# Patient Record
Sex: MALE | Race: WHITE | Employment: FULL TIME | URBAN - METROPOLITAN AREA
[De-identification: names, ages, dates, MRNs, and addresses within clinical notes are randomized per-mention and may not be internally consistent; named-entity substitution may affect disease eponyms.]

---

## 2017-03-24 ENCOUNTER — TELEPHONE (OUTPATIENT)
Dept: FAMILY MEDICINE CLINIC | Facility: CLINIC | Age: 57
End: 2017-03-24

## 2017-03-24 DIAGNOSIS — R73.01 IMPAIRED FASTING GLUCOSE: ICD-10-CM

## 2017-03-24 DIAGNOSIS — E78.5 DYSLIPIDEMIA: Primary | ICD-10-CM

## 2017-03-24 DIAGNOSIS — E03.8 SUBCLINICAL HYPOTHYROIDISM: ICD-10-CM

## 2017-03-24 DIAGNOSIS — I10 ESSENTIAL HYPERTENSION: ICD-10-CM

## 2017-03-24 RX ORDER — LOSARTAN POTASSIUM AND HYDROCHLOROTHIAZIDE 25; 100 MG/1; MG/1
TABLET ORAL
Qty: 30 TABLET | Refills: 0 | Status: SHIPPED | OUTPATIENT
Start: 2017-03-24 | End: 2017-03-31

## 2017-03-24 NOTE — TELEPHONE ENCOUNTER
Please call pt as he needs a BP check with Dr Sumeet Shankar, Lorsartan/HCTZ refill only #30 pended for approval.

## 2017-03-24 NOTE — TELEPHONE ENCOUNTER
PT has appointment 3/31/17. He would like to do labs before appt on 3/27/17. Please advise pt when in system. Thank you.

## 2017-03-24 NOTE — TELEPHONE ENCOUNTER
Call to pt-advised edward fasting lab orders have been placed. Pt mentions pharamcy told him he could get his losartan refill if he just calls the office. Informed pt dr Graham Burrell already sent refill for losartan-HCTZ to Take the Interview today.   Patient voices unders

## 2017-03-24 NOTE — TELEPHONE ENCOUNTER
Failed protocol,as pt has not seen your for 8months. Phone call for front staff to call pt and make an appt.  BP CHECK

## 2017-03-24 NOTE — TELEPHONE ENCOUNTER
Pt is scheduled for med visit 3/31/17  Last ofc visit 7/16 with recommendation to follow up in 2 wks-never returned. Last lab orders placed in 2015 never done. Please confirm what labs you want for this visit-CMP, A1C,  lipids, tsh with reflex?  If so, s

## 2017-03-27 ENCOUNTER — APPOINTMENT (OUTPATIENT)
Dept: LAB | Age: 57
End: 2017-03-27
Attending: FAMILY MEDICINE
Payer: COMMERCIAL

## 2017-03-27 DIAGNOSIS — R73.01 IMPAIRED FASTING GLUCOSE: ICD-10-CM

## 2017-03-27 DIAGNOSIS — E78.5 DYSLIPIDEMIA: ICD-10-CM

## 2017-03-27 DIAGNOSIS — I10 ESSENTIAL HYPERTENSION: ICD-10-CM

## 2017-03-27 DIAGNOSIS — E03.8 SUBCLINICAL HYPOTHYROIDISM: ICD-10-CM

## 2017-03-27 LAB
ALBUMIN SERPL-MCNC: 3.4 G/DL (ref 3.5–4.8)
ALP LIVER SERPL-CCNC: 118 U/L (ref 45–117)
ALT SERPL-CCNC: 57 U/L (ref 17–63)
AST SERPL-CCNC: 48 U/L (ref 15–41)
BILIRUB SERPL-MCNC: 0.5 MG/DL (ref 0.1–2)
BUN BLD-MCNC: 22 MG/DL (ref 8–20)
CALCIUM BLD-MCNC: 9.9 MG/DL (ref 8.3–10.3)
CHLORIDE: 99 MMOL/L (ref 101–111)
CHOLEST SMN-MCNC: 209 MG/DL (ref ?–200)
CO2: 29 MMOL/L (ref 22–32)
CREAT BLD-MCNC: 1.08 MG/DL (ref 0.7–1.3)
EST. AVERAGE GLUCOSE BLD GHB EST-MCNC: 309 MG/DL (ref 68–126)
GLUCOSE BLD-MCNC: 334 MG/DL (ref 70–99)
HBA1C MFR BLD HPLC: 12.4 % (ref ?–5.7)
HDLC SERPL-MCNC: 30 MG/DL (ref 45–?)
HDLC SERPL: 6.97 {RATIO} (ref ?–4.97)
LDLC SERPL CALC-MCNC: 124 MG/DL (ref ?–130)
M PROTEIN MFR SERPL ELPH: 8.8 G/DL (ref 6.1–8.3)
NONHDLC SERPL-MCNC: 179 MG/DL (ref ?–130)
POTASSIUM SERPL-SCNC: 4.1 MMOL/L (ref 3.6–5.1)
SODIUM SERPL-SCNC: 136 MMOL/L (ref 136–144)
TRIGLYCERIDES: 277 MG/DL (ref ?–150)
TSI SER-ACNC: 5.1 MIU/ML (ref 0.35–5.5)
VLDL: 55 MG/DL (ref 5–40)

## 2017-03-27 PROCEDURE — 80053 COMPREHEN METABOLIC PANEL: CPT

## 2017-03-27 PROCEDURE — 84443 ASSAY THYROID STIM HORMONE: CPT

## 2017-03-27 PROCEDURE — 83036 HEMOGLOBIN GLYCOSYLATED A1C: CPT

## 2017-03-27 PROCEDURE — 36415 COLL VENOUS BLD VENIPUNCTURE: CPT

## 2017-03-27 PROCEDURE — 80061 LIPID PANEL: CPT

## 2017-03-31 ENCOUNTER — OFFICE VISIT (OUTPATIENT)
Dept: FAMILY MEDICINE CLINIC | Facility: CLINIC | Age: 57
End: 2017-03-31

## 2017-03-31 VITALS
BODY MASS INDEX: 42.2 KG/M2 | WEIGHT: 315 LBS | HEIGHT: 72.5 IN | SYSTOLIC BLOOD PRESSURE: 160 MMHG | DIASTOLIC BLOOD PRESSURE: 100 MMHG | RESPIRATION RATE: 18 BRPM | HEART RATE: 88 BPM | TEMPERATURE: 98 F

## 2017-03-31 DIAGNOSIS — E11.40 UNCONTROLLED TYPE 2 DIABETES MELLITUS WITH DIABETIC NEUROPATHY, WITHOUT LONG-TERM CURRENT USE OF INSULIN (HCC): Primary | ICD-10-CM

## 2017-03-31 DIAGNOSIS — E11.69 HYPERLIPIDEMIA DUE TO TYPE 2 DIABETES MELLITUS (HCC): ICD-10-CM

## 2017-03-31 DIAGNOSIS — E66.01 MORBID OBESITY WITH BMI OF 45.0-49.9, ADULT (HCC): ICD-10-CM

## 2017-03-31 DIAGNOSIS — E78.5 HYPERLIPIDEMIA DUE TO TYPE 2 DIABETES MELLITUS (HCC): ICD-10-CM

## 2017-03-31 DIAGNOSIS — I10 HYPERTENSION GOAL BP (BLOOD PRESSURE) < 130/80: ICD-10-CM

## 2017-03-31 DIAGNOSIS — E11.65 UNCONTROLLED TYPE 2 DIABETES MELLITUS WITH DIABETIC NEUROPATHY, WITHOUT LONG-TERM CURRENT USE OF INSULIN (HCC): Primary | ICD-10-CM

## 2017-03-31 PROCEDURE — 99215 OFFICE O/P EST HI 40 MIN: CPT | Performed by: FAMILY MEDICINE

## 2017-03-31 RX ORDER — LOSARTAN POTASSIUM AND HYDROCHLOROTHIAZIDE 25; 100 MG/1; MG/1
1 TABLET ORAL
Qty: 90 TABLET | Refills: 0 | Status: SHIPPED | OUTPATIENT
Start: 2017-03-31 | End: 2017-07-10

## 2017-03-31 NOTE — PATIENT INSTRUCTIONS
Controlling Your Cholesterol  Cholesterol is a waxy substance. It travels in your blood through the blood vessels. When you have high cholesterol, it builds up in the walls of the blood vessels. This makes the vessels narrower. Blood flow decreases.  You · Choose an activity you enjoy. Walking, swimming, and riding a bike are some good ways to be active. · Start at a level where you feel comfortable. Increase your time and pace a little each week.   · Work up to 40 minutes of moderate to high intensity phy How medicine helps  Different kinds of medicines help with cholesterol levels. Some help lower your LDL (bad cholesterol). Some help raise your HDL (good cholesterol). Other medicines lower your triglyceride levels. And some do all three.  It may take some Statins can help you stay healthy. They can also help prevent a heart attack or stroke. You may need to take a statin if you are in one of these groups:  · Adults who have had a heart attack or stroke.  Or adults who have had peripheral vascular disease, a Food is an important tool that you can use to control diabetes and stay healthy. Eating well-balanced meals in the correct amounts will help you control your blood glucose levels and prevent low blood sugar reactions.  It will also help you reduce the healt · Avoid added salt. It can contribute to high blood pressure, which can cause heart disease. People with diabetes already have a risk of high blood pressure and heart disease. · Stay at a healthy weight.  If you need to lose weight, cut down on your portio · Fiber is found in foods such as vegetables, fruits, beans, and whole grains. Unlike other carbs, fiber isn’t digested or absorbed. So it doesn’t raise blood sugar.  In fact, fiber can help keep blood sugar from rising too fast. It also helps keep blood ch Protein helps the body build and repair muscle and other tissue. Protein has little or no effect on blood sugar. However, many foods that contain protein also contain saturated fat.  By choosing low-fat protein sources, you can get the benefits of protein w Keep track of the amount of carbohydrates you eat. This can help you keep the right balance of physical activity and medicine. The amount of carbohydrates needed will vary for each person.  It depends on many things such as your health, the medicines you ta The amount of food you eat affects your blood sugar. It also affects your weight. Your healthcare team will tell you how much of each type of food you should eat. · Use measuring cups and spoons and a food scale to measure serving sizes.   · Learn what a c Portion sizes are important to controlling your blood sugar and staying at a healthy weight. Stock up on healthy snack items so you always have them on hand. When to eat  Your meal plan will likely include breakfast, lunch, dinner, and some snacks.   · Try · Nerve problems (neuropathy), causing pain or loss of feeling in your feet and other parts of your body, potentially leading to an amputation of a limb   · High blood pressure (hypertension), putting strain on your heart and blood vessels  · Serious infec · Listen to your feelings. This will help you work through fear or anger. · Eat the same meals you eat. Your meal plan will be healthy for family and friends, too. · Exercise with you. Exercise is good for everyone.  It strengthens the heart and helps rel © 0371-1212 [address], [address] [place]. All rights reserved. This information is not intended as a substitute for professional medical care. Always follow your healthcare professional's instructions.

## 2017-03-31 NOTE — PROGRESS NOTES
[facility] Family Medicine Office Note  Chief Complaint:   Patient presents with:  Medication Follow-Up: BP CHECK      HPI:   This is a 55year old male coming in for diabetes, HTN, hyperlipidemia and obesity.     1.  Diabetes - The patient presen History:  No family history on file.   Allergies:  No Known Allergies  Current Meds:    Current Outpatient Prescriptions:  MetFORMIN HCl 500 MG Oral Tab 2 tabs PO BID Disp: 360 tablet Rfl: 0   SITagliptin Phosphate (JANUVIA) 100 MG Oral Tab Take 1 tablet (1 hepatosplenomegaly  EXTREMITIES:  Strength intact with 5/5 bilaterally upper and lower extremities, no edema noted; no ulcerations noted in bilateral legs but does have purplish hue and +2 pitting edema consistent with venous stasis, pedal pulses intact bi OV  -  Encourage low fat/low carb diet  -  Recheck lipid panel in 3 months  - DIETITIAN EDUCATION INITIAL, DIET (INTERNAL)    4.   Morbid obesity with BMI 45.0-49.0  -  Encourage strict low fat/low carb diet and exercise  -  Follow up in 3 months and will r

## 2017-04-21 ENCOUNTER — OFFICE VISIT (OUTPATIENT)
Dept: FAMILY MEDICINE CLINIC | Facility: CLINIC | Age: 57
End: 2017-04-21

## 2017-04-21 VITALS
RESPIRATION RATE: 18 BRPM | HEART RATE: 80 BPM | BODY MASS INDEX: 42.2 KG/M2 | TEMPERATURE: 97 F | WEIGHT: 315 LBS | SYSTOLIC BLOOD PRESSURE: 160 MMHG | DIASTOLIC BLOOD PRESSURE: 100 MMHG | OXYGEN SATURATION: 95 % | HEIGHT: 72.5 IN

## 2017-04-21 DIAGNOSIS — E11.65 UNCONTROLLED TYPE 2 DIABETES MELLITUS WITH DIABETIC NEUROPATHY, WITHOUT LONG-TERM CURRENT USE OF INSULIN (HCC): ICD-10-CM

## 2017-04-21 DIAGNOSIS — R07.9 INTERMITTENT CHEST PAIN: Primary | ICD-10-CM

## 2017-04-21 DIAGNOSIS — M54.41 CHRONIC BILATERAL LOW BACK PAIN WITH BILATERAL SCIATICA: ICD-10-CM

## 2017-04-21 DIAGNOSIS — G89.29 CHRONIC BILATERAL LOW BACK PAIN WITH BILATERAL SCIATICA: ICD-10-CM

## 2017-04-21 DIAGNOSIS — E11.40 UNCONTROLLED TYPE 2 DIABETES MELLITUS WITH DIABETIC NEUROPATHY, WITHOUT LONG-TERM CURRENT USE OF INSULIN (HCC): ICD-10-CM

## 2017-04-21 DIAGNOSIS — I10 HYPERTENSION GOAL BP (BLOOD PRESSURE) < 130/80: ICD-10-CM

## 2017-04-21 DIAGNOSIS — M54.42 CHRONIC BILATERAL LOW BACK PAIN WITH BILATERAL SCIATICA: ICD-10-CM

## 2017-04-21 PROCEDURE — 93000 ELECTROCARDIOGRAM COMPLETE: CPT | Performed by: FAMILY MEDICINE

## 2017-04-21 PROCEDURE — 99214 OFFICE O/P EST MOD 30 MIN: CPT | Performed by: FAMILY MEDICINE

## 2017-04-21 RX ORDER — METOPROLOL SUCCINATE 100 MG/1
100 TABLET, EXTENDED RELEASE ORAL DAILY
Qty: 30 TABLET | Refills: 0 | Status: SHIPPED | OUTPATIENT
Start: 2017-04-21 | End: 2017-07-10 | Stop reason: DRUGHIGH

## 2017-04-21 NOTE — PROGRESS NOTES
[facility] Family Medicine Office Note  Chief Complaint:   Patient presents with:  Pain: right upper chest and upper rib to axilla       HPI:   This is a 55year old male coming in for intermittent right chest pain, diabetes and HTN.     1.  Inter ago.  Revised in 1995     Social History:    Smoking Status: Never Smoker                      Smokeless Status: Never Used                        Alcohol Use: No              Family History:  History reviewed. No pertinent family history.   Allergies:  No auscultation bilaterally, no rales/rhonchi/wheezing  HEART:  Regular rate and rhythm, no murmurs, rubs or gallops  CHEST:  + TTP of right upper pectoralis muscle  ABDOMEN:  Soft, nondistended, nontender, bowel sounds normal in all 4 quadrants, no hepatospl 07/09/2011  Influenza Vaccine(1) due on 09/01/2016  Annual Physical due on 09/17/2016    Patient/Caregiver Education: Patient/Caregiver Education: There are no barriers to learning. Medical education done. Outcome: Patient verbalizes understanding.  Yaquelin

## 2017-04-21 NOTE — PATIENT INSTRUCTIONS
Uncertain Causes of Chest Pain    Chest pain can happen for a number of reasons. Sometimes the cause can't be determined.  If your condition does not seem serious, and your pain does not appear to be coming from your heart, your healthcare provider may re © 2525-6039 [address], [address] [place]. All rights reserved. This information is not intended as a substitute for professional medical care. Always follow your healthcare professional's instructions.         Jovi Mckeon · Cough with dark colored sputum (phlegm) or blood  · Fever of 100.4ºF (38ºC) or higher, or as directed by your healthcare provider  · Swelling, pain or redness in one leg  · Shortness of breath  Date Last Reviewed: 12/30/2015  © 6297-7982 [employer] ¨ Don’t add salt to your food at the table. ¨ Use only small amounts of salt when cooking. · Begin an exercise program. Talk with your health care provider about what exercise program is best for you. It doesn’t have to be difficult.  Even brisk walking f · Difficulty speaking or seeing   Date Last Reviewed: 11/25/2014  © 2525-1166 The [address] [place] [address], [address] [place] [address]. All rights reserved. This information is not intended as a substitute for professional medical care.  Always

## 2017-05-02 ENCOUNTER — TELEPHONE (OUTPATIENT)
Dept: FAMILY MEDICINE CLINIC | Facility: CLINIC | Age: 57
End: 2017-05-02

## 2017-05-02 NOTE — TELEPHONE ENCOUNTER
Please call patient to schedule a nurse visit to recheck their Blood pressure. Pt BP was elevated at last OV with PCP.

## 2017-07-06 ENCOUNTER — TELEPHONE (OUTPATIENT)
Dept: FAMILY MEDICINE CLINIC | Facility: CLINIC | Age: 57
End: 2017-07-06

## 2017-07-06 NOTE — TELEPHONE ENCOUNTER
Please call patient. Due for visit with Dr. Per Burris and labs for DM. Orders are in for labs. Please schedule follow up visit.

## 2017-07-10 ENCOUNTER — OFFICE VISIT (OUTPATIENT)
Dept: FAMILY MEDICINE CLINIC | Facility: CLINIC | Age: 57
End: 2017-07-10

## 2017-07-10 ENCOUNTER — LAB ENCOUNTER (OUTPATIENT)
Dept: LAB | Age: 57
End: 2017-07-10
Attending: FAMILY MEDICINE
Payer: COMMERCIAL

## 2017-07-10 VITALS
DIASTOLIC BLOOD PRESSURE: 100 MMHG | HEIGHT: 72.5 IN | RESPIRATION RATE: 18 BRPM | HEART RATE: 80 BPM | BODY MASS INDEX: 42.2 KG/M2 | SYSTOLIC BLOOD PRESSURE: 180 MMHG | WEIGHT: 315 LBS | TEMPERATURE: 97 F

## 2017-07-10 DIAGNOSIS — E11.40 UNCONTROLLED TYPE 2 DIABETES MELLITUS WITH DIABETIC NEUROPATHY, WITHOUT LONG-TERM CURRENT USE OF INSULIN (HCC): ICD-10-CM

## 2017-07-10 DIAGNOSIS — I10 HYPERTENSION GOAL BP (BLOOD PRESSURE) < 130/80: ICD-10-CM

## 2017-07-10 DIAGNOSIS — I10 ESSENTIAL HYPERTENSION WITH GOAL BLOOD PRESSURE LESS THAN 140/90: ICD-10-CM

## 2017-07-10 DIAGNOSIS — Z12.11 SCREEN FOR COLON CANCER: ICD-10-CM

## 2017-07-10 DIAGNOSIS — E11.65 UNCONTROLLED TYPE 2 DIABETES MELLITUS WITH DIABETIC NEUROPATHY, WITHOUT LONG-TERM CURRENT USE OF INSULIN (HCC): Primary | ICD-10-CM

## 2017-07-10 DIAGNOSIS — E66.01 MORBID OBESITY WITH BMI OF 45.0-49.9, ADULT (HCC): ICD-10-CM

## 2017-07-10 DIAGNOSIS — G47.19 EXCESSIVE DAYTIME SLEEPINESS: ICD-10-CM

## 2017-07-10 DIAGNOSIS — E11.40 UNCONTROLLED TYPE 2 DIABETES MELLITUS WITH DIABETIC NEUROPATHY, WITHOUT LONG-TERM CURRENT USE OF INSULIN (HCC): Primary | ICD-10-CM

## 2017-07-10 DIAGNOSIS — R60.9 PERIPHERAL EDEMA: ICD-10-CM

## 2017-07-10 DIAGNOSIS — E11.65 UNCONTROLLED TYPE 2 DIABETES MELLITUS WITH DIABETIC NEUROPATHY, WITHOUT LONG-TERM CURRENT USE OF INSULIN (HCC): ICD-10-CM

## 2017-07-10 DIAGNOSIS — E11.69 HYPERLIPIDEMIA DUE TO TYPE 2 DIABETES MELLITUS (HCC): ICD-10-CM

## 2017-07-10 DIAGNOSIS — E78.5 HYPERLIPIDEMIA DUE TO TYPE 2 DIABETES MELLITUS (HCC): ICD-10-CM

## 2017-07-10 LAB
ALBUMIN SERPL-MCNC: 3.4 G/DL (ref 3.5–4.8)
ALP LIVER SERPL-CCNC: 95 U/L (ref 45–117)
ALT SERPL-CCNC: 44 U/L (ref 17–63)
AST SERPL-CCNC: 29 U/L (ref 15–41)
BILIRUB SERPL-MCNC: 0.4 MG/DL (ref 0.1–2)
BUN BLD-MCNC: 20 MG/DL (ref 8–20)
CALCIUM BLD-MCNC: 9.2 MG/DL (ref 8.3–10.3)
CHLORIDE: 102 MMOL/L (ref 101–111)
CHOLEST SMN-MCNC: 214 MG/DL (ref ?–200)
CO2: 31 MMOL/L (ref 22–32)
CREAT BLD-MCNC: 1.12 MG/DL (ref 0.7–1.3)
CREAT UR-SCNC: 171 MG/DL
EST. AVERAGE GLUCOSE BLD GHB EST-MCNC: 280 MG/DL (ref 68–126)
GLUCOSE BLD-MCNC: 310 MG/DL (ref 70–99)
HBA1C MFR BLD HPLC: 11.4 % (ref ?–5.7)
HDLC SERPL-MCNC: 31 MG/DL (ref 45–?)
HDLC SERPL: 6.9 {RATIO} (ref ?–4.97)
LDLC SERPL DIRECT ASSAY-MCNC: 133 MG/DL (ref ?–100)
M PROTEIN MFR SERPL ELPH: 8.6 G/DL (ref 6.1–8.3)
MICROALBUMIN UR-MCNC: 3.36 MG/DL
MICROALBUMIN/CREAT 24H UR-RTO: 19.6 UG/MG (ref ?–30)
NONHDLC SERPL-MCNC: 183 MG/DL (ref ?–130)
POTASSIUM SERPL-SCNC: 4.1 MMOL/L (ref 3.6–5.1)
SODIUM SERPL-SCNC: 139 MMOL/L (ref 136–144)
TRIGLYCERIDES: 404 MG/DL (ref ?–150)

## 2017-07-10 PROCEDURE — 82570 ASSAY OF URINE CREATININE: CPT | Performed by: FAMILY MEDICINE

## 2017-07-10 PROCEDURE — 83036 HEMOGLOBIN GLYCOSYLATED A1C: CPT | Performed by: FAMILY MEDICINE

## 2017-07-10 PROCEDURE — 99215 OFFICE O/P EST HI 40 MIN: CPT | Performed by: FAMILY MEDICINE

## 2017-07-10 PROCEDURE — 80053 COMPREHEN METABOLIC PANEL: CPT | Performed by: FAMILY MEDICINE

## 2017-07-10 PROCEDURE — 82043 UR ALBUMIN QUANTITATIVE: CPT | Performed by: FAMILY MEDICINE

## 2017-07-10 PROCEDURE — 36415 COLL VENOUS BLD VENIPUNCTURE: CPT | Performed by: FAMILY MEDICINE

## 2017-07-10 PROCEDURE — 80061 LIPID PANEL: CPT | Performed by: FAMILY MEDICINE

## 2017-07-10 PROCEDURE — 83721 ASSAY OF BLOOD LIPOPROTEIN: CPT | Performed by: FAMILY MEDICINE

## 2017-07-10 RX ORDER — POTASSIUM CHLORIDE 20 MEQ/1
TABLET, EXTENDED RELEASE ORAL
Qty: 30 TABLET | Refills: 0 | Status: SHIPPED | OUTPATIENT
Start: 2017-07-10 | End: 2017-10-10

## 2017-07-10 RX ORDER — LOSARTAN POTASSIUM AND HYDROCHLOROTHIAZIDE 25; 100 MG/1; MG/1
1 TABLET ORAL
Qty: 90 TABLET | Refills: 0 | Status: SHIPPED | OUTPATIENT
Start: 2017-07-10 | End: 2018-01-23

## 2017-07-10 RX ORDER — FUROSEMIDE 20 MG/1
TABLET ORAL
Qty: 60 TABLET | Refills: 0 | Status: SHIPPED | OUTPATIENT
Start: 2017-07-10 | End: 2017-09-23

## 2017-07-10 RX ORDER — METOPROLOL SUCCINATE 200 MG/1
200 TABLET, EXTENDED RELEASE ORAL DAILY
Qty: 90 TABLET | Refills: 0 | Status: SHIPPED | OUTPATIENT
Start: 2017-07-10 | End: 2017-10-26 | Stop reason: ALTCHOICE

## 2017-07-10 NOTE — PATIENT INSTRUCTIONS
Diet: Diabetes  Food is an important tool that you can use to control diabetes and stay healthy. Eating well-balanced meals in the correct amounts will help you control your blood glucose levels and prevent low blood sugar reactions.  It will also help yo · Avoid added salt. It can contribute to high blood pressure, which can cause heart disease. People with diabetes already have a risk of high blood pressure and heart disease. · Stay at a healthy weight.  If you need to lose weight, cut down on your portio · Kidney disease (nephropathy) can eventually lead to kidney failure, which may require dialysis or kidney transplant   · Nerve problems (neuropathy), causing pain or loss of feeling in your feet and other parts of your body, potentially leading to an ampu A blood pressure reading is made up of 2 numbers. There is a top number over a bottom number. The top number is the systolic pressure. The bottom number is the diastolic pressure. A normal blood pressure is less than 120 over less than 80.  High blood press · Learn how to handle stress better. This is an important part of any program to lower blood pressure. Learn ways to relax. These include meditation, yoga, and biofeedback. · If medicines were prescribed, take them exactly as directed.  Missing doses may c

## 2017-07-10 NOTE — PROGRESS NOTES
[facility] Family Medicine Office Note  Chief Complaint:   Patient presents with:  Diabetes: DM, HTN CHECK      HPI:   This is a 65year old male coming in for follow up of DM2, HTN, obesity and excessive daytime sleepiness.     1.  DM2 - The linnea Never Used                      Alcohol use: No              Family History:  History reviewed. No pertinent family history. Allergies:  No Known Allergies  Current Meds:    Current Outpatient Prescriptions:   SITagliptin Phosphate (JANUVIA) 100 MG Oral Ta apparent distress, morbidly obese  HEAD:  Normocephalic, atraumatic  NECK:  Supple,no LAD  LUNGS: clear to auscultation bilaterally, no rales/rhonchi/wheezing  HEART:  Regular rate and rhythm, no murmurs, rubs or gallops  ABDOMEN:  Soft, nondistended, nont adult [facility])  -Encourage low fat/low carb intake and exercise  -Consider concentrate and weight loss clinic referral if no improvement in weight loss    4.  Excessive daytime sleepiness  -Check sleep study to rule out obstructive sleep apnea  -FRANSISCO could be c Medical education done. Outcome: Patient verbalizes understanding. Patient is notified to call with any questions, complications, allergies, or worsening or changing symptoms.   Patient is to call with any side effects or complications from the treatments

## 2017-07-14 DIAGNOSIS — E78.5 DYSLIPIDEMIA: Primary | ICD-10-CM

## 2017-07-14 DIAGNOSIS — R89.9 ABNORMAL LABORATORY TEST RESULT: ICD-10-CM

## 2017-07-14 DIAGNOSIS — R53.83 FATIGUE, UNSPECIFIED TYPE: ICD-10-CM

## 2017-07-14 DIAGNOSIS — I10 ESSENTIAL HYPERTENSION: ICD-10-CM

## 2017-07-14 DIAGNOSIS — R73.01 IMPAIRED FASTING GLUCOSE: ICD-10-CM

## 2017-07-14 DIAGNOSIS — Z12.5 SCREENING FOR PROSTATE CANCER: ICD-10-CM

## 2017-07-24 ENCOUNTER — NURSE ONLY (OUTPATIENT)
Dept: FAMILY MEDICINE CLINIC | Facility: CLINIC | Age: 57
End: 2017-07-24

## 2017-07-24 VITALS — DIASTOLIC BLOOD PRESSURE: 86 MMHG | SYSTOLIC BLOOD PRESSURE: 150 MMHG

## 2017-07-24 DIAGNOSIS — I10 HYPERTENSION, UNSPECIFIED TYPE: Primary | ICD-10-CM

## 2017-07-24 PROCEDURE — 99211 OFF/OP EST MAY X REQ PHY/QHP: CPT | Performed by: FAMILY MEDICINE

## 2017-07-24 NOTE — PROGRESS NOTES
Pt states he hasn't started the Metroprolol 200 mg as directed yet due to pharmacy issues and also hasn't had the time to pick. No complaints. States he will pick it up today.     Pt advised to make sure to  the prescription today and to follow up

## 2017-08-08 ENCOUNTER — TELEPHONE (OUTPATIENT)
Dept: FAMILY MEDICINE CLINIC | Facility: CLINIC | Age: 57
End: 2017-08-08

## 2017-08-08 PROBLEM — IMO0001 UNCONTROLLED TYPE 2 DIABETES MELLITUS WITHOUT COMPLICATION, WITHOUT LONG-TERM CURRENT USE OF INSULIN: Status: ACTIVE | Noted: 2017-08-08

## 2017-08-08 NOTE — TELEPHONE ENCOUNTER
I need an official Dx of Diabetes Type 1 or 2 in the chart to be able to process the PA for the [place].

## 2017-09-23 DIAGNOSIS — R60.9 PERIPHERAL EDEMA: ICD-10-CM

## 2017-09-25 RX ORDER — FUROSEMIDE 20 MG/1
TABLET ORAL
Qty: 60 TABLET | Refills: 0 | Status: SHIPPED | OUTPATIENT
Start: 2017-09-25 | End: 2019-07-12 | Stop reason: ALTCHOICE

## 2017-10-10 ENCOUNTER — OFFICE VISIT (OUTPATIENT)
Dept: FAMILY MEDICINE CLINIC | Facility: CLINIC | Age: 57
End: 2017-10-10

## 2017-10-10 VITALS
HEART RATE: 84 BPM | RESPIRATION RATE: 16 BRPM | HEIGHT: 72.5 IN | WEIGHT: 315 LBS | SYSTOLIC BLOOD PRESSURE: 162 MMHG | TEMPERATURE: 98 F | BODY MASS INDEX: 42.2 KG/M2 | DIASTOLIC BLOOD PRESSURE: 92 MMHG

## 2017-10-10 DIAGNOSIS — R60.9 PERIPHERAL EDEMA: ICD-10-CM

## 2017-10-10 DIAGNOSIS — J22 ACUTE LOWER RESPIRATORY INFECTION: Primary | ICD-10-CM

## 2017-10-10 PROCEDURE — 99214 OFFICE O/P EST MOD 30 MIN: CPT | Performed by: FAMILY MEDICINE

## 2017-10-10 RX ORDER — POTASSIUM CHLORIDE 20 MEQ/1
TABLET, EXTENDED RELEASE ORAL
Qty: 90 TABLET | Refills: 0 | Status: SHIPPED | OUTPATIENT
Start: 2017-10-10

## 2017-10-10 RX ORDER — AZITHROMYCIN 250 MG/1
TABLET, FILM COATED ORAL
Qty: 6 TABLET | Refills: 0 | Status: SHIPPED | OUTPATIENT
Start: 2017-10-10 | End: 2017-11-10 | Stop reason: ALTCHOICE

## 2017-10-10 NOTE — PROGRESS NOTES
HPI:   Deborah Acuna is a 65year old male who presents for upper respiratory symptoms for  10  days. Patient reports congestion, cough with green colored sputum, OTC cold meds have not been helping, prior history of bronchitis, denies fever.       Mehreen Pearson shortness of breath with exertion; cough  CARDIOVASCULAR: denies chest pain on exertion  GI: no nausea or abdominal pain  NEURO: denies headaches    EXAM:   BP (!) 168/94 (BP Location: Right arm, Patient Position: Sitting, Cuff Size: large)   Pulse 84   Te

## 2017-10-26 ENCOUNTER — TELEPHONE (OUTPATIENT)
Dept: FAMILY MEDICINE CLINIC | Facility: CLINIC | Age: 57
End: 2017-10-26

## 2017-10-26 RX ORDER — ATENOLOL 50 MG/1
50 TABLET ORAL DAILY
Qty: 90 TABLET | Refills: 0 | Status: SHIPPED | OUTPATIENT
Start: 2017-10-26 | End: 2017-11-10

## 2017-10-26 NOTE — TELEPHONE ENCOUNTER
Pt was taking Antelol (sp?) (BP med) before but then [facility] had a shortage of med and pt could not get it. Dr. Ester Snider wanted pt to call [facility] to see if they had gotten the Antelol in. Pt called [facility] and they do have the med now.   Pt is asking for refill

## 2017-10-26 NOTE — TELEPHONE ENCOUNTER
S/w pt. Advised of below. He agrees. Rx pended. Med list updated. Appt made for nurse vs, needs early am d/t schedule.

## 2017-10-26 NOTE — TELEPHONE ENCOUNTER
Since patient is taking both losartan/hydrochlorothiazide and metoprolol and blood pressure is still not controlled, would continue losartan/hydrochlorothiazide 100/25 mg daily and stop metoprolol and start atenolol 50 mg daily.   His metoprolol dose is mid

## 2017-10-26 NOTE — TELEPHONE ENCOUNTER
Pt was called as I do not see that pt ever was on atenolol. Pt states, my mother is on this medication and [facility] carries it. This is the only BP med that controlled moms BP. Pt states you were going to switch him from losartan to atenolol as BP still high.

## 2017-11-10 ENCOUNTER — NURSE ONLY (OUTPATIENT)
Dept: FAMILY MEDICINE CLINIC | Facility: CLINIC | Age: 57
End: 2017-11-10

## 2017-11-10 VITALS — DIASTOLIC BLOOD PRESSURE: 96 MMHG | HEART RATE: 86 BPM | SYSTOLIC BLOOD PRESSURE: 156 MMHG

## 2017-11-10 DIAGNOSIS — I10 HYPERTENSION, UNSPECIFIED TYPE: Primary | ICD-10-CM

## 2017-11-10 PROCEDURE — 99211 OFF/OP EST MAY X REQ PHY/QHP: CPT | Performed by: FAMILY MEDICINE

## 2017-11-10 RX ORDER — ATENOLOL 50 MG/1
TABLET ORAL
Qty: 90 TABLET | Refills: 0 | COMMUNITY
Start: 2017-11-10 | End: 2019-05-03

## 2017-11-10 NOTE — PROGRESS NOTES
Patient here for BP check.  BP(right)-150/90, P-86  BP(left)-156/96, P-86  BP-146/90. Dr. Floridalma Mae notified.   Patient to continue current medications, Patient to increase to Atenolol 100 mg daily, (patient currently taking Atenolol 50 mg daily), take BP at [facility]

## 2018-01-09 ENCOUNTER — TELEPHONE (OUTPATIENT)
Dept: FAMILY MEDICINE CLINIC | Facility: CLINIC | Age: 58
End: 2018-01-09

## 2018-01-09 NOTE — TELEPHONE ENCOUNTER
Please call patient. Needs DM follow up with Dr. Lucy Ramirez. Have labs done prior to visit - orders are in.

## 2018-01-23 DIAGNOSIS — E11.40 UNCONTROLLED TYPE 2 DIABETES MELLITUS WITH DIABETIC NEUROPATHY, WITHOUT LONG-TERM CURRENT USE OF INSULIN (HCC): ICD-10-CM

## 2018-01-23 DIAGNOSIS — I10 ESSENTIAL HYPERTENSION WITH GOAL BLOOD PRESSURE LESS THAN 140/90: ICD-10-CM

## 2018-01-23 DIAGNOSIS — E11.65 UNCONTROLLED TYPE 2 DIABETES MELLITUS WITH DIABETIC NEUROPATHY, WITHOUT LONG-TERM CURRENT USE OF INSULIN (HCC): ICD-10-CM

## 2018-01-24 ENCOUNTER — TELEPHONE (OUTPATIENT)
Dept: FAMILY MEDICINE CLINIC | Facility: CLINIC | Age: 58
End: 2018-01-24

## 2018-01-26 DIAGNOSIS — E11.65 UNCONTROLLED TYPE 2 DIABETES MELLITUS WITH DIABETIC NEUROPATHY, WITHOUT LONG-TERM CURRENT USE OF INSULIN (HCC): ICD-10-CM

## 2018-01-26 DIAGNOSIS — E11.40 UNCONTROLLED TYPE 2 DIABETES MELLITUS WITH DIABETIC NEUROPATHY, WITHOUT LONG-TERM CURRENT USE OF INSULIN (HCC): ICD-10-CM

## 2018-01-26 RX ORDER — ATENOLOL 50 MG/1
50 TABLET ORAL DAILY
Qty: 90 TABLET | Refills: 0 | OUTPATIENT
Start: 2018-01-26

## 2018-01-26 RX ORDER — LOSARTAN POTASSIUM AND HYDROCHLOROTHIAZIDE 25; 100 MG/1; MG/1
1 TABLET ORAL
Qty: 90 TABLET | Refills: 0 | Status: SHIPPED | OUTPATIENT
Start: 2018-01-26 | End: 2019-05-03

## 2018-01-26 RX ORDER — SITAGLIPTIN 100 MG/1
100 TABLET, FILM COATED ORAL DAILY
Qty: 90 TABLET | Refills: 0 | Status: SHIPPED | OUTPATIENT
Start: 2018-01-26 | End: 2019-05-03

## 2018-01-26 RX ORDER — ATENOLOL 50 MG/1
50 TABLET ORAL DAILY
Qty: 90 TABLET | Refills: 0 | Status: SHIPPED | OUTPATIENT
Start: 2018-01-26 | End: 2019-05-03

## 2018-01-26 RX ORDER — SITAGLIPTIN 100 MG/1
100 TABLET, FILM COATED ORAL DAILY
Qty: 90 TABLET | Refills: 0 | Status: SHIPPED | OUTPATIENT
Start: 2018-01-26 | End: 2018-01-26

## 2018-02-06 ENCOUNTER — LAB ENCOUNTER (OUTPATIENT)
Dept: LAB | Age: 58
End: 2018-02-06
Attending: FAMILY MEDICINE
Payer: COMMERCIAL

## 2018-02-06 DIAGNOSIS — I10 ESSENTIAL HYPERTENSION: ICD-10-CM

## 2018-02-06 DIAGNOSIS — E78.5 DYSLIPIDEMIA: ICD-10-CM

## 2018-02-06 DIAGNOSIS — Z12.5 SCREENING FOR PROSTATE CANCER: ICD-10-CM

## 2018-02-06 DIAGNOSIS — R73.01 IMPAIRED FASTING GLUCOSE: ICD-10-CM

## 2018-02-06 DIAGNOSIS — R89.9 ABNORMAL LABORATORY TEST RESULT: ICD-10-CM

## 2018-02-06 DIAGNOSIS — R53.83 FATIGUE, UNSPECIFIED TYPE: ICD-10-CM

## 2018-02-06 LAB
ALBUMIN SERPL-MCNC: 3.5 G/DL (ref 3.5–4.8)
ALP LIVER SERPL-CCNC: 107 U/L (ref 45–117)
ALT SERPL-CCNC: 59 U/L (ref 17–63)
AST SERPL-CCNC: 42 U/L (ref 15–41)
BASOPHILS # BLD AUTO: 0.06 X10(3) UL (ref 0–0.1)
BASOPHILS NFR BLD AUTO: 0.7 %
BILIRUB SERPL-MCNC: 0.5 MG/DL (ref 0.1–2)
BUN BLD-MCNC: 15 MG/DL (ref 8–20)
CALCIUM BLD-MCNC: 9.3 MG/DL (ref 8.3–10.3)
CHLORIDE: 100 MMOL/L (ref 101–111)
CHOLEST SMN-MCNC: 222 MG/DL (ref ?–200)
CO2: 31 MMOL/L (ref 22–32)
COMPLEXED PSA SERPL-MCNC: 1.05 NG/ML (ref 0.01–4)
CREAT BLD-MCNC: 1.01 MG/DL (ref 0.7–1.3)
EOSINOPHIL # BLD AUTO: 0.12 X10(3) UL (ref 0–0.3)
EOSINOPHIL NFR BLD AUTO: 1.5 %
ERYTHROCYTE [DISTWIDTH] IN BLOOD BY AUTOMATED COUNT: 12.1 % (ref 11.5–16)
EST. AVERAGE GLUCOSE BLD GHB EST-MCNC: 295 MG/DL (ref 68–126)
GLUCOSE BLD-MCNC: 288 MG/DL (ref 70–99)
HBA1C MFR BLD HPLC: 11.9 % (ref ?–5.7)
HCT VFR BLD AUTO: 51 % (ref 37–53)
HDLC SERPL-MCNC: 30 MG/DL (ref 45–?)
HDLC SERPL: 7.4 {RATIO} (ref ?–4.97)
HGB BLD-MCNC: 17.3 G/DL (ref 13–17)
IMMATURE GRANULOCYTE COUNT: 0.02 X10(3) UL (ref 0–1)
IMMATURE GRANULOCYTE RATIO %: 0.2 %
LDLC SERPL CALC-MCNC: 141 MG/DL (ref ?–130)
LYMPHOCYTES # BLD AUTO: 2.51 X10(3) UL (ref 0.9–4)
LYMPHOCYTES NFR BLD AUTO: 30.8 %
M PROTEIN MFR SERPL ELPH: 8.6 G/DL (ref 6.1–8.3)
MCH RBC QN AUTO: 30.9 PG (ref 27–33.2)
MCHC RBC AUTO-ENTMCNC: 33.9 G/DL (ref 31–37)
MCV RBC AUTO: 91.2 FL (ref 80–99)
MONOCYTES # BLD AUTO: 0.65 X10(3) UL (ref 0.1–0.6)
MONOCYTES NFR BLD AUTO: 8 %
NEUTROPHIL ABS PRELIM: 4.8 X10 (3) UL (ref 1.3–6.7)
NEUTROPHILS # BLD AUTO: 4.8 X10(3) UL (ref 1.3–6.7)
NEUTROPHILS NFR BLD AUTO: 58.8 %
NONHDLC SERPL-MCNC: 192 MG/DL (ref ?–130)
PLATELET # BLD AUTO: 165 10(3)UL (ref 150–450)
POTASSIUM SERPL-SCNC: 4 MMOL/L (ref 3.6–5.1)
RBC # BLD AUTO: 5.59 X10(6)UL (ref 4.3–5.7)
RED CELL DISTRIBUTION WIDTH-SD: 40.1 FL (ref 35.1–46.3)
SODIUM SERPL-SCNC: 138 MMOL/L (ref 136–144)
TRIGL SERPL-MCNC: 256 MG/DL (ref ?–150)
VLDLC SERPL CALC-MCNC: 51 MG/DL (ref 5–40)
WBC # BLD AUTO: 8.2 X10(3) UL (ref 4–13)

## 2018-02-06 PROCEDURE — 80061 LIPID PANEL: CPT | Performed by: FAMILY MEDICINE

## 2018-02-06 PROCEDURE — 84153 ASSAY OF PSA TOTAL: CPT | Performed by: FAMILY MEDICINE

## 2018-02-06 PROCEDURE — 80053 COMPREHEN METABOLIC PANEL: CPT | Performed by: FAMILY MEDICINE

## 2018-02-06 PROCEDURE — 85025 COMPLETE CBC W/AUTO DIFF WBC: CPT | Performed by: FAMILY MEDICINE

## 2018-02-06 PROCEDURE — 83036 HEMOGLOBIN GLYCOSYLATED A1C: CPT | Performed by: FAMILY MEDICINE

## 2018-02-06 PROCEDURE — 36415 COLL VENOUS BLD VENIPUNCTURE: CPT | Performed by: FAMILY MEDICINE

## 2018-02-08 ENCOUNTER — OFFICE VISIT (OUTPATIENT)
Dept: FAMILY MEDICINE CLINIC | Facility: CLINIC | Age: 58
End: 2018-02-08

## 2018-02-08 VITALS
HEART RATE: 88 BPM | BODY MASS INDEX: 42.2 KG/M2 | RESPIRATION RATE: 20 BRPM | SYSTOLIC BLOOD PRESSURE: 178 MMHG | TEMPERATURE: 97 F | HEIGHT: 72.5 IN | DIASTOLIC BLOOD PRESSURE: 100 MMHG | WEIGHT: 315 LBS

## 2018-02-08 DIAGNOSIS — E66.01 MORBID OBESITY WITH BMI OF 45.0-49.9, ADULT (HCC): ICD-10-CM

## 2018-02-08 DIAGNOSIS — E11.69 HYPERLIPIDEMIA ASSOCIATED WITH TYPE 2 DIABETES MELLITUS (HCC): ICD-10-CM

## 2018-02-08 DIAGNOSIS — I10 ESSENTIAL HYPERTENSION WITH GOAL BLOOD PRESSURE LESS THAN 130/80: ICD-10-CM

## 2018-02-08 DIAGNOSIS — E78.5 HYPERLIPIDEMIA ASSOCIATED WITH TYPE 2 DIABETES MELLITUS (HCC): ICD-10-CM

## 2018-02-08 DIAGNOSIS — IMO0001 UNCONTROLLED TYPE 2 DIABETES MELLITUS WITHOUT COMPLICATION, WITHOUT LONG-TERM CURRENT USE OF INSULIN: Primary | ICD-10-CM

## 2018-02-08 PROCEDURE — 99214 OFFICE O/P EST MOD 30 MIN: CPT | Performed by: FAMILY MEDICINE

## 2018-02-08 RX ORDER — AMLODIPINE BESYLATE 10 MG/1
10 TABLET ORAL DAILY
Qty: 90 TABLET | Refills: 0 | Status: SHIPPED | OUTPATIENT
Start: 2018-02-08 | End: 2021-03-10

## 2018-02-08 RX ORDER — ROSUVASTATIN CALCIUM 10 MG/1
10 TABLET, COATED ORAL NIGHTLY
Qty: 90 TABLET | Refills: 0 | Status: SHIPPED | OUTPATIENT
Start: 2018-02-08 | End: 2019-05-03 | Stop reason: DRUGHIGH

## 2018-05-13 ENCOUNTER — PATIENT OUTREACH (OUTPATIENT)
Dept: FAMILY MEDICINE CLINIC | Facility: CLINIC | Age: 58
End: 2018-05-13

## 2018-05-13 NOTE — PROGRESS NOTES
Patient has dx of HTN and DM. Pt's last office visit 02/2018- per Dr Lindo Puls note pt to follow up in 3 months. Please have pt schedule med check/follow up HTN and DM with Dr Floridalma Mae.  Thanks

## 2018-05-15 NOTE — PROGRESS NOTES
Call to patient reaches a business and name of gentleman other than patient. Unable to reach letter sent via [address]

## 2018-06-03 ENCOUNTER — PATIENT OUTREACH (OUTPATIENT)
Dept: FAMILY MEDICINE CLINIC | Facility: CLINIC | Age: 58
End: 2018-06-03

## 2018-06-03 NOTE — PROGRESS NOTES
Please call pt to inquire if pt has had a colonoscopy in the last 10 years and if so who performed it (name and phone #). Please let [facility] know so I can obtain the report.     If pt did not have a colonoscopy please advise them we will leave the FIT stoo

## 2019-03-20 ENCOUNTER — TELEPHONE (OUTPATIENT)
Dept: FAMILY MEDICINE CLINIC | Facility: CLINIC | Age: 59
End: 2019-03-20

## 2019-03-20 NOTE — TELEPHONE ENCOUNTER
PLease call pt to schedule visit with Dr. Michael Sparrow for diabetes and BP follow up.   He has not been seen since Feb 2018

## 2019-03-26 NOTE — PROGRESS NOTES
Phone rang continuously and did not connect to a voicemail. Sent unable to reach letter via [employer].

## 2019-03-26 NOTE — TELEPHONE ENCOUNTER
Phone rang continuously and never connected to a voicemail. Unable to reach letter sent via [address]

## 2019-04-02 ENCOUNTER — TELEPHONE (OUTPATIENT)
Dept: FAMILY MEDICINE CLINIC | Facility: CLINIC | Age: 59
End: 2019-04-02

## 2019-04-29 NOTE — PROGRESS NOTES
Please call pt to inquire if pt has had a colonoscopy in the last 10 years and if so who performed it (name and phone #). Please let Nadyne Closs know so I can obtain the report.     If pt did not have a colonoscopy please advise them we will leave the FIT stoo

## 2019-05-03 ENCOUNTER — OFFICE VISIT (OUTPATIENT)
Dept: FAMILY MEDICINE CLINIC | Facility: CLINIC | Age: 59
End: 2019-05-03
Payer: COMMERCIAL

## 2019-05-03 ENCOUNTER — LAB ENCOUNTER (OUTPATIENT)
Dept: LAB | Age: 59
End: 2019-05-03
Attending: FAMILY MEDICINE
Payer: COMMERCIAL

## 2019-05-03 VITALS
BODY MASS INDEX: 42.2 KG/M2 | RESPIRATION RATE: 14 BRPM | DIASTOLIC BLOOD PRESSURE: 110 MMHG | WEIGHT: 315 LBS | HEIGHT: 72.5 IN | TEMPERATURE: 97 F | HEART RATE: 78 BPM | SYSTOLIC BLOOD PRESSURE: 170 MMHG

## 2019-05-03 DIAGNOSIS — E11.69 HYPERLIPIDEMIA ASSOCIATED WITH TYPE 2 DIABETES MELLITUS (HCC): ICD-10-CM

## 2019-05-03 DIAGNOSIS — Z12.11 COLON CANCER SCREENING: ICD-10-CM

## 2019-05-03 DIAGNOSIS — E11.65 UNCONTROLLED TYPE 2 DIABETES MELLITUS WITH HYPERGLYCEMIA, WITHOUT LONG-TERM CURRENT USE OF INSULIN (HCC): Primary | ICD-10-CM

## 2019-05-03 DIAGNOSIS — E78.5 HYPERLIPIDEMIA ASSOCIATED WITH TYPE 2 DIABETES MELLITUS (HCC): ICD-10-CM

## 2019-05-03 DIAGNOSIS — R60.9 PERIPHERAL EDEMA: ICD-10-CM

## 2019-05-03 DIAGNOSIS — E66.01 MORBID OBESITY WITH BMI OF 45.0-49.9, ADULT (HCC): ICD-10-CM

## 2019-05-03 DIAGNOSIS — I10 ESSENTIAL HYPERTENSION WITH GOAL BLOOD PRESSURE LESS THAN 130/80: ICD-10-CM

## 2019-05-03 DIAGNOSIS — E11.65 UNCONTROLLED TYPE 2 DIABETES MELLITUS WITH HYPERGLYCEMIA, WITHOUT LONG-TERM CURRENT USE OF INSULIN (HCC): ICD-10-CM

## 2019-05-03 PROBLEM — R60.0 PERIPHERAL EDEMA: Status: ACTIVE | Noted: 2019-05-03

## 2019-05-03 PROCEDURE — 83036 HEMOGLOBIN GLYCOSYLATED A1C: CPT

## 2019-05-03 PROCEDURE — 82043 UR ALBUMIN QUANTITATIVE: CPT

## 2019-05-03 PROCEDURE — 99215 OFFICE O/P EST HI 40 MIN: CPT | Performed by: FAMILY MEDICINE

## 2019-05-03 PROCEDURE — 80053 COMPREHEN METABOLIC PANEL: CPT

## 2019-05-03 PROCEDURE — 36415 COLL VENOUS BLD VENIPUNCTURE: CPT

## 2019-05-03 PROCEDURE — 82570 ASSAY OF URINE CREATININE: CPT

## 2019-05-03 PROCEDURE — 85025 COMPLETE CBC W/AUTO DIFF WBC: CPT

## 2019-05-03 PROCEDURE — 80061 LIPID PANEL: CPT

## 2019-05-03 RX ORDER — OLMESARTAN MEDOXOMIL AND HYDROCHLOROTHIAZIDE 40/25 40; 25 MG/1; MG/1
1 TABLET ORAL DAILY
Qty: 90 TABLET | Refills: 0 | Status: SHIPPED | OUTPATIENT
Start: 2019-05-03 | End: 2019-05-15

## 2019-05-03 RX ORDER — ROSUVASTATIN CALCIUM 5 MG/1
5 TABLET, COATED ORAL NIGHTLY
Qty: 90 TABLET | Refills: 0 | Status: SHIPPED | OUTPATIENT
Start: 2019-05-03 | End: 2019-12-13

## 2019-05-03 RX ORDER — ATENOLOL 50 MG/1
50 TABLET ORAL DAILY
Qty: 90 TABLET | Refills: 0 | Status: SHIPPED | OUTPATIENT
Start: 2019-05-03 | End: 2019-12-09

## 2019-05-05 NOTE — PROGRESS NOTES
[facility] Family Medicine Office Note  Chief Complaint:   Patient presents with:  Medication Follow-Up: DM CHECK      HPI:   This is a 63year old male coming in for DM2, HTN, hyperlipidemia, peripheral edema and obesity.     1. DM2 - The patient history. Allergies:  No Known Allergies  Current Meds:    Current Outpatient Medications:  Olmesartan Medoxomil-HCTZ 40-25 MG Oral Tab Take 1 tablet by mouth daily.  Disp: 90 tablet Rfl: 0   metFORMIN HCl 1000 MG Oral Tab Take 1 tablet (1,000 mg total) by this encounter: 72.5\". Weight as of this encounter: 368 lb. Vital signs reviewed. Appears stated age, well groomed.   Physical Exam:  GEN:  Patient is alert and oriented x3, no apparent distress  HEAD:  Normocephalic, atraumatic  NECK:  Supple, no LAD 1 tablet by mouth daily. Dispense: 90 tablet; Refill: 0  - atenolol 50 MG Oral Tab; Take 1 tablet (50 mg total) by mouth daily. Dispense: 90 tablet; Refill: 0  - COMP METABOLIC PANEL (14); Future  - CBC WITH DIFFERENTIAL WITH PLATELET; Future    4.  Hyper complications from the treatments as a result of today.      Problem List:  Patient Active Problem List:     Hyperlipidemia associated with type 2 diabetes mellitus ([facility] [address]     Subclinical hypothyroidism     Impaired fasting glucose     Proteinuria     Essent

## 2019-05-06 DIAGNOSIS — E78.2 MIXED HYPERLIPIDEMIA: ICD-10-CM

## 2019-05-06 DIAGNOSIS — E11.65 UNCONTROLLED TYPE 2 DIABETES MELLITUS WITH HYPERGLYCEMIA, WITHOUT LONG-TERM CURRENT USE OF INSULIN (HCC): Primary | ICD-10-CM

## 2019-05-09 ENCOUNTER — TELEPHONE (OUTPATIENT)
Dept: FAMILY MEDICINE CLINIC | Facility: CLINIC | Age: 59
End: 2019-05-09

## 2019-05-09 NOTE — TELEPHONE ENCOUNTER
Received a request for a PA to be done for pt's Mc[place]tyre. The request did list two alternatives as Invokana and Jardiance. Dr Dioni Garrison stated that pt could be switched to Jardiance 10mg every morning. Order pended to provider.     Called pt and discussed the

## 2019-05-15 ENCOUNTER — TELEPHONE (OUTPATIENT)
Dept: FAMILY MEDICINE CLINIC | Facility: CLINIC | Age: 59
End: 2019-05-15

## 2019-05-15 RX ORDER — HYDROCHLOROTHIAZIDE 25 MG/1
25 TABLET ORAL DAILY
Qty: 90 TABLET | Refills: 0 | OUTPATIENT
Start: 2019-05-15 | End: 2019-12-09

## 2019-05-15 RX ORDER — OLMESARTAN MEDOXOMIL 20 MG/1
40 TABLET ORAL DAILY
Qty: 180 TABLET | Refills: 0 | OUTPATIENT
Start: 2019-05-15 | End: 2019-12-09

## 2019-05-15 NOTE — TELEPHONE ENCOUNTER
Call from mercedes/pharmacist/denny patel received 5/3/19 olmesartan-HCTZ 40-25 order from dr huang.  Mary Shukla this combination med is currently unavailable but she can dispense these meds separately if dr huang is ok w that.  Mary Shukla currently can only get olm

## 2019-06-07 ENCOUNTER — NURSE ONLY (OUTPATIENT)
Dept: FAMILY MEDICINE CLINIC | Facility: CLINIC | Age: 59
End: 2019-06-07
Payer: COMMERCIAL

## 2019-06-07 DIAGNOSIS — I10 HYPERTENSION, UNSPECIFIED TYPE: Primary | ICD-10-CM

## 2019-06-07 PROCEDURE — 99211 OFF/OP EST MAY X REQ PHY/QHP: CPT | Performed by: FAMILY MEDICINE

## 2019-06-07 NOTE — PROGRESS NOTES
Patient here for BP Check. BP-169/69, P-72, recheck BP-152/96. Patient taking:  Olmesartan 20 mg twice daily  Hydrochlorothiazide 25 mg daily   atenolol 50 mg daily. Dr. Brenda Morales. Patient to take Olmesartan 20 mg (2 tablet daily together).   Add amlodi

## 2019-06-13 ENCOUNTER — TELEPHONE (OUTPATIENT)
Dept: FAMILY MEDICINE CLINIC | Facility: CLINIC | Age: 59
End: 2019-06-13

## 2019-06-13 NOTE — TELEPHONE ENCOUNTER
Letter of determination received from [facility], pt's Farxiga 5mg tab is denied.   Please advise, thank you

## 2019-06-21 NOTE — TELEPHONE ENCOUNTER
Called to pt's insurance spoke with \"Dmitry\" who stated that the covered alternatives are Invokana and Jardiance.   Please advise, thank you

## 2019-06-21 NOTE — TELEPHONE ENCOUNTER
Medication ordered and sent to pharmacy. Called to pt to advise, he stated that was the medication that he has been on.

## 2019-07-12 ENCOUNTER — HOSPITAL ENCOUNTER (OUTPATIENT)
Age: 59
Discharge: HOME | End: 2019-07-12
Attending: FAMILY MEDICINE
Payer: COMMERCIAL

## 2019-07-12 VITALS
WEIGHT: 315 LBS | SYSTOLIC BLOOD PRESSURE: 168 MMHG | HEIGHT: 73 IN | TEMPERATURE: 98 F | HEART RATE: 87 BPM | DIASTOLIC BLOOD PRESSURE: 91 MMHG | RESPIRATION RATE: 16 BRPM | OXYGEN SATURATION: 94 % | BODY MASS INDEX: 41.75 KG/M2

## 2019-07-12 DIAGNOSIS — S13.9XXA NECK SPRAIN, INITIAL ENCOUNTER: Primary | ICD-10-CM
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
## 2019-07-12 DIAGNOSIS — S43.409A SPRAIN OF SHOULDER, UNSPECIFIED LATERALITY, UNSPECIFIED SHOULDER SPRAIN TYPE, INITIAL ENCOUNTER: ICD-10-CM

## 2019-07-12 DIAGNOSIS — S33.5XXA LUMBAR SPRAIN, INITIAL ENCOUNTER: ICD-10-CM

## 2019-07-12 PROCEDURE — 99213 OFFICE O/P EST LOW 20 MIN: CPT

## 2019-07-12 PROCEDURE — 99204 OFFICE O/P NEW MOD 45 MIN: CPT

## 2019-07-12 RX ORDER — NAPROXEN 500 MG/1
500 TABLET ORAL 2 TIMES DAILY PRN
Qty: 20 TABLET | Refills: 0 | Status: SHIPPED | OUTPATIENT
Start: 2019-07-12 | End: 2019-07-19

## 2019-07-12 RX ORDER — CYCLOBENZAPRINE HCL 10 MG
10 TABLET ORAL 3 TIMES DAILY PRN
Qty: 20 TABLET | Refills: 0 | Status: SHIPPED | OUTPATIENT
Start: 2019-07-12 | End: 2019-07-19
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
## 2019-07-30 RX ORDER — FUROSEMIDE 20 MG/1
TABLET ORAL
Qty: 60 TABLET | Refills: 2 | Status: SHIPPED | OUTPATIENT
Start: 2019-07-30 | End: 2019-12-09

## 2019-12-09 ENCOUNTER — TELEPHONE (OUTPATIENT)
Dept: FAMILY MEDICINE CLINIC | Facility: CLINIC | Age: 59
End: 2019-12-09

## 2019-12-09 DIAGNOSIS — E11.65 UNCONTROLLED TYPE 2 DIABETES MELLITUS WITH HYPERGLYCEMIA, WITHOUT LONG-TERM CURRENT USE OF INSULIN (HCC): ICD-10-CM

## 2019-12-09 DIAGNOSIS — I10 ESSENTIAL HYPERTENSION WITH GOAL BLOOD PRESSURE LESS THAN 130/80: ICD-10-CM

## 2019-12-09 RX ORDER — OLMESARTAN MEDOXOMIL 20 MG/1
40 TABLET ORAL DAILY
Qty: 60 TABLET | Refills: 0 | Status: SHIPPED | OUTPATIENT
Start: 2019-12-09 | End: 2020-04-02

## 2019-12-09 RX ORDER — OLMESARTAN MEDOXOMIL 20 MG/1
40 TABLET ORAL DAILY
Qty: 180 TABLET | Refills: 0 | Status: CANCELLED | OUTPATIENT
Start: 2019-12-09

## 2019-12-09 RX ORDER — ATENOLOL 50 MG/1
50 TABLET ORAL DAILY
Qty: 90 TABLET | Refills: 0 | Status: CANCELLED | OUTPATIENT
Start: 2019-12-09

## 2019-12-09 RX ORDER — ATENOLOL 50 MG/1
50 TABLET ORAL DAILY
Qty: 30 TABLET | Refills: 0 | Status: SHIPPED | OUTPATIENT
Start: 2019-12-09 | End: 2019-12-13 | Stop reason: DRUGHIGH

## 2019-12-09 RX ORDER — HYDROCHLOROTHIAZIDE 25 MG/1
25 TABLET ORAL DAILY
Qty: 30 TABLET | Refills: 0 | Status: SHIPPED | OUTPATIENT
Start: 2019-12-09 | End: 2020-04-02

## 2019-12-09 RX ORDER — HYDROCHLOROTHIAZIDE 25 MG/1
25 TABLET ORAL DAILY
Qty: 90 TABLET | Refills: 0 | Status: CANCELLED | OUTPATIENT
Start: 2019-12-09

## 2019-12-09 RX ORDER — FUROSEMIDE 20 MG/1
TABLET ORAL
Qty: 60 TABLET | Refills: 0 | Status: SHIPPED | OUTPATIENT
Start: 2019-12-09 | End: 2020-07-30

## 2019-12-09 NOTE — TELEPHONE ENCOUNTER
Please call pt as he needs a 6 month med check ,remind pt that fasting labs from 8/2019 not done yet, refills pending

## 2019-12-09 NOTE — TELEPHONE ENCOUNTER
Pt requesting 90 day refills of:    -metFORMIN HCl 1000 MG Oral Tab    -atenolol 50 MG Oral Tab    -Olmesartan Medoxomil 20 MG Oral Tab    -FUROSEMIDE 20 MG Oral Tab    -hydrochlorothiazide 25 MG Oral Tab      All sent to [facility] in chart.     Zulema stated

## 2019-12-13 ENCOUNTER — OFFICE VISIT (OUTPATIENT)
Dept: FAMILY MEDICINE CLINIC | Facility: CLINIC | Age: 59
End: 2019-12-13
Payer: COMMERCIAL

## 2019-12-13 VITALS
HEART RATE: 80 BPM | DIASTOLIC BLOOD PRESSURE: 84 MMHG | OXYGEN SATURATION: 97 % | BODY MASS INDEX: 41.75 KG/M2 | SYSTOLIC BLOOD PRESSURE: 138 MMHG | WEIGHT: 315 LBS | HEIGHT: 73 IN | TEMPERATURE: 99 F

## 2019-12-13 DIAGNOSIS — B35.1 ONYCHOMYCOSIS: ICD-10-CM

## 2019-12-13 DIAGNOSIS — G89.29 CHRONIC RIGHT-SIDED LOW BACK PAIN WITHOUT SCIATICA: ICD-10-CM

## 2019-12-13 DIAGNOSIS — I10 ESSENTIAL HYPERTENSION WITH GOAL BLOOD PRESSURE LESS THAN 130/80: ICD-10-CM

## 2019-12-13 DIAGNOSIS — M54.50 CHRONIC RIGHT-SIDED LOW BACK PAIN WITHOUT SCIATICA: ICD-10-CM

## 2019-12-13 DIAGNOSIS — E11.69 HYPERLIPIDEMIA ASSOCIATED WITH TYPE 2 DIABETES MELLITUS (HCC): ICD-10-CM

## 2019-12-13 DIAGNOSIS — E66.01 MORBID OBESITY WITH BMI OF 45.0-49.9, ADULT (HCC): ICD-10-CM

## 2019-12-13 DIAGNOSIS — E78.5 HYPERLIPIDEMIA ASSOCIATED WITH TYPE 2 DIABETES MELLITUS (HCC): ICD-10-CM

## 2019-12-13 DIAGNOSIS — E11.65 UNCONTROLLED TYPE 2 DIABETES MELLITUS WITH HYPERGLYCEMIA, WITHOUT LONG-TERM CURRENT USE OF INSULIN (HCC): Primary | ICD-10-CM

## 2019-12-13 DIAGNOSIS — F32.0 MILD MAJOR DEPRESSION, SINGLE EPISODE (HCC): ICD-10-CM

## 2019-12-13 PROCEDURE — 99215 OFFICE O/P EST HI 40 MIN: CPT | Performed by: FAMILY MEDICINE

## 2019-12-13 RX ORDER — BUPROPION HYDROCHLORIDE 150 MG/1
150 TABLET ORAL DAILY
Qty: 30 TABLET | Refills: 0 | Status: SHIPPED | OUTPATIENT
Start: 2019-12-13

## 2019-12-13 RX ORDER — ATENOLOL 100 MG/1
100 TABLET ORAL DAILY
Qty: 90 TABLET | Refills: 0 | COMMUNITY
Start: 2019-12-13 | End: 2021-03-11 | Stop reason: DRUGHIGH

## 2019-12-13 RX ORDER — ETODOLAC 400 MG/1
400 TABLET, FILM COATED ORAL 2 TIMES DAILY
Qty: 28 TABLET | Refills: 0 | Status: SHIPPED | OUTPATIENT
Start: 2019-12-13 | End: 2019-12-27
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
## 2020-04-01 DIAGNOSIS — I10 ESSENTIAL HYPERTENSION WITH GOAL BLOOD PRESSURE LESS THAN 130/80: ICD-10-CM

## 2020-04-01 DIAGNOSIS — E11.65 UNCONTROLLED TYPE 2 DIABETES MELLITUS WITH HYPERGLYCEMIA, WITHOUT LONG-TERM CURRENT USE OF INSULIN (HCC): ICD-10-CM

## 2020-04-02 RX ORDER — ATENOLOL 50 MG/1
TABLET ORAL
Qty: 30 TABLET | Refills: 0 | Status: SHIPPED | OUTPATIENT
Start: 2020-04-02 | End: 2021-01-27

## 2020-04-02 RX ORDER — HYDROCHLOROTHIAZIDE 25 MG/1
TABLET ORAL
Qty: 30 TABLET | Refills: 0 | Status: SHIPPED | OUTPATIENT
Start: 2020-04-02 | End: 2020-07-30

## 2020-04-02 RX ORDER — OLMESARTAN MEDOXOMIL 20 MG/1
TABLET ORAL
Qty: 60 TABLET | Refills: 0 | Status: SHIPPED | OUTPATIENT
Start: 2020-04-02 | End: 2020-07-30

## 2020-07-27 DIAGNOSIS — E11.65 UNCONTROLLED TYPE 2 DIABETES MELLITUS WITH HYPERGLYCEMIA, WITHOUT LONG-TERM CURRENT USE OF INSULIN (HCC): ICD-10-CM

## 2020-07-30 RX ORDER — HYDROCHLOROTHIAZIDE 25 MG/1
TABLET ORAL
Qty: 30 TABLET | Refills: 0 | Status: SHIPPED | OUTPATIENT
Start: 2020-07-30 | End: 2020-11-09

## 2020-07-30 RX ORDER — OLMESARTAN MEDOXOMIL 20 MG/1
TABLET ORAL
Qty: 60 TABLET | Refills: 0 | Status: SHIPPED | OUTPATIENT
Start: 2020-07-30 | End: 2020-11-09

## 2020-07-30 RX ORDER — FUROSEMIDE 20 MG/1
TABLET ORAL
Qty: 60 TABLET | Refills: 0 | Status: SHIPPED | OUTPATIENT
Start: 2020-07-30 | End: 2020-11-09

## 2020-08-05 ENCOUNTER — TELEMEDICINE (OUTPATIENT)
Dept: FAMILY MEDICINE CLINIC | Facility: CLINIC | Age: 60
End: 2020-08-05
Payer: COMMERCIAL

## 2020-08-05 DIAGNOSIS — F32.0 MILD MAJOR DEPRESSION, SINGLE EPISODE (HCC): ICD-10-CM

## 2020-08-05 DIAGNOSIS — E11.69 HYPERLIPIDEMIA ASSOCIATED WITH TYPE 2 DIABETES MELLITUS (HCC): ICD-10-CM

## 2020-08-05 DIAGNOSIS — E78.5 HYPERLIPIDEMIA ASSOCIATED WITH TYPE 2 DIABETES MELLITUS (HCC): ICD-10-CM

## 2020-08-05 DIAGNOSIS — I10 ESSENTIAL HYPERTENSION WITH GOAL BLOOD PRESSURE LESS THAN 130/80: ICD-10-CM

## 2020-08-05 DIAGNOSIS — M62.81 MUSCLE WEAKNESS: ICD-10-CM

## 2020-08-05 DIAGNOSIS — M25.50 MULTIPLE JOINT PAIN: ICD-10-CM

## 2020-08-05 DIAGNOSIS — E11.65 UNCONTROLLED TYPE 2 DIABETES MELLITUS WITH HYPERGLYCEMIA, WITHOUT LONG-TERM CURRENT USE OF INSULIN (HCC): Primary | ICD-10-CM

## 2020-08-05 PROCEDURE — 99214 OFFICE O/P EST MOD 30 MIN: CPT | Performed by: FAMILY MEDICINE

## 2020-08-05 NOTE — PROGRESS NOTES
Virtual/Telephone Check-In    Noel Perez verbally consents to a Virtual/Telephone Check-In service on 08/05/20. Patient understands and accepts financial responsibility for any deductible, co-insurance and/or co-pays associated with this service.  This TWICE DAILY AS NEEDED  60 tablet 0   • HYDROCHLOROTHIAZIDE 25 MG Oral Tab TAKE ONE TABLET BY MOUTH ONE TIME DAILY  30 tablet 0   • OLMESARTAN MEDOXOMIL 20 MG Oral Tab TAKE TWO TABLETS BY MOUTH DAILY  60 tablet 0   • ATENOLOL 50 MG Oral Tab TAKE ONE TABLET blood pressure less than 130/80  -  stable  -  Continue benicar hct  -  Continue atenolol  -  Check renal function stable  -  Monitor BP at home  - COMP METABOLIC PANEL (14); Future  - CBC WITH DIFFERENTIAL WITH PLATELET; Future     3.  Hyperlipidemia assoc

## 2020-11-09 DIAGNOSIS — E11.65 UNCONTROLLED TYPE 2 DIABETES MELLITUS WITH HYPERGLYCEMIA, WITHOUT LONG-TERM CURRENT USE OF INSULIN (HCC): ICD-10-CM

## 2020-11-09 RX ORDER — HYDROCHLOROTHIAZIDE 25 MG/1
TABLET ORAL
Qty: 30 TABLET | Refills: 2 | Status: SHIPPED | OUTPATIENT
Start: 2020-11-09 | End: 2021-07-20

## 2020-11-09 RX ORDER — FUROSEMIDE 20 MG/1
TABLET ORAL
Qty: 60 TABLET | Refills: 2 | Status: SHIPPED | OUTPATIENT
Start: 2020-11-09 | End: 2021-07-20

## 2020-11-09 RX ORDER — OLMESARTAN MEDOXOMIL 20 MG/1
TABLET ORAL
Qty: 60 TABLET | Refills: 2 | Status: SHIPPED | OUTPATIENT
Start: 2020-11-09 | End: 2021-07-20

## 2020-12-17 ENCOUNTER — TELEPHONE (OUTPATIENT)
Dept: ENDOCRINOLOGY CLINIC | Facility: CLINIC | Age: 60
End: 2020-12-17

## 2020-12-17 NOTE — TELEPHONE ENCOUNTER
Spoke with patient regarding setting up an appointment with a diabetes provider for diabetes management at [facility] based on A1c from high A1c list. 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

## 2021-01-27 ENCOUNTER — OFFICE VISIT (OUTPATIENT)
Dept: ENDOCRINOLOGY CLINIC | Facility: CLINIC | Age: 61
End: 2021-01-27
Payer: COMMERCIAL

## 2021-01-27 VITALS
WEIGHT: 315 LBS | HEART RATE: 73 BPM | HEIGHT: 73 IN | BODY MASS INDEX: 41.75 KG/M2 | DIASTOLIC BLOOD PRESSURE: 92 MMHG | OXYGEN SATURATION: 98 % | SYSTOLIC BLOOD PRESSURE: 140 MMHG

## 2021-01-27 DIAGNOSIS — I10 ESSENTIAL HYPERTENSION: ICD-10-CM

## 2021-01-27 DIAGNOSIS — E66.01 MORBID OBESITY WITH BMI OF 45.0-49.9, ADULT (HCC): ICD-10-CM

## 2021-01-27 DIAGNOSIS — E11.65 TYPE 2 DIABETES MELLITUS WITH HYPERGLYCEMIA, WITHOUT LONG-TERM CURRENT USE OF INSULIN (HCC): Primary | ICD-10-CM

## 2021-01-27 DIAGNOSIS — E78.2 MIXED HYPERLIPIDEMIA: ICD-10-CM

## 2021-01-27 PROBLEM — IMO0001 UNCONTROLLED TYPE 2 DIABETES MELLITUS WITHOUT COMPLICATION, WITHOUT LONG-TERM CURRENT USE OF INSULIN: Status: RESOLVED | Noted: 2017-08-08 | Resolved: 2021-01-27

## 2021-01-27 LAB
CARTRIDGE LOT#: 704 NUMERIC
HEMOGLOBIN A1C: 12.2 % (ref 4.3–5.6)

## 2021-01-27 PROCEDURE — 3077F SYST BP >= 140 MM HG: CPT | Performed by: NURSE PRACTITIONER

## 2021-01-27 PROCEDURE — 3008F BODY MASS INDEX DOCD: CPT | Performed by: NURSE PRACTITIONER

## 2021-01-27 PROCEDURE — 3080F DIAST BP >= 90 MM HG: CPT | Performed by: NURSE PRACTITIONER

## 2021-01-27 PROCEDURE — 99214 OFFICE O/P EST MOD 30 MIN: CPT | Performed by: NURSE PRACTITIONER

## 2021-01-27 PROCEDURE — 83036 HEMOGLOBIN GLYCOSYLATED A1C: CPT | Performed by: NURSE PRACTITIONER

## 2021-01-27 PROCEDURE — 3046F HEMOGLOBIN A1C LEVEL >9.0%: CPT | Performed by: FAMILY MEDICINE

## 2021-01-27 RX ORDER — SEMAGLUTIDE 1.34 MG/ML
0.25 INJECTION, SOLUTION SUBCUTANEOUS
Qty: 1 PEN | Refills: 0 | Status: SHIPPED | COMMUNITY
Start: 2021-01-27 | End: 2021-01-27

## 2021-01-27 RX ORDER — SEMAGLUTIDE 1.34 MG/ML
0.25 INJECTION, SOLUTION SUBCUTANEOUS
Qty: 1 PEN | Refills: 0 | Status: SHIPPED | COMMUNITY
Start: 2021-01-27 | End: 2021-08-11

## 2021-01-27 NOTE — PATIENT INSTRUCTIONS
We are here to support you with Diabetes but please remember that you still need your primary care doctor for your routine health maintenance. Your current A1C: 12.2%  This test provides us with your average blood sugar for the past 3 months.    The main your kidney function (blood and urine protein levels) , liver function tests and cholesterol levels when you have diabetes. 2. FOOT EXAMS:  daily foot inspections for foot wounds or skin changes are important for foot care.  Any unusual changes should b

## 2021-02-11 ENCOUNTER — TELEPHONE (OUTPATIENT)
Dept: ENDOCRINOLOGY CLINIC | Facility: CLINIC | Age: 61
End: 2021-02-11

## 2021-02-26 DIAGNOSIS — I10 ESSENTIAL HYPERTENSION WITH GOAL BLOOD PRESSURE LESS THAN 130/80: ICD-10-CM

## 2021-03-02 RX ORDER — ATENOLOL 50 MG/1
TABLET ORAL
Qty: 30 TABLET | Refills: 0 | Status: SHIPPED | OUTPATIENT
Start: 2021-03-02 | End: 2021-03-11 | Stop reason: DRUGHIGH

## 2021-03-05 ENCOUNTER — TELEPHONE (OUTPATIENT)
Dept: FAMILY MEDICINE CLINIC | Facility: CLINIC | Age: 61
End: 2021-03-05

## 2021-03-05 RX ORDER — ATENOLOL 100 MG/1
100 TABLET ORAL DAILY
Qty: 30 TABLET | Refills: 0 | Status: CANCELLED | OUTPATIENT
Start: 2021-03-05

## 2021-03-05 NOTE — TELEPHONE ENCOUNTER
Pt states that his ATENOLOL 50 MG Oral Tab was supposed to be updated to 100 mg. The refill that was sent in today was for 50 mg. Please advise.

## 2021-03-05 NOTE — TELEPHONE ENCOUNTER
As of LOV 8/5/20 (teleV)   Pt on 100 mg daily of Atenolol and 50 mg daily  (per OV notes/review)   And pt reported been taking 100 mg since dose change 12/13/19     Do you want 100 mg or 50 mg or 150 mg?  Can be discussed on OV, coming Mon  3/5        Benny Armstrong

## 2021-03-08 ENCOUNTER — TELEMEDICINE (OUTPATIENT)
Dept: FAMILY MEDICINE CLINIC | Facility: CLINIC | Age: 61
End: 2021-03-08

## 2021-03-08 DIAGNOSIS — F32.0 MILD MAJOR DEPRESSION, SINGLE EPISODE (HCC): ICD-10-CM

## 2021-03-08 DIAGNOSIS — I10 ESSENTIAL HYPERTENSION WITH GOAL BLOOD PRESSURE LESS THAN 130/80: Primary | ICD-10-CM

## 2021-03-08 DIAGNOSIS — E11.65 UNCONTROLLED TYPE 2 DIABETES MELLITUS WITH HYPERGLYCEMIA, WITHOUT LONG-TERM CURRENT USE OF INSULIN (HCC): ICD-10-CM

## 2021-03-08 DIAGNOSIS — E11.69 HYPERLIPIDEMIA ASSOCIATED WITH TYPE 2 DIABETES MELLITUS (HCC): ICD-10-CM

## 2021-03-08 DIAGNOSIS — E78.5 HYPERLIPIDEMIA ASSOCIATED WITH TYPE 2 DIABETES MELLITUS (HCC): ICD-10-CM

## 2021-03-08 PROCEDURE — G2012 BRIEF CHECK IN BY MD/QHP: HCPCS | Performed by: FAMILY MEDICINE

## 2021-03-09 ENCOUNTER — PATIENT OUTREACH (OUTPATIENT)
Dept: FAMILY MEDICINE CLINIC | Facility: CLINIC | Age: 61
End: 2021-03-09

## 2021-03-09 NOTE — PROGRESS NOTES
Patient is due for annual physical and cancer screenings (which can be discussed at his office visit)- please schedule with Dr. Trish Moss

## 2021-03-10 RX ORDER — AMLODIPINE BESYLATE 10 MG/1
10 TABLET ORAL DAILY
Qty: 90 TABLET | Refills: 0 | Status: SHIPPED | OUTPATIENT
Start: 2021-03-10 | End: 2021-07-20

## 2021-03-10 NOTE — PROGRESS NOTES
Virtual/Telephone Check-In    Teja Méndez verbally consents to a Virtual/Telephone Check-In service on 3/8/21. Patient understands and accepts financial responsibility for any deductible, co-insurance and/or co-pays associated with this service.  This v FUROSEMIDE 20 MG Oral Tab TAKE ONE TABLET BY MOUTH TWICE DAILY AS NEEDED  60 tablet 2   • HYDROCHLOROTHIAZIDE 25 MG Oral Tab TAKE ONE TABLET BY MOUTH ONE TIME DAILY  30 tablet 2   • buPROPion HCl ER, XL, 150 MG Oral Tablet 24 Hr Take 1 tablet (150 mg total wellbutrin      Follow up: After lab results  Duration of service, in minutes: 8 min  Patient also advised to follow CDC guidelines for self isolation/social distancing and symptomatic treatment as outlined on CDC Patient Guidelines.   Meggan Blair,

## 2021-03-11 ENCOUNTER — TELEPHONE (OUTPATIENT)
Dept: FAMILY MEDICINE CLINIC | Facility: CLINIC | Age: 61
End: 2021-03-11

## 2021-03-11 RX ORDER — ATENOLOL 100 MG/1
100 TABLET ORAL DAILY
Qty: 90 TABLET | Refills: 0 | Status: SHIPPED | OUTPATIENT
Start: 2021-03-11 | End: 2021-07-20

## 2021-03-20 DIAGNOSIS — Z23 NEED FOR VACCINATION: ICD-10-CM

## 2021-07-16 DIAGNOSIS — I10 ESSENTIAL HYPERTENSION WITH GOAL BLOOD PRESSURE LESS THAN 130/80: ICD-10-CM

## 2021-07-20 RX ORDER — HYDROCHLOROTHIAZIDE 25 MG/1
TABLET ORAL
Qty: 90 TABLET | Refills: 0 | Status: SHIPPED | OUTPATIENT
Start: 2021-07-20

## 2021-07-20 RX ORDER — AMLODIPINE BESYLATE 10 MG/1
TABLET ORAL
Qty: 90 TABLET | Refills: 0 | Status: SHIPPED | OUTPATIENT
Start: 2021-07-20

## 2021-07-20 RX ORDER — OLMESARTAN MEDOXOMIL 20 MG/1
TABLET ORAL
Qty: 180 TABLET | Refills: 0 | Status: SHIPPED | OUTPATIENT
Start: 2021-07-20 | End: 2022-01-10

## 2021-07-20 RX ORDER — ATENOLOL 100 MG/1
TABLET ORAL
Qty: 90 TABLET | Refills: 0 | Status: SHIPPED | OUTPATIENT
Start: 2021-07-20

## 2021-07-20 RX ORDER — FUROSEMIDE 20 MG/1
TABLET ORAL
Qty: 180 TABLET | Refills: 0 | Status: SHIPPED | OUTPATIENT
Start: 2021-07-20

## 2021-08-11 ENCOUNTER — OFFICE VISIT (OUTPATIENT)
Dept: FAMILY MEDICINE CLINIC | Facility: CLINIC | Age: 61
End: 2021-08-11
Payer: COMMERCIAL

## 2021-08-11 VITALS
WEIGHT: 315 LBS | DIASTOLIC BLOOD PRESSURE: 92 MMHG | RESPIRATION RATE: 18 BRPM | BODY MASS INDEX: 41.75 KG/M2 | TEMPERATURE: 98 F | HEART RATE: 68 BPM | SYSTOLIC BLOOD PRESSURE: 136 MMHG | HEIGHT: 73 IN

## 2021-08-11 DIAGNOSIS — E66.01 MORBID OBESITY WITH BMI OF 45.0-49.9, ADULT (HCC): ICD-10-CM

## 2021-08-11 DIAGNOSIS — S39.012A STRAIN OF LUMBAR REGION, INITIAL ENCOUNTER: ICD-10-CM

## 2021-08-11 DIAGNOSIS — Z12.5 PROSTATE CANCER SCREENING: ICD-10-CM

## 2021-08-11 DIAGNOSIS — I10 ESSENTIAL HYPERTENSION WITH GOAL BLOOD PRESSURE LESS THAN 130/80: ICD-10-CM

## 2021-08-11 DIAGNOSIS — E11.65 UNCONTROLLED TYPE 2 DIABETES MELLITUS WITH HYPERGLYCEMIA, WITHOUT LONG-TERM CURRENT USE OF INSULIN (HCC): ICD-10-CM

## 2021-08-11 DIAGNOSIS — Z12.11 COLON CANCER SCREENING: ICD-10-CM

## 2021-08-11 DIAGNOSIS — E11.69 HYPERLIPIDEMIA ASSOCIATED WITH TYPE 2 DIABETES MELLITUS (HCC): ICD-10-CM

## 2021-08-11 DIAGNOSIS — E78.5 HYPERLIPIDEMIA ASSOCIATED WITH TYPE 2 DIABETES MELLITUS (HCC): ICD-10-CM

## 2021-08-11 DIAGNOSIS — Z00.00 ROUTINE GENERAL MEDICAL EXAMINATION AT A HEALTH CARE FACILITY: Primary | ICD-10-CM

## 2021-08-11 DIAGNOSIS — F32.0 MILD MAJOR DEPRESSION, SINGLE EPISODE (HCC): ICD-10-CM

## 2021-08-11 PROCEDURE — 3080F DIAST BP >= 90 MM HG: CPT | Performed by: FAMILY MEDICINE

## 2021-08-11 PROCEDURE — 99396 PREV VISIT EST AGE 40-64: CPT | Performed by: FAMILY MEDICINE

## 2021-08-11 PROCEDURE — 3075F SYST BP GE 130 - 139MM HG: CPT | Performed by: FAMILY MEDICINE

## 2021-08-11 PROCEDURE — 3008F BODY MASS INDEX DOCD: CPT | Performed by: FAMILY MEDICINE

## 2021-08-11 PROCEDURE — 99214 OFFICE O/P EST MOD 30 MIN: CPT | Performed by: FAMILY MEDICINE

## 2021-08-11 RX ORDER — METHYLPREDNISOLONE 4 MG/1
TABLET ORAL
Qty: 1 EACH | Refills: 0 | Status: SHIPPED | OUTPATIENT
Start: 2021-08-11

## 2021-08-11 RX ORDER — CYCLOBENZAPRINE HCL 10 MG
10 TABLET ORAL NIGHTLY PRN
Qty: 30 TABLET | Refills: 0 | Status: SHIPPED | OUTPATIENT
Start: 2021-08-11

## 2021-08-12 NOTE — PROGRESS NOTES
Taty Ontiveros is a 62year old male who presents for a complete physical exam.   HPI:   Pt complains of low back pain. Pain is located at right low back. Pain is described as dull, aching, cramping. Severity is mild.  The pain radiates to no radiation of by mouth daily. • methylPREDNISolone (MEDROL) 4 MG Oral Tablet Therapy Pack As directed. 1 each 0   • cyclobenzaprine 10 MG Oral Tab Take 1 tablet (10 mg total) by mouth nightly as needed for Muscle spasms.  30 tablet 0   • HYDROCHLOROTHIAZIDE 25 MG Ora standard drinks    Drug use: No     Occ: insurance claims   Exercise: none.   Diet: watches minimally     REVIEW OF SYSTEMS:   GENERAL: feels well otherwise  SKIN: denies any unusual skin lesions  EYES:denies blurred vision or double vision  HEENT: denies n mass index is 48.82 kg/m². , recommended low fat diet and aerobic exercise 30 minutes three times weekly.    Health maintenance, will check: Orders Placed This Encounter      Hemoglobin A1C [E]      PSA (Screening) [E]      TSH W Reflex To Free T4 [E]      O today    The patient indicates understanding of these issues and agrees to the plan. The patient is asked to return in 3 months.

## 2021-10-12 DIAGNOSIS — E11.65 TYPE 2 DIABETES MELLITUS WITH HYPERGLYCEMIA, WITHOUT LONG-TERM CURRENT USE OF INSULIN (HCC): ICD-10-CM

## 2021-10-28 ENCOUNTER — TELEPHONE (OUTPATIENT)
Dept: FAMILY MEDICINE CLINIC | Facility: CLINIC | Age: 61
End: 2021-10-28

## 2021-10-28 NOTE — TELEPHONE ENCOUNTER
Medical Records Request received from [address] [place] [address] 2050 for records from 8/10/2021 to present. Release original sent to [employer] on 10/28/2021. Copy sent to scanning.

## 2022-01-10 ENCOUNTER — PATIENT MESSAGE (OUTPATIENT)
Dept: FAMILY MEDICINE CLINIC | Facility: CLINIC | Age: 62
End: 2022-01-10

## 2022-01-10 ENCOUNTER — TELEPHONE (OUTPATIENT)
Dept: FAMILY MEDICINE CLINIC | Facility: CLINIC | Age: 62
End: 2022-01-10

## 2022-01-10 DIAGNOSIS — I10 ESSENTIAL HYPERTENSION WITH GOAL BLOOD PRESSURE LESS THAN 130/80: ICD-10-CM

## 2022-01-10 RX ORDER — OLMESARTAN MEDOXOMIL 20 MG/1
TABLET ORAL
Qty: 60 TABLET | Refills: 0 | Status: SHIPPED | OUTPATIENT
Start: 2022-01-10

## 2022-01-10 NOTE — TELEPHONE ENCOUNTER
LOV: 8/11/21  for: Physical  Patient advised to RTC on:  3 months  [facility] sent to make a follow up appt.     OLMESARTAN MEDOXOMIL 20 MG Oral Tab 180 tablet 0 7/20/2021    Sig:   TAKE TWO TABLETS BY MOUTH DAILY

## 2022-01-14 ENCOUNTER — TELEPHONE (OUTPATIENT)
Dept: FAMILY MEDICINE CLINIC | Facility: CLINIC | Age: 62
End: 2022-01-14

## 2022-01-14 NOTE — TELEPHONE ENCOUNTER
[facility] sent to make a follow up appt, do blood work, do or send diabetic eye exam.

## 2022-03-18 NOTE — TELEPHONE ENCOUNTER
Received call back from pt. Advised due for lab work and follow up, also requesting diabetic eye exam results/appointment. Pt states has completed diabetic eye exam and will get results to our office. Pt also states will schedule lab work and then call back to schedule and follow up.

## 2022-04-19 ENCOUNTER — TELEPHONE (OUTPATIENT)
Dept: FAMILY MEDICINE CLINIC | Facility: CLINIC | Age: 62
End: 2022-04-19

## 2022-04-19 NOTE — TELEPHONE ENCOUNTER
Last OV was cpx 8/11/21  Cyclobenzaprine and medrol dose rona  last ordered on same date. Call to pt's cell reaches identified voice mail. Per hipaa consent, left m req call back to triage nurse today to update symptom info before we forward referral request to dr huang.  Provided ofc phone hours/contact number

## 2022-04-19 NOTE — TELEPHONE ENCOUNTER
Pt requesting referral to see a doctor regarding his back pain. Pt states he has reached a point where he struggles to walk comfortably. Pt notes that he was prescribed a muscle relaxer by Dr Se Archuleta, however it has not offered much improvement. Advised appointment may be needed for evaluation and to discuss referral, pt declined appointment states wants to see if Dr Se Archuleta can write referral without appointment.     Please advise

## 2022-04-19 NOTE — TELEPHONE ENCOUNTER
Pt called back and states he did receive Cyclobenzaprine and Medrol Dose Lucas from [address] [place] [address], but no relief. Has been taking Advil and seeing a Chiropractor and feels symptoms have worsen. C/O low back pain and decrease muscle strength on B/L legs. Specialist? OV here? Please see original message below.

## 2022-04-19 NOTE — TELEPHONE ENCOUNTER
Ok to refer patient to Dr. Alana Anthony from neurosurgery. He should not need referral as he has PPO.

## 2022-04-19 NOTE — TELEPHONE ENCOUNTER
Medical Records Request received from [facility] for records from 8/10/2021 - Present. Release original sent to [employer] on 4/19/2022. Copy sent to scanning.

## 2022-05-26 ENCOUNTER — PATIENT OUTREACH (OUTPATIENT)
Dept: FAMILY MEDICINE CLINIC | Facility: CLINIC | Age: 62
End: 2022-05-26

## 2022-05-26 NOTE — PROGRESS NOTES
Patient outreached RE: following up for his Blood Pressures. The pt last OV was 08/11/2021. Pt advised to lalitha and schedule his appt.

## 2022-06-30 ENCOUNTER — TELEPHONE (OUTPATIENT)
Dept: FAMILY MEDICINE CLINIC | Facility: CLINIC | Age: 62
End: 2022-06-30

## 2022-06-30 NOTE — TELEPHONE ENCOUNTER
Multiple messages sent to remind pt to have eye exam and colonoscopy done. Also, to make an appt. [facility] sent again to remind pt of care gaps and to make an appt.

## 2022-07-05 ENCOUNTER — OFFICE VISIT (OUTPATIENT)
Dept: SURGERY | Facility: CLINIC | Age: 62
End: 2022-07-05
Payer: COMMERCIAL

## 2022-07-05 VITALS — SYSTOLIC BLOOD PRESSURE: 148 MMHG | HEART RATE: 104 BPM | DIASTOLIC BLOOD PRESSURE: 98 MMHG

## 2022-07-05 DIAGNOSIS — M47.816 LUMBAR SPONDYLOSIS: Primary | ICD-10-CM

## 2022-07-05 DIAGNOSIS — M48.062 SPINAL STENOSIS OF LUMBAR REGION WITH NEUROGENIC CLAUDICATION: ICD-10-CM

## 2022-07-05 PROCEDURE — 99203 OFFICE O/P NEW LOW 30 MIN: CPT | Performed by: PHYSICIAN ASSISTANT

## 2022-07-05 PROCEDURE — 3077F SYST BP >= 140 MM HG: CPT | Performed by: PHYSICIAN ASSISTANT

## 2022-07-05 PROCEDURE — 3080F DIAST BP >= 90 MM HG: CPT | Performed by: PHYSICIAN ASSISTANT

## 2022-07-05 NOTE — PATIENT INSTRUCTIONS
PLAN:  -X-rays and MRI of the lumbar spine  -Follow-up after imaging and further recommendations be forthcoming

## 2022-07-05 NOTE — PROGRESS NOTES
Patient here for evaluation of low back pain. Patient has no recent imaging and states he anticipates imaging to be ordered today. Patient states pain is located right lower back, and is constant. Patient has issues ambulating long distances. Patient states pain is 8/10 at it's worst, and is tolerable on a day when he works at home. Patient taking Aleve at home to manage pain.

## 2022-08-12 DIAGNOSIS — I10 ESSENTIAL HYPERTENSION WITH GOAL BLOOD PRESSURE LESS THAN 130/80: ICD-10-CM

## 2022-08-12 DIAGNOSIS — E11.65 TYPE 2 DIABETES MELLITUS WITH HYPERGLYCEMIA, WITHOUT LONG-TERM CURRENT USE OF INSULIN (HCC): ICD-10-CM

## 2022-08-16 DIAGNOSIS — I10 ESSENTIAL HYPERTENSION WITH GOAL BLOOD PRESSURE LESS THAN 130/80: ICD-10-CM

## 2022-08-16 DIAGNOSIS — E11.65 TYPE 2 DIABETES MELLITUS WITH HYPERGLYCEMIA, WITHOUT LONG-TERM CURRENT USE OF INSULIN (HCC): ICD-10-CM

## 2022-08-17 RX ORDER — ATENOLOL 100 MG/1
100 TABLET ORAL DAILY
Qty: 30 TABLET | Refills: 0 | Status: SHIPPED | OUTPATIENT
Start: 2022-08-17

## 2022-08-17 RX ORDER — OLMESARTAN MEDOXOMIL 20 MG/1
TABLET ORAL
Qty: 60 TABLET | Refills: 0 | Status: SHIPPED | OUTPATIENT
Start: 2022-08-17

## 2022-08-17 RX ORDER — HYDROCHLOROTHIAZIDE 25 MG/1
TABLET ORAL
Qty: 90 TABLET | Refills: 0 | OUTPATIENT
Start: 2022-08-17

## 2022-08-17 RX ORDER — ATENOLOL 100 MG/1
TABLET ORAL
Qty: 90 TABLET | Refills: 0 | OUTPATIENT
Start: 2022-08-17

## 2022-08-17 RX ORDER — AMLODIPINE BESYLATE 10 MG/1
TABLET ORAL
Qty: 90 TABLET | Refills: 0 | OUTPATIENT
Start: 2022-08-17

## 2022-08-17 RX ORDER — FUROSEMIDE 20 MG/1
TABLET ORAL
Qty: 180 TABLET | Refills: 0 | OUTPATIENT
Start: 2022-08-17

## 2022-08-17 RX ORDER — FUROSEMIDE 20 MG/1
20 TABLET ORAL 2 TIMES DAILY PRN
Qty: 60 TABLET | Refills: 0 | Status: SHIPPED | OUTPATIENT
Start: 2022-08-17

## 2022-08-17 RX ORDER — AMLODIPINE BESYLATE 10 MG/1
10 TABLET ORAL DAILY
Qty: 30 TABLET | Refills: 0 | Status: SHIPPED | OUTPATIENT
Start: 2022-08-17

## 2022-09-06 ENCOUNTER — TELEPHONE (OUTPATIENT)
Dept: SURGERY | Facility: CLINIC | Age: 62
End: 2022-09-06

## 2022-12-22 ENCOUNTER — TELEPHONE (OUTPATIENT)
Dept: FAMILY MEDICINE CLINIC | Facility: CLINIC | Age: 62
End: 2022-12-22

## 2022-12-22 NOTE — TELEPHONE ENCOUNTER
Called and spoke to Pt regarding care gaps. Pt states he has done is Diabetic Eye Exam through [facility]. Pt was told about other care gaps. Pt states he is in the middle of looking for a new provider/medical group. Called [facility] and they state Pt has not done an eye exam at any of their locations since Nov. 2021.

## 2023-02-03 NOTE — PROGRESS NOTES
[facility] Family Medicine Office Note  Chief Complaint:   Patient presents with:  Medication Follow-Up: diabetes       HPI:   This is a 65year old male coming in for follow up of DM2, HTN, and obesity.     1.  DM2 - The patient presents for rech tablet Rfl: 0   Rosuvastatin Calcium 10 MG Oral Tab Take 1 tablet (10 mg total) by mouth nightly.  Disp: 90 tablet Rfl: 0   METFORMIN  MG Oral Tab TAKE 2 TABLETS BY MOUTH TWICE DAILY Disp: 360 tablet Rfl: 0   LOSARTAN POTASSIUM-HCTZ 100-25 MG Oral Ta distress, morbidly obese  HEAD:  Normocephalic, atraumatic  NECK:  Supple,no LAD  LUNGS: clear to auscultation bilaterally, no rales/rhonchi/wheezing  HEART:  Regular rate and rhythm, no murmurs, rubs or gallops  ABDOMEN:  Soft, nondistended, nontender, alla Rosuvastatin Calcium 10 MG Oral Tab 90 tablet 0      Sig: Take 1 tablet (10 mg total) by mouth nightly.            Health Maintenance:  Annual Depression Screen due on 07/09/1960  Diabetes Care Foot Exam due on 07/09/1960  Diabetes Care Dilated Eye Exam due oriented to person, place and time

## 2023-02-27 ENCOUNTER — HOSPITAL ENCOUNTER (INPATIENT)
Facility: HOSPITAL | Age: 63
DRG: 641 | End: 2023-02-27
Attending: EMERGENCY MEDICINE | Admitting: INTERNAL MEDICINE
Payer: MEDICAID

## 2023-02-27 ENCOUNTER — APPOINTMENT (OUTPATIENT)
Dept: CT IMAGING | Facility: HOSPITAL | Age: 63
DRG: 641 | End: 2023-02-27
Attending: EMERGENCY MEDICINE
Payer: MEDICAID

## 2023-02-27 ENCOUNTER — HOSPITAL ENCOUNTER (INPATIENT)
Facility: HOSPITAL | Age: 63
LOS: 8 days | Discharge: NURSING, REHAB OR LONG-TERM CARE | DRG: 641 | End: 2023-03-07
Attending: EMERGENCY MEDICINE | Admitting: INTERNAL MEDICINE
Payer: MEDICAID

## 2023-02-27 ENCOUNTER — APPOINTMENT (OUTPATIENT)
Dept: GENERAL RADIOLOGY | Facility: HOSPITAL | Age: 63
DRG: 641 | End: 2023-02-27
Attending: EMERGENCY MEDICINE
Payer: MEDICAID

## 2023-02-27 DIAGNOSIS — R29.898 RIGHT LEG WEAKNESS: ICD-10-CM

## 2023-02-27 DIAGNOSIS — J96.01 ACUTE HYPOXEMIC RESPIRATORY FAILURE (HCC): ICD-10-CM

## 2023-02-27 DIAGNOSIS — M62.82 NON-TRAUMATIC RHABDOMYOLYSIS: Primary | ICD-10-CM

## 2023-02-27 LAB
ALBUMIN SERPL-MCNC: 3 G/DL (ref 3.4–5)
ALBUMIN/GLOB SERPL: 0.6 {RATIO} (ref 1–2)
ALP LIVER SERPL-CCNC: 97 U/L
ALT SERPL-CCNC: 34 U/L
AMPHET UR QL SCN: NEGATIVE
ANION GAP SERPL CALC-SCNC: 11 MMOL/L (ref 0–18)
APAP SERPL-MCNC: <2 UG/ML (ref 10–30)
ARTERIAL PATENCY WRIST A: POSITIVE
AST SERPL-CCNC: 63 U/L (ref 15–37)
BASE EXCESS BLDA CALC-SCNC: 3.1 MMOL/L (ref ?–2)
BASOPHILS # BLD AUTO: 0.02 X10(3) UL (ref 0–0.2)
BASOPHILS NFR BLD AUTO: 0.2 %
BENZODIAZ UR QL SCN: NEGATIVE
BILIRUB SERPL-MCNC: 1.1 MG/DL (ref 0.1–2)
BILIRUB UR QL STRIP.AUTO: NEGATIVE
BODY TEMPERATURE: 98.6 F
BUN BLD-MCNC: 26 MG/DL (ref 7–18)
CA-I BLD-SCNC: 1.15 MMOL/L (ref 0.95–1.32)
CALCIUM BLD-MCNC: 9.2 MG/DL (ref 8.5–10.1)
CANNABINOIDS UR QL SCN: NEGATIVE
CHLORIDE SERPL-SCNC: 100 MMOL/L (ref 98–112)
CHOLEST SERPL-MCNC: 194 MG/DL (ref ?–200)
CK SERPL-CCNC: 1937 U/L
CO2 SERPL-SCNC: 28 MMOL/L (ref 21–32)
COCAINE UR QL: NEGATIVE
COHGB MFR BLD: 1.6 % SAT (ref 0–3)
COLOR UR AUTO: YELLOW
CREAT BLD-MCNC: 1.33 MG/DL
CREAT UR-SCNC: 223 MG/DL
EOSINOPHIL # BLD AUTO: 0 X10(3) UL (ref 0–0.7)
EOSINOPHIL NFR BLD AUTO: 0 %
ERYTHROCYTE [DISTWIDTH] IN BLOOD BY AUTOMATED COUNT: 12.4 %
ETHANOL SERPL-MCNC: <3 MG/DL (ref ?–3)
GFR SERPLBLD BASED ON 1.73 SQ M-ARVRAT: 61 ML/MIN/1.73M2 (ref 60–?)
GLOBULIN PLAS-MCNC: 5.2 G/DL (ref 2.8–4.4)
GLUCOSE BLD-MCNC: 280 MG/DL (ref 70–99)
GLUCOSE BLD-MCNC: 298 MG/DL (ref 70–99)
GLUCOSE BLD-MCNC: 316 MG/DL (ref 70–99)
GLUCOSE UR STRIP.AUTO-MCNC: >=500 MG/DL
HCO3 BLDA-SCNC: 27.3 MEQ/L (ref 21–27)
HCT VFR BLD AUTO: 49.8 %
HDLC SERPL-MCNC: 33 MG/DL (ref 40–59)
HGB BLD-MCNC: 17 G/DL
HGB BLD-MCNC: 17.1 G/DL
HYALINE CASTS #/AREA URNS AUTO: PRESENT /LPF
IMM GRANULOCYTES # BLD AUTO: 0.06 X10(3) UL (ref 0–1)
IMM GRANULOCYTES NFR BLD: 0.5 %
L/M: 2 L/MIN
LACTATE BLD-SCNC: 1.6 MMOL/L (ref 0.5–2)
LDLC SERPL CALC-MCNC: 135 MG/DL (ref ?–100)
LEUKOCYTE ESTERASE UR QL STRIP.AUTO: NEGATIVE
LYMPHOCYTES # BLD AUTO: 0.74 X10(3) UL (ref 1–4)
LYMPHOCYTES NFR BLD AUTO: 6.2 %
MCH RBC QN AUTO: 30.3 PG (ref 26–34)
MCHC RBC AUTO-ENTMCNC: 34.3 G/DL (ref 31–37)
MCV RBC AUTO: 88.3 FL
MDMA UR QL SCN: NEGATIVE
METHGB MFR BLD: 0.3 % SAT (ref 0.4–1.5)
MONOCYTES # BLD AUTO: 1.14 X10(3) UL (ref 0.1–1)
MONOCYTES NFR BLD AUTO: 9.6 %
NEUTROPHILS # BLD AUTO: 9.89 X10 (3) UL (ref 1.5–7.7)
NEUTROPHILS # BLD AUTO: 9.89 X10(3) UL (ref 1.5–7.7)
NEUTROPHILS NFR BLD AUTO: 83.5 %
NITRITE UR QL STRIP.AUTO: NEGATIVE
NONHDLC SERPL-MCNC: 161 MG/DL (ref ?–130)
NT-PROBNP SERPL-MCNC: 2210 PG/ML (ref ?–125)
OPIATES UR QL SCN: NEGATIVE
OSMOLALITY SERPL CALC.SUM OF ELEC: 303 MOSM/KG (ref 275–295)
OXYCODONE UR QL SCN: NEGATIVE
OXYHGB MFR BLDA: 95.2 % (ref 92–100)
PCO2 BLDA: 38 MM HG (ref 35–45)
PH BLDA: 7.46 [PH] (ref 7.35–7.45)
PH UR STRIP.AUTO: 5 [PH] (ref 5–8)
PLATELET # BLD AUTO: 130 10(3)UL (ref 150–450)
PO2 BLDA: 79 MM HG (ref 80–100)
POTASSIUM BLD-SCNC: 4.1 MMOL/L (ref 3.6–5.1)
POTASSIUM SERPL-SCNC: 4.1 MMOL/L (ref 3.5–5.1)
PROT SERPL-MCNC: 8.2 G/DL (ref 6.4–8.2)
PROT UR STRIP.AUTO-MCNC: 100 MG/DL
RBC # BLD AUTO: 5.64 X10(6)UL
SALICYLATES SERPL-MCNC: <1.7 MG/DL (ref 2.8–20)
SARS-COV-2 RNA RESP QL NAA+PROBE: NOT DETECTED
SODIUM BLD-SCNC: 135 MMOL/L (ref 135–145)
SODIUM SERPL-SCNC: 139 MMOL/L (ref 136–145)
SP GR UR STRIP.AUTO: 1.03 (ref 1–1.03)
TRIGL SERPL-MCNC: 142 MG/DL (ref 30–149)
TROPONIN I HIGH SENSITIVITY: 82 NG/L
UROBILINOGEN UR STRIP.AUTO-MCNC: 2 MG/DL
VLDLC SERPL CALC-MCNC: 26 MG/DL (ref 0–30)
WBC # BLD AUTO: 11.9 X10(3) UL (ref 4–11)

## 2023-02-27 PROCEDURE — 70450 CT HEAD/BRAIN W/O DYE: CPT | Performed by: EMERGENCY MEDICINE

## 2023-02-27 PROCEDURE — 99291 CRITICAL CARE FIRST HOUR: CPT | Performed by: INTERNAL MEDICINE

## 2023-02-27 PROCEDURE — 72131 CT LUMBAR SPINE W/O DYE: CPT | Performed by: EMERGENCY MEDICINE

## 2023-02-27 PROCEDURE — 71045 X-RAY EXAM CHEST 1 VIEW: CPT | Performed by: EMERGENCY MEDICINE

## 2023-02-27 RX ORDER — ACETAMINOPHEN 500 MG
500 TABLET ORAL EVERY 4 HOURS PRN
Status: DISCONTINUED | OUTPATIENT
Start: 2023-02-27 | End: 2023-02-27

## 2023-02-27 RX ORDER — HYDRALAZINE HYDROCHLORIDE 20 MG/ML
10 INJECTION INTRAMUSCULAR; INTRAVENOUS EVERY 2 HOUR PRN
Status: DISCONTINUED | OUTPATIENT
Start: 2023-02-27 | End: 2023-03-07

## 2023-02-27 RX ORDER — LABETALOL HYDROCHLORIDE 5 MG/ML
10 INJECTION, SOLUTION INTRAVENOUS EVERY 10 MIN PRN
Status: COMPLETED | OUTPATIENT
Start: 2023-02-27 | End: 2023-03-06

## 2023-02-27 RX ORDER — ENOXAPARIN SODIUM 100 MG/ML
0.5 INJECTION SUBCUTANEOUS DAILY
Status: DISCONTINUED | OUTPATIENT
Start: 2023-02-28 | End: 2023-03-07

## 2023-02-27 RX ORDER — SODIUM CHLORIDE 9 MG/ML
125 INJECTION, SOLUTION INTRAVENOUS CONTINUOUS
Status: DISCONTINUED | OUTPATIENT
Start: 2023-02-27 | End: 2023-02-28

## 2023-02-27 RX ORDER — ACETAMINOPHEN 325 MG/1
650 TABLET ORAL EVERY 4 HOURS PRN
Status: DISCONTINUED | OUTPATIENT
Start: 2023-02-27 | End: 2023-03-07

## 2023-02-27 RX ORDER — SODIUM CHLORIDE 9 MG/ML
INJECTION, SOLUTION INTRAVENOUS CONTINUOUS
Status: DISCONTINUED | OUTPATIENT
Start: 2023-02-27 | End: 2023-02-27

## 2023-02-27 RX ORDER — PHENYLEPHRINE HCL IN 0.9% NACL 50MG/250ML
PLASTIC BAG, INJECTION (ML) INTRAVENOUS CONTINUOUS PRN
Status: DISCONTINUED | OUTPATIENT
Start: 2023-02-27 | End: 2023-03-02 | Stop reason: ALTCHOICE

## 2023-02-27 RX ORDER — ONDANSETRON 2 MG/ML
4 INJECTION INTRAMUSCULAR; INTRAVENOUS EVERY 6 HOURS PRN
Status: DISCONTINUED | OUTPATIENT
Start: 2023-02-27 | End: 2023-02-27

## 2023-02-27 RX ORDER — PROCHLORPERAZINE EDISYLATE 5 MG/ML
5 INJECTION INTRAMUSCULAR; INTRAVENOUS EVERY 8 HOURS PRN
Status: DISCONTINUED | OUTPATIENT
Start: 2023-02-27 | End: 2023-03-07

## 2023-02-27 RX ORDER — NICOTINE POLACRILEX 4 MG
15 LOZENGE BUCCAL
Status: DISCONTINUED | OUTPATIENT
Start: 2023-02-27 | End: 2023-03-07

## 2023-02-27 RX ORDER — ASPIRIN 325 MG
325 TABLET ORAL DAILY
Status: DISCONTINUED | OUTPATIENT
Start: 2023-02-28 | End: 2023-03-01

## 2023-02-27 RX ORDER — ASPIRIN 81 MG/1
324 TABLET, CHEWABLE ORAL ONCE
Status: COMPLETED | OUTPATIENT
Start: 2023-02-27 | End: 2023-02-27

## 2023-02-27 RX ORDER — ACETAMINOPHEN 650 MG/1
650 SUPPOSITORY RECTAL EVERY 4 HOURS PRN
Status: DISCONTINUED | OUTPATIENT
Start: 2023-02-27 | End: 2023-03-07

## 2023-02-27 RX ORDER — DEXTROSE MONOHYDRATE 25 G/50ML
50 INJECTION, SOLUTION INTRAVENOUS
Status: DISCONTINUED | OUTPATIENT
Start: 2023-02-27 | End: 2023-03-07

## 2023-02-27 RX ORDER — MELATONIN
3 NIGHTLY PRN
Status: DISCONTINUED | OUTPATIENT
Start: 2023-02-27 | End: 2023-03-07

## 2023-02-27 RX ORDER — ONDANSETRON 2 MG/ML
4 INJECTION INTRAMUSCULAR; INTRAVENOUS EVERY 6 HOURS PRN
Status: DISCONTINUED | OUTPATIENT
Start: 2023-02-27 | End: 2023-03-07

## 2023-02-27 RX ORDER — ASPIRIN 300 MG/1
300 SUPPOSITORY RECTAL DAILY
Status: DISCONTINUED | OUTPATIENT
Start: 2023-02-28 | End: 2023-03-01

## 2023-02-27 RX ORDER — LABETALOL HYDROCHLORIDE 5 MG/ML
5 INJECTION, SOLUTION INTRAVENOUS ONCE
Status: COMPLETED | OUTPATIENT
Start: 2023-02-27 | End: 2023-02-27

## 2023-02-27 RX ORDER — NICOTINE POLACRILEX 4 MG
30 LOZENGE BUCCAL
Status: DISCONTINUED | OUTPATIENT
Start: 2023-02-27 | End: 2023-03-07

## 2023-02-28 ENCOUNTER — NURSE ONLY (OUTPATIENT)
Dept: ELECTROPHYSIOLOGY | Facility: HOSPITAL | Age: 63
DRG: 641 | End: 2023-02-28
Attending: NURSE PRACTITIONER
Payer: MEDICAID

## 2023-02-28 ENCOUNTER — APPOINTMENT (OUTPATIENT)
Dept: CV DIAGNOSTICS | Facility: HOSPITAL | Age: 63
DRG: 641 | End: 2023-02-28
Payer: MEDICAID

## 2023-02-28 ENCOUNTER — APPOINTMENT (OUTPATIENT)
Dept: MRI IMAGING | Facility: HOSPITAL | Age: 63
DRG: 641 | End: 2023-02-28
Payer: MEDICAID

## 2023-02-28 LAB
ALBUMIN SERPL-MCNC: 2.7 G/DL (ref 3.4–5)
ALBUMIN/GLOB SERPL: 0.6 {RATIO} (ref 1–2)
ALP LIVER SERPL-CCNC: 85 U/L
ALT SERPL-CCNC: 31 U/L
ANION GAP SERPL CALC-SCNC: 8 MMOL/L (ref 0–18)
ARTERIAL PATENCY WRIST A: POSITIVE
AST SERPL-CCNC: 54 U/L (ref 15–37)
ATRIAL RATE: 92 BPM
BASE EXCESS BLDA CALC-SCNC: 2.4 MMOL/L (ref ?–2)
BASOPHILS # BLD AUTO: 0.02 X10(3) UL (ref 0–0.2)
BASOPHILS NFR BLD AUTO: 0.2 %
BILIRUB SERPL-MCNC: 1.1 MG/DL (ref 0.1–2)
BILIRUB UR QL STRIP.AUTO: NEGATIVE
BODY TEMPERATURE: 98.6 F
BUN BLD-MCNC: 33 MG/DL (ref 7–18)
CALCIUM BLD-MCNC: 8.8 MG/DL (ref 8.5–10.1)
CHLORIDE SERPL-SCNC: 103 MMOL/L (ref 98–112)
CHOLEST SERPL-MCNC: 184 MG/DL (ref ?–200)
CK SERPL-CCNC: 1896 U/L
CLARITY UR REFRACT.AUTO: CLEAR
CO2 SERPL-SCNC: 27 MMOL/L (ref 21–32)
COHGB MFR BLD: 1.8 % SAT (ref 0–3)
COLOR UR AUTO: YELLOW
CREAT BLD-MCNC: 1.18 MG/DL
CREAT UR-SCNC: 220 MG/DL
EOSINOPHIL # BLD AUTO: 0 X10(3) UL (ref 0–0.7)
EOSINOPHIL NFR BLD AUTO: 0 %
ERYTHROCYTE [DISTWIDTH] IN BLOOD BY AUTOMATED COUNT: 12.6 %
EST. AVERAGE GLUCOSE BLD GHB EST-MCNC: 258 MG/DL (ref 68–126)
GFR SERPLBLD BASED ON 1.73 SQ M-ARVRAT: 70 ML/MIN/1.73M2 (ref 60–?)
GLOBULIN PLAS-MCNC: 4.7 G/DL (ref 2.8–4.4)
GLUCOSE BLD-MCNC: 193 MG/DL (ref 70–99)
GLUCOSE BLD-MCNC: 214 MG/DL (ref 70–99)
GLUCOSE BLD-MCNC: 261 MG/DL (ref 70–99)
GLUCOSE BLD-MCNC: 274 MG/DL (ref 70–99)
GLUCOSE BLD-MCNC: 298 MG/DL (ref 70–99)
GLUCOSE UR STRIP.AUTO-MCNC: >=500 MG/DL
HBA1C MFR BLD: 10.6 % (ref ?–5.7)
HCO3 BLDA-SCNC: 26.7 MEQ/L (ref 21–27)
HCT VFR BLD AUTO: 47.3 %
HDLC SERPL-MCNC: 27 MG/DL (ref 40–59)
HGB BLD-MCNC: 16.1 G/DL
HGB BLD-MCNC: 16.2 G/DL
IMM GRANULOCYTES # BLD AUTO: 0.03 X10(3) UL (ref 0–1)
IMM GRANULOCYTES NFR BLD: 0.3 %
LDLC SERPL CALC-MCNC: 128 MG/DL (ref ?–100)
LEUKOCYTE ESTERASE UR QL STRIP.AUTO: NEGATIVE
LYMPHOCYTES # BLD AUTO: 0.86 X10(3) UL (ref 1–4)
LYMPHOCYTES NFR BLD AUTO: 8.3 %
MAGNESIUM SERPL-MCNC: 2 MG/DL (ref 1.6–2.6)
MCH RBC QN AUTO: 30.2 PG (ref 26–34)
MCHC RBC AUTO-ENTMCNC: 34 G/DL (ref 31–37)
MCV RBC AUTO: 88.7 FL
METHGB MFR BLD: 0.8 % SAT (ref 0.4–1.5)
MONOCYTES # BLD AUTO: 1.19 X10(3) UL (ref 0.1–1)
MONOCYTES NFR BLD AUTO: 11.5 %
NEUTROPHILS # BLD AUTO: 8.27 X10 (3) UL (ref 1.5–7.7)
NEUTROPHILS # BLD AUTO: 8.27 X10(3) UL (ref 1.5–7.7)
NEUTROPHILS NFR BLD AUTO: 79.7 %
NITRITE UR QL STRIP.AUTO: NEGATIVE
NONHDLC SERPL-MCNC: 157 MG/DL (ref ?–130)
OSMOLALITY SERPL CALC.SUM OF ELEC: 304 MOSM/KG (ref 275–295)
OXYHGB MFR BLDA: 94.7 % (ref 92–100)
P AXIS: 43 DEGREES
P-R INTERVAL: 190 MS
PCO2 BLDA: 38 MM HG (ref 35–45)
PH BLDA: 7.45 [PH] (ref 7.35–7.45)
PH UR STRIP.AUTO: 5 [PH] (ref 5–8)
PHOSPHATE SERPL-MCNC: 3.1 MG/DL (ref 2.5–4.9)
PLATELET # BLD AUTO: 132 10(3)UL (ref 150–450)
PO2 BLDA: 77 MM HG (ref 80–100)
POTASSIUM SERPL-SCNC: 3.8 MMOL/L (ref 3.5–5.1)
PROCALCITONIN SERPL-MCNC: 1.61 NG/ML (ref ?–0.16)
PROT SERPL-MCNC: 7.4 G/DL (ref 6.4–8.2)
PROT UR STRIP.AUTO-MCNC: 30 MG/DL
PROT UR-MCNC: 148.6 MG/DL
PROT/CREAT UR-RTO: 0.68
Q-T INTERVAL: 394 MS
QRS DURATION: 96 MS
QTC CALCULATION (BEZET): 487 MS
R AXIS: -31 DEGREES
RBC # BLD AUTO: 5.33 X10(6)UL
SODIUM SERPL-SCNC: 138 MMOL/L (ref 136–145)
SP GR UR STRIP.AUTO: 1.03 (ref 1–1.03)
T AXIS: 64 DEGREES
T4 FREE SERPL-MCNC: 1.1 NG/DL (ref 0.8–1.7)
TRIGL SERPL-MCNC: 162 MG/DL (ref 30–149)
TROPONIN I HIGH SENSITIVITY: 64 NG/L
TROPONIN I HIGH SENSITIVITY: 75 NG/L
TSI SER-ACNC: 1.55 MIU/ML (ref 0.36–3.74)
UROBILINOGEN UR STRIP.AUTO-MCNC: 4 MG/DL
VENTRICULAR RATE: 92 BPM
VIT B12 SERPL-MCNC: 573 PG/ML (ref 193–986)
VLDLC SERPL CALC-MCNC: 29 MG/DL (ref 0–30)
WBC # BLD AUTO: 10.4 X10(3) UL (ref 4–11)

## 2023-02-28 PROCEDURE — 99233 SBSQ HOSP IP/OBS HIGH 50: CPT | Performed by: HOSPITALIST

## 2023-02-28 PROCEDURE — 93306 TTE W/DOPPLER COMPLETE: CPT | Performed by: NURSE PRACTITIONER

## 2023-02-28 PROCEDURE — 95819 EEG AWAKE AND ASLEEP: CPT

## 2023-02-28 PROCEDURE — 99223 1ST HOSP IP/OBS HIGH 75: CPT | Performed by: OTHER

## 2023-02-28 PROCEDURE — 95816 EEG AWAKE AND DROWSY: CPT | Performed by: OTHER

## 2023-02-28 PROCEDURE — 70549 MR ANGIOGRAPH NECK W/O&W/DYE: CPT | Performed by: NURSE PRACTITIONER

## 2023-02-28 PROCEDURE — 70553 MRI BRAIN STEM W/O & W/DYE: CPT | Performed by: NURSE PRACTITIONER

## 2023-02-28 PROCEDURE — 70546 MR ANGIOGRAPH HEAD W/O&W/DYE: CPT | Performed by: NURSE PRACTITIONER

## 2023-02-28 PROCEDURE — 5A09357 ASSISTANCE WITH RESPIRATORY VENTILATION, LESS THAN 24 CONSECUTIVE HOURS, CONTINUOUS POSITIVE AIRWAY PRESSURE: ICD-10-PCS | Performed by: NURSE PRACTITIONER

## 2023-02-28 RX ORDER — LOSARTAN POTASSIUM 25 MG/1
25 TABLET ORAL DAILY
Status: DISCONTINUED | OUTPATIENT
Start: 2023-02-28 | End: 2023-03-07

## 2023-02-28 RX ORDER — CEFAZOLIN SODIUM/WATER 2 G/20 ML
2 SYRINGE (ML) INTRAVENOUS EVERY 8 HOURS
Status: DISCONTINUED | OUTPATIENT
Start: 2023-02-28 | End: 2023-03-07

## 2023-02-28 RX ORDER — GADOTERATE MEGLUMINE 376.9 MG/ML
20 INJECTION INTRAVENOUS
Status: COMPLETED | OUTPATIENT
Start: 2023-02-28 | End: 2023-02-28

## 2023-02-28 RX ORDER — POTASSIUM CHLORIDE 1.5 G/1.77G
40 POWDER, FOR SOLUTION ORAL ONCE
Status: COMPLETED | OUTPATIENT
Start: 2023-02-28 | End: 2023-02-28

## 2023-02-28 RX ORDER — AMLODIPINE BESYLATE 5 MG/1
5 TABLET ORAL DAILY
Status: DISCONTINUED | OUTPATIENT
Start: 2023-02-28 | End: 2023-03-07

## 2023-02-28 RX ORDER — SODIUM CHLORIDE 9 MG/ML
INJECTION, SOLUTION INTRAVENOUS CONTINUOUS
Status: DISCONTINUED | OUTPATIENT
Start: 2023-02-28 | End: 2023-03-02

## 2023-02-28 NOTE — PLAN OF CARE
Admitting hospitalist called rn to request completion of the med rec. Family at bedside, not aware of what medications the patient takes or when he may have taken them last. Pt poor historian/unable to participate in care at this time. Called Eddie S[facility]chez, last known place pt refilled medications. Last medications filled 8/17/22. Med list updated with dates of last filled medications.

## 2023-02-28 NOTE — ED QUICK NOTES
[facility] MARIO EMMANUEL.

## 2023-02-28 NOTE — CM/SW NOTE
Chart reviewed and pt listed as self pay. Message left for [facility] [place]. [id number]    / to remain available for support and/or discharge planning.      Nabila Perry MBA MSN, RN CTL/  [id number]

## 2023-02-28 NOTE — PLAN OF CARE
Received pt as an admission from the ED on 3L NC. Pt arrived awake and following commands, however slow to respond, requiring multiple prompts for neuro exam. APN at bedside to examine, NIH stroke scale completed, see flowsheets. Consulted neurology, see orders. Pt BP remained elevated however within ordered parameters. Pt became more drowsy, falling asleep in conversation, APN notified, at bedside to assess, no new orders.  Neuro checks completed Q1hrs, discussed with APN, instructed to change to Q2 hrs to promote pt rest.

## 2023-02-28 NOTE — PLAN OF CARE
Assumed care of patient at change of shift. Neuro status waxing and waning. See flowsheet for assessment details. NIH done by stroke navigator. MRI brain/neck done, no acute stroke. Neuro checks Q4. eeg done at bedside. Nasal cannula versus cpap this afternoon for FRANSISCO (pt desats to 70s). Afebrile, Sr on tele, sbp 140-180, no bp parameters per neuro just lower slowly. Oral BP meds restarted, no prns administered. Echo done at bedside. SLP eval done, cardiac diabetic diet ordered, pt okay to eat while alert. accuchecks with insulin coverage. No bm. External catheter in place, no void this afternoon, bladder scan >400 straight cath x1. Tylenol administered for back pain with relief. PT al, see note for details. 2 piv. Blood cx x3 and ura/cx done. SW consult placed for assistance determining surrogate decision maker.

## 2023-02-28 NOTE — ED INITIAL ASSESSMENT (HPI)
Pt here via EMS for being found on the living room unresponsive by sister. Pt does not recall about what happened. Pt was found covered in feces by ems. Denies blood thinners. Patient stated Leticia Browning has not taken BP meds in over a week d/t running out of them\".

## 2023-03-01 ENCOUNTER — APPOINTMENT (OUTPATIENT)
Dept: GENERAL RADIOLOGY | Facility: HOSPITAL | Age: 63
DRG: 641 | End: 2023-03-01
Attending: INTERNAL MEDICINE
Payer: MEDICAID

## 2023-03-01 ENCOUNTER — APPOINTMENT (OUTPATIENT)
Dept: GENERAL RADIOLOGY | Facility: HOSPITAL | Age: 63
DRG: 641 | End: 2023-03-01
Attending: HOSPITALIST
Payer: MEDICAID

## 2023-03-01 LAB
ANION GAP SERPL CALC-SCNC: 2 MMOL/L (ref 0–18)
ARTERIAL PATENCY WRIST A: POSITIVE
BASE EXCESS BLDA CALC-SCNC: -2.1 MMOL/L (ref ?–2)
BODY TEMPERATURE: 99.8 F
BUN BLD-MCNC: 35 MG/DL (ref 7–18)
C DIFF TOX B STL QL: NEGATIVE
CALCIUM BLD-MCNC: 8.9 MG/DL (ref 8.5–10.1)
CHLORIDE SERPL-SCNC: 108 MMOL/L (ref 98–112)
CK SERPL-CCNC: 671 U/L
CO2 SERPL-SCNC: 27 MMOL/L (ref 21–32)
COHGB MFR BLD: 1.5 % SAT (ref 0–3)
CPAP: 20 CM H2O
CREAT BLD-MCNC: 1.15 MG/DL
ERYTHROCYTE [DISTWIDTH] IN BLOOD BY AUTOMATED COUNT: 13 %
GFR SERPLBLD BASED ON 1.73 SQ M-ARVRAT: 72 ML/MIN/1.73M2 (ref 60–?)
GLUCOSE BLD-MCNC: 184 MG/DL (ref 70–99)
GLUCOSE BLD-MCNC: 184 MG/DL (ref 70–99)
GLUCOSE BLD-MCNC: 189 MG/DL (ref 70–99)
GLUCOSE BLD-MCNC: 201 MG/DL (ref 70–99)
GLUCOSE BLD-MCNC: 206 MG/DL (ref 70–99)
HCO3 BLDA-SCNC: 23.2 MEQ/L (ref 21–27)
HCT VFR BLD AUTO: 46 %
HGB BLD-MCNC: 15.2 G/DL
HGB BLD-MCNC: 15.5 G/DL
L/M: 5 L/MIN
MCH RBC QN AUTO: 30.9 PG (ref 26–34)
MCHC RBC AUTO-ENTMCNC: 33.7 G/DL (ref 31–37)
MCV RBC AUTO: 91.6 FL
METHGB MFR BLD: 0.5 % SAT (ref 0.4–1.5)
OSMOLALITY SERPL CALC.SUM OF ELEC: 298 MOSM/KG (ref 275–295)
OXYHGB MFR BLDA: 95.7 % (ref 92–100)
PCO2 BLDA: 38 MM HG (ref 35–45)
PH BLDA: 7.38 [PH] (ref 7.35–7.45)
PLATELET # BLD AUTO: 140 10(3)UL (ref 150–450)
PO2 BLDA: 92 MM HG (ref 80–100)
POTASSIUM SERPL-SCNC: 3.8 MMOL/L (ref 3.5–5.1)
POTASSIUM SERPL-SCNC: 3.8 MMOL/L (ref 3.5–5.1)
RBC # BLD AUTO: 5.02 X10(6)UL
SODIUM SERPL-SCNC: 137 MMOL/L (ref 136–145)
WBC # BLD AUTO: 9.3 X10(3) UL (ref 4–11)

## 2023-03-01 PROCEDURE — 71045 X-RAY EXAM CHEST 1 VIEW: CPT | Performed by: INTERNAL MEDICINE

## 2023-03-01 PROCEDURE — 99232 SBSQ HOSP IP/OBS MODERATE 35: CPT | Performed by: OTHER

## 2023-03-01 PROCEDURE — 73620 X-RAY EXAM OF FOOT: CPT | Performed by: HOSPITALIST

## 2023-03-01 PROCEDURE — 73630 X-RAY EXAM OF FOOT: CPT | Performed by: HOSPITALIST

## 2023-03-01 PROCEDURE — 99233 SBSQ HOSP IP/OBS HIGH 50: CPT | Performed by: HOSPITALIST

## 2023-03-01 RX ORDER — LOPERAMIDE HYDROCHLORIDE 2 MG/1
2 CAPSULE ORAL 4 TIMES DAILY PRN
Status: DISCONTINUED | OUTPATIENT
Start: 2023-03-01 | End: 2023-03-07

## 2023-03-01 RX ORDER — ATENOLOL 50 MG/1
100 TABLET ORAL DAILY
Status: DISCONTINUED | OUTPATIENT
Start: 2023-03-01 | End: 2023-03-07

## 2023-03-01 RX ORDER — POTASSIUM CHLORIDE 14.9 MG/ML
20 INJECTION INTRAVENOUS ONCE
Status: COMPLETED | OUTPATIENT
Start: 2023-03-01 | End: 2023-03-01

## 2023-03-01 RX ORDER — MELATONIN
100 DAILY
Status: DISCONTINUED | OUTPATIENT
Start: 2023-03-02 | End: 2023-03-07

## 2023-03-01 NOTE — PROCEDURES
ELECTROENCEPHALOGRAM REPORT      Patient Name: Sourav Newton   : 1960    Date of Test: 2023  History: 65year old male with HTN, found with decreased responsiveness    TECHNICAL ASPECTS OF EEG RECORDING:  This is a scalp EEG monitoring study. Scalp electrodes were positioned according to the 10-20 International system of electrode placement. EEG data were recorded continuously and digitally stored, and this is a routine study. No sedation was given. EEG data were reviewed using bipolar and referential montages. Video recording is also present. DESCRIPTION OF EEG FINDINGS:  BACKGROUND ACTIVITY: The background rhythm is not well appreciated and instead a diffuse slow wave abnormality is seen, in the 6Hz range. In addition, there is a superimposed slow wave abnormality in the bifrontal regions, especially toward the end of the record, that is in the 1-2 second range. INTERICTAL EPILEPTIFORM ACTIVITY: None  ICTAL ACTIVITY: None  ACTIVATION PROCEDURES: Not performed  SLEEP: Sleep pattern not clearly seen    IMPRESSION:  This is an abnormal EEG. This study is consistent with diffuse cerebral dysfunction of a non-specific type. Clinical correlation is recommended. No seizure or epileptiform activity was seen.        Monty Doherty DO  Neuromuscular and General Neurology  [facility]

## 2023-03-01 NOTE — CM/SW NOTE
Received order to determine surrogate decision maker. Pt discussed in rounds and he is now alert and able to make HCPOA decisions.  consulted to assist patient. / to remain available for support and/or discharge planning.      Ksenia Sweet MBA MSN, RN CTL/  [id number]

## 2023-03-01 NOTE — PLAN OF CARE
Pt resting in bed. Neurologically improving as day goes on. Discontinued restraints after pt verbally stated they will not pull at lines or interfere with their medical equipment. Denies pain laying in bed unless he is moving. O2 weaned as able. Feeds self at bedside. Multiple loose stools. Sent test to rule out cdiff. PRN imodium given. Pt created new password for account which is alannah. External male cath placed. Straight cath per protocol.

## 2023-03-01 NOTE — PROGRESS NOTES
Assumed care following RN report with patient resting in bed. Neuro status waxing and waning. Drowsy, oriented x1-2. Cpap placed at night for FRANSISCO. Bladder scan >400 mL, straight cath per protocol. Blood cultures positive for gram (+) cocci in cluster, PCR Wendy Fernandez, CCAPN MANOJ Trujillo notified as well ID (Dr. Carina Hoang) consulted. See orders. Call light within reach. Will continue to monitor.

## 2023-03-01 NOTE — PROGRESS NOTES
Critical Care Progress Note     Assessment / Plan:  1. Syncope   - suspect due to dehydration and uncontrolled DM/HTN  - CT head without acute changes   - MRI brain reviewed  - echo with normal LVEF. Grade 1 DD  2. Rhabdomyolysis   - due to syncope   - improving CK  - IVF  3. TARIK  - due to above  - Improving Cr  - IVF   - monitor UO  4. ID: GPC bacteremia   - on cefazolin based on non-MRSA screen  - ID On consult   - echo reviewed  5. Nocturnal hypoxemia   - suspect undiagnosed FRANSISCO  - OP sleep study  - O2 prn with sleep   - CPAP protocol while hospitalized   - reviewed ABG which does not show CO2 retention   6. Uncontrolled DM   - per IM   7. HTN   - home meds   8. FEN, prophyalxis   - ADAT  - SCDs  - LMWH  9. Dispo  - we will follow      Subjective:  Patient drowsy overnight and placed on CPAP. He is interactive and responding to questions this am.     Objective:   03/01/23  0900 03/01/23  0925 03/01/23  0938 03/01/23  1000   BP: 138/73   (!) 134/120   BP Location:       Pulse: 86 90 86 85   Resp: 22 22 20 (!) 27   Temp:       TempSrc:       SpO2: 100% 94% 96% 95%   Weight:       Height:         Physical Exam:  General: laying in bed, on CPAP  Skin: no rash, ulcers or subcutaneous nodules  Eyes: anicteric sclerae, moist conjunctivae  Head, ears, nose, throat: atraumatic, oropharynx clear with moist mucous membranes  Neck: trachea midline with no thyromegaly  Heart: regular rate and rhythm, no murmurs / rubs / gallops  Lungs: clear bilaterally, normal respiratory effort, no accessory muscle use  Abdomen: soft, nontender, nondistended   Extremities: no edema or cyanosis  Psych: interactive, answering questions appropriately, appropriate affect    Medications:  Reviewed in EMR    Lab Data:  Reviewed in EMR    Imaging:  I independently visualized all relevant chest imaging in PACS and agree with radiology interpretation except where noted.

## 2023-03-01 NOTE — CM/SW NOTE
03/01/23 1500   CM/SW Referral Data   Referral Source Physician   Reason for Referral Discharge planning   Informant Patient   Patient Info   Patient's Current Mental Status at Time of Assessment Alert;Oriented   Patient's Home Environment [facility]   Number of Levels in Home 2   Patient lives with Alone   Patient Status Prior to Admission   Independent with ADLs and Mobility Yes   Discharge Needs   Anticipated D/C needs To be determined   Choice of Post-Acute Provider   Informed patient of right to choose their preferred provider Yes     Received order for [facility]   Pt admitted 2/27/2023 after he was found unresponsive at home by his sister. Pt with MSSA bacteremia, rhabdomyolysis. Today, patient is more alert/oriented today so met with him for eval.    He reports he lives alone and was independent at baseline. He had to leave his job last fall due to back pain/unable to work any more so he no longer has health care coverage. He has sisters that live nearby, but they all work during the day. PT evaluated yesterday and recommended Acute rehab. Sent tentative referral and ordered PMR    Change  started Medicaid application    Addendum 3/1/2023 1600  Patient's sisters arrived at bedside and discussed possible plans with pt and sisters. Eleanor Iqbal reports she lives a block away and if pt needs to stay with her post discharge or post rehab, he is welcome to do so. Both sisters have been trying to assist him with getting Medicaid coverage and will assist with gathering documents to complete application      / to remain available for support and/or discharge planning.      Irene CLAYA MSN, RN CTL/  [id number]

## 2023-03-01 NOTE — PROGRESS NOTES
03/01/23 1504   Clinical Encounter Type   Visited With Patient; Family   Routine Visit Introduction   Taxonomy   Intended Effects Build relationship of care and support   Methods Collaborate with care team member;Offer support   Interventions Acknowledge current situation;Assist someone with Advance Directives; Explain  role      responded to request for POAH discussion.  contacted nurse Love Bernal who verified that patient is decisional.  explained POAH process. Patient would like to read through forms.  advised patient and sister to let nurse Love Bernal know if we can be of further assistance in completing the POAH forms. Spiritual Care support can be requested via an Epic consult. WILMA Sarah. Div  Extension:[phone]

## 2023-03-02 ENCOUNTER — APPOINTMENT (OUTPATIENT)
Dept: MRI IMAGING | Facility: HOSPITAL | Age: 63
DRG: 641 | End: 2023-03-02
Attending: PHYSICIAN ASSISTANT
Payer: MEDICAID

## 2023-03-02 LAB
ANION GAP SERPL CALC-SCNC: 1 MMOL/L (ref 0–18)
BASOPHILS # BLD AUTO: 0.05 X10(3) UL (ref 0–0.2)
BASOPHILS NFR BLD AUTO: 0.4 %
BUN BLD-MCNC: 30 MG/DL (ref 7–18)
CALCIUM BLD-MCNC: 8.5 MG/DL (ref 8.5–10.1)
CHLORIDE SERPL-SCNC: 111 MMOL/L (ref 98–112)
CO2 SERPL-SCNC: 23 MMOL/L (ref 21–32)
CREAT BLD-MCNC: 0.95 MG/DL
EOSINOPHIL # BLD AUTO: 0.03 X10(3) UL (ref 0–0.7)
EOSINOPHIL NFR BLD AUTO: 0.2 %
ERYTHROCYTE [DISTWIDTH] IN BLOOD BY AUTOMATED COUNT: 13.1 %
GFR SERPLBLD BASED ON 1.73 SQ M-ARVRAT: 91 ML/MIN/1.73M2 (ref 60–?)
GLUCOSE BLD-MCNC: 121 MG/DL (ref 70–99)
GLUCOSE BLD-MCNC: 122 MG/DL (ref 70–99)
GLUCOSE BLD-MCNC: 137 MG/DL (ref 70–99)
GLUCOSE BLD-MCNC: 140 MG/DL (ref 70–99)
GLUCOSE BLD-MCNC: 161 MG/DL (ref 70–99)
HCT VFR BLD AUTO: 47.5 %
HGB BLD-MCNC: 15.5 G/DL
IMM GRANULOCYTES # BLD AUTO: 0.05 X10(3) UL (ref 0–1)
IMM GRANULOCYTES NFR BLD: 0.4 %
LYMPHOCYTES # BLD AUTO: 1.68 X10(3) UL (ref 1–4)
LYMPHOCYTES NFR BLD AUTO: 12.7 %
MCH RBC QN AUTO: 30.4 PG (ref 26–34)
MCHC RBC AUTO-ENTMCNC: 32.6 G/DL (ref 31–37)
MCV RBC AUTO: 93.1 FL
MONOCYTES # BLD AUTO: 1.93 X10(3) UL (ref 0.1–1)
MONOCYTES NFR BLD AUTO: 14.6 %
NEUTROPHILS # BLD AUTO: 9.52 X10 (3) UL (ref 1.5–7.7)
NEUTROPHILS # BLD AUTO: 9.52 X10(3) UL (ref 1.5–7.7)
NEUTROPHILS NFR BLD AUTO: 71.7 %
OSMOLALITY SERPL CALC.SUM OF ELEC: 288 MOSM/KG (ref 275–295)
PLATELET # BLD AUTO: 131 10(3)UL (ref 150–450)
POTASSIUM SERPL-SCNC: 4.3 MMOL/L (ref 3.5–5.1)
POTASSIUM SERPL-SCNC: 4.3 MMOL/L (ref 3.5–5.1)
RBC # BLD AUTO: 5.1 X10(6)UL
SODIUM SERPL-SCNC: 135 MMOL/L (ref 136–145)
WBC # BLD AUTO: 13.3 X10(3) UL (ref 4–11)

## 2023-03-02 PROCEDURE — 99232 SBSQ HOSP IP/OBS MODERATE 35: CPT | Performed by: HOSPITALIST

## 2023-03-02 PROCEDURE — 72158 MRI LUMBAR SPINE W/O & W/DYE: CPT | Performed by: PHYSICIAN ASSISTANT

## 2023-03-02 PROCEDURE — 72157 MRI CHEST SPINE W/O & W/DYE: CPT | Performed by: PHYSICIAN ASSISTANT

## 2023-03-02 RX ORDER — BENZONATATE 100 MG/1
200 CAPSULE ORAL 3 TIMES DAILY PRN
Status: DISCONTINUED | OUTPATIENT
Start: 2023-03-02 | End: 2023-03-07

## 2023-03-02 RX ORDER — GADOTERATE MEGLUMINE 376.9 MG/ML
20 INJECTION INTRAVENOUS
Status: COMPLETED | OUTPATIENT
Start: 2023-03-02 | End: 2023-03-02

## 2023-03-02 NOTE — PROGRESS NOTES
Assumed care following RN report with patient resting in bed maintaining saturations on 2L NC. Placed on Cpap at night for FRANSISCO, patient refusing to wear mask, pt placed back on 2L NC with saturations wnl. VSS. Incontinent of urine, external cathether placed. Transfer order in place. Will continue to monitor.

## 2023-03-02 NOTE — PLAN OF CARE
Pt a/o x2-3. Intermittent confusion r/t date/situation. C/o moderate back pain. PRN Tylenol given w/adequate results per pt report. 4L NC PRN. Desaturation w/sleeping. CPAP for naps/sleeping. NSR observed to bedside monitor. Up to chair with max assist, well tolerated. Good appetite w/ADA diet. Urine retention managed w/intermittent catheterization x1. Approximately 800mL urine output. MRI lumbar spine, results pending. Active transfer orders in place. Awaiting bed placement. Plan of care discussed.

## 2023-03-02 NOTE — CM/SW NOTE
PMR did eval and advise acut rehab. Need to find out id [facility] will accept under Medicaid pending. Will need to get copy of Medicaid martine from [facility]. Rosy requesting copy of Medicaid martine too.     Sunitha Oconnor, JAMESW  /Discharge Planner

## 2023-03-03 LAB
ANION GAP SERPL CALC-SCNC: 4 MMOL/L (ref 0–18)
BASOPHILS # BLD AUTO: 0.05 X10(3) UL (ref 0–0.2)
BASOPHILS NFR BLD AUTO: 0.4 %
BUN BLD-MCNC: 37 MG/DL (ref 7–18)
CALCIUM BLD-MCNC: 8.7 MG/DL (ref 8.5–10.1)
CHLORIDE SERPL-SCNC: 108 MMOL/L (ref 98–112)
CO2 SERPL-SCNC: 25 MMOL/L (ref 21–32)
CREAT BLD-MCNC: 0.99 MG/DL
EOSINOPHIL # BLD AUTO: 0.07 X10(3) UL (ref 0–0.7)
EOSINOPHIL NFR BLD AUTO: 0.6 %
ERYTHROCYTE [DISTWIDTH] IN BLOOD BY AUTOMATED COUNT: 13.1 %
GFR SERPLBLD BASED ON 1.73 SQ M-ARVRAT: 87 ML/MIN/1.73M2 (ref 60–?)
GLUCOSE BLD-MCNC: 131 MG/DL (ref 70–99)
GLUCOSE BLD-MCNC: 136 MG/DL (ref 70–99)
GLUCOSE BLD-MCNC: 138 MG/DL (ref 70–99)
GLUCOSE BLD-MCNC: 145 MG/DL (ref 70–99)
GLUCOSE BLD-MCNC: 158 MG/DL (ref 70–99)
HCT VFR BLD AUTO: 43.5 %
HGB BLD-MCNC: 14.3 G/DL
IMM GRANULOCYTES # BLD AUTO: 0.06 X10(3) UL (ref 0–1)
IMM GRANULOCYTES NFR BLD: 0.5 %
LYMPHOCYTES # BLD AUTO: 1.61 X10(3) UL (ref 1–4)
LYMPHOCYTES NFR BLD AUTO: 14.3 %
MCH RBC QN AUTO: 30.1 PG (ref 26–34)
MCHC RBC AUTO-ENTMCNC: 32.9 G/DL (ref 31–37)
MCV RBC AUTO: 91.6 FL
MONOCYTES # BLD AUTO: 1.2 X10(3) UL (ref 0.1–1)
MONOCYTES NFR BLD AUTO: 10.7 %
NEUTROPHILS # BLD AUTO: 8.26 X10 (3) UL (ref 1.5–7.7)
NEUTROPHILS # BLD AUTO: 8.26 X10(3) UL (ref 1.5–7.7)
NEUTROPHILS NFR BLD AUTO: 73.5 %
OSMOLALITY SERPL CALC.SUM OF ELEC: 295 MOSM/KG (ref 275–295)
PLATELET # BLD AUTO: 155 10(3)UL (ref 150–450)
POTASSIUM SERPL-SCNC: 4 MMOL/L (ref 3.5–5.1)
RBC # BLD AUTO: 4.75 X10(6)UL
SODIUM SERPL-SCNC: 137 MMOL/L (ref 136–145)
STAPHYLOCOCCUS AUREUS, NOT MRSA BY PCR: DETECTED
WBC # BLD AUTO: 11.3 X10(3) UL (ref 4–11)

## 2023-03-03 PROCEDURE — B548ZZA ULTRASONOGRAPHY OF SUPERIOR VENA CAVA, GUIDANCE: ICD-10-PCS | Performed by: INTERNAL MEDICINE

## 2023-03-03 PROCEDURE — 99232 SBSQ HOSP IP/OBS MODERATE 35: CPT | Performed by: HOSPITALIST

## 2023-03-03 PROCEDURE — 02HV33Z INSERTION OF INFUSION DEVICE INTO SUPERIOR VENA CAVA, PERCUTANEOUS APPROACH: ICD-10-PCS | Performed by: INTERNAL MEDICINE

## 2023-03-03 RX ORDER — LIDOCAINE HYDROCHLORIDE 10 MG/ML
5 INJECTION, SOLUTION EPIDURAL; INFILTRATION; INTRACAUDAL; PERINEURAL
Status: COMPLETED | OUTPATIENT
Start: 2023-03-03 | End: 2023-03-03

## 2023-03-03 RX ORDER — SODIUM CHLORIDE 9 MG/ML
500 INJECTION, SOLUTION INTRAVENOUS ONCE
Status: DISCONTINUED | OUTPATIENT
Start: 2023-03-03 | End: 2023-03-03

## 2023-03-03 NOTE — PLAN OF CARE
Assumed care of pt for the night shift. Pt alert following commands. CPAP at night. Pt with transfer orders. See flowsheets for further assessment.

## 2023-03-03 NOTE — CM/SW NOTE
Gordy Ybarra from [facility]: in regards to eventual IV abs at home. \"We can but we have to set up like self pay on a payment plan in case. Then when Medicaid pays us we would return any funds we collected. \"    Will need to address above with pt /family.     Carlos Carter LCSW  /Discharge Planner

## 2023-03-03 NOTE — CM/SW NOTE
Matteo received call from Carlo from [facility]- they are reviewing for acceptance - MATTEO to follow up with [facility] on Monday  [phone] is Tab contact number.     Carlos Ervin LCSW  /Discharge Planner

## 2023-03-03 NOTE — PLAN OF CARE
Pt a/o x4. Forgetful. C/o mild back. PRN tylenol given. Maintaining spo2 saturations on 2L NC. PRN Tessalon given for congested cough. NSR. PT/OT this afternoon. Up with moderate/max assist. Urinary retention resolved. Approximately 600mL urine output. Good appetite. PICC placed to RUE r/t long term abx. Active transfer orders in place. Awaiting bed placement. Plan of care discussed.

## 2023-03-03 NOTE — CM/SW NOTE
Left message for [facility] [employer] requesting copy of Medicaid Application and supporting documents.     Miki Landau, JAMESW  /Discharge Planner

## 2023-03-03 NOTE — PROGRESS NOTES
03/02/23 1901   Clinical Encounter Type   Visited With Family   Taxonomy   Intended Effects Build relationship of care and support   Methods Offer support; Offer emotional support;Collaborate with care team member   Interventions Acknowledge current situation; Active listening; Ask guided questions; Share words of hope and inspiration;Provide hospitality        attempted to follow up with patient Cely Mccarty on [facility]. Patient out for a procedure.  visited with sister Josselyn Batista as she waited outside ICU.  provided info on family waiting area.  also sat with Josselyn Batista and helped pass time as she waited for nurses to get finished prepping Cely Mccarty.  advised how to contact spiritual care once Cely Mccarty is ready to complete POAH.  remains available for support. Spiritual Care support can be requested via an Epic consult. WILMA Maharaj. Div  Extension:[phone]

## 2023-03-03 NOTE — CM/SW NOTE
Sienna met with pt and later spoke to his sister re: need for supporting documents for his Medicaid application. Jose Armando Martinez are considering pt- clinically approved for [facility] but will need to be financially approved also. Sister will work on getting supporting docs this weekend. Pt will also be in need of 6 weeks of IV abs. Pt and sister aware of this. Sw sent referral to [facility] to see if they will accept pt Medicaid pending once he leaves rehab. Sw to follow up with pt's sister Josie Washington on Monday to get copy of supporting docs. Change  sent me copy of the Medicaid application which has been uploaded into [employer].     Venetta Litten, LCSW  /Discharge Planner

## 2023-03-04 LAB
ANION GAP SERPL CALC-SCNC: 3 MMOL/L (ref 0–18)
BASOPHILS # BLD AUTO: 0.07 X10(3) UL (ref 0–0.2)
BASOPHILS NFR BLD AUTO: 0.7 %
BUN BLD-MCNC: 29 MG/DL (ref 7–18)
CALCIUM BLD-MCNC: 8.8 MG/DL (ref 8.5–10.1)
CHLORIDE SERPL-SCNC: 106 MMOL/L (ref 98–112)
CO2 SERPL-SCNC: 28 MMOL/L (ref 21–32)
CREAT BLD-MCNC: 0.86 MG/DL
EOSINOPHIL # BLD AUTO: 0.2 X10(3) UL (ref 0–0.7)
EOSINOPHIL NFR BLD AUTO: 2 %
ERYTHROCYTE [DISTWIDTH] IN BLOOD BY AUTOMATED COUNT: 13 %
GFR SERPLBLD BASED ON 1.73 SQ M-ARVRAT: 99 ML/MIN/1.73M2 (ref 60–?)
GLUCOSE BLD-MCNC: 118 MG/DL (ref 70–99)
GLUCOSE BLD-MCNC: 125 MG/DL (ref 70–99)
GLUCOSE BLD-MCNC: 141 MG/DL (ref 70–99)
GLUCOSE BLD-MCNC: 142 MG/DL (ref 70–99)
GLUCOSE BLD-MCNC: 145 MG/DL (ref 70–99)
HCT VFR BLD AUTO: 44.6 %
HGB BLD-MCNC: 14.4 G/DL
IMM GRANULOCYTES # BLD AUTO: 0.06 X10(3) UL (ref 0–1)
IMM GRANULOCYTES NFR BLD: 0.6 %
LYMPHOCYTES # BLD AUTO: 1.92 X10(3) UL (ref 1–4)
LYMPHOCYTES NFR BLD AUTO: 18.8 %
MCH RBC QN AUTO: 30.7 PG (ref 26–34)
MCHC RBC AUTO-ENTMCNC: 32.3 G/DL (ref 31–37)
MCV RBC AUTO: 95.1 FL
MONOCYTES # BLD AUTO: 1.1 X10(3) UL (ref 0.1–1)
MONOCYTES NFR BLD AUTO: 10.8 %
NEUTROPHILS # BLD AUTO: 6.86 X10 (3) UL (ref 1.5–7.7)
NEUTROPHILS # BLD AUTO: 6.86 X10(3) UL (ref 1.5–7.7)
NEUTROPHILS NFR BLD AUTO: 67.1 %
OSMOLALITY SERPL CALC.SUM OF ELEC: 292 MOSM/KG (ref 275–295)
PLATELET # BLD AUTO: 174 10(3)UL (ref 150–450)
POTASSIUM SERPL-SCNC: 4.2 MMOL/L (ref 3.5–5.1)
RBC # BLD AUTO: 4.69 X10(6)UL
SODIUM SERPL-SCNC: 137 MMOL/L (ref 136–145)
WBC # BLD AUTO: 10.2 X10(3) UL (ref 4–11)

## 2023-03-04 PROCEDURE — 99232 SBSQ HOSP IP/OBS MODERATE 35: CPT | Performed by: INTERNAL MEDICINE

## 2023-03-04 NOTE — PLAN OF CARE
Pt resting in bed on 3L NC. Follows commands. Confused at times. Up to chair with PT 2 assist this morning. Complains of right sided leg/lower back pain after getting up and moving. PRN tylenol given, ice pack, and patch for pain relief.  Offered to order pt breakfast pt said he was not hungry and did not want to eat breakfast.

## 2023-03-04 NOTE — PLAN OF CARE
Assumed care of pt for the night shift. Pt forgetful at times, follows commands. Pt with cough, PRN cough medication given per orders. Pt with transfer orders. See flowsheets for further assessment.

## 2023-03-05 LAB
ANION GAP SERPL CALC-SCNC: 0 MMOL/L (ref 0–18)
BASOPHILS # BLD AUTO: 0.04 X10(3) UL (ref 0–0.2)
BASOPHILS NFR BLD AUTO: 0.4 %
BUN BLD-MCNC: 25 MG/DL (ref 7–18)
CALCIUM BLD-MCNC: 8.9 MG/DL (ref 8.5–10.1)
CHLORIDE SERPL-SCNC: 106 MMOL/L (ref 98–112)
CO2 SERPL-SCNC: 29 MMOL/L (ref 21–32)
CREAT BLD-MCNC: 0.76 MG/DL
EOSINOPHIL # BLD AUTO: 0.15 X10(3) UL (ref 0–0.7)
EOSINOPHIL NFR BLD AUTO: 1.4 %
ERYTHROCYTE [DISTWIDTH] IN BLOOD BY AUTOMATED COUNT: 12.6 %
GFR SERPLBLD BASED ON 1.73 SQ M-ARVRAT: 102 ML/MIN/1.73M2 (ref 60–?)
GLUCOSE BLD-MCNC: 128 MG/DL (ref 70–99)
GLUCOSE BLD-MCNC: 142 MG/DL (ref 70–99)
GLUCOSE BLD-MCNC: 153 MG/DL (ref 70–99)
GLUCOSE BLD-MCNC: 159 MG/DL (ref 70–99)
GLUCOSE BLD-MCNC: 162 MG/DL (ref 70–99)
HCT VFR BLD AUTO: 43.7 %
HGB BLD-MCNC: 14.3 G/DL
IMM GRANULOCYTES # BLD AUTO: 0.09 X10(3) UL (ref 0–1)
IMM GRANULOCYTES NFR BLD: 0.8 %
LYMPHOCYTES # BLD AUTO: 1.67 X10(3) UL (ref 1–4)
LYMPHOCYTES NFR BLD AUTO: 15.1 %
MCH RBC QN AUTO: 30.1 PG (ref 26–34)
MCHC RBC AUTO-ENTMCNC: 32.7 G/DL (ref 31–37)
MCV RBC AUTO: 92 FL
MONOCYTES # BLD AUTO: 1 X10(3) UL (ref 0.1–1)
MONOCYTES NFR BLD AUTO: 9 %
NEUTROPHILS # BLD AUTO: 8.11 X10 (3) UL (ref 1.5–7.7)
NEUTROPHILS # BLD AUTO: 8.11 X10(3) UL (ref 1.5–7.7)
NEUTROPHILS NFR BLD AUTO: 73.3 %
OSMOLALITY SERPL CALC.SUM OF ELEC: 288 MOSM/KG (ref 275–295)
PLATELET # BLD AUTO: 208 10(3)UL (ref 150–450)
POTASSIUM SERPL-SCNC: 4.2 MMOL/L (ref 3.5–5.1)
RBC # BLD AUTO: 4.75 X10(6)UL
SODIUM SERPL-SCNC: 135 MMOL/L (ref 136–145)
WBC # BLD AUTO: 11.1 X10(3) UL (ref 4–11)

## 2023-03-05 PROCEDURE — 99232 SBSQ HOSP IP/OBS MODERATE 35: CPT | Performed by: INTERNAL MEDICINE

## 2023-03-05 RX ORDER — HYDROCHLOROTHIAZIDE 25 MG/1
25 TABLET ORAL DAILY
Status: DISCONTINUED | OUTPATIENT
Start: 2023-03-05 | End: 2023-03-07

## 2023-03-05 RX ORDER — TRAMADOL HYDROCHLORIDE 50 MG/1
50 TABLET ORAL EVERY 6 HOURS PRN
Qty: 10 TABLET | Refills: 0 | Status: SHIPPED | OUTPATIENT
Start: 2023-03-05

## 2023-03-05 RX ORDER — TRAMADOL HYDROCHLORIDE 50 MG/1
50 TABLET ORAL EVERY 6 HOURS PRN
Status: DISCONTINUED | OUTPATIENT
Start: 2023-03-05 | End: 2023-03-07

## 2023-03-05 NOTE — PLAN OF CARE
Assumed care of pt for the night shift. Pt confused. Complaint of persistent back pain, DOTTIE MARIA notfiied. PRN medications given, see MAR. See flowsheets for further assessment.      Pt to transfer to room 384, report given to [facility]

## 2023-03-05 NOTE — PLAN OF CARE
ER admit 2/27 found unresponsive at home, acute rhabdomyolysis, Pt admitted to ICU, transfer to Ortho 6 am 3/5, MRI showing infectious myositis, PICC place to Rt upper arm, Pt removed IV, Pt remains slightly confused / forgetful, reminded to use call light for needs, voiding freely to urinal or restroom, up MOD assist W/ RW gait belt and 2 PPL, glucose monitored and treated per orders, plan for acute rehab, Pt doing well, all needs met, all safety measures in place, call light within reach, will CTM.

## 2023-03-06 LAB
ANION GAP SERPL CALC-SCNC: 1 MMOL/L (ref 0–18)
BASOPHILS # BLD AUTO: 0.04 X10(3) UL (ref 0–0.2)
BASOPHILS NFR BLD AUTO: 0.5 %
BUN BLD-MCNC: 22 MG/DL (ref 7–18)
CALCIUM BLD-MCNC: 9.1 MG/DL (ref 8.5–10.1)
CHLORIDE SERPL-SCNC: 104 MMOL/L (ref 98–112)
CO2 SERPL-SCNC: 31 MMOL/L (ref 21–32)
CREAT BLD-MCNC: 0.83 MG/DL
EOSINOPHIL # BLD AUTO: 0.16 X10(3) UL (ref 0–0.7)
EOSINOPHIL NFR BLD AUTO: 1.8 %
ERYTHROCYTE [DISTWIDTH] IN BLOOD BY AUTOMATED COUNT: 12.7 %
GFR SERPLBLD BASED ON 1.73 SQ M-ARVRAT: 100 ML/MIN/1.73M2 (ref 60–?)
GLUCOSE BLD-MCNC: 124 MG/DL (ref 70–99)
GLUCOSE BLD-MCNC: 135 MG/DL (ref 70–99)
GLUCOSE BLD-MCNC: 140 MG/DL (ref 70–99)
GLUCOSE BLD-MCNC: 182 MG/DL (ref 70–99)
GLUCOSE BLD-MCNC: 184 MG/DL (ref 70–99)
HCT VFR BLD AUTO: 40.6 %
HGB BLD-MCNC: 13.5 G/DL
IMM GRANULOCYTES # BLD AUTO: 0.07 X10(3) UL (ref 0–1)
IMM GRANULOCYTES NFR BLD: 0.8 %
LYMPHOCYTES # BLD AUTO: 1.84 X10(3) UL (ref 1–4)
LYMPHOCYTES NFR BLD AUTO: 20.8 %
MCH RBC QN AUTO: 30.4 PG (ref 26–34)
MCHC RBC AUTO-ENTMCNC: 33.3 G/DL (ref 31–37)
MCV RBC AUTO: 91.4 FL
MONOCYTES # BLD AUTO: 0.79 X10(3) UL (ref 0.1–1)
MONOCYTES NFR BLD AUTO: 8.9 %
NEUTROPHILS # BLD AUTO: 5.96 X10 (3) UL (ref 1.5–7.7)
NEUTROPHILS # BLD AUTO: 5.96 X10(3) UL (ref 1.5–7.7)
NEUTROPHILS NFR BLD AUTO: 67.2 %
OSMOLALITY SERPL CALC.SUM OF ELEC: 287 MOSM/KG (ref 275–295)
PLATELET # BLD AUTO: 208 10(3)UL (ref 150–450)
POTASSIUM SERPL-SCNC: 3.8 MMOL/L (ref 3.5–5.1)
RBC # BLD AUTO: 4.44 X10(6)UL
SODIUM SERPL-SCNC: 136 MMOL/L (ref 136–145)
WBC # BLD AUTO: 8.9 X10(3) UL (ref 4–11)

## 2023-03-06 PROCEDURE — 99232 SBSQ HOSP IP/OBS MODERATE 35: CPT | Performed by: INTERNAL MEDICINE

## 2023-03-06 RX ORDER — MORPHINE SULFATE 2 MG/ML
INJECTION, SOLUTION INTRAMUSCULAR; INTRAVENOUS
Status: COMPLETED
Start: 2023-03-06 | End: 2023-03-06

## 2023-03-06 RX ORDER — MORPHINE SULFATE 2 MG/ML
2 INJECTION, SOLUTION INTRAMUSCULAR; INTRAVENOUS EVERY 2 HOUR PRN
Status: DISCONTINUED | OUTPATIENT
Start: 2023-03-06 | End: 2023-03-07

## 2023-03-06 RX ORDER — MORPHINE SULFATE 2 MG/ML
0.5 INJECTION, SOLUTION INTRAMUSCULAR; INTRAVENOUS EVERY 2 HOUR PRN
Status: DISCONTINUED | OUTPATIENT
Start: 2023-03-06 | End: 2023-03-07

## 2023-03-06 RX ORDER — MORPHINE SULFATE 2 MG/ML
1 INJECTION, SOLUTION INTRAMUSCULAR; INTRAVENOUS EVERY 2 HOUR PRN
Status: DISCONTINUED | OUTPATIENT
Start: 2023-03-06 | End: 2023-03-07

## 2023-03-06 RX ORDER — HYDROCODONE BITARTRATE AND ACETAMINOPHEN 5; 325 MG/1; MG/1
1 TABLET ORAL EVERY 6 HOURS PRN
Status: DISCONTINUED | OUTPATIENT
Start: 2023-03-06 | End: 2023-03-07

## 2023-03-06 RX ORDER — POTASSIUM CHLORIDE 20 MEQ/1
40 TABLET, EXTENDED RELEASE ORAL ONCE
Status: COMPLETED | OUTPATIENT
Start: 2023-03-06 | End: 2023-03-06

## 2023-03-06 NOTE — CM/SW NOTE
Previous notes from SW reviewed. Call placed to [facility] to determine status of Medicaid application. [facility] verified pt's benefits and revealed that pt's Medicaid application has been approved/coverage is listed as active (approval [id number]), therefore no additional documents needed for application. This approval information was added to [address] referral in Aidin and shared with [facility] liaison, Andrew Duran. Andrew Duran stated that she will submit this information to their financial department to be verified and the liaison will follow up tomorrow. [facility] referral sent via Aidin as a backup plan - will complete PASRR. Added Medicaid approval information to this referral, as well. Luis Newman from [facility] confirmed that they also accept Medicaid in the event he discharges home with IV abx. Pt shares that he would be willing and able to self administer IV abx and will have the support of his family - should he discharge home from [facility] or rehab needing additional IV abx. CM met with pt and sister, Luis Newman, at bedside to discuss discharge planning. Luis Newman shares that their preference would be for pt to discharge to [place] [address] for acute rehab. Discussed [facility] as a backup plan and pt/Ada would be agreeable - window remains open in Aidin for facilities to respond. Also discussed long term planning. Luis Newman states that her family is working to clean the pt's home and make arrangements for him to live on the first floor of his home, although he only has a half bathroom. Luis Newman lives in his neighborhood and is able to have him stay with her if needed. She shares that the pt is very independent and has always helped his sisters, therefore now they want to help him in anyway they can. They are aware that the pt is likely to be ready for discharge in the next few days.  left for Amita to request she call pt's sister, Luis Newman, to further discuss her questions regarding Medicaid. Updated unit SW.     Darline Soares, BSN, RN-BC  Case Manager  [id number]

## 2023-03-06 NOTE — PLAN OF CARE
Pt a/ox4. Can be forgetful at times. VSS, maintains sats on room air, O2 when sleeping. Up with moderate assist x1 this am to chair, tolerated well. PT/OT following. Acute rehab recommended, pending. PICC line to SURJIT, aspirates and flushes. Dressing CDI, reinforced. IV ancef per ID. Increased pain to back this am, per [facility] pt has not been keeping up with pain medications for the last 24 hours. Discussed with MD. 1 dose morphine given this am, started on PO norco. Encouraged patient to take pain medication at regular intervals. SW working on [employer] pending with pt sister, documents delivered to bedside this afternoon. Plan pending SW and pain control for discharge.

## 2023-03-06 NOTE — PHYSICAL THERAPY NOTE
IP PT attempted, pt up in chair c nsg staff. Pt currently working on paperwork c sister. Will re-attempt as schedule permits. Patient seen and examined.  Agree with above pa note.  PAtient is a 80 year old woman with medical history of HTN, HLD, Firbomyalgia, lymphedema, OA, JAMMIE on CPAP, Hypothyroidism admitted after  multiple falls/syncopal episodes.     Last episode occurred after awaking from bed and walking to the bathroom.  yesterday she feel and was on the floor for 30 minutes  today she feels better without active complaints    Patient denies any chest pain, dyspnea, palpitations, cough, exertional symptoms, nausea, abdominal pain, fever, chills,  or rash.  Denies any history of CAD, MI, valve disease, cardiomyopathy, or congenital heart disease.      cath 3 mos ago at [facility] no obs disease    ecg  inc lbbb, sr  cecilia negative    Vitals stable  nad aao  x 3 nc/at neck supple no jvd  cv s1s2 rrr  lungs clear b/l  abd soft, nt  ext b/l edema    A/P    80 year old woman with history of HTN, HLD, Firbomyalgia, lymphedema, OA, JAMMIE on CPAP, Hypothyroidism, admitted with multiple falls/syncopal episodes.       1. Syncope   possibly related to volume status  check orthostatic vitals   hydrate  check echo r/o cmp, valve disease  ivcd on tele   eps consult if no other causes of syncope elucidated    2. Rhabo  hydrate  trend ck  3. JAMMIE  CPAP    dvt ppx  r/o dvt

## 2023-03-06 NOTE — PLAN OF CARE
A&Ox4, can be forgetful. VSS. on 2L of O2 via NC. Pain controlled with prn tylenol. Dressing. Neurovascular. Voiding. PICC in place. ADA diet w qid accu checks. Voiding in bathroom, with some incontinence. Plan to go home to [facility] when medically cleared and PICC for 6 wks of abx . Discussed plan of care with patient. Safety precautions in place.

## 2023-03-06 NOTE — OCCUPATIONAL THERAPY NOTE
Attempted to see pt for OT treatment session. Pt sitting up in chair with sister present upon arrival. Pt completing paperwork with sister. Will re-attempt and continue to follow as schedule permits.

## 2023-03-07 VITALS
OXYGEN SATURATION: 100 % | DIASTOLIC BLOOD PRESSURE: 74 MMHG | HEIGHT: 73 IN | WEIGHT: 315 LBS | TEMPERATURE: 98 F | RESPIRATION RATE: 18 BRPM | SYSTOLIC BLOOD PRESSURE: 106 MMHG | BODY MASS INDEX: 41.75 KG/M2 | HEART RATE: 61 BPM

## 2023-03-07 LAB
ANION GAP SERPL CALC-SCNC: 4 MMOL/L (ref 0–18)
BASOPHILS # BLD AUTO: 0.04 X10(3) UL (ref 0–0.2)
BASOPHILS NFR BLD AUTO: 0.5 %
BUN BLD-MCNC: 28 MG/DL (ref 7–18)
CALCIUM BLD-MCNC: 8.7 MG/DL (ref 8.5–10.1)
CHLORIDE SERPL-SCNC: 102 MMOL/L (ref 98–112)
CO2 SERPL-SCNC: 30 MMOL/L (ref 21–32)
CREAT BLD-MCNC: 0.91 MG/DL
EOSINOPHIL # BLD AUTO: 0.15 X10(3) UL (ref 0–0.7)
EOSINOPHIL NFR BLD AUTO: 1.8 %
ERYTHROCYTE [DISTWIDTH] IN BLOOD BY AUTOMATED COUNT: 12.7 %
GFR SERPLBLD BASED ON 1.73 SQ M-ARVRAT: 96 ML/MIN/1.73M2 (ref 60–?)
GLUCOSE BLD-MCNC: 125 MG/DL (ref 70–99)
GLUCOSE BLD-MCNC: 137 MG/DL (ref 70–99)
GLUCOSE BLD-MCNC: 183 MG/DL (ref 70–99)
GLUCOSE BLD-MCNC: 206 MG/DL (ref 70–99)
HCT VFR BLD AUTO: 40.9 %
HGB BLD-MCNC: 13.7 G/DL
IMM GRANULOCYTES # BLD AUTO: 0.07 X10(3) UL (ref 0–1)
IMM GRANULOCYTES NFR BLD: 0.8 %
LYMPHOCYTES # BLD AUTO: 1.87 X10(3) UL (ref 1–4)
LYMPHOCYTES NFR BLD AUTO: 22.1 %
MCH RBC QN AUTO: 30 PG (ref 26–34)
MCHC RBC AUTO-ENTMCNC: 33.5 G/DL (ref 31–37)
MCV RBC AUTO: 89.7 FL
MONOCYTES # BLD AUTO: 0.79 X10(3) UL (ref 0.1–1)
MONOCYTES NFR BLD AUTO: 9.3 %
NEUTROPHILS # BLD AUTO: 5.56 X10 (3) UL (ref 1.5–7.7)
NEUTROPHILS # BLD AUTO: 5.56 X10(3) UL (ref 1.5–7.7)
NEUTROPHILS NFR BLD AUTO: 65.5 %
OSMOLALITY SERPL CALC.SUM OF ELEC: 290 MOSM/KG (ref 275–295)
PLATELET # BLD AUTO: 246 10(3)UL (ref 150–450)
POTASSIUM SERPL-SCNC: 4.2 MMOL/L (ref 3.5–5.1)
POTASSIUM SERPL-SCNC: 4.2 MMOL/L (ref 3.5–5.1)
RBC # BLD AUTO: 4.56 X10(6)UL
SARS-COV-2 RNA RESP QL NAA+PROBE: NOT DETECTED
SODIUM SERPL-SCNC: 136 MMOL/L (ref 136–145)
WBC # BLD AUTO: 8.5 X10(3) UL (ref 4–11)

## 2023-03-07 PROCEDURE — 99239 HOSP IP/OBS DSCHRG MGMT >30: CPT | Performed by: INTERNAL MEDICINE

## 2023-03-07 RX ORDER — CEFAZOLIN SODIUM/WATER 2 G/20 ML
2 SYRINGE (ML) INTRAVENOUS EVERY 8 HOURS
Qty: 600 ML | Refills: 0 | Status: SHIPPED | OUTPATIENT
Start: 2023-03-07 | End: 2023-04-12

## 2023-03-07 RX ORDER — HYDROCODONE BITARTRATE AND ACETAMINOPHEN 5; 325 MG/1; MG/1
1 TABLET ORAL EVERY 6 HOURS PRN
Qty: 30 TABLET | Refills: 0 | Status: SHIPPED | OUTPATIENT
Start: 2023-03-07

## 2023-03-07 NOTE — CM/SW NOTE
Informed by Monie at [facility] that pt can be accepted to room 2220 today. RN to call report to [phone]. [facility] transport scheduled for 530pm.  PCS from completed and available for RN to print. They will send stretcher at [facility] rates. Updated pt at bedside and spoke with pt's sister, Michael Aldana by phone. Both in agreement with DC plan for today. Updated pt's RN. Pt to receive dose of IV abx prior to DC. / to remain available for support and/or discharge planning.      Laurie Juarez \[facility] [place] [facility]\""LACEY  Discharge Planner  [phone]

## 2023-03-07 NOTE — OCCUPATIONAL THERAPY NOTE
Attempted to see pt for skilled OT services this date. Pt is currently reporting that he is discharging within the next hour and would like to just follow with therapy at rehab.   Yi Toro, 03/07/23, 2:16 PM

## 2023-03-07 NOTE — PLAN OF CARE
A&Ox4, can be forgetful. VSS. on 2L of O2 via NC. Reports pain to his lower back, controlled with prn pain meds. PICC in place, flushed with blood return. Ordered iv abx for tonight. ADA diet w qid accu checks. Voiding in bathroom, with some incontinence. Plan to go home to [facility] when medically cleared and PICC for 6 wks of abx . Discussed plan of care with patient. Safety precautions in place.

## 2023-03-07 NOTE — CM/SW NOTE
03/07/23 1107   Choice of Post-Acute Provider   Informed patient of right to choose their preferred provider Yes   List of appropriate post-acute services provided to patient/family with quality data Yes   Patient/family choice [facility]   Information given to Patient       Pt's Medicaid is approved. Martha Madeline with [facility] who confirmed pt can be accepted. Met with pt and provided [facility] information for  rehab. Pt confirmed this as his preferred DC plan. Discussed anticipated DC later today and pt in agreement with planning. Updated RN and requested  bed assignment. / to remain available for support and/or discharge planning.      Laurie Juarez [facility]  Discharge Planner  [phone]

## 2023-03-07 NOTE — PLAN OF CARE
Patient A&Ox4, RA, up w/ 1 assist and walker. Tele-NSR. Reporting chronic lower back pain. Lidocaine patch applied and receiving norco prn for pain. Voiding. Tolerating diet. Receiving insulin per mar. Receiving IV abx. PICC line intact. Plan of care discussed w/ patient. Problem: Diabetes/Glucose Control  Goal: Glucose maintained within prescribed range  Description: INTERVENTIONS:  - Monitor Blood Glucose as ordered  - Assess for signs and symptoms of hyperglycemia and hypoglycemia  - Administer ordered medications to maintain glucose within target range  - Assess barriers to adequate nutritional intake and initiate nutrition consult as needed  - Instruct patient on self management of diabetes  Outcome: Progressing     Problem: Patient/Family Goals  Goal: Patient/Family Long Term Goal  Description: Patient's Long Term Goal: return function to baseline    Interventions:  - PT/OT when appropriate  - ROM/ mobility activities   - See additional Care Plan goals for specific interventions  Outcome: Progressing  Goal: Patient/Family Short Term Goal  Description: Patient's Short Term Goal: improve mental status    Interventions:   - neuro checks as ordered  - frequent reorienting  - delirium protocol  - See additional Care Plan goals for specific interventions  Outcome: Progressing     Problem: NEUROLOGICAL - ADULT  Goal: Achieves stable or improved neurological status  Description: INTERVENTIONS  - Assess for and report changes in neurological status  - Initiate measures to prevent increased intracranial pressure  - Maintain blood pressure and fluid volume within ordered parameters to optimize cerebral perfusion and minimize risk of hemorrhage  - Monitor temperature, glucose, and sodium.  Initiate appropriate interventions as ordered  Outcome: Progressing  Goal: Achieves maximal functionality and self care  Description: INTERVENTIONS  - Monitor swallowing and airway patency with patient fatigue and changes in neurological status  - Encourage and assist patient to increase activity and self care with guidance from PT/OT  - Encourage visually impaired, hearing impaired and aphasic patients to use assistive/communication devices  Outcome: Progressing     Problem: Impaired Functional Mobility  Goal: Achieve highest/safest level of mobility/gait  Description: Interventions:  - Assess patient's functional ability and stability  - Promote increasing activity/tolerance for mobility and gait  - Educate and engage patient/family in tolerated activity level and precautions  Outcome: Progressing     Problem: Impaired Cognition  Goal: Patient will exhibit improved attention, thought processing and/or memory  Description: Interventions:  Outcome: Progressing     Problem: Delirium  Goal: Minimize duration of delirium  Description: Interventions:  - Encourage use of hearing aids, eye glasses  - Promote highest level of mobility daily  - Provide frequent reorientation  - Promote wakefulness i.e. lights on, blinds open  - Promote sleep, encourage patient's normal rest cycle i.e. lights off, TV off, minimize noise and interruptions  - Encourage family to assist in orientation and promotion of home routines  Outcome: Progressing

## 2023-03-07 NOTE — PROGRESS NOTES
NURSING DISCHARGE NOTE    Discharged Rehab facility via Ambulance. Accompanied by Support staff  Belongings Taken by patient/family. Patient given discharge instructions. Discharge instructions and face sheet also given to ambulance staff. Report given to Shannon Kat RN at [facility]. All questions and concerns addressed w/ patient at this time.

## 2023-03-08 ENCOUNTER — PATIENT OUTREACH (OUTPATIENT)
Dept: CASE MANAGEMENT | Age: 63
End: 2023-03-08

## 2023-03-08 NOTE — PROGRESS NOTES
TCM chart review. No TCM as patient was discharged to [facility]. Encounter closing.

## 2023-03-21 ENCOUNTER — TELEPHONE (OUTPATIENT)
Dept: SURGERY | Facility: CLINIC | Age: 63
End: 2023-03-21

## 2023-03-21 ENCOUNTER — HOSPITAL ENCOUNTER (INPATIENT)
Facility: HOSPITAL | Age: 63
LOS: 8 days | Discharge: NURSING, REHAB OR LONG-TERM CARE | End: 2023-03-29
Attending: EMERGENCY MEDICINE | Admitting: HOSPITALIST
Payer: MEDICAID

## 2023-03-21 ENCOUNTER — HOSPITAL ENCOUNTER (INPATIENT)
Facility: HOSPITAL | Age: 63
LOS: 8 days | Discharge: NURSING, REHAB OR LONG-TERM CARE | End: 2023-03-29
Attending: EMERGENCY MEDICINE | Admitting: HOSPITALIST
Payer: MEDICAID

## 2023-03-21 ENCOUNTER — HOSPITAL ENCOUNTER (INPATIENT)
Facility: HOSPITAL | Age: 63
Discharge: NURSING, REHAB OR LONG-TERM CARE | End: 2023-03-21
Attending: EMERGENCY MEDICINE | Admitting: HOSPITALIST
Payer: MEDICAID

## 2023-03-21 DIAGNOSIS — F32.0 MILD MAJOR DEPRESSION, SINGLE EPISODE (HCC): ICD-10-CM

## 2023-03-21 DIAGNOSIS — G06.2 EPIDURAL ABSCESS: Primary | ICD-10-CM

## 2023-03-21 LAB
ALBUMIN SERPL-MCNC: 2.5 G/DL (ref 3.4–5)
ALBUMIN/GLOB SERPL: 0.4 {RATIO} (ref 1–2)
ALP LIVER SERPL-CCNC: 145 U/L
ALT SERPL-CCNC: 8 U/L
ANION GAP SERPL CALC-SCNC: 5 MMOL/L (ref 0–18)
AST SERPL-CCNC: 13 U/L (ref 15–37)
BASOPHILS # BLD AUTO: 0.04 X10(3) UL (ref 0–0.2)
BASOPHILS NFR BLD AUTO: 0.6 %
BILIRUB SERPL-MCNC: 0.3 MG/DL (ref 0.1–2)
BUN BLD-MCNC: 30 MG/DL (ref 7–18)
CALCIUM BLD-MCNC: 9.2 MG/DL (ref 8.5–10.1)
CHLORIDE SERPL-SCNC: 102 MMOL/L (ref 98–112)
CO2 SERPL-SCNC: 28 MMOL/L (ref 21–32)
CREAT BLD-MCNC: 1.04 MG/DL
CRP SERPL-MCNC: 8.25 MG/DL (ref ?–0.3)
EOSINOPHIL # BLD AUTO: 0.09 X10(3) UL (ref 0–0.7)
EOSINOPHIL NFR BLD AUTO: 1.4 %
ERYTHROCYTE [DISTWIDTH] IN BLOOD BY AUTOMATED COUNT: 12.3 %
ERYTHROCYTE [SEDIMENTATION RATE] IN BLOOD: 108 MM/HR
GFR SERPLBLD BASED ON 1.73 SQ M-ARVRAT: 82 ML/MIN/1.73M2 (ref 60–?)
GLOBULIN PLAS-MCNC: 6 G/DL (ref 2.8–4.4)
GLUCOSE BLD-MCNC: 138 MG/DL (ref 70–99)
HCT VFR BLD AUTO: 42.7 %
HGB BLD-MCNC: 14.7 G/DL
IMM GRANULOCYTES # BLD AUTO: 0.02 X10(3) UL (ref 0–1)
IMM GRANULOCYTES NFR BLD: 0.3 %
LYMPHOCYTES # BLD AUTO: 1.27 X10(3) UL (ref 1–4)
LYMPHOCYTES NFR BLD AUTO: 19.3 %
MCH RBC QN AUTO: 30.6 PG (ref 26–34)
MCHC RBC AUTO-ENTMCNC: 34.4 G/DL (ref 31–37)
MCV RBC AUTO: 88.8 FL
MONOCYTES # BLD AUTO: 0.71 X10(3) UL (ref 0.1–1)
MONOCYTES NFR BLD AUTO: 10.8 %
NEUTROPHILS # BLD AUTO: 4.45 X10 (3) UL (ref 1.5–7.7)
NEUTROPHILS # BLD AUTO: 4.45 X10(3) UL (ref 1.5–7.7)
NEUTROPHILS NFR BLD AUTO: 67.6 %
OSMOLALITY SERPL CALC.SUM OF ELEC: 288 MOSM/KG (ref 275–295)
PLATELET # BLD AUTO: 225 10(3)UL (ref 150–450)
POTASSIUM SERPL-SCNC: 4 MMOL/L (ref 3.5–5.1)
PROT SERPL-MCNC: 8.5 G/DL (ref 6.4–8.2)
RBC # BLD AUTO: 4.81 X10(6)UL
SARS-COV-2 RNA RESP QL NAA+PROBE: NOT DETECTED
SODIUM SERPL-SCNC: 135 MMOL/L (ref 136–145)
WBC # BLD AUTO: 6.6 X10(3) UL (ref 4–11)

## 2023-03-21 PROCEDURE — 99223 1ST HOSP IP/OBS HIGH 75: CPT | Performed by: HOSPITALIST

## 2023-03-21 RX ORDER — HYDROMORPHONE HYDROCHLORIDE 1 MG/ML
0.5 INJECTION, SOLUTION INTRAMUSCULAR; INTRAVENOUS; SUBCUTANEOUS EVERY 30 MIN PRN
Status: DISCONTINUED | OUTPATIENT
Start: 2023-03-21 | End: 2023-03-23

## 2023-03-21 NOTE — ED INITIAL ASSESSMENT (HPI)
Patient received via [facility] for Lower back pain x 2 weeks. Patient is at [facility] for rehab services, states he has been released from PT until they know what is causing his pain. Patient rates pain 10 out of 10 on pain scale. Patient has PICC line to right upper arm, receiving IV antibiotics for infection in lower back.

## 2023-03-21 NOTE — TELEPHONE ENCOUNTER
ACACIA Cuevas from [facility] is calling regarding would like to speak to nurse or PA please cb at [phone]

## 2023-03-21 NOTE — TELEPHONE ENCOUNTER
Received a call from [facility] regarding a previously established patient. Pt seen once on 7/5/2022, advised to complete imaging and follow up in clinic. [facility] states pt was seen recently in the ED and diagnosed with discitis, pt was sent to [facility] where he was complaining of pain worsening instead of improving. They ordered new Stat imaging which was done and demonstrates possible epidural abscesse. They are requesting our providers review imaging and also direct admit pt to the hospital.  She is requesting to speak to a provider regarding this. Discussed with Kayla Garza who accepted to take the call. On EMR review pt seen once 7/5/22,   Recent ED visit on 2/27/23- Our providers did not consult      Call transferred to provider.     Per provider made arrangements for pt to be seen in our [facility] office tomorrow 3/22/23

## 2023-03-21 NOTE — TELEPHONE ENCOUNTER
Discussed with Marshall Cole NP Bonnie Deluca. Patient has worsening back pain with hx of recent discitis. On IV abx through ID for another 3 weeks. No recent CRP, ESR, but recent WBC normal.    Had MRI at [facility] reported possible small ventral abscess. I recommended they repeat inflammatory markers. If neurologically intact [facility] [address] at 9:30 for neuro exam.  If develops deficits can go to ER. NP accepted the appointment and will arrange transport and make sure CD is provided for upload.

## 2023-03-22 ENCOUNTER — APPOINTMENT (OUTPATIENT)
Dept: MRI IMAGING | Facility: HOSPITAL | Age: 63
End: 2023-03-22
Attending: EMERGENCY MEDICINE
Payer: MEDICAID

## 2023-03-22 LAB
GLUCOSE BLD-MCNC: 145 MG/DL (ref 70–99)
GLUCOSE BLD-MCNC: 149 MG/DL (ref 70–99)
GLUCOSE BLD-MCNC: 182 MG/DL (ref 70–99)
GLUCOSE BLD-MCNC: 210 MG/DL (ref 70–99)
PROCALCITONIN SERPL-MCNC: 0.2 NG/ML (ref ?–0.16)

## 2023-03-22 PROCEDURE — 3080F DIAST BP >= 90 MM HG: CPT | Performed by: HOSPITALIST

## 2023-03-22 PROCEDURE — 72158 MRI LUMBAR SPINE W/O & W/DYE: CPT | Performed by: EMERGENCY MEDICINE

## 2023-03-22 PROCEDURE — 99233 SBSQ HOSP IP/OBS HIGH 50: CPT | Performed by: HOSPITALIST

## 2023-03-22 PROCEDURE — 3077F SYST BP >= 140 MM HG: CPT | Performed by: HOSPITALIST

## 2023-03-22 RX ORDER — DEXTROSE MONOHYDRATE 25 G/50ML
50 INJECTION, SOLUTION INTRAVENOUS
Status: DISCONTINUED | OUTPATIENT
Start: 2023-03-22 | End: 2023-03-29

## 2023-03-22 RX ORDER — ACETAMINOPHEN 325 MG/1
650 TABLET ORAL EVERY 4 HOURS PRN
Status: DISCONTINUED | OUTPATIENT
Start: 2023-03-22 | End: 2023-03-29

## 2023-03-22 RX ORDER — MORPHINE SULFATE 4 MG/ML
4 INJECTION, SOLUTION INTRAMUSCULAR; INTRAVENOUS EVERY 2 HOUR PRN
Status: DISCONTINUED | OUTPATIENT
Start: 2023-03-22 | End: 2023-03-29

## 2023-03-22 RX ORDER — ONDANSETRON 2 MG/ML
4 INJECTION INTRAMUSCULAR; INTRAVENOUS EVERY 6 HOURS PRN
Status: DISCONTINUED | OUTPATIENT
Start: 2023-03-22 | End: 2023-03-29

## 2023-03-22 RX ORDER — ENOXAPARIN SODIUM 100 MG/ML
40 INJECTION SUBCUTANEOUS DAILY
Status: DISCONTINUED | OUTPATIENT
Start: 2023-03-22 | End: 2023-03-22

## 2023-03-22 RX ORDER — ENOXAPARIN SODIUM 100 MG/ML
0.5 INJECTION SUBCUTANEOUS DAILY
Status: DISCONTINUED | OUTPATIENT
Start: 2023-03-22 | End: 2023-03-29

## 2023-03-22 RX ORDER — NICOTINE POLACRILEX 4 MG
15 LOZENGE BUCCAL
Status: DISCONTINUED | OUTPATIENT
Start: 2023-03-22 | End: 2023-03-29

## 2023-03-22 RX ORDER — HYDROCODONE BITARTRATE AND ACETAMINOPHEN 5; 325 MG/1; MG/1
2 TABLET ORAL EVERY 4 HOURS PRN
Status: DISCONTINUED | OUTPATIENT
Start: 2023-03-22 | End: 2023-03-29

## 2023-03-22 RX ORDER — TRAMADOL HYDROCHLORIDE 50 MG/1
50 TABLET ORAL EVERY 6 HOURS PRN
Status: DISCONTINUED | OUTPATIENT
Start: 2023-03-22 | End: 2023-03-23

## 2023-03-22 RX ORDER — LOSARTAN POTASSIUM 50 MG/1
50 TABLET ORAL DAILY
Status: DISCONTINUED | OUTPATIENT
Start: 2023-03-22 | End: 2023-03-24

## 2023-03-22 RX ORDER — HYDROCODONE BITARTRATE AND ACETAMINOPHEN 5; 325 MG/1; MG/1
1 TABLET ORAL EVERY 4 HOURS PRN
Status: DISCONTINUED | OUTPATIENT
Start: 2023-03-22 | End: 2023-03-29

## 2023-03-22 RX ORDER — GADOTERATE MEGLUMINE 376.9 MG/ML
20 INJECTION INTRAVENOUS
Status: COMPLETED | OUTPATIENT
Start: 2023-03-22 | End: 2023-03-22

## 2023-03-22 RX ORDER — HYDROCODONE BITARTRATE AND ACETAMINOPHEN 5; 325 MG/1; MG/1
1 TABLET ORAL EVERY 6 HOURS PRN
Status: DISCONTINUED | OUTPATIENT
Start: 2023-03-22 | End: 2023-03-27

## 2023-03-22 RX ORDER — CEFAZOLIN SODIUM/WATER 2 G/20 ML
2 SYRINGE (ML) INTRAVENOUS EVERY 8 HOURS
Status: DISCONTINUED | OUTPATIENT
Start: 2023-03-22 | End: 2023-03-22

## 2023-03-22 RX ORDER — ATENOLOL 50 MG/1
100 TABLET ORAL DAILY
Status: DISCONTINUED | OUTPATIENT
Start: 2023-03-22 | End: 2023-03-27

## 2023-03-22 RX ORDER — AMLODIPINE BESYLATE 5 MG/1
10 TABLET ORAL DAILY
Status: DISCONTINUED | OUTPATIENT
Start: 2023-03-22 | End: 2023-03-27

## 2023-03-22 RX ORDER — PROCHLORPERAZINE EDISYLATE 5 MG/ML
5 INJECTION INTRAMUSCULAR; INTRAVENOUS EVERY 8 HOURS PRN
Status: DISCONTINUED | OUTPATIENT
Start: 2023-03-22 | End: 2023-03-23

## 2023-03-22 RX ORDER — HYDRALAZINE HYDROCHLORIDE 20 MG/ML
10 INJECTION INTRAMUSCULAR; INTRAVENOUS EVERY 6 HOURS PRN
Status: DISCONTINUED | OUTPATIENT
Start: 2023-03-22 | End: 2023-03-29

## 2023-03-22 RX ORDER — NICOTINE POLACRILEX 4 MG
30 LOZENGE BUCCAL
Status: DISCONTINUED | OUTPATIENT
Start: 2023-03-22 | End: 2023-03-29

## 2023-03-22 RX ORDER — MORPHINE SULFATE 2 MG/ML
2 INJECTION, SOLUTION INTRAMUSCULAR; INTRAVENOUS EVERY 2 HOUR PRN
Status: DISCONTINUED | OUTPATIENT
Start: 2023-03-22 | End: 2023-03-27

## 2023-03-22 RX ORDER — MORPHINE SULFATE 2 MG/ML
1 INJECTION, SOLUTION INTRAMUSCULAR; INTRAVENOUS EVERY 2 HOUR PRN
Status: DISCONTINUED | OUTPATIENT
Start: 2023-03-22 | End: 2023-03-23

## 2023-03-22 RX ORDER — BUPROPION HYDROCHLORIDE 150 MG/1
150 TABLET ORAL DAILY
Status: DISCONTINUED | OUTPATIENT
Start: 2023-03-22 | End: 2023-03-29

## 2023-03-22 NOTE — OCCUPATIONAL THERAPY NOTE
Attempted to see pt for skilled OT services this date. Pt is currently refusing OT citing anxiety, stress and pain as his reasons. Pt also reports wanting to wait until after his MRI. Will re-attempt as schedule allows.  Tricia Purvis, 03/22/23, 11:10 AM

## 2023-03-22 NOTE — PLAN OF CARE
Pt alert and oriented x3. Disoriented to time. Pt forgetful of place periodically. VIJAY REIS made aware. Reorientation PRN. Denies pain at rest. Pt c/o severe pain with movement/activity. BP elevated. VIJAY REIS paged and aware, no new orders. Voiding freely via urinal. BM PTA. Tolerating diet, denies nausea. Insulin given per order. PT/OT to eval. Pt refusing SCDs. Educated pt on importance of DVT prophylaxis and risk for blood clot, pt continues to decline. Lovenox given per order. Ankle pumps encouraged. Pt agreeable to MRI after speaking to neurosurgery this AM. MRI to be done this evening per MRI department. Plan to continue IV abx and awaiting MRI results. Pt and sisters updated on plan of care, all questions answered. Belongings within reach, instructed to call for assistance. Addendum-/92 this evening. VIJAY REIS paged, see order.

## 2023-03-22 NOTE — DIETARY NOTE
[facility]     Pt chart reviewed for elevated A1C of 10.3% drawn on 2/27/23. Pt was educated by RD on 3/3/23. Do not need to educate regarding DM diet at this time. Will follow up as appropriate for full nutrition assessment. Please consult if patient status changes or nutrition issues arise.      Amparo Boss, MS, RDN, [facility]  Clinical Dietitian  [id number]

## 2023-03-22 NOTE — CM/SW NOTE
Pt was admitted 2/27-3/7 after being found down by his family. He was living independently prior to admission. During admission, he was treated for MSSA bacteremia with L3-L4 discitis/OM and infectious myositis of the R psoas muscle. He was discharged to [facility] for acute rehab on IV Ancef with plans to continue until 4/12. While working with therapy at [facility], he had reported increasing back pain x 2 weeks. CT scan performed at [facility] on 3/21 showed \"known discitis/OM of L3-L4. With suggestion of possible small ventral epidural abscess at L3-L4. \" He was transferred to THE [facility] ED on 3/21 for work up. [facility] ID on consult. Pt started on IV nafcillin with plans for MRI lumbar spine to evaluate for possible epidural abscess. PT/OT to evaluate to determine discharge planning.     FLORES Castro, RN-BC    [id number]

## 2023-03-22 NOTE — PHYSICAL THERAPY NOTE
PT orders received, chart reviewed. Attempted to see pt for skilled PT services this date. Pt is currently refusing PT citing anxiety,( \"I've got a lot on my shoulders\"),stress and pain as his reasons. Pt also reports wanting to wait until after his MRI. Will re-attempt as schedule allows.

## 2023-03-22 NOTE — ED QUICK NOTES
Orders for admission, patient is aware of plan and ready to go upstairs. Any questions, please call ED RN Tonya at extension [phone].      Patient Covid vaccination status: Unvaccinated     COVID Test Ordered in ED: Rapid SARS-CoV-2 by PCR = negative    COVID Suspicion at Admission: Low clinical suspicion for COVID    Running Infusions:      Mental Status/LOC at time of transport: A+O x4    Other pertinent information:   CIWA score: N/A   NIH score:  N/A

## 2023-03-23 ENCOUNTER — APPOINTMENT (OUTPATIENT)
Dept: CT IMAGING | Facility: HOSPITAL | Age: 63
End: 2023-03-23
Attending: PHYSICIAN ASSISTANT
Payer: MEDICAID

## 2023-03-23 LAB
ANION GAP SERPL CALC-SCNC: 6 MMOL/L (ref 0–18)
BASOPHILS # BLD AUTO: 0.03 X10(3) UL (ref 0–0.2)
BASOPHILS NFR BLD AUTO: 0.5 %
BUN BLD-MCNC: 26 MG/DL (ref 7–18)
CALCIUM BLD-MCNC: 9.1 MG/DL (ref 8.5–10.1)
CHLORIDE SERPL-SCNC: 103 MMOL/L (ref 98–112)
CO2 SERPL-SCNC: 27 MMOL/L (ref 21–32)
CREAT BLD-MCNC: 1.09 MG/DL
EOSINOPHIL # BLD AUTO: 0.05 X10(3) UL (ref 0–0.7)
EOSINOPHIL NFR BLD AUTO: 0.8 %
ERYTHROCYTE [DISTWIDTH] IN BLOOD BY AUTOMATED COUNT: 12.4 %
GFR SERPLBLD BASED ON 1.73 SQ M-ARVRAT: 77 ML/MIN/1.73M2 (ref 60–?)
GLUCOSE BLD-MCNC: 132 MG/DL (ref 70–99)
GLUCOSE BLD-MCNC: 152 MG/DL (ref 70–99)
GLUCOSE BLD-MCNC: 152 MG/DL (ref 70–99)
GLUCOSE BLD-MCNC: 155 MG/DL (ref 70–99)
GLUCOSE BLD-MCNC: 183 MG/DL (ref 70–99)
HCT VFR BLD AUTO: 42.3 %
HGB BLD-MCNC: 14.5 G/DL
IMM GRANULOCYTES # BLD AUTO: 0.02 X10(3) UL (ref 0–1)
IMM GRANULOCYTES NFR BLD: 0.3 %
INR BLD: 1.25 (ref 0.85–1.16)
LYMPHOCYTES # BLD AUTO: 0.92 X10(3) UL (ref 1–4)
LYMPHOCYTES NFR BLD AUTO: 15.2 %
MCH RBC QN AUTO: 30.4 PG (ref 26–34)
MCHC RBC AUTO-ENTMCNC: 34.3 G/DL (ref 31–37)
MCV RBC AUTO: 88.7 FL
MONOCYTES # BLD AUTO: 0.5 X10(3) UL (ref 0.1–1)
MONOCYTES NFR BLD AUTO: 8.3 %
NEUTROPHILS # BLD AUTO: 4.52 X10 (3) UL (ref 1.5–7.7)
NEUTROPHILS # BLD AUTO: 4.52 X10(3) UL (ref 1.5–7.7)
NEUTROPHILS NFR BLD AUTO: 74.9 %
OSMOLALITY SERPL CALC.SUM OF ELEC: 291 MOSM/KG (ref 275–295)
PLATELET # BLD AUTO: 198 10(3)UL (ref 150–450)
POTASSIUM SERPL-SCNC: 3.7 MMOL/L (ref 3.5–5.1)
PROTHROMBIN TIME: 15.6 SECONDS (ref 11.6–14.8)
RBC # BLD AUTO: 4.77 X10(6)UL
SODIUM SERPL-SCNC: 136 MMOL/L (ref 136–145)
WBC # BLD AUTO: 6 X10(3) UL (ref 4–11)

## 2023-03-23 PROCEDURE — 99153 MOD SED SAME PHYS/QHP EA: CPT | Performed by: PHYSICIAN ASSISTANT

## 2023-03-23 PROCEDURE — 10030 IMG GID FLU COLL DRG SFT TIS: CPT | Performed by: PHYSICIAN ASSISTANT

## 2023-03-23 PROCEDURE — 0K9P30Z DRAINAGE OF LEFT HIP MUSCLE WITH DRAINAGE DEVICE, PERCUTANEOUS APPROACH: ICD-10-PCS | Performed by: RADIOLOGY

## 2023-03-23 PROCEDURE — L0650 LSO SC R ANT/POS PNL PRE OTS: HCPCS

## 2023-03-23 PROCEDURE — 99232 SBSQ HOSP IP/OBS MODERATE 35: CPT | Performed by: HOSPITALIST

## 2023-03-23 PROCEDURE — 99152 MOD SED SAME PHYS/QHP 5/>YRS: CPT | Performed by: PHYSICIAN ASSISTANT

## 2023-03-23 RX ORDER — POTASSIUM CHLORIDE 20 MEQ/1
40 TABLET, EXTENDED RELEASE ORAL ONCE
Status: COMPLETED | OUTPATIENT
Start: 2023-03-23 | End: 2023-03-23

## 2023-03-23 RX ORDER — MIDAZOLAM HYDROCHLORIDE 1 MG/ML
INJECTION INTRAMUSCULAR; INTRAVENOUS
Status: COMPLETED
Start: 2023-03-23 | End: 2023-03-23

## 2023-03-23 RX ORDER — MIDAZOLAM HYDROCHLORIDE 1 MG/ML
1 INJECTION INTRAMUSCULAR; INTRAVENOUS EVERY 5 MIN PRN
Status: DISCONTINUED | OUTPATIENT
Start: 2023-03-23 | End: 2023-03-23 | Stop reason: HOSPADM

## 2023-03-23 RX ORDER — FLUMAZENIL 0.1 MG/ML
0.2 INJECTION INTRAVENOUS AS NEEDED
Status: DISCONTINUED | OUTPATIENT
Start: 2023-03-23 | End: 2023-03-23 | Stop reason: HOSPADM

## 2023-03-23 RX ORDER — OLANZAPINE 5 MG/1
5 TABLET, ORALLY DISINTEGRATING ORAL NIGHTLY
Status: DISCONTINUED | OUTPATIENT
Start: 2023-03-23 | End: 2023-03-24

## 2023-03-23 RX ORDER — HALOPERIDOL 5 MG/ML
2 INJECTION INTRAMUSCULAR EVERY 6 HOURS PRN
Status: DISCONTINUED | OUTPATIENT
Start: 2023-03-23 | End: 2023-03-29

## 2023-03-23 RX ORDER — LABETALOL HYDROCHLORIDE 5 MG/ML
10 INJECTION, SOLUTION INTRAVENOUS EVERY 4 HOURS PRN
Status: DISCONTINUED | OUTPATIENT
Start: 2023-03-23 | End: 2023-03-29

## 2023-03-23 RX ORDER — NALOXONE HYDROCHLORIDE 0.4 MG/ML
80 INJECTION, SOLUTION INTRAMUSCULAR; INTRAVENOUS; SUBCUTANEOUS AS NEEDED
Status: DISCONTINUED | OUTPATIENT
Start: 2023-03-23 | End: 2023-03-23 | Stop reason: HOSPADM

## 2023-03-23 RX ORDER — SODIUM CHLORIDE 9 MG/ML
INJECTION, SOLUTION INTRAVENOUS CONTINUOUS
Status: DISCONTINUED | OUTPATIENT
Start: 2023-03-23 | End: 2023-03-23 | Stop reason: HOSPADM

## 2023-03-23 NOTE — PROGRESS NOTES
Neurosurgery   Progress note      HISTORY OF PRESENT Aydin Peters is a 65year old male no new complaints. His back pain is tolerable when he is laying flat. He is LSO brace has been delivered and is in the room. He has completed his MRI of his lumbar spine    3/22/23  who was admitted February 27, 2023 after being found down for 1 to 2 days in his own feces. He was transported by EMS to THE [facility]. He was found to be bacteremic he was diagnosed with MSSA. He was also diagnosed with L3-4 discitis. He was given IV antibiotics. At the time of discharge around March 7 he was having 9-10 out of 10 back pain. Yesterday with moving around at [facility] rehab he had increasing back pain with movement but his baseline back pain is still 9/10 today. He had a CT scan and was diagnosed with possible epidural abscess. He was transported and admitted to [facility]. Today complains of lower back pain which is a 9 to a 10/10 is worse with movement. He has old weakness in his right anterior thigh. Denies any bowel or bladder incontinence. Denies any current leg pain he does have some pain in his right knee from old osteoarthritis. PHYSICAL EXAMINATION:  GENERAL:  Patient is in no acute distress. HEENT:  Normocephalic, atraumatic  SKIN: Warm, dry, no rashes. NEUROLOGICAL:  This patient is alert and orientated. Speech fluent. Comprehension intact. Face is symmetrical.    Unchanged  SPINE:  Negative SLR bilaterally. But with right straight leg raising its terrible back pain. He has terrible back pain with any type of movement. He is laying flat. Lower extremity strength:     Iliopsoas  Hamstrings   Quads    D-flexion P-flexion Eversion   Right       4+         5       5         5 5    Left       5         5       5         5 5        IMAGING:  MRI SPINE LUMBAR (W+WO) (CPT=72158)    Result Date: 3/22/2023  CONCLUSION:   1.  Worsening of discitis/osteomyelitis involving the L3-4 level with new anterior epidural phlegmon without drainable epidural abscess. This does cause mild to moderate central canal stenosis at this level. 2. Worsening paraspinal infectious myositis involving the bilateral psoas muscles with a new left psoas muscle abscess. 3. Stable mild degenerative changes of the lumbar spine at L4-5 and L5-S1. The nurse dell [facility] was notified of the critical zones at 1907 hours on 3/22/2023. Dictated by (CST): Jeremías Han MD on 3/22/2023 at 6:48 PM     Finalized by (CST): Jeremías Han MD on 3/22/2023 at 7:07 PM       MRI THORACIC+LUMBAR SPINE (ALL W+WO) (CPT=72157/56986)    Result Date: 3/2/2023  CONCLUSION:   1. Subtle findings of early discitis/osteomyelitis at L3-4 level. There is mild subtle enhancement extending into the right psoas muscle consistent with infectious myositis without drainable abscess at this time. 2. Thoracic spine is grossly unremarkable. Dictated by (CST): Jeremías Han MD on 3/02/2023 at 7:02 PM     Finalized by (CST): Jeremías Han MD on 3/02/2023 at 7:17 PM       XR FOOT (2 VIEW), RIGHT (CPT=73620)    Result Date: 3/1/2023  CONCLUSION:  See above. Dictated by (CST): Kathrine Leger MD on 3/01/2023 at 5:53 PM     Finalized by (CST): Kathrine Leger MD on 3/01/2023 at 5:54 PM       XR FOOT (2 VIEW), LEFT (CPT=73620)    Result Date: 3/1/2023  CONCLUSION:  See above. Dictated by (CST): Kathrine Leger MD on 3/01/2023 at 5:51 PM     Finalized by (CST): Kathrine Leger MD on 3/01/2023 at 5:52 PM       XR CHEST AP PORTABLE  (CPT=71045)    Result Date: 3/1/2023  CONCLUSION:  No acute pulmonary findings. Dictated by (CST): Nicolasa Freeman MD on 3/01/2023 at 10:26 AM     Finalized by (CST): Nicolasa Freeman MD on 3/01/2023 at 10:27 AM       MRI BRAIN MRA HEAD+MRA NECK (ALL W+WO) (CPT=70553/09205/70669)    Result Date: 2/28/2023  CONCLUSION:  Suboptimal exam due to extensive patient motion. 1. No acute intracranial abnormality identified.   2. Mild chronic microvascular ischemic changes in the cerebral white matter. 3. No evidence of intracranial aneurysm, flow-limiting stenosis, or focal arterial occlusion within the limitations of the exam.  4. No evidence of occlusion, dissection, or flow-limiting stenosis in the cervical vertebral or carotid arteries within the limitations of the exam. No evidence of hemodynamically significant carotid stenosis by NASCET criteria. Please see above for further details. Dictated by (CST): Jaxon Garcia MD on 2/28/2023 at 11:05 AM     Finalized by (CST): Jaxon Garcia MD on 2/28/2023 at 11:11 AM       XR CHEST AP PORTABLE  (CPT=71045)    Result Date: 2/27/2023  CONCLUSION:  1. Suboptimal inspiration with relative elevation of the right hemidiaphragm. 2. Mild atelectatic changes in the right lung base noted. Dictated by (CST): Maria Fernanda Frye DO on 2/27/2023 at 8:47 PM     Finalized by (CST): Maria Fernanda Frye DO on 2/27/2023 at 8:48 PM       CT SPINE LUMBAR (CPT=72131)    Result Date: 2/27/2023  CONCLUSION:  1. No acute process. 2. Multilevel degenerative disc disease, most pronounced at L4-5 where there is vacuum disc change and broad-based disc bulge. In conjunction with facet arthritic changes, there is evidence of mild central canal stenosis, extensive bilateral subarticular stenosis and mild to moderate bilateral neural foraminal stenosis. Please see the body of the report above for further details regarding each individual lumbar level. 3. Multilevel posterior articular facet joint arthropathy and bilateral SI joint arthropathy. Dictated by (CST): Maria Fernanda Frye DO on 2/27/2023 at 8:30 PM     Finalized by (CST): Maria Fernanda Frye DO on 2/27/2023 at 8:36 PM       CT BRAIN OR HEAD (24210)    Result Date: 2/27/2023  CONCLUSION:  1. No acute process noted. 2. Mild diffuse atrophy and periventricular/subcortical white matter disease which has progressed from prior imaging.  3. High attenuation noted diffusely throughout the intracranial vasculature, suggesting dehydration/hemoconcentration. Dictated by (CST): Allen Varghese DO on 2/27/2023 at 8:19 PM     Finalized by (CST): Allen Varghese DO on 2/27/2023 at 8:21 PM       CT done yesterday is unavailable    Lab results  3/21/2022 White blood cell count 6.6    CRP on admission was 90.8, repeat was 8.23    MRI SPINE LUMBAR (W+WO) (CPT=72158)    Result Date: 3/22/2023  CONCLUSION:   1. Worsening of discitis/osteomyelitis involving the L3-4 level with new anterior epidural phlegmon without drainable epidural abscess. This does cause mild to moderate central canal stenosis at this level. 2. Worsening paraspinal infectious myositis involving the bilateral psoas muscles with a new left psoas muscle abscess. 3. Stable mild degenerative changes of the lumbar spine at L4-5 and L5-S1. The nurse dell [facility] was notified of the critical zones at 1907 hours on 3/22/2023.    Dictated by (CST): Hair Sanches MD on 3/22/2023 at 6:48 PM     Finalized by (CST): Hair Sanches MD on 3/22/2023 at 7:07 PM         MRI LS w nela 3/22/23     MRI LS w nela 3/2/23      ASSESSMENT:  -MSAA bacteremia from 2/27/2023  -L3-4 discitis 2/27/2023 with progression  -Ongoing lower back pain    PLAN:  -Pain control  -LSO for comfort  -CT biopsy L3-4  -Antibiotics per ID  -Medical management per hospitalist  -Further recommendation be forthcoming based upon biopsy results  -Images reviewed his questions were answered    Case discussed with Dr. Low Ask he is in agreement      Cristiana Price M.S., PASandraC, [address]  [address] [facility], [facility] Ronni Schroeder, [address]  [phone]

## 2023-03-23 NOTE — PROCEDURES
[facility]  Pre-Procedure Note    Name: Martha Oropeza  MRN#: [id number]  : 1960    Procedure:  CT guided left psoas abscess drain placement    Indication: L3-4 discitis with left psoas abscess    Allergies:    No Known Allergies    Pertinent Medications:    Is patient on any Aspirin, Coumadin, or any other Anticoagulations/Antiplatelet medications? no      Mental Status:  Alert and Oriented      Health Status: Acceptable for Procedure    Impression and Plans:    L3-4 discitis osteomyelitis along with new left psoas abscess requiring CT guided drainage. Discussed with ID and NS. I have reviewed the above information prior to procedure. I have discussed the risks and benefits and alternatives with the patient. The patient understands and agrees to proceed with plan of care.     Audelia Villalpando MD

## 2023-03-23 NOTE — PLAN OF CARE
Pt alert and oriented x3. Disoriented to time. Pt forgetful of place periodically. IM MD made aware, see MAR. Reorientation PRN. Denies pain at rest. Pt c/o severe pain with movement/activity. BP elevated, PRN hydralazine given. Voiding freely via urinal. BM PTA, IM MD made aware awaiting orders. Tolerating diet, denies nausea. Insulin given per order. Worked with therapy, able to dangle at edge of bed. Pt refusing SCDs. Educated pt on importance of DVT prophylaxis and risk for blood clot, pt continues to decline. Lovenox given per order. Ankle pumps encouraged. CT guided biopsy and drain placed this afternoon/evening. Drain bag to gravity. Drain dressing to left lower back, CDI. Awaiting cultures. PMR consult to be done. Pt updated on plan of care, all questions answered. Belongings within reach, instructed to call for assistance.

## 2023-03-23 NOTE — IMAGING NOTE
Pt here for CT Psoas Muscle Abscess Drain. Pre procedure vitals checked. IV access to right arm PICC saline lock. 0.9 NS IV initiated to maintain patency. Informed consent obtained by Dr Wilberto Cristobal. Positioned pt prone on scanner. 10.2 Fr drain was placed attached to  Specimen sent to Pathology. Presently denies pain. Tolerated procedure well. Vital signs stable on room air. SBAR report given to Howie. Transported pt to  360 accompanied by [employer].

## 2023-03-23 NOTE — PROGRESS NOTES
Spoke with radiologist regarding critical MRI result. Paged neurosurgery MD and made aware of results.

## 2023-03-23 NOTE — IMAGING NOTE
Pre procedure instruction given  To Gorge RN Breakfast at [facility] pt NPO hold  lovenox Stat PT/INR. Pt does not use CPAP.  Tentative time  For bx  3PM

## 2023-03-23 NOTE — CM/SW NOTE
Received call from Annmarie with the [facility] who stated they had received referral from [facility] for EMERITA placement after acute rehab was completed. They are able to accept pt for EMERITA admission at ID from hospital if needed. Noted PT recommending acute rehab. PMR consulted to determine appropriate plan: return to [facility] acute vs [facility] EMERITA. / to remain available for support and/or discharge planning.      Ayesha Loera, [facility]  Discharge Planner  [phone]

## 2023-03-23 NOTE — PLAN OF CARE
A&O x2-3, disoriented to time and place. VSS on RA. . Pain with movement, pt appears to be comfortable at rest. Voids freely per urinal. Declines SCDs.  called for LSO brace, to be delivered to pt in AM. IV abx infusing as ordered. PT eval pending. Reviewed POC, pain management, and fall precautions with pt. Bed alarm on w/bed in lowest position. Pt reminded to use call light.

## 2023-03-23 NOTE — PROCEDURES
[facility] Patient Status:  Inpatient    1960 MRN [id number]   [facility] 3SW-A Attending Benedict Curling, MD   Hosp Day # 2 PCP MADELINE FENTON DO         Brief Procedure Report    Pre-Operative Diagnosis: Left psoas abscess requiring drainage. Post-Operative Diagnosis: Same as above. Procedure Performed: CT guided left psoas abscess drain placement    Anesthesia: 1% lidocaine. Moderate sedation    EBL: 1cc    Complications: None    Summary of Case: 5cc of cloudy thin fluid aspirated from the left psoas muscle abscess. 10.2 Fr. All Purpose Drainage Catheter placed into left psoas abscess cavity. Catheter left to dependent gravity drain. Fluid was sent for routine studies as directed by the ordering physician. Patient tolerated procedure well without immediate complication. Full report to follow in PACS.     Katharina Troy

## 2023-03-24 LAB
GLUCOSE BLD-MCNC: 120 MG/DL (ref 70–99)
GLUCOSE BLD-MCNC: 135 MG/DL (ref 70–99)
GLUCOSE BLD-MCNC: 178 MG/DL (ref 70–99)
POTASSIUM SERPL-SCNC: 4.1 MMOL/L (ref 3.5–5.1)

## 2023-03-24 PROCEDURE — 99232 SBSQ HOSP IP/OBS MODERATE 35: CPT | Performed by: HOSPITALIST

## 2023-03-24 RX ORDER — OLANZAPINE 5 MG/1
10 TABLET, ORALLY DISINTEGRATING ORAL NIGHTLY
Status: DISCONTINUED | OUTPATIENT
Start: 2023-03-24 | End: 2023-03-25

## 2023-03-24 RX ORDER — LOSARTAN POTASSIUM 100 MG/1
100 TABLET ORAL DAILY
Status: DISCONTINUED | OUTPATIENT
Start: 2023-03-25 | End: 2023-03-24

## 2023-03-24 RX ORDER — LOSARTAN POTASSIUM 50 MG/1
50 TABLET ORAL ONCE
Status: DISCONTINUED | OUTPATIENT
Start: 2023-03-24 | End: 2023-03-24

## 2023-03-24 RX ORDER — QUETIAPINE FUMARATE 25 MG/1
25 TABLET, FILM COATED ORAL 3 TIMES DAILY PRN
Status: DISCONTINUED | OUTPATIENT
Start: 2023-03-24 | End: 2023-03-26

## 2023-03-24 RX ORDER — LOSARTAN POTASSIUM 50 MG/1
50 TABLET ORAL 2 TIMES DAILY
Status: DISCONTINUED | OUTPATIENT
Start: 2023-03-24 | End: 2023-03-29

## 2023-03-24 NOTE — PROGRESS NOTES
Neurosurgery   Progress note      HISTORY OF PRESENT Edith Masterson is a 65year old male sleeping this am. Agitated last night and pulled out psoas drain. 5cc of fluid drain yesterday by IR and sent to lab.    3/23/23  no new complaints. His back pain is tolerable when he is laying flat. He is LSO brace has been delivered and is in the room. He has completed his MRI of his lumbar spine    3/22/23  who was admitted February 27, 2023 after being found down for 1 to 2 days in his own feces. He was transported by EMS to [facility]. He was found to be bacteremic he was diagnosed with MSSA. He was also diagnosed with L3-4 discitis. He was given IV antibiotics. At the time of discharge around March 7 he was having 9-10 out of 10 back pain. Yesterday with moving around at [facility] rehab he had increasing back pain with movement but his baseline back pain is still 9/10 today. He had a CT scan and was diagnosed with possible epidural abscess. He was transported and admitted to [facility]. Today complains of lower back pain which is a 9 to a 10/10 is worse with movement. He has old weakness in his right anterior thigh. Denies any bowel or bladder incontinence. Denies any current leg pain he does have some pain in his right knee from old osteoarthritis. PHYSICAL EXAMINATION: last exam  GENERAL:  Patient is in no acute distress. HEENT:  Normocephalic, atraumatic  SKIN: Warm, dry, no rashes. NEUROLOGICAL:  This patient is alert and orientated. Speech fluent. Comprehension intact. Face is symmetrical.    Unchanged  SPINE:  Negative SLR bilaterally. But with right straight leg raising its terrible back pain. He has terrible back pain with any type of movement. He is laying flat.   Lower extremity strength:     Iliopsoas  Hamstrings   Quads    D-flexion P-flexion Eversion   Right       4+         5       5         5 5    Left       5         5       5         5 5        IMAGING:  CT DRAIN ABSCESS SUBCUTANEOUS WITH CATHETER (CPT=10030)    Result Date: 3/23/2023  CONCLUSION:  CT-guided left psoas abscess drain placement was performed without complication. Dictated by (CST): Caty Thakur MD on 3/23/2023 at 6:22 PM     Finalized by (CST): Caty Thakur MD on 3/23/2023 at 6:25 PM       MRI SPINE LUMBAR (W+WO) (CGP=31061)    Result Date: 3/22/2023  CONCLUSION:   1. Worsening of discitis/osteomyelitis involving the L3-4 level with new anterior epidural phlegmon without drainable epidural abscess. This does cause mild to moderate central canal stenosis at this level. 2. Worsening paraspinal infectious myositis involving the bilateral psoas muscles with a new left psoas muscle abscess. 3. Stable mild degenerative changes of the lumbar spine at L4-5 and L5-S1. The nurse dell [place] was notified of the critical zones at 1907 hours on 3/22/2023. Dictated by (CST): Elda Matta MD on 3/22/2023 at 6:48 PM     Finalized by (CST): Elda Matta MD on 3/22/2023 at 7:07 PM       MRI THORACIC+LUMBAR SPINE (ALL W+WO) (CPT=72157/54957)    Result Date: 3/2/2023  CONCLUSION:   1. Subtle findings of early discitis/osteomyelitis at L3-4 level. There is mild subtle enhancement extending into the right psoas muscle consistent with infectious myositis without drainable abscess at this time. 2. Thoracic spine is grossly unremarkable. Dictated by (CST): Elda Matta MD on 3/02/2023 at 7:02 PM     Finalized by (CST): Edla Matta MD on 3/02/2023 at 7:17 PM       XR FOOT (2 VIEW), RIGHT (CPT=73620)    Result Date: 3/1/2023  CONCLUSION:  See above. Dictated by (CST): Steph Milner MD on 3/01/2023 at 5:53 PM     Finalized by (CST): Steph Milner MD on 3/01/2023 at 5:54 PM       XR FOOT (2 VIEW), LEFT (CPT=73620)    Result Date: 3/1/2023  CONCLUSION:  See above.    Dictated by (CST): Steph Milner MD on 3/01/2023 at 5:51 PM     Finalized by (CLAIRE): Steph Milner MD on 3/01/2023 at 5:52 PM XR CHEST AP PORTABLE  (CPT=71045)    Result Date: 3/1/2023  CONCLUSION:  No acute pulmonary findings. Dictated by (CST): Roosevelt Mcclure MD on 3/01/2023 at 10:26 AM     Finalized by (CST): Roosevelt Mcclure MD on 3/01/2023 at 10:27 AM       MRI BRAIN MRA HEAD+MRA NECK (ALL W+WO) (CPT=70553/26799/70186)    Result Date: 2/28/2023  CONCLUSION:  Suboptimal exam due to extensive patient motion. 1. No acute intracranial abnormality identified. 2. Mild chronic microvascular ischemic changes in the cerebral white matter. 3. No evidence of intracranial aneurysm, flow-limiting stenosis, or focal arterial occlusion within the limitations of the exam.  4. No evidence of occlusion, dissection, or flow-limiting stenosis in the cervical vertebral or carotid arteries within the limitations of the exam. No evidence of hemodynamically significant carotid stenosis by NASCET criteria. Please see above for further details. Dictated by (CST): Laura Canas MD on 2/28/2023 at 11:05 AM     Finalized by (CST): Laura Canas MD on 2/28/2023 at 11:11 AM       XR CHEST AP PORTABLE  (CPT=71045)    Result Date: 2/27/2023  CONCLUSION:  1. Suboptimal inspiration with relative elevation of the right hemidiaphragm. 2. Mild atelectatic changes in the right lung base noted. Dictated by (CST): Renetta Elizabeth DO on 2/27/2023 at 8:47 PM     Finalized by (CST): Renetta Elizabeth DO on 2/27/2023 at 8:48 PM       CT SPINE LUMBAR (CPT=72131)    Result Date: 2/27/2023  CONCLUSION:  1. No acute process. 2. Multilevel degenerative disc disease, most pronounced at L4-5 where there is vacuum disc change and broad-based disc bulge. In conjunction with facet arthritic changes, there is evidence of mild central canal stenosis, extensive bilateral subarticular stenosis and mild to moderate bilateral neural foraminal stenosis. Please see the body of the report above for further details regarding each individual lumbar level.  3. Multilevel posterior articular facet joint arthropathy and bilateral SI joint arthropathy. Dictated by (CST): Xi Starr,  on 2/27/2023 at 8:30 PM     Finalized by (CST): Xi Starr, DO on 2/27/2023 at 8:36 PM       CT BRAIN OR HEAD (07766)    Result Date: 2/27/2023  CONCLUSION:  1. No acute process noted. 2. Mild diffuse atrophy and periventricular/subcortical white matter disease which has progressed from prior imaging. 3. High attenuation noted diffusely throughout the intracranial vasculature, suggesting dehydration/hemoconcentration. Dictated by (CST): Xi Starr, DO on 2/27/2023 at 8:19 PM     Finalized by (CST): Xi Starr, DO on 2/27/2023 at 8:21 PM       CT done yesterday is unavailable    Lab results  3/21/2022 White blood cell count 6.6    CRP on admission was 90.8, repeat was 8.23    CT DRAIN ABSCESS SUBCUTANEOUS WITH CATHETER (CPT=10030)    Result Date: 3/23/2023  CONCLUSION:  CT-guided left psoas abscess drain placement was performed without complication. Dictated by (CST): Antonio Devine MD on 3/23/2023 at 6:22 PM     Finalized by (CST): Antonio Devine MD on 3/23/2023 at 6:25 PM         MRI LS w nela 3/22/23     MRI LS w nela 3/2/23      ASSESSMENT:  -MSAA bacteremia from 2/27/2023  -L3-4 discitis 2/27/2023 with progression  -Ongoing lower back pain    PLAN:  -Pain control  -LSO for comfort  -follow labs  -Antibiotics per ID  -Medical management per hospitalist  -Further recommendation be forthcoming based upon lab results  -consider psych eval if agitation continues.     Case discussed with Dr. Luz Elena Atkinson he is in agreement      Car Pretty M.S., PAKARINA, Rodney [address]  [address] [place] [address], [address] [facility], [address]  [phone]

## 2023-03-24 NOTE — CM/SW NOTE
PT recommendations changed from [address] to EM[facility]. PMR also recommending EMERITA. Per SW, pt was planning to discharge to The [address] [place] [address],[address] after MJ. Referral sent to The [facility] via [facility]. Needs PASRR completed. CM/SW to remain available for discharge planning.     DOMINGA BenitesN, RN-BC    [id number]

## 2023-03-24 NOTE — PLAN OF CARE
Pt has been calm and cooperative this shift, reports he slept well, despite reports of patient being combative overnight. Drain pulled out by patient last evening. MD aware, meds adjusted to see if behavior can be prevented. O2 sats dropping when asleep to 70% while on room air, encouraging cough and deep breathing exercises, will possibly need O2 overnight if pt is willing to wear it. Reports pain high with movement, will not allow RN or staff to raise [facility] due to pain. Morphine and norco given for pain. CMS intact to BLE. Worked with PT/OT this afternoon. PICC line to RUE, flushes and aspirates. Continuing nafcillin Q4hr per ID. Cultures taken by IR yesterday pending. Plan to EMERITA when ready and medically cleared.

## 2023-03-25 LAB
GLUCOSE BLD-MCNC: 120 MG/DL (ref 70–99)
GLUCOSE BLD-MCNC: 137 MG/DL (ref 70–99)
GLUCOSE BLD-MCNC: 156 MG/DL (ref 70–99)
GLUCOSE BLD-MCNC: 159 MG/DL (ref 70–99)

## 2023-03-25 PROCEDURE — 99231 SBSQ HOSP IP/OBS SF/LOW 25: CPT | Performed by: NEUROLOGICAL SURGERY

## 2023-03-25 PROCEDURE — 99232 SBSQ HOSP IP/OBS MODERATE 35: CPT | Performed by: HOSPITALIST

## 2023-03-25 RX ORDER — OLANZAPINE 5 MG/1
7.5 TABLET, ORALLY DISINTEGRATING ORAL NIGHTLY
Status: DISCONTINUED | OUTPATIENT
Start: 2023-03-25 | End: 2023-03-29

## 2023-03-25 NOTE — PLAN OF CARE
Pt Aox4, forgetful at times, at times makes comments that do not align with the situation. Somewhat drowsy today but easily arousable. Lovenox for VTE proph. Last BM 3/20. Voiding per urinal. Norco given for pain. Mepilex dressings to B feet over bunions. IV Nafcillin- RUE PICC line. Awaiting final cultures, then DC to [facility].

## 2023-03-25 NOTE — PLAN OF CARE
Patient A/O x4, VSS on RA, c/o mod-severe pain. Norco PRN. Voiding freely via urinal, last BM 3/20. IV abx continued. Cultures pend. Plan for dc EMERITA w/IV abx via PICC, ref sent. Safety measures in place.

## 2023-03-26 LAB
GLUCOSE BLD-MCNC: 135 MG/DL (ref 70–99)
GLUCOSE BLD-MCNC: 135 MG/DL (ref 70–99)
GLUCOSE BLD-MCNC: 139 MG/DL (ref 70–99)
GLUCOSE BLD-MCNC: 145 MG/DL (ref 70–99)

## 2023-03-26 PROCEDURE — 99232 SBSQ HOSP IP/OBS MODERATE 35: CPT | Performed by: HOSPITALIST

## 2023-03-26 RX ORDER — POLYETHYLENE GLYCOL 3350 17 G/17G
17 POWDER, FOR SOLUTION ORAL DAILY
Status: DISCONTINUED | OUTPATIENT
Start: 2023-03-26 | End: 2023-03-29

## 2023-03-26 RX ORDER — BISACODYL 10 MG
10 SUPPOSITORY, RECTAL RECTAL
Status: DISCONTINUED | OUTPATIENT
Start: 2023-03-26 | End: 2023-03-29

## 2023-03-26 RX ORDER — SENNOSIDES 8.6 MG
17.2 TABLET ORAL NIGHTLY
Status: DISCONTINUED | OUTPATIENT
Start: 2023-03-26 | End: 2023-03-29

## 2023-03-26 RX ORDER — HYDROCHLOROTHIAZIDE 12.5 MG/1
12.5 TABLET ORAL DAILY
Status: DISCONTINUED | OUTPATIENT
Start: 2023-03-26 | End: 2023-03-29

## 2023-03-26 NOTE — PLAN OF CARE
A/o x4 can be forgetful. 3L nasal cannula at night. 1800 ada diet with QID accuchecks. Voiding. LBM 3/20. Pain managed with po pain medication. IV abx infusing. PICC RUE patent and blood return noted. POC updated with pt. All safety measures in place. Call light within reach instructed to call for help or assistance.

## 2023-03-26 NOTE — PLAN OF CARE
Pt quiet and cooperative today. B/P remains elevated, receiving prn hydralazine and labetolol as ordered. Miralax and dulcolax supp given for constipation with good results, large BM x1. Refuses to get OOB. Rolls side to side in bed. Taking Norco prn for pain control. Forgetful and confused at times. Waiting for final culture results then dc to [place] [id number] with PICC and IV antibiotics.

## 2023-03-27 LAB
GLUCOSE BLD-MCNC: 143 MG/DL (ref 70–99)
GLUCOSE BLD-MCNC: 145 MG/DL (ref 70–99)
GLUCOSE BLD-MCNC: 151 MG/DL (ref 70–99)

## 2023-03-27 PROCEDURE — 99232 SBSQ HOSP IP/OBS MODERATE 35: CPT | Performed by: HOSPITALIST

## 2023-03-27 PROCEDURE — 99231 SBSQ HOSP IP/OBS SF/LOW 25: CPT | Performed by: NURSE PRACTITIONER

## 2023-03-27 RX ORDER — CARVEDILOL 12.5 MG/1
12.5 TABLET ORAL ONCE
Status: COMPLETED | OUTPATIENT
Start: 2023-03-27 | End: 2023-03-27

## 2023-03-27 RX ORDER — CARVEDILOL 12.5 MG/1
12.5 TABLET ORAL 2 TIMES DAILY WITH MEALS
Status: DISCONTINUED | OUTPATIENT
Start: 2023-03-27 | End: 2023-03-27

## 2023-03-27 RX ORDER — HYDRALAZINE HYDROCHLORIDE 20 MG/ML
20 INJECTION INTRAMUSCULAR; INTRAVENOUS ONCE
Status: COMPLETED | OUTPATIENT
Start: 2023-03-27 | End: 2023-03-27

## 2023-03-27 RX ORDER — NIFEDIPINE 30 MG/1
60 TABLET, EXTENDED RELEASE ORAL DAILY
Status: DISCONTINUED | OUTPATIENT
Start: 2023-03-28 | End: 2023-03-29

## 2023-03-27 RX ORDER — CARVEDILOL 12.5 MG/1
25 TABLET ORAL 2 TIMES DAILY WITH MEALS
Status: DISCONTINUED | OUTPATIENT
Start: 2023-03-28 | End: 2023-03-29

## 2023-03-27 RX ORDER — HYDRALAZINE HYDROCHLORIDE 50 MG/1
50 TABLET, FILM COATED ORAL EVERY 8 HOURS SCHEDULED
Status: DISCONTINUED | OUTPATIENT
Start: 2023-03-27 | End: 2023-03-29

## 2023-03-27 NOTE — PLAN OF CARE
Ao to self, seemingly more as day progresses, updated sister on mentation, calm at this time, BP elevated, PRN medications and oral BP meds given as ordered, on room air , scds refused, Lovenox, RUE picc line, resting in bed, max x2 turn in bed d/t pain, pain controlled at this time, plan to dc to [facility] + IV abx.

## 2023-03-27 NOTE — PLAN OF CARE
AOx2 to self, time, intermittent confusion. Family updated on plan of care. Patient is cooperative throughout the day, calm at this time, BP elevated, PRN medications and oral BP meds given as ordered. Patient on room air, , scds refused, Lovenox, RUE picc line, resting in bed, max x2 turn in bed d/t pain, pain controlled at this time, plan to dc to [facility] + IV abx pending cultures.

## 2023-03-27 NOTE — CM/SW NOTE
03/27/23 1123   Choice of Post-Acute Provider   Informed patient of right to choose their preferred provider Yes   List of appropriate post-acute services provided to patient/family with quality data Yes   Patient/family choice [facility]   Information given to Other (comment)       Spoke with pt's sister, Tree Greenfield regarding DC planning. She confirmed pt/family agreeable with planning for [facility] at discharge. Pt's sister with questions about pt's medical condition and treatment plan. Recommended she discuss with pt's RN/MD.  [facility] reserved [address]. Await medical clearance for DC. / to remain available for support and/or discharge planning.      The [facility] of [address] Bhavna Juarez LCSW  Discharge Planner  [phone]

## 2023-03-28 ENCOUNTER — APPOINTMENT (OUTPATIENT)
Dept: CT IMAGING | Facility: HOSPITAL | Age: 63
End: 2023-03-28
Attending: PHYSICIAN ASSISTANT
Payer: MEDICAID

## 2023-03-28 LAB
ANION GAP SERPL CALC-SCNC: 3 MMOL/L (ref 0–18)
ANION GAP SERPL CALC-SCNC: 4 MMOL/L (ref 0–18)
BASOPHILS # BLD AUTO: 0.04 X10(3) UL (ref 0–0.2)
BASOPHILS NFR BLD AUTO: 0.5 %
BILIRUB UR QL STRIP.AUTO: NEGATIVE
BUN BLD-MCNC: 25 MG/DL (ref 7–18)
BUN BLD-MCNC: 26 MG/DL (ref 7–18)
CALCIUM BLD-MCNC: 9 MG/DL (ref 8.5–10.1)
CALCIUM BLD-MCNC: 9.2 MG/DL (ref 8.5–10.1)
CHLORIDE SERPL-SCNC: 106 MMOL/L (ref 98–112)
CHLORIDE SERPL-SCNC: 108 MMOL/L (ref 98–112)
CLARITY UR REFRACT.AUTO: CLEAR
CO2 SERPL-SCNC: 27 MMOL/L (ref 21–32)
CO2 SERPL-SCNC: 29 MMOL/L (ref 21–32)
COLOR UR AUTO: YELLOW
CREAT BLD-MCNC: 0.96 MG/DL
CREAT BLD-MCNC: 1 MG/DL
CRP SERPL-MCNC: 2.18 MG/DL (ref ?–0.3)
EOSINOPHIL # BLD AUTO: 0.07 X10(3) UL (ref 0–0.7)
EOSINOPHIL NFR BLD AUTO: 0.9 %
ERYTHROCYTE [DISTWIDTH] IN BLOOD BY AUTOMATED COUNT: 13.1 %
ERYTHROCYTE [SEDIMENTATION RATE] IN BLOOD: 68 MM/HR
GFR SERPLBLD BASED ON 1.73 SQ M-ARVRAT: 86 ML/MIN/1.73M2 (ref 60–?)
GFR SERPLBLD BASED ON 1.73 SQ M-ARVRAT: 90 ML/MIN/1.73M2 (ref 60–?)
GLUCOSE BLD-MCNC: 118 MG/DL (ref 70–99)
GLUCOSE BLD-MCNC: 128 MG/DL (ref 70–99)
GLUCOSE BLD-MCNC: 133 MG/DL (ref 70–99)
GLUCOSE BLD-MCNC: 149 MG/DL (ref 70–99)
GLUCOSE BLD-MCNC: 153 MG/DL (ref 70–99)
GLUCOSE BLD-MCNC: 154 MG/DL (ref 70–99)
GLUCOSE UR STRIP.AUTO-MCNC: 50 MG/DL
HCT VFR BLD AUTO: 44.1 %
HGB BLD-MCNC: 14.4 G/DL
HYALINE CASTS #/AREA URNS AUTO: PRESENT /LPF
IMM GRANULOCYTES # BLD AUTO: 0.02 X10(3) UL (ref 0–1)
IMM GRANULOCYTES NFR BLD: 0.2 %
LEUKOCYTE ESTERASE UR QL STRIP.AUTO: NEGATIVE
LYMPHOCYTES # BLD AUTO: 1.64 X10(3) UL (ref 1–4)
LYMPHOCYTES NFR BLD AUTO: 20.2 %
MCH RBC QN AUTO: 29.9 PG (ref 26–34)
MCHC RBC AUTO-ENTMCNC: 32.7 G/DL (ref 31–37)
MCV RBC AUTO: 91.7 FL
MONOCYTES # BLD AUTO: 0.57 X10(3) UL (ref 0.1–1)
MONOCYTES NFR BLD AUTO: 7 %
NEUTROPHILS # BLD AUTO: 5.78 X10 (3) UL (ref 1.5–7.7)
NEUTROPHILS # BLD AUTO: 5.78 X10(3) UL (ref 1.5–7.7)
NEUTROPHILS NFR BLD AUTO: 71.2 %
NITRITE UR QL STRIP.AUTO: NEGATIVE
OSMOLALITY SERPL CALC.SUM OF ELEC: 294 MOSM/KG (ref 275–295)
OSMOLALITY SERPL CALC.SUM OF ELEC: 295 MOSM/KG (ref 275–295)
PH UR STRIP.AUTO: 5 [PH] (ref 5–8)
PLATELET # BLD AUTO: 229 10(3)UL (ref 150–450)
POTASSIUM SERPL-SCNC: 3.6 MMOL/L (ref 3.5–5.1)
POTASSIUM SERPL-SCNC: 3.7 MMOL/L (ref 3.5–5.1)
POTASSIUM SERPL-SCNC: 3.7 MMOL/L (ref 3.5–5.1)
PROT UR STRIP.AUTO-MCNC: NEGATIVE MG/DL
RBC # BLD AUTO: 4.81 X10(6)UL
RBC UR QL AUTO: NEGATIVE
SODIUM SERPL-SCNC: 138 MMOL/L (ref 136–145)
SODIUM SERPL-SCNC: 139 MMOL/L (ref 136–145)
SP GR UR STRIP.AUTO: 1.02 (ref 1–1.03)
UROBILINOGEN UR STRIP.AUTO-MCNC: 2 MG/DL
WBC # BLD AUTO: 8.1 X10(3) UL (ref 4–11)

## 2023-03-28 PROCEDURE — 70450 CT HEAD/BRAIN W/O DYE: CPT | Performed by: PHYSICIAN ASSISTANT

## 2023-03-28 PROCEDURE — 99232 SBSQ HOSP IP/OBS MODERATE 35: CPT | Performed by: HOSPITALIST

## 2023-03-28 RX ORDER — POTASSIUM CHLORIDE 1.5 G/1.77G
40 POWDER, FOR SOLUTION ORAL EVERY 4 HOURS
Status: DISPENSED | OUTPATIENT
Start: 2023-03-28 | End: 2023-03-28

## 2023-03-28 RX ORDER — TIZANIDINE 2 MG/1
2 TABLET ORAL EVERY 6 HOURS PRN
Status: DISCONTINUED | OUTPATIENT
Start: 2023-03-28 | End: 2023-03-29

## 2023-03-28 NOTE — PLAN OF CARE
Received orders for elevated SBP Coreg and one time Hydralazine, Night shift RN notified of orders and administration.

## 2023-03-28 NOTE — CM/SW NOTE
CM asked to assist with coordinate care between physicians, at request of pt's family. Discussed case with hospitalist and ID. Workup for pt's mentation is currently in process. Dr. Angela Zarco able to connect with pt's sister, Renuka Ferrer, this afternoon and answer questions related to [address]. Per SW, sister Jagjit Hilliard) requesting an in-person care conference with providers. CM spoke with pt's sister, Renuka Ferrer, as León Solorzano was unable to be reached by Dr. Angela Zarco. Renuka Bustosgess stating that the family is still requesting a care conference, despite discussion with Dr. Angela Zarco. In attendance will be pt's sisters (Ro Escalona, and Yue). They request the hospitalist and ID's attendance to discuss mentation/infection/POC moving forward. Sisters are aware that Dr. Angela Zarco will not be in house tomorrow and that the covering hospitalist, Dr. Lilian Mendoza will be taking over care. Updates sent to [facility] and hospitalist.    Family has been made aware that pt likely to be medically ready for discharge soon, however they would like to meet with care team to better understand is POC moving forward. Care conference planned for tomorrow, 3/29, at 1100 on the Ortho/Spine unit, ideally in the conference room if available. Updated unit SW.     Ross Wheeler, DOMINGAN, RN-BC    [id number]

## 2023-03-28 NOTE — PROGRESS NOTES
Received call from pts sister Cher Shell, requesting care conference. Updated Chersheyla Shell that MDs and SW have been made aware of mental status issues, and that MD will be calling to discuss pt POC today. Sister requested UA collected, sent.    Notified MD and Sw of care conference request.

## 2023-03-28 NOTE — PLAN OF CARE
Pt a/o x 3-4, confused when waking, some disorientation noted. Family expressing concerns over mental status, CT head to be done this afternoon. UA negative. BP elevated, improving with new medications. O2 sats stable on room air. Pt appetite decreased, encouraging PO intake. QIDBS, correction and carb count coverage ordered. BM x3 this shift, held stool softeners/laxatives. Max assist for turns and to dangle. PT/OT following. Continuing nafcillin Q4hr as ordered. NH will be able to accommodate IV abx needs per SW.   Plan for DC tomorrow 3/29 to the [facility] for ongoing rehabilitation if stable.

## 2023-03-28 NOTE — PLAN OF CARE
RN witnessed sister of patient Corinna Sy complete [employer] form for patient. Patient able and signed POA form. Copy placed on chart.

## 2023-03-28 NOTE — CM/SW NOTE
Spoke with pt's sister Maryfrances Phalen who expressed concern about pt's mental status. She requested to speak with MDs or that they call pt's sister Aurelio Lu who is also involved with pt's care. Discussed case with Cary from ID and Chrystal Lemus from neurosurgery. Message sent to Dr Lindsey Forte requesting that he contact pt's family. Cary/ID indicating pt may DC on nafcillin 2g Q4 hrs. Spoke with Genesis Oropeza from [facility] to confirm if facility can accommodate antibiotic at this frequency. Await response. / to remain available for support and/or discharge planning. Brittny Hernandez, [facility]  Discharge Planner  [phone]    Addendum:  Received confirmation from [facility] that they can accommodate nafcillin Q4hrs.

## 2023-03-28 NOTE — PLAN OF CARE
Seemingly more alert this evening, pain treated as able, BP improving with ordered medication, on room air , Right PICC line functioning, on Nafcillin Q4, cxs pending from drain, max turn x2, POC updated with family, plan to dc to the [facility] tomorrow on IV abx.

## 2023-03-29 VITALS
TEMPERATURE: 98 F | BODY MASS INDEX: 46 KG/M2 | RESPIRATION RATE: 18 BRPM | SYSTOLIC BLOOD PRESSURE: 146 MMHG | HEART RATE: 72 BPM | OXYGEN SATURATION: 94 % | WEIGHT: 315 LBS | DIASTOLIC BLOOD PRESSURE: 81 MMHG

## 2023-03-29 LAB
GLUCOSE BLD-MCNC: 125 MG/DL (ref 70–99)
GLUCOSE BLD-MCNC: 165 MG/DL (ref 70–99)
PLATELET # BLD AUTO: 219 10(3)UL (ref 150–450)

## 2023-03-29 PROCEDURE — 99239 HOSP IP/OBS DSCHRG MGMT >30: CPT | Performed by: HOSPITALIST

## 2023-03-29 PROCEDURE — 90792 PSYCH DIAG EVAL W/MED SRVCS: CPT | Performed by: OTHER

## 2023-03-29 RX ORDER — MAGNESIUM OXIDE 400 MG (241.3 MG MAGNESIUM) TABLET
TABLET
Qty: 30 TABLET | Refills: 0 | Status: SHIPPED | OUTPATIENT
Start: 2023-03-29

## 2023-03-29 RX ORDER — HYDROCHLOROTHIAZIDE 12.5 MG/1
12.5 TABLET ORAL DAILY
Qty: 30 TABLET | Refills: 2 | Status: SHIPPED | OUTPATIENT
Start: 2023-03-30 | End: 2023-04-29

## 2023-03-29 RX ORDER — CARVEDILOL 25 MG/1
25 TABLET ORAL 2 TIMES DAILY WITH MEALS
Qty: 60 TABLET | Refills: 2 | Status: SHIPPED | OUTPATIENT
Start: 2023-03-29 | End: 2023-04-28

## 2023-03-29 RX ORDER — HYDROCODONE BITARTRATE AND ACETAMINOPHEN 5; 325 MG/1; MG/1
1 TABLET ORAL EVERY 6 HOURS PRN
Qty: 30 TABLET | Refills: 0 | Status: SHIPPED | OUTPATIENT
Start: 2023-03-29

## 2023-03-29 RX ORDER — INSULIN DETEMIR 100 [IU]/ML
24 INJECTION, SOLUTION SUBCUTANEOUS NIGHTLY
Qty: 5 EACH | Refills: 2 | Status: SHIPPED | OUTPATIENT
Start: 2023-03-29

## 2023-03-29 RX ORDER — TIZANIDINE 2 MG/1
2 TABLET ORAL EVERY 6 HOURS PRN
Qty: 20 TABLET | Refills: 2 | Status: SHIPPED | OUTPATIENT
Start: 2023-03-29 | End: 2023-04-28

## 2023-03-29 RX ORDER — POTASSIUM CHLORIDE 20 MEQ/1
40 TABLET, EXTENDED RELEASE ORAL ONCE
Status: COMPLETED | OUTPATIENT
Start: 2023-03-29 | End: 2023-03-29

## 2023-03-29 RX ORDER — BUPROPION HYDROCHLORIDE 150 MG/1
150 TABLET ORAL DAILY
Qty: 30 TABLET | Refills: 2 | Status: SHIPPED | OUTPATIENT
Start: 2023-03-29 | End: 2023-04-28

## 2023-03-29 RX ORDER — INSULIN ASPART 100 [IU]/ML
INJECTION, SOLUTION INTRAVENOUS; SUBCUTANEOUS
Qty: 5 EACH | Refills: 2 | Status: SHIPPED | OUTPATIENT
Start: 2023-03-29

## 2023-03-29 RX ORDER — NIFEDIPINE 60 MG/1
60 TABLET, FILM COATED, EXTENDED RELEASE ORAL DAILY
Qty: 30 TABLET | Refills: 2 | Status: SHIPPED | OUTPATIENT
Start: 2023-03-30 | End: 2023-04-29

## 2023-03-29 RX ORDER — HYDRALAZINE HYDROCHLORIDE 50 MG/1
50 TABLET, FILM COATED ORAL EVERY 8 HOURS SCHEDULED
Qty: 90 TABLET | Refills: 2 | Status: SHIPPED | OUTPATIENT
Start: 2023-03-29 | End: 2023-04-28

## 2023-03-29 NOTE — CM/SW NOTE
Participated in care conference today. Present were pt's sisters, Carolina Garcia and Yue and also Cary Raymond, ID PA and Dr Gabe Acuna. Pt's medical condition reviewed and family's questions/concerns addressed. Pt's family agreeable with DC to [facility] today. Pt's mental status improved and plan for pt to complete HCPOA naming his sister, Sonya Shine as [place]. Forms provided. Updated RN who will ask  to assist.  Updated Rodrigo Lesch from [facility] who confirmed pt can be accepted for admission today. IV nafcillin to be ordered from their pharmacy once pt arrives. Ambulance transport scheduled for 230pm.  PCS form completed and available for RN to print. Patient updated to DC plan for today. No further needs at this time. / to remain available for support and/or discharge planning.      [address] [facility]  [address], [facility]  Discharge Planner  [phone]

## 2023-03-29 NOTE — PROGRESS NOTES
03/29/23 1250   Advance Directives for Healthcare   Does the patient have:  Power of  for Healthcare   Is there a copy on the chart?  Yes   Healthcare Agent Appointed Yes   Healthcare Agent's Name [facility] Agent's Phone Number [phone]

## 2023-03-29 NOTE — PROGRESS NOTES
Called report to [facility]. Scripts for norco, zanaflex, nafcillin, BP meds and insulin sent with patient. All belongings collected and sent with patien tat time of discharge. POA form copied and sent as well. Picked up by [facility] [id number].

## 2023-03-29 NOTE — PROGRESS NOTES
03/29/23 1248   Clinical Encounter Type   Visited With Patient and family together;Health care provider  (2 sisters)   Continue Visiting No   Referral From Nurse   Referral To    Taxonomy   Interventions Assist someone with Advance Directives     Responded to RN who asked  to assist with HCPOA. RN stated that patient is decisional at this time. Form was completed. Original given to patient and a copy placed on chart. Spiritual Care support can be requested via an Epic consult.

## 2023-03-29 NOTE — PLAN OF CARE
A&O x3 tonight, confused intermittently. VSS. Room air, . Back pain well controlled with PO pain medication PRN. Max assist turns. Voids freely. PICC line patent, IV abx infusing as ordered. Scheduled nightly Zyprexa held tonight per family's request. Reviewed POC, pain management, and fall precautions with pt and family. Bed alarm on w/bed in lowest position. Pt reminded to use call light. Care conference tomorrow @ [facility] on dc.

## 2023-03-29 NOTE — PLAN OF CARE
Pt a/ox3-4 this am. VSS. O2 PRN with sleep to keep sats >90%. Encouraging IS use, achieved 9553-6535 ml this shift. Di Close lifted to chair this am, tolerated well. PT requested a drop arm chair at bedside to attempt lateral transfers today. Pain controlled with PO norco and zanaflex. 2 BMS over night, mirlax held this am.  PICC line to SURJIT, flushes and aspirates. Care conference done today with MD and family. Plan to rehab facility later this afternoon.

## 2023-03-30 ENCOUNTER — PATIENT OUTREACH (OUTPATIENT)
Dept: CASE MANAGEMENT | Age: 63
End: 2023-03-30

## 2023-03-31 ENCOUNTER — TELEPHONE (OUTPATIENT)
Dept: SURGERY | Facility: CLINIC | Age: 63
End: 2023-03-31

## 2023-04-11 ENCOUNTER — EXTERNAL FACILITY (OUTPATIENT)
Dept: FAMILY MEDICINE CLINIC | Facility: CLINIC | Age: 63
End: 2023-04-11

## 2023-04-11 DIAGNOSIS — G06.2 EPIDURAL ABSCESS: Primary | ICD-10-CM

## 2023-04-11 DIAGNOSIS — R60.9 PERIPHERAL EDEMA: ICD-10-CM

## 2023-04-11 DIAGNOSIS — B95.61 MSSA BACTEREMIA: ICD-10-CM

## 2023-04-11 DIAGNOSIS — E66.01 MORBID OBESITY WITH BMI OF 45.0-49.9, ADULT (HCC): ICD-10-CM

## 2023-04-11 DIAGNOSIS — M46.46 DISCITIS OF LUMBAR REGION: ICD-10-CM

## 2023-04-11 DIAGNOSIS — R53.1 GENERALIZED WEAKNESS: ICD-10-CM

## 2023-04-11 DIAGNOSIS — E03.8 SUBCLINICAL HYPOTHYROIDISM: ICD-10-CM

## 2023-04-11 DIAGNOSIS — E11.65 TYPE 2 DIABETES MELLITUS WITH HYPERGLYCEMIA, WITHOUT LONG-TERM CURRENT USE OF INSULIN (HCC): ICD-10-CM

## 2023-04-11 DIAGNOSIS — J96.01 ACUTE HYPOXEMIC RESPIRATORY FAILURE (HCC): ICD-10-CM

## 2023-04-11 DIAGNOSIS — R78.81 MSSA BACTEREMIA: ICD-10-CM

## 2023-04-11 DIAGNOSIS — I10 ESSENTIAL HYPERTENSION: ICD-10-CM

## 2023-04-11 DIAGNOSIS — E11.01 HHNC (HYPERGLYCEMIC HYPEROSMOLAR NONKETOTIC COMA) (HCC): ICD-10-CM

## 2023-04-11 DIAGNOSIS — R29.898 RIGHT LEG WEAKNESS: ICD-10-CM

## 2023-04-11 DIAGNOSIS — R80.9 PROTEINURIA, UNSPECIFIED TYPE: ICD-10-CM

## 2023-04-11 DIAGNOSIS — E78.2 MIXED HYPERLIPIDEMIA: ICD-10-CM

## 2023-05-04 ENCOUNTER — TELEPHONE (OUTPATIENT)
Dept: FAMILY MEDICINE CLINIC | Facility: CLINIC | Age: 63
End: 2023-05-04

## 2023-05-04 NOTE — TELEPHONE ENCOUNTER
Approval for nafcillin sodium/sodium chloride compound    5/1/23 thru 5/18/23     Pt currently in skilled nursing living facilty/[facility]

## 2023-06-27 ENCOUNTER — HOSPITAL ENCOUNTER (INPATIENT)
Facility: HOSPITAL | Age: 63
LOS: 4 days | Discharge: NURSING, REHAB OR LONG-TERM CARE | End: 2023-07-02
Attending: EMERGENCY MEDICINE | Admitting: STUDENT IN AN ORGANIZED HEALTH CARE EDUCATION/TRAINING PROGRAM
Payer: MEDICAID

## 2023-06-27 ENCOUNTER — HOSPITAL ENCOUNTER (INPATIENT)
Facility: HOSPITAL | Age: 63
LOS: 4 days | Discharge: NURSING, REHAB OR LONG-TERM CARE | End: 2023-07-02
Attending: EMERGENCY MEDICINE | Admitting: STUDENT IN AN ORGANIZED HEALTH CARE EDUCATION/TRAINING PROGRAM
Payer: MEDICAID

## 2023-06-27 ENCOUNTER — APPOINTMENT (OUTPATIENT)
Dept: MRI IMAGING | Facility: HOSPITAL | Age: 63
End: 2023-06-27
Attending: EMERGENCY MEDICINE
Payer: MEDICAID

## 2023-06-27 DIAGNOSIS — M54.9 INTRACTABLE BACK PAIN: Primary | ICD-10-CM

## 2023-06-27 LAB
ANION GAP SERPL CALC-SCNC: 6 MMOL/L (ref 0–18)
BASOPHILS # BLD AUTO: 0.01 X10(3) UL (ref 0–0.2)
BASOPHILS NFR BLD AUTO: 0.2 %
BILIRUB UR QL STRIP.AUTO: NEGATIVE
BUN BLD-MCNC: 24 MG/DL (ref 7–18)
CALCIUM BLD-MCNC: 9.1 MG/DL (ref 8.5–10.1)
CHLORIDE SERPL-SCNC: 110 MMOL/L (ref 98–112)
CLARITY UR REFRACT.AUTO: CLEAR
CO2 SERPL-SCNC: 25 MMOL/L (ref 21–32)
COLOR UR AUTO: YELLOW
CREAT BLD-MCNC: 0.96 MG/DL
EOSINOPHIL # BLD AUTO: 0.37 X10(3) UL (ref 0–0.7)
EOSINOPHIL NFR BLD AUTO: 9 %
ERYTHROCYTE [DISTWIDTH] IN BLOOD BY AUTOMATED COUNT: 11.9 %
ERYTHROCYTE [SEDIMENTATION RATE] IN BLOOD: 34 MM/HR
GFR SERPLBLD BASED ON 1.73 SQ M-ARVRAT: 90 ML/MIN/1.73M2 (ref 60–?)
GLUCOSE BLD-MCNC: 110 MG/DL (ref 70–99)
GLUCOSE BLD-MCNC: 144 MG/DL (ref 70–99)
GLUCOSE UR STRIP.AUTO-MCNC: NEGATIVE MG/DL
HCT VFR BLD AUTO: 41.6 %
HGB BLD-MCNC: 13.8 G/DL
IMM GRANULOCYTES # BLD AUTO: 0.01 X10(3) UL (ref 0–1)
IMM GRANULOCYTES NFR BLD: 0.2 %
KETONES UR STRIP.AUTO-MCNC: NEGATIVE MG/DL
LEUKOCYTE ESTERASE UR QL STRIP.AUTO: NEGATIVE
LYMPHOCYTES # BLD AUTO: 0.97 X10(3) UL (ref 1–4)
LYMPHOCYTES NFR BLD AUTO: 23.5 %
MCH RBC QN AUTO: 31.2 PG (ref 26–34)
MCHC RBC AUTO-ENTMCNC: 33.2 G/DL (ref 31–37)
MCV RBC AUTO: 94.1 FL
MONOCYTES # BLD AUTO: 0.58 X10(3) UL (ref 0.1–1)
MONOCYTES NFR BLD AUTO: 14 %
NEUTROPHILS # BLD AUTO: 2.19 X10 (3) UL (ref 1.5–7.7)
NEUTROPHILS # BLD AUTO: 2.19 X10(3) UL (ref 1.5–7.7)
NEUTROPHILS NFR BLD AUTO: 53.1 %
NITRITE UR QL STRIP.AUTO: NEGATIVE
NT-PROBNP SERPL-MCNC: 250 PG/ML (ref ?–125)
OSMOLALITY SERPL CALC.SUM OF ELEC: 299 MOSM/KG (ref 275–295)
PH UR STRIP.AUTO: 5 [PH] (ref 5–8)
PLATELET # BLD AUTO: 154 10(3)UL (ref 150–450)
POTASSIUM SERPL-SCNC: 3.3 MMOL/L (ref 3.5–5.1)
PROCALCITONIN SERPL-MCNC: 0.17 NG/ML (ref ?–0.16)
PROT UR STRIP.AUTO-MCNC: NEGATIVE MG/DL
RBC # BLD AUTO: 4.42 X10(6)UL
RBC UR QL AUTO: NEGATIVE
SODIUM SERPL-SCNC: 141 MMOL/L (ref 136–145)
SP GR UR STRIP.AUTO: 1.01 (ref 1–1.03)
UROBILINOGEN UR STRIP.AUTO-MCNC: <2 MG/DL
WBC # BLD AUTO: 4.1 X10(3) UL (ref 4–11)

## 2023-06-27 PROCEDURE — 72158 MRI LUMBAR SPINE W/O & W/DYE: CPT | Performed by: EMERGENCY MEDICINE

## 2023-06-27 RX ORDER — POTASSIUM CHLORIDE 20 MEQ/1
20 TABLET, EXTENDED RELEASE ORAL ONCE
Status: COMPLETED | OUTPATIENT
Start: 2023-06-27 | End: 2023-06-27

## 2023-06-27 RX ORDER — SODIUM CHLORIDE 9 MG/ML
125 INJECTION, SOLUTION INTRAVENOUS CONTINUOUS
Status: DISCONTINUED | OUTPATIENT
Start: 2023-06-27 | End: 2023-06-28

## 2023-06-27 RX ORDER — HYDROMORPHONE HYDROCHLORIDE 1 MG/ML
1 INJECTION, SOLUTION INTRAMUSCULAR; INTRAVENOUS; SUBCUTANEOUS EVERY 30 MIN PRN
Status: DISCONTINUED | OUTPATIENT
Start: 2023-06-27 | End: 2023-07-02

## 2023-06-27 RX ORDER — GADOTERATE MEGLUMINE 376.9 MG/ML
20 INJECTION INTRAVENOUS
Status: COMPLETED | OUTPATIENT
Start: 2023-06-27 | End: 2023-06-27

## 2023-06-27 NOTE — ED INITIAL ASSESSMENT (HPI)
Patient sent to ER from The [facility] for back pain. Patient had a fall last August and has had chronic back pain since with dx of extradural abscess, infection of intervertebral disc and MRSA.  Patient sent by PCP for CT of spine

## 2023-06-28 PROBLEM — M54.9 INTRACTABLE BACK PAIN: Status: ACTIVE | Noted: 2023-06-28

## 2023-06-28 LAB
CRP SERPL-MCNC: 2.19 MG/DL (ref ?–0.3)
EST. AVERAGE GLUCOSE BLD GHB EST-MCNC: 108 MG/DL (ref 68–126)
GLUCOSE BLD-MCNC: 120 MG/DL (ref 70–99)
GLUCOSE BLD-MCNC: 122 MG/DL (ref 70–99)
GLUCOSE BLD-MCNC: 124 MG/DL (ref 70–99)
GLUCOSE BLD-MCNC: 124 MG/DL (ref 70–99)
HBA1C MFR BLD: 5.4 % (ref ?–5.7)
SARS-COV-2 RNA RESP QL NAA+PROBE: NOT DETECTED

## 2023-06-28 PROCEDURE — 0SB23ZX EXCISION OF LUMBAR VERTEBRAL DISC, PERCUTANEOUS APPROACH, DIAGNOSTIC: ICD-10-PCS | Performed by: RADIOLOGY

## 2023-06-28 PROCEDURE — 99223 1ST HOSP IP/OBS HIGH 75: CPT | Performed by: HOSPITALIST

## 2023-06-28 PROCEDURE — 0S923ZX DRAINAGE OF LUMBAR VERTEBRAL DISC, PERCUTANEOUS APPROACH, DIAGNOSTIC: ICD-10-PCS | Performed by: RADIOLOGY

## 2023-06-28 RX ORDER — HYDROMORPHONE HYDROCHLORIDE 1 MG/ML
0.8 INJECTION, SOLUTION INTRAMUSCULAR; INTRAVENOUS; SUBCUTANEOUS EVERY 2 HOUR PRN
Status: DISCONTINUED | OUTPATIENT
Start: 2023-06-28 | End: 2023-07-02

## 2023-06-28 RX ORDER — ENOXAPARIN SODIUM 100 MG/ML
0.5 INJECTION SUBCUTANEOUS NIGHTLY
Status: DISCONTINUED | OUTPATIENT
Start: 2023-06-28 | End: 2023-07-02

## 2023-06-28 RX ORDER — LABETALOL HYDROCHLORIDE 5 MG/ML
10 INJECTION, SOLUTION INTRAVENOUS EVERY 6 HOURS PRN
Status: DISCONTINUED | OUTPATIENT
Start: 2023-06-28 | End: 2023-07-02

## 2023-06-28 RX ORDER — GABAPENTIN 100 MG/1
100 CAPSULE ORAL NIGHTLY
Status: DISCONTINUED | OUTPATIENT
Start: 2023-06-28 | End: 2023-07-02

## 2023-06-28 RX ORDER — POTASSIUM CHLORIDE 20 MEQ/1
40 TABLET, EXTENDED RELEASE ORAL ONCE
Status: COMPLETED | OUTPATIENT
Start: 2023-06-28 | End: 2023-06-28

## 2023-06-28 RX ORDER — CARVEDILOL 12.5 MG/1
12.5 TABLET ORAL 2 TIMES DAILY WITH MEALS
Status: DISCONTINUED | OUTPATIENT
Start: 2023-06-28 | End: 2023-06-29

## 2023-06-28 RX ORDER — LEVOTHYROXINE SODIUM 0.03 MG/1
25 TABLET ORAL
Status: ON HOLD | COMMUNITY

## 2023-06-28 RX ORDER — BISACODYL 10 MG
10 SUPPOSITORY, RECTAL RECTAL
Status: DISCONTINUED | OUTPATIENT
Start: 2023-06-28 | End: 2023-07-02

## 2023-06-28 RX ORDER — POLYETHYLENE GLYCOL 3350 17 G/17G
17 POWDER, FOR SOLUTION ORAL DAILY
Status: ON HOLD | COMMUNITY

## 2023-06-28 RX ORDER — HYDROCODONE BITARTRATE AND ACETAMINOPHEN 5; 325 MG/1; MG/1
2 TABLET ORAL EVERY 4 HOURS PRN
Status: DISCONTINUED | OUTPATIENT
Start: 2023-06-28 | End: 2023-07-02

## 2023-06-28 RX ORDER — POLYETHYLENE GLYCOL 3350 17 G/17G
17 POWDER, FOR SOLUTION ORAL DAILY PRN
Status: DISCONTINUED | OUTPATIENT
Start: 2023-06-28 | End: 2023-07-02

## 2023-06-28 RX ORDER — NICOTINE POLACRILEX 4 MG
30 LOZENGE BUCCAL
Status: DISCONTINUED | OUTPATIENT
Start: 2023-06-28 | End: 2023-07-02

## 2023-06-28 RX ORDER — VALSARTAN 80 MG/1
80 TABLET ORAL DAILY
Status: ON HOLD | COMMUNITY

## 2023-06-28 RX ORDER — ENOXAPARIN SODIUM 150 MG/ML
40 INJECTION SUBCUTANEOUS EVERY 12 HOURS
Status: ON HOLD | COMMUNITY
End: 2023-06-29

## 2023-06-28 RX ORDER — LOSARTAN POTASSIUM 50 MG/1
50 TABLET ORAL DAILY
Status: DISCONTINUED | OUTPATIENT
Start: 2023-06-28 | End: 2023-06-29

## 2023-06-28 RX ORDER — POTASSIUM CHLORIDE 750 MG/1
20 TABLET, EXTENDED RELEASE ORAL DAILY
COMMUNITY
End: 2023-07-02

## 2023-06-28 RX ORDER — FUROSEMIDE 40 MG/1
40 TABLET ORAL DAILY
Status: DISCONTINUED | OUTPATIENT
Start: 2023-06-28 | End: 2023-06-30

## 2023-06-28 RX ORDER — HYDRALAZINE HYDROCHLORIDE 20 MG/ML
10 INJECTION INTRAMUSCULAR; INTRAVENOUS EVERY 6 HOURS PRN
Status: DISCONTINUED | OUTPATIENT
Start: 2023-06-28 | End: 2023-07-02

## 2023-06-28 RX ORDER — FUROSEMIDE 40 MG/1
40 TABLET ORAL DAILY
COMMUNITY
End: 2023-07-02

## 2023-06-28 RX ORDER — HYDROMORPHONE HYDROCHLORIDE 1 MG/ML
0.2 INJECTION, SOLUTION INTRAMUSCULAR; INTRAVENOUS; SUBCUTANEOUS EVERY 2 HOUR PRN
Status: DISCONTINUED | OUTPATIENT
Start: 2023-06-28 | End: 2023-07-02

## 2023-06-28 RX ORDER — CYCLOBENZAPRINE HCL 10 MG
10 TABLET ORAL 3 TIMES DAILY PRN
Status: DISCONTINUED | OUTPATIENT
Start: 2023-06-28 | End: 2023-07-02

## 2023-06-28 RX ORDER — NIFEDIPINE 60 MG/1
60 TABLET, FILM COATED, EXTENDED RELEASE ORAL DAILY
Status: ON HOLD | COMMUNITY

## 2023-06-28 RX ORDER — NICOTINE POLACRILEX 4 MG
15 LOZENGE BUCCAL
Status: DISCONTINUED | OUTPATIENT
Start: 2023-06-28 | End: 2023-07-02

## 2023-06-28 RX ORDER — HYDROCODONE BITARTRATE AND ACETAMINOPHEN 5; 325 MG/1; MG/1
1 TABLET ORAL EVERY 4 HOURS PRN
Status: DISCONTINUED | OUTPATIENT
Start: 2023-06-28 | End: 2023-07-02

## 2023-06-28 RX ORDER — MELATONIN
3 NIGHTLY PRN
Status: DISCONTINUED | OUTPATIENT
Start: 2023-06-28 | End: 2023-07-02

## 2023-06-28 RX ORDER — ENEMA 19; 7 G/133ML; G/133ML
1 ENEMA RECTAL ONCE AS NEEDED
Status: DISCONTINUED | OUTPATIENT
Start: 2023-06-28 | End: 2023-07-02

## 2023-06-28 RX ORDER — NIFEDIPINE 30 MG/1
60 TABLET, EXTENDED RELEASE ORAL DAILY
Status: DISCONTINUED | OUTPATIENT
Start: 2023-06-28 | End: 2023-07-02

## 2023-06-28 RX ORDER — ACETAMINOPHEN 500 MG
500 TABLET ORAL EVERY 4 HOURS PRN
Status: DISCONTINUED | OUTPATIENT
Start: 2023-06-28 | End: 2023-07-02

## 2023-06-28 RX ORDER — ONDANSETRON 2 MG/ML
4 INJECTION INTRAMUSCULAR; INTRAVENOUS EVERY 6 HOURS PRN
Status: DISCONTINUED | OUTPATIENT
Start: 2023-06-28 | End: 2023-07-02

## 2023-06-28 RX ORDER — SENNOSIDES 8.6 MG
17.2 TABLET ORAL NIGHTLY PRN
Status: DISCONTINUED | OUTPATIENT
Start: 2023-06-28 | End: 2023-07-02

## 2023-06-28 RX ORDER — GABAPENTIN 100 MG/1
100 CAPSULE ORAL NIGHTLY
Status: ON HOLD | COMMUNITY

## 2023-06-28 RX ORDER — HYDROCODONE BITARTRATE AND ACETAMINOPHEN 5; 325 MG/1; MG/1
1 TABLET ORAL EVERY 6 HOURS PRN
Status: DISCONTINUED | OUTPATIENT
Start: 2023-06-28 | End: 2023-06-29

## 2023-06-28 RX ORDER — PROCHLORPERAZINE EDISYLATE 5 MG/ML
5 INJECTION INTRAMUSCULAR; INTRAVENOUS EVERY 8 HOURS PRN
Status: DISCONTINUED | OUTPATIENT
Start: 2023-06-28 | End: 2023-07-02

## 2023-06-28 RX ORDER — LEVOTHYROXINE SODIUM 0.03 MG/1
25 TABLET ORAL
Status: DISCONTINUED | OUTPATIENT
Start: 2023-06-28 | End: 2023-07-02

## 2023-06-28 RX ORDER — CYCLOBENZAPRINE HCL 10 MG
10 TABLET ORAL 3 TIMES DAILY PRN
Status: ON HOLD | COMMUNITY

## 2023-06-28 RX ORDER — HEPARIN SODIUM 5000 [USP'U]/ML
5000 INJECTION, SOLUTION INTRAVENOUS; SUBCUTANEOUS EVERY 12 HOURS SCHEDULED
Status: DISCONTINUED | OUTPATIENT
Start: 2023-06-28 | End: 2023-06-28

## 2023-06-28 RX ORDER — HYDROMORPHONE HYDROCHLORIDE 1 MG/ML
0.4 INJECTION, SOLUTION INTRAMUSCULAR; INTRAVENOUS; SUBCUTANEOUS EVERY 2 HOUR PRN
Status: DISCONTINUED | OUTPATIENT
Start: 2023-06-28 | End: 2023-07-02

## 2023-06-28 RX ORDER — POLYETHYLENE GLYCOL 3350 17 G/17G
17 POWDER, FOR SOLUTION ORAL DAILY
Status: DISCONTINUED | OUTPATIENT
Start: 2023-06-28 | End: 2023-07-02

## 2023-06-28 RX ORDER — DEXTROSE MONOHYDRATE 25 G/50ML
50 INJECTION, SOLUTION INTRAVENOUS
Status: DISCONTINUED | OUTPATIENT
Start: 2023-06-28 | End: 2023-07-02

## 2023-06-28 RX ORDER — ACETAMINOPHEN 325 MG/1
650 TABLET ORAL EVERY 4 HOURS PRN
Status: DISCONTINUED | OUTPATIENT
Start: 2023-06-28 | End: 2023-07-02

## 2023-06-28 NOTE — PLAN OF CARE
Pt A&Ox4, VSS on RA, , tele; NSR. BP elevated in 160's on admission and assessment. PRN anti hypertensive's ordered. Pt reports moderate pain to lower back that radiates to pelvis; PO pain medications given. Refused Lidocaine patch at this time. Pt on cardiac diet, voiding via male external, LBM 6/27. Plan to see consults and PT/OT. Plan of care reviewed with patient. Patient demonstrates understanding.

## 2023-06-28 NOTE — PHYSICAL THERAPY NOTE
PHYSICAL THERAPY EVALUATION - INPATIENT     Room Number: 380/380-A  Evaluation Date: 6/28/2023  Type of Evaluation: Initial  Physician Order: PT Eval and Treat    Presenting Problem: back pain  Co-Morbidities : HTN, DM2, morbid obesity  Reason for Therapy: Mobility Dysfunction and Discharge Planning    History related to current admission: Patient is a 65year old male admitted on 6/27/2023 from [address] [place] [address],[address] for worsening back pain. Per neurosurgery MD note 6/28/23,  \"no acute intervention indicated at this time\" and  \"LSO when OOB as needed, for comfort. 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 Sequelae of chronic bony destruction is noted. Marked severe spinal canal stenosis at L3-L4 with severe bilateral neural foraminal stenosis is again noted. Marked enhancement   extending into bilateral psoas musculature is again noted. The overall enhancement is similar to prior examination. Recent hospitalizations:  3/21-3/29/23 dx with MSSA bacteremia/discitis--> dc to [facility]  2/27-3/7 admitted for non-traumatic rhabdomyolysis --> dc to [facility]  ASSESSMENT   In this PT evaluation, the patient presents with the following impairments: decreased balance, strength, and functional endurance, and pain that worsens with movement. These impairments and comorbidities manifest themselves as functional limitations in independent bed mobility, transfers, and gait. The patient is below baseline and would benefit from skilled inpatient PT to address the above deficits to assist patient in returning to prior to level of function. Functional outcome measures completed include AMPA. The AM-PAC '6-Clicks' Inpatient Basic Mobility Short Form was completed and this patient is demonstrating a Approx Degree of Impairment: 61.29%  degree of impairment in mobility.  Research supports that patients with this level of impairment may benefit from [place] [id number]. DISCHARGE RECOMMENDATIONS  PT Discharge Recommendations: Sub-acute rehabilitation    PLAN  PT Treatment Plan: Bed mobility; Body mechanics; Patient education; Energy conservation;Gait training;Strengthening;Transfer training;Balance training;Range of motion  Rehab Potential : Fair  Frequency (Obs): 3-5x/week  Number of Visits to Meet Established Goals: 6      CURRENT GOALS    Goal #1 Patient is able to demonstrate supine - sit EOB @ level: modified independent     Goal #2 Patient is able to demonstrate transfers Sit to/from Stand at assistance level: supervision     Goal #3 Patient is able to ambulate 25 feet with assist device: walker - rolling at assistance level: minimum assistance     Goal #4    Goal #5    Goal #6    Goal Comments: Goals established on 2023    HOME SITUATION  Type of Home: [address]: Two level; Able to live on main level  Stairs to Enter : 1     Stairs to Bedroom: 14       Lives With: Alone     Patient Owned Equipment: Rolling walker; Wheelchair       Prior Level of [place]: Pt states that he was ambulating 3 x 76' with RW when at [address] [place] [address] in March and had been walking approx 20-30' at [place] [address] prior to this admission. He was transferring bed to w/c without assistance. SUBJECTIVE  Pt states he is surprised that he has lost so much strength recently. OBJECTIVE  Precautions: Lumbar brace;Spine  Fall Risk: High fall risk    WEIGHT BEARING RESTRICTION  Weight Bearing Restriction: None                PAIN ASSESSMENT  Ratin  Location: back, right hip  Management Techniques: Body mechanics;Repositioning; Activity promotion    COGNITION  Overall Cognitive Status:  WFL - within functional limits    RANGE OF MOTION AND STRENGTH ASSESSMENT  Upper extremity ROM and strength are within functional limits     Lower extremity ROM is within functional limits     Lower extremity strength is within functional limits except for the following:   right hip flex 3-/5, left hip flex 3-/5; bilateral knee ext 4/5      BALANCE  Static Sitting: Fair +  Dynamic Sitting: Fair +  Static Standing: Poor +  Dynamic Standing: Poor +    ADDITIONAL TESTS                                    ACTIVITY TOLERANCE                         O2 WALK  Oxygen Therapy  SPO2% on Room Air at Rest: 97    NEUROLOGICAL FINDINGS                        AM-PAC '6-Clicks' INPATIENT SHORT FORM - BASIC MOBILITY  How much difficulty does the patient currently have. .. Patient Difficulty: Turning over in bed (including adjusting bedclothes, sheets and blankets)?: A Little   Patient Difficulty: Sitting down on and standing up from a chair with arms (e.g., wheelchair, bedside commode, etc.): A Little   Patient Difficulty: Moving from lying on back to sitting on the side of the bed?: A Little   How much help from another person does the patient currently need. .. Help from Another: Moving to and from a bed to a chair (including a wheelchair)?: A Lot   Help from Another: Need to walk in hospital room?: A Lot   Help from Another: Climbing 3-5 steps with a railing?: Total       AM-PAC Score:  Raw Score: 14   Approx Degree of Impairment: 61.29%   Standardized Score (AM-PAC Scale): 38.1   CMS Modifier (G-Code): CL    FUNCTIONAL ABILITY STATUS  Gait Assessment   Functional Mobility/Gait Assessment  Gait Assistance: Minimum assistance  Distance (ft): 1  Assistive Device: Rolling walker  Pattern: Shuffle    Skilled Therapy Provided     Bed Mobility:  Rolling: supervision  Supine to sit: supervision with cuing for log roll   Sit to supine: min A for LEs     Transfer Mobility:  Sit to stand: CGA   Stand to sit: CGA  Gait = Pt ambulated 5 small steps side-stepping along bed with RW and CGA    Therapist's Comments: Pt lying in bed and agreeable to PT. Donned LSO in sitting with supervision. Worked on sit to stand with RW from elevated bed height. Pt tolerated standing up to 30 sec at a time; returned to sitting due to pain.  Attempted marching in place x 6 steps with limited ROM to avoid worsening pain. Pt requested return to bed at end of session. Exercise/Education Provided: Body mechanics  Functional activity tolerated  Lower therapeutic exercise: Alternating marching  Ankle pumps    Patient End of Session: In bed;Needs met;Call light within reach;RN aware of session/findings; All patient questions and concerns addressed; Alarm set;SCDs in place      Patient Evaluation Complexity Level:  History Moderate - 1 or 2 personal factors and/or co-morbidities   Examination of body systems Low - addressing 1-2 elements   Clinical Presentation Low - Stable   Clinical Decision Making Low - Stable       PT Session Time: 31 minutes    Therapeutic Activity: 15 minutes

## 2023-06-28 NOTE — CM/SW NOTE
06/28/23 1100   CM/SW Referral Data   Referral Source Social Work (self-referral)   Reason for Referral Discharge planning   Informant Patient;Sibling;EMR;Clinical Staff Member   Patient Info   Patient's Current Mental Status at Time of Assessment Alert;Oriented   Patient's [address]   Post Acute Care Provider Upon Admission   [facility]   Discharge Needs   Anticipated D/C needs Long term care facility;Transportation services       Patient is a 64 y/o man admitted for intractable back pain. Pt is familiar to me from previous hospital admission earlier this year. Pt normally lives alone in his own home, however has been staying at [facility] since 3/2023 for treatment of spine OM. Met with pt who stated that he has been working with his family to return to his home. Pt agreeable with return to [facility] if needed at DC. Pt asked  to contact his sister/POA, Anahi Dumont as he does not have his cell phone and has been unable to inform her of his hospital admission. Spoke with pt's sister, Anahi Dumont who stated she will visit pt today to bring him his cell phone. She stated that pt completed IV abx in May, but shortly afterwards pt began having increasing back pain. They have been work with [facility] on DC plan for pt to return to his home with [facility] services, however the date for this has been pushed back due to pt's ongoing back pain. Pt's sister is agreeable with return to [facility] at [facility] if needed. She would like to be updated regarding status of pt's back pain/infection. Updates sent to [facility] via [address]. Await MD and therapy recommendations for further DC planning. / to remain available for support and/or discharge planning.      Joanne Wilkerson [facility]  Discharge Planner  [phone]

## 2023-06-28 NOTE — ED QUICK NOTES
Orders for admission, patient is aware of plan and ready to go upstairs. Any questions, please call ED RN Marleny Griffith at extension [phone].      Patient Covid vaccination status: Unvaccinated     COVID Test Ordered in ED: None    COVID Suspicion at Admission: N/A    Running Infusions:    None    Mental Status/LOC at time of transport: alert    Other pertinent information:   CIWA score: N/A   NIH score:  N/A

## 2023-06-28 NOTE — OCCUPATIONAL THERAPY NOTE
OT order received, chart reviewed. Patient politely declined due to fatigue from PT session. Will follow-up at a later date.

## 2023-06-28 NOTE — IMAGING NOTE
Spoke with floor RN-Shanti re: CT bx. Advised to keep pt NPO after midnight except meds with a sip of water, hold blood thinners, draw STAT PT/INR in the am. Send pt down saline locked and accucheck prior to leaving the floor for the procedure. Consent will be obtained by Radiology dept prior to procedure. RN verbalized understanding and tentative procedure time is 1300. PRINCIPAL DISCHARGE DIAGNOSIS  Diagnosis: Gastrointestinal bleed  Assessment and Plan of Treatment: Rectal bleed, seen by Gastroenterology  Condition resolved  CBC stable. Continue with Pantoprazole  Complete course of Cipro and Flagyl antibiotic therapy      SECONDARY DISCHARGE DIAGNOSES  Diagnosis: Adult failure to thrive  Assessment and Plan of Treatment: Creatinine stable    Diagnosis: Uncontrolled hypertension  Assessment and Plan of Treatment: Low salt diet.  Activity as tolerated.  Take all medication as prescribed.  Follow up with your medical doctor for routine blood pressure monitoring at your next visit.  Notify your doctor if you have any of the following symptoms:   Dizziness, Lightheadedness, Blurry vision, Headache, Chest pain, Shortness of breath    Diagnosis: DM (diabetes mellitus), type 2  Assessment and Plan of Treatment: 12/30: HgbA1c   7.4  Continue with Diabetic medication

## 2023-06-28 NOTE — PLAN OF CARE
NURSING ADMISSION NOTE      Patient admitted via Cart  Oriented to room. Safety precautions initiated. Bed in low position. Call light in reach. Alert and oriented x4. VSS. On RA. . Telemetry monitoring. SCDs on BLE. Tolerating diet. Voiding freely. Pt rating pain 7/10 to lower back and groin, see MAR. PT/OT. ID and spine consult.

## 2023-06-29 ENCOUNTER — APPOINTMENT (OUTPATIENT)
Dept: CT IMAGING | Facility: HOSPITAL | Age: 63
End: 2023-06-29
Attending: INTERNAL MEDICINE
Payer: MEDICAID

## 2023-06-29 PROBLEM — M86.9 OSTEOMYELITIS (HCC): Status: ACTIVE | Noted: 2023-06-29

## 2023-06-29 LAB
ANION GAP SERPL CALC-SCNC: 3 MMOL/L (ref 0–18)
BASOPHILS # BLD AUTO: 0.02 X10(3) UL (ref 0–0.2)
BASOPHILS NFR BLD AUTO: 0.4 %
BUN BLD-MCNC: 17 MG/DL (ref 7–18)
CALCIUM BLD-MCNC: 8.6 MG/DL (ref 8.5–10.1)
CHLORIDE SERPL-SCNC: 108 MMOL/L (ref 98–112)
CO2 SERPL-SCNC: 26 MMOL/L (ref 21–32)
CREAT BLD-MCNC: 0.79 MG/DL
EOSINOPHIL # BLD AUTO: 0.41 X10(3) UL (ref 0–0.7)
EOSINOPHIL NFR BLD AUTO: 8.2 %
ERYTHROCYTE [DISTWIDTH] IN BLOOD BY AUTOMATED COUNT: 11.8 %
GFR SERPLBLD BASED ON 1.73 SQ M-ARVRAT: 101 ML/MIN/1.73M2 (ref 60–?)
GLUCOSE BLD-MCNC: 105 MG/DL (ref 70–99)
GLUCOSE BLD-MCNC: 120 MG/DL (ref 70–99)
GLUCOSE BLD-MCNC: 120 MG/DL (ref 70–99)
GLUCOSE BLD-MCNC: 122 MG/DL (ref 70–99)
GLUCOSE BLD-MCNC: 128 MG/DL (ref 70–99)
HCT VFR BLD AUTO: 39.4 %
HGB BLD-MCNC: 13.7 G/DL
IMM GRANULOCYTES # BLD AUTO: 0.01 X10(3) UL (ref 0–1)
IMM GRANULOCYTES NFR BLD: 0.2 %
INR BLD: 1.14 (ref 0.85–1.16)
LYMPHOCYTES # BLD AUTO: 1.64 X10(3) UL (ref 1–4)
LYMPHOCYTES NFR BLD AUTO: 32.7 %
MAGNESIUM SERPL-MCNC: 1.6 MG/DL (ref 1.6–2.6)
MCH RBC QN AUTO: 31.6 PG (ref 26–34)
MCHC RBC AUTO-ENTMCNC: 34.8 G/DL (ref 31–37)
MCV RBC AUTO: 90.8 FL
MONOCYTES # BLD AUTO: 0.8 X10(3) UL (ref 0.1–1)
MONOCYTES NFR BLD AUTO: 16 %
NEUTROPHILS # BLD AUTO: 2.13 X10 (3) UL (ref 1.5–7.7)
NEUTROPHILS # BLD AUTO: 2.13 X10(3) UL (ref 1.5–7.7)
NEUTROPHILS NFR BLD AUTO: 42.5 %
OSMOLALITY SERPL CALC.SUM OF ELEC: 287 MOSM/KG (ref 275–295)
PLATELET # BLD AUTO: 179 10(3)UL (ref 150–450)
POTASSIUM SERPL-SCNC: 3.1 MMOL/L (ref 3.5–5.1)
POTASSIUM SERPL-SCNC: 3.1 MMOL/L (ref 3.5–5.1)
PROTHROMBIN TIME: 14.6 SECONDS (ref 11.6–14.8)
RBC # BLD AUTO: 4.34 X10(6)UL
SODIUM SERPL-SCNC: 137 MMOL/L (ref 136–145)
WBC # BLD AUTO: 5 X10(3) UL (ref 4–11)

## 2023-06-29 PROCEDURE — 99232 SBSQ HOSP IP/OBS MODERATE 35: CPT | Performed by: INTERNAL MEDICINE

## 2023-06-29 PROCEDURE — 77012 CT SCAN FOR NEEDLE BIOPSY: CPT | Performed by: INTERNAL MEDICINE

## 2023-06-29 PROCEDURE — 99152 MOD SED SAME PHYS/QHP 5/>YRS: CPT | Performed by: INTERNAL MEDICINE

## 2023-06-29 PROCEDURE — 62267 INTERDISCAL PERQ ASPIR DX: CPT | Performed by: INTERNAL MEDICINE

## 2023-06-29 RX ORDER — MIDAZOLAM HYDROCHLORIDE 1 MG/ML
INJECTION INTRAMUSCULAR; INTRAVENOUS
Status: COMPLETED
Start: 2023-06-29 | End: 2023-06-29

## 2023-06-29 RX ORDER — POTASSIUM CHLORIDE 20 MEQ/1
40 TABLET, EXTENDED RELEASE ORAL ONCE
Status: COMPLETED | OUTPATIENT
Start: 2023-06-29 | End: 2023-06-29

## 2023-06-29 RX ORDER — NALOXONE HYDROCHLORIDE 0.4 MG/ML
80 INJECTION, SOLUTION INTRAMUSCULAR; INTRAVENOUS; SUBCUTANEOUS AS NEEDED
Status: DISCONTINUED | OUTPATIENT
Start: 2023-06-29 | End: 2023-06-29 | Stop reason: HOSPADM

## 2023-06-29 RX ORDER — HYDROCODONE BITARTRATE AND ACETAMINOPHEN 5; 325 MG/1; MG/1
1 TABLET ORAL EVERY 6 HOURS PRN
COMMUNITY
End: 2023-07-02

## 2023-06-29 RX ORDER — MAGNESIUM OXIDE 400 MG/1
400 TABLET ORAL ONCE
Status: DISCONTINUED | OUTPATIENT
Start: 2023-06-29 | End: 2023-06-29

## 2023-06-29 RX ORDER — CARVEDILOL 12.5 MG/1
25 TABLET ORAL 2 TIMES DAILY WITH MEALS
Status: DISCONTINUED | OUTPATIENT
Start: 2023-06-29 | End: 2023-07-02

## 2023-06-29 RX ORDER — SODIUM CHLORIDE 9 MG/ML
INJECTION, SOLUTION INTRAVENOUS CONTINUOUS
Status: DISCONTINUED | OUTPATIENT
Start: 2023-06-29 | End: 2023-06-29 | Stop reason: HOSPADM

## 2023-06-29 RX ORDER — LOSARTAN POTASSIUM 50 MG/1
50 TABLET ORAL 2 TIMES DAILY
Status: DISCONTINUED | OUTPATIENT
Start: 2023-06-29 | End: 2023-07-02

## 2023-06-29 RX ORDER — MIDAZOLAM HYDROCHLORIDE 1 MG/ML
1 INJECTION INTRAMUSCULAR; INTRAVENOUS EVERY 5 MIN PRN
Status: DISCONTINUED | OUTPATIENT
Start: 2023-06-29 | End: 2023-06-29 | Stop reason: HOSPADM

## 2023-06-29 RX ORDER — FLUMAZENIL 0.1 MG/ML
0.2 INJECTION INTRAVENOUS AS NEEDED
Status: DISCONTINUED | OUTPATIENT
Start: 2023-06-29 | End: 2023-06-29 | Stop reason: HOSPADM

## 2023-06-29 RX ORDER — HYDROCODONE BITARTRATE AND ACETAMINOPHEN 10; 325 MG/1; MG/1
1 TABLET ORAL EVERY 6 HOURS PRN
Status: ON HOLD | COMMUNITY
End: 2023-06-29 | Stop reason: CLARIF

## 2023-06-29 RX ORDER — ENOXAPARIN SODIUM 100 MG/ML
40 INJECTION SUBCUTANEOUS 2 TIMES DAILY
Status: ON HOLD | COMMUNITY

## 2023-06-29 RX ORDER — MAGNESIUM SULFATE HEPTAHYDRATE 40 MG/ML
2 INJECTION, SOLUTION INTRAVENOUS ONCE
Status: COMPLETED | OUTPATIENT
Start: 2023-06-29 | End: 2023-06-29

## 2023-06-29 NOTE — PLAN OF CARE
Patient is alert and oriented x4. No complaint of numbness or tingling. Pulses bilateral +2. Appropriate strength and mobility in lower extremities. Some complaints of pain last rated a 6/10. IV is infusing. Vital signs in normal range. Tolerates food and beverages well. Voids appropriately, last bowel movement was 6/27. IS and ankle pumps encouraged. Belongings within reach. Encouraged to call for any needs.

## 2023-06-29 NOTE — IMAGING NOTE
Pt here for image guided intervertebral lumbar disc biopsy. Pre procedure vitals checked. IV access to right upper arm saline lock. 0.9 NS IV initiated to maintain patency. Informed consent obtained by Dr Donnell Vazquez. Positioned pt on scanner. Obtained biopsy. Specimen sent to Pathology. Presently denies pain. Tolerated procedure well. Vital signs stable on room air. SBAR report given to [employer].  Transported pt to  380 accompanied by [facility].    6/29/23 @ 14:05 PM TO/RB Dr. Brittany Arzate: May resume Heparin SubQ on 6/29/2023 at 21:00 PM.

## 2023-06-29 NOTE — OCCUPATIONAL THERAPY NOTE
OT order received, chart reviewed. Patient on flat bedrest after biopsy this PM. Will follow-up as appropriate.

## 2023-06-29 NOTE — PROCEDURES
L3-4 disc space. No fluid could be aspirated. 2 ml lavage w/ re aspiration into sterile cup.  18g core x 6, small gelatinous fragments; 2 in formalin and 4 in sterile cup. Comp-none.

## 2023-06-29 NOTE — PHYSICAL THERAPY NOTE
PHYSICAL THERAPY TREATMENT NOTE - INPATIENT    Room Number: 380/380-A     Session: 1     Number of Visits to Meet Established Goals: 6    Presenting Problem: back pain  Co-Morbidities : HTN, DM2, morbid obesity  ASSESSMENT     The patient was unable to perform full STS transfer today with complaints of L hip pain. The patient continues to present with the following impairments: decreased balance, strength, and functional endurance, and pain that worsens with movement. The patient is below baseline and would benefit from skilled inpatient PT to address the above deficits to assist patient in returning to prior to level of function. DISCHARGE RECOMMENDATIONS  PT Discharge Recommendations: Sub-acute rehabilitation     PLAN  PT Treatment Plan: Bed mobility; Body mechanics; Patient education; Energy conservation;Gait training;Strengthening;Transfer training;Balance training;Range of motion  Rehab Potential : Fair  Frequency (Obs): 3-5x/week    CURRENT GOALS       Goal #1 Patient is able to demonstrate supine - sit EOB @ level: modified independent      Goal #2 Patient is able to demonstrate transfers Sit to/from Stand at assistance level: supervision      Goal #3 Patient is able to ambulate 25 feet with assist device: walker - rolling at assistance level: minimum assistance      Goal #4     Goal #5     Goal #6     Goal Comments: Goals established on 2023 all goals ongoing      SUBJECTIVE  \"My hip really hurts\"     OBJECTIVE  Precautions: Lumbar brace;Spine    WEIGHT BEARING RESTRICTION  Weight Bearing Restriction: None                PAIN ASSESSMENT   Ratin  Location: R hip  Management Techniques: Activity promotion; Body mechanics;Repositioning    BALANCE                                                                                                                       Static Sitting: Fair +  Dynamic Sitting: Fair +           Static Standing: Poor +  Dynamic Standing: Poor +    ACTIVITY TOLERANCE                         O2 WALK         AM-PAC '6-Clicks' INPATIENT SHORT FORM - BASIC MOBILITY  How much difficulty does the patient currently have. .. Patient Difficulty: Turning over in bed (including adjusting bedclothes, sheets and blankets)?: A Little   Patient Difficulty: Sitting down on and standing up from a chair with arms (e.g., wheelchair, bedside commode, etc.): A Lot   Patient Difficulty: Moving from lying on back to sitting on the side of the bed?: A Lot   How much help from another person does the patient currently need. .. Help from Another: Moving to and from a bed to a chair (including a wheelchair)?: A Lot   Help from Another: Need to walk in hospital room?: Total   Help from Another: Climbing 3-5 steps with a railing?: Total       AM-PAC Score:  Raw Score: 11   Approx Degree of Impairment: 72.57%   Standardized Score (AM-PAC Scale): 33.86   CMS Modifier (G-Code): CL    FUNCTIONAL ABILITY STATUS  Gait Assessment   Functional Mobility/Gait Assessment  Gait Assistance: Not tested  Distance (ft): 1  Assistive Device: Rolling walker  Pattern: Shuffle    Skilled Therapy Provided    Bed Mobility:  Rolling: Mod A  Supine<>Sit: Mod A; vc to use R arm on L bedrail and to use L elbow to assist with force production. Sit<>Supine: Mod A to guide LE back to bed      Transfer Mobility:  Sit<>Stand: NT; Patient up 25% but unable to complete full STS     Stand<>Sit: NT   Gait: NT    Therapist's Comments: Patient supine in bed and agreeable to therapy. Patient L hip pain limited bed mobility and transfer mobility. Patient hesitant to STS with regular RW, unable to locate wide RW and patient unable to perform STS with normal RW. Commode brought out and placed bedside. Patient returned to bed and reminded to call nursing staff for further personal needs; pt agreeable.        THERAPEUTIC EXERCISES  Lower Extremity Alternating marching  Heel raises  Knee extension  Toe raises     Upper Extremity Position Sitting     Repetitions   10   Sets   1     Patient End of Session: In bed;Needs met;Call light within reach;RN aware of session/findings;SCDs in place; All patient questions and concerns addressed    PT Session Time: 30 minutes  Therapeutic Activity: 25 minutes  Therapeutic Exercise: 5 minutes

## 2023-06-29 NOTE — PLAN OF CARE
Alert and oriented x4. VSS. On 4L NC while sleeping. . Telemetry monitoring. SCDs on BLE. Tolerating diet. Voiding via male external. Pain controlled with PRN medication. LSO brace for comfort. PT/OT. Lovenox on hold. NPO at midnight. Plan for CT biopsy 6/29. Call light within reach.

## 2023-06-30 PROBLEM — A04.72 C. DIFFICILE DIARRHEA: Status: ACTIVE | Noted: 2023-06-30

## 2023-06-30 PROBLEM — R19.7 DIARRHEA: Status: ACTIVE | Noted: 2023-06-30

## 2023-06-30 LAB
C DIFF GDH + TOXINS A+B STL QL IA.RAPID: DETECTED
C DIFF TOX B STL QL: POSITIVE
GLUCOSE BLD-MCNC: 101 MG/DL (ref 70–99)
GLUCOSE BLD-MCNC: 102 MG/DL (ref 70–99)
GLUCOSE BLD-MCNC: 140 MG/DL (ref 70–99)
GLUCOSE BLD-MCNC: 155 MG/DL (ref 70–99)
MAGNESIUM SERPL-MCNC: 2 MG/DL (ref 1.6–2.6)
POTASSIUM SERPL-SCNC: 3.3 MMOL/L (ref 3.5–5.1)

## 2023-06-30 PROCEDURE — 99232 SBSQ HOSP IP/OBS MODERATE 35: CPT | Performed by: INTERNAL MEDICINE

## 2023-06-30 RX ORDER — POTASSIUM CHLORIDE 20 MEQ/1
40 TABLET, EXTENDED RELEASE ORAL ONCE
Status: COMPLETED | OUTPATIENT
Start: 2023-06-30 | End: 2023-06-30

## 2023-06-30 RX ORDER — TRIAMTERENE AND HYDROCHLOROTHIAZIDE 37.5; 25 MG/1; MG/1
1 CAPSULE ORAL DAILY
Status: DISCONTINUED | OUTPATIENT
Start: 2023-06-30 | End: 2023-07-02

## 2023-06-30 RX ORDER — VANCOMYCIN HYDROCHLORIDE 125 MG/1
125 CAPSULE ORAL 4 TIMES DAILY
Status: DISCONTINUED | OUTPATIENT
Start: 2023-06-30 | End: 2023-07-02

## 2023-06-30 NOTE — PROGRESS NOTES
Attempted to see patient  Patient sleeping  MRI reviewed  Recent biopsy  No acute neurosurgical intervention  Recommend antibiotics and at least until ESR and CRP normalize  [address] LSO for comfort  Please call with any questions or concerns

## 2023-06-30 NOTE — PLAN OF CARE
Patient is alert and oriented x4. No complaint of numbness or tingling. Pulses bilateral +1. Appropriate strength and mobility in lower extremities. Some complaints of pain last rated a 6/10. Patient ambulates 2 person, stand pivot at baseline. Vital signs in normal range. Tolerates food and beverages well. Voids appropriately, last bowel movement was 6/29. Patient positive for C. Diff, oral vanco started. IS and ankle pumps encouraged. Belongings within reach. Encouraged to call for any needs.

## 2023-06-30 NOTE — CM/SW NOTE
PASRR completed and uploaded to SNF referral in [facility].     Ryder Aleman, DOMINGAN, RN-BC    [id number]

## 2023-07-01 LAB
ANION GAP SERPL CALC-SCNC: 3 MMOL/L (ref 0–18)
BASOPHILS # BLD AUTO: 0.05 X10(3) UL (ref 0–0.2)
BASOPHILS NFR BLD AUTO: 0.7 %
BUN BLD-MCNC: 28 MG/DL (ref 7–18)
CALCIUM BLD-MCNC: 9 MG/DL (ref 8.5–10.1)
CHLORIDE SERPL-SCNC: 106 MMOL/L (ref 98–112)
CO2 SERPL-SCNC: 28 MMOL/L (ref 21–32)
CREAT BLD-MCNC: 1.37 MG/DL
EOSINOPHIL # BLD AUTO: 0.32 X10(3) UL (ref 0–0.7)
EOSINOPHIL NFR BLD AUTO: 4.6 %
ERYTHROCYTE [DISTWIDTH] IN BLOOD BY AUTOMATED COUNT: 11.8 %
GFR SERPLBLD BASED ON 1.73 SQ M-ARVRAT: 59 ML/MIN/1.73M2 (ref 60–?)
GLUCOSE BLD-MCNC: 110 MG/DL (ref 70–99)
GLUCOSE BLD-MCNC: 119 MG/DL (ref 70–99)
GLUCOSE BLD-MCNC: 159 MG/DL (ref 70–99)
GLUCOSE BLD-MCNC: 92 MG/DL (ref 70–99)
GLUCOSE BLD-MCNC: 95 MG/DL (ref 70–99)
HCT VFR BLD AUTO: 42.4 %
HGB BLD-MCNC: 14 G/DL
IMM GRANULOCYTES # BLD AUTO: 0.01 X10(3) UL (ref 0–1)
IMM GRANULOCYTES NFR BLD: 0.1 %
LYMPHOCYTES # BLD AUTO: 1.93 X10(3) UL (ref 1–4)
LYMPHOCYTES NFR BLD AUTO: 27.8 %
MAGNESIUM SERPL-MCNC: 1.9 MG/DL (ref 1.6–2.6)
MCH RBC QN AUTO: 31.6 PG (ref 26–34)
MCHC RBC AUTO-ENTMCNC: 33 G/DL (ref 31–37)
MCV RBC AUTO: 95.7 FL
MONOCYTES # BLD AUTO: 1.11 X10(3) UL (ref 0.1–1)
MONOCYTES NFR BLD AUTO: 16 %
NEUTROPHILS # BLD AUTO: 3.52 X10 (3) UL (ref 1.5–7.7)
NEUTROPHILS # BLD AUTO: 3.52 X10(3) UL (ref 1.5–7.7)
NEUTROPHILS NFR BLD AUTO: 50.8 %
OSMOLALITY SERPL CALC.SUM OF ELEC: 289 MOSM/KG (ref 275–295)
PLATELET # BLD AUTO: 188 10(3)UL (ref 150–450)
POTASSIUM SERPL-SCNC: 3.7 MMOL/L (ref 3.5–5.1)
POTASSIUM SERPL-SCNC: 3.7 MMOL/L (ref 3.5–5.1)
RBC # BLD AUTO: 4.43 X10(6)UL
SODIUM SERPL-SCNC: 137 MMOL/L (ref 136–145)
WBC # BLD AUTO: 6.9 X10(3) UL (ref 4–11)

## 2023-07-01 PROCEDURE — 99232 SBSQ HOSP IP/OBS MODERATE 35: CPT | Performed by: INTERNAL MEDICINE

## 2023-07-01 RX ORDER — SODIUM CHLORIDE 9 MG/ML
INJECTION, SOLUTION INTRAVENOUS CONTINUOUS
Status: ACTIVE | OUTPATIENT
Start: 2023-07-01 | End: 2023-07-02

## 2023-07-01 NOTE — PLAN OF CARE
Patient alert and oriented x4. Room air. O2 via nasal cannula PRN. Telemetry monitoring. Isolation in place per protocol. Cultures pending. Antibiotic Vanco 4x/day. 1800/cardiac with accuchecks. IV fluids started. Plan is to discharge to [facility] when medically ready.

## 2023-07-01 NOTE — PLAN OF CARE
Patient A & O x4. VSS, on 3.5L PRN. C/o mod pain, norco given. Scant amount of drainage to gauze/teg on back. Voiding freely. Stand and pivot x2 assist. Safety measures in place. Instructed to use call light.

## 2023-07-02 VITALS
WEIGHT: 315 LBS | SYSTOLIC BLOOD PRESSURE: 137 MMHG | TEMPERATURE: 98 F | DIASTOLIC BLOOD PRESSURE: 78 MMHG | BODY MASS INDEX: 46 KG/M2 | OXYGEN SATURATION: 92 % | RESPIRATION RATE: 16 BRPM | HEART RATE: 69 BPM

## 2023-07-02 LAB
ANION GAP SERPL CALC-SCNC: 5 MMOL/L (ref 0–18)
BILIRUB UR QL STRIP.AUTO: NEGATIVE
BUN BLD-MCNC: 33 MG/DL (ref 7–18)
CALCIUM BLD-MCNC: 8.9 MG/DL (ref 8.5–10.1)
CHLORIDE SERPL-SCNC: 105 MMOL/L (ref 98–112)
CLARITY UR REFRACT.AUTO: CLEAR
CO2 SERPL-SCNC: 27 MMOL/L (ref 21–32)
COLOR UR AUTO: YELLOW
CREAT BLD-MCNC: 1.11 MG/DL
GFR SERPLBLD BASED ON 1.73 SQ M-ARVRAT: 76 ML/MIN/1.73M2 (ref 60–?)
GLUCOSE BLD-MCNC: 103 MG/DL (ref 70–99)
GLUCOSE BLD-MCNC: 91 MG/DL (ref 70–99)
GLUCOSE BLD-MCNC: 95 MG/DL (ref 70–99)
GLUCOSE UR STRIP.AUTO-MCNC: NEGATIVE MG/DL
KETONES UR STRIP.AUTO-MCNC: NEGATIVE MG/DL
LEUKOCYTE ESTERASE UR QL STRIP.AUTO: NEGATIVE
NITRITE UR QL STRIP.AUTO: NEGATIVE
OSMOLALITY SERPL CALC.SUM OF ELEC: 292 MOSM/KG (ref 275–295)
PH UR STRIP.AUTO: 6 [PH] (ref 5–8)
POTASSIUM SERPL-SCNC: 3.5 MMOL/L (ref 3.5–5.1)
PROT UR STRIP.AUTO-MCNC: NEGATIVE MG/DL
RBC UR QL AUTO: NEGATIVE
SODIUM SERPL-SCNC: 137 MMOL/L (ref 136–145)
SP GR UR STRIP.AUTO: 1.01 (ref 1–1.03)
UROBILINOGEN UR STRIP.AUTO-MCNC: <2 MG/DL

## 2023-07-02 PROCEDURE — 99239 HOSP IP/OBS DSCHRG MGMT >30: CPT | Performed by: INTERNAL MEDICINE

## 2023-07-02 RX ORDER — VANCOMYCIN HYDROCHLORIDE 125 MG/1
125 CAPSULE ORAL 4 TIMES DAILY
Qty: 32 CAPSULE | Refills: 0 | Status: ON HOLD | OUTPATIENT
Start: 2023-07-02 | End: 2023-07-10

## 2023-07-02 RX ORDER — TRIAMTERENE AND HYDROCHLOROTHIAZIDE 37.5; 25 MG/1; MG/1
1 CAPSULE ORAL DAILY
Qty: 30 CAPSULE | Refills: 0 | Status: ON HOLD | OUTPATIENT
Start: 2023-07-02

## 2023-07-02 RX ORDER — SODIUM CHLORIDE 9 MG/ML
INJECTION, SOLUTION INTRAVENOUS CONTINUOUS
Status: DISCONTINUED | OUTPATIENT
Start: 2023-07-02 | End: 2023-07-02

## 2023-07-02 RX ORDER — INSULIN DETEMIR 100 [IU]/ML
1 INJECTION, SOLUTION SUBCUTANEOUS NIGHTLY
Qty: 5 EACH | Refills: 2 | Status: ON HOLD | OUTPATIENT
Start: 2023-07-02

## 2023-07-02 RX ORDER — HYDROCODONE BITARTRATE AND ACETAMINOPHEN 5; 325 MG/1; MG/1
1 TABLET ORAL EVERY 4 HOURS PRN
Qty: 20 TABLET | Refills: 0 | Status: ON HOLD | OUTPATIENT
Start: 2023-07-02

## 2023-07-02 RX ORDER — CARVEDILOL 25 MG/1
25 TABLET ORAL 2 TIMES DAILY WITH MEALS
Qty: 60 TABLET | Refills: 0 | Status: ON HOLD | OUTPATIENT
Start: 2023-07-02

## 2023-07-02 NOTE — PLAN OF CARE
Patient alert and oriented x4. Room air. O2 via nasal cannula PRN. Telemetry monitoring. Isolation in place per protocol. Cultures pending. Antibiotic Vanco 4x/day. 1800/cardiac with accuchecks. IV fluids started. Plan is to discharge on 7/2 to [facility]. Report given to Annika Wharton RN with  [facility]. Patient transported at .

## 2023-07-02 NOTE — PLAN OF CARE
Patient A & O x4. VSS, on 3.5L PRN. C/o mod pain, norco given. Scant amount of drainage to gauze/teg on back. Voiding freely. Stand and pivot x2 assist. IVF infusing per order. Safety measures in place. Instructed to use call light.

## 2023-07-02 NOTE — CM/SW NOTE
07/02/23 1101   Discharge disposition   Expected discharge disposition [id number] Provider   ([facility])   Discharge transportation [address]     Pt cleared to [facility] to [facility]	[place] today. [address] arranged for 130pm.  RN to call report to [address] [place] [address]  /Discharge Planner    Rn requested dc time to be changed since ID requested [facility].  [facility] time changed to 3pm

## 2023-07-02 NOTE — DISCHARGE SUMMARY
[facility] HOSPITALIST  DISCHARGE SUMMARY     Sourav Newton Patient Status:  Inpatient    1960 MRN [id number]   [facility] 3SW-A Attending Clarita Andrews DO   Hosp Day # 4 PCP MADELINE FENTON DO     Date of Admission: 2023  Date of Discharge: 2023  Discharge Disposition: [facility]    Discharge Diagnosis:   L3-L4 discitis/osteomyelitis diagnosed in 2023  Acute on chronic back pain  C. difficile diarrhea  Acute kidney injury, resolved  MSSA bacteremia in 2023  Essential hypertension  Insulin-dependent diabetes mellitus type 2  Morbid obesity  Suspected FRANSISCO    History of Present Illness: Sourav Newton is a [place] 89year old male with Past medical history essential hypertension, uncontrolled insulin-dependent diabetes mellitus type 2, hyperlipidemia, morbid obesity presents to the hospital for back pain. Pt states back pain is worsening and no longer better w/ norco. He otherwise denies any n/v/diarrhea constipation, chills or fevers. Of note, patient hospitalized 3/21-3/29 where patient was diagnosed with MSSA bacteremia/discitis. Patient seen by infectious disease and neurosurgery at that time. Pt has since completed IV abx. In the ER, vital signs show temperature 90.8, blood pressure 176/80, pulse 92, respiration 18. Labs show glucose 144, potassium 3.3, creatinine 0.96, procalcitonin 0.17, WBC 4.1, UA negative, cultures none, MRI spine lumbar with and without shows \"marked discitis osteomyelitis at L3-L4 is improved since prior examination. Sequela of chronic bony destruction is noted. Marked severe spinal canal stenosis at L3-L4 with severe bilateral neural foraminal stenosis is again noted. Marked enhancement extending to bilateral psoas musculature is noted again. \"  In the ER, patient given Dilaudid and potassium. Brief Synopsis: MRI this admission showed possible discitis/osteomyelitis but improved from prior. CRP was slightly elevated and he underwent IR biopsy. Cultures and path are still pending. ID recommended to monitor off antibiotics as imaging was improved and wait for cultures. Neurosurgery not recommend any acute surgical intervention. Found to have C. difficile and started on oral vancomycin. TARIK improved with IV fluids. He likely does have FRANSISCO and was recommended to follow-up with his PCP after discharge from rehab to get an official sleep study. He was discharged to subacute rehab in stable condition. Lace+ Score: 64  59-90 High Risk  29-58 Medium Risk  0-28   Low Risk       TCM Follow-Up Recommendation:  LACE > 58: High Risk of readmission after discharge from the hospital.    Procedures during hospitalization:   None    Incidental or significant findings and recommendations (brief descriptions):  Please see brief synopsis above    Lab/Test results pending at Discharge:   Path/cultures from IR biopsy    Consultants:  Infectious disease  Neurosurgery    Discharge Medication List:     Discharge Medications        START taking these medications        Instructions Prescription details   carvedilol 25 MG Tabs  Commonly known as: Coreg      Take 1 tablet (25 mg total) by mouth 2 (two) times daily with meals. Quantity: 60 tablet  Refills: 0     triamterene-hydroCHLOROthiazide 37.5-25 MG Caps  Commonly known as: Dyazide      Take 1 capsule by mouth daily. Quantity: 30 capsule  Refills: 0     vancomycin 125 MG Caps  Commonly known as: Vancocin      Take 1 capsule (125 mg total) by mouth 4 (four) times daily for 8 days. Stop taking on: July 10, 2023  Quantity: 32 capsule  Refills: 0            CHANGE how you take these medications        Instructions Prescription details   HYDROcodone-acetaminophen 5-325 MG Tabs  Commonly known as: Norco  What changed:   when to take this  reasons to take this      Take 1 tablet by mouth every 4 (four) hours as needed.    Quantity: 20 tablet  Refills: 0     Levemir FlexTouch 100 UNIT/ML Sopn  Generic drug: insulin detemir  What changed: how much to take      Inject 1 Units into the skin nightly. Quantity: 5 each  Refills: 2            CONTINUE taking these medications        Instructions Prescription details   cyclobenzaprine 10 MG Tabs  Commonly known as: Flexeril      Take 1 tablet (10 mg total) by mouth 3 (three) times daily as needed for Muscle spasms. Refills: 0     enoxaparin 40 MG/0.4ML Sosy  Commonly known as: Lovenox      Inject 0.4 mL (40 mg total) into the skin 2 (two) times daily. For dvt ppx   Refills: 0     gabapentin 100 MG Caps  Commonly known as: Neurontin      Take 1 capsule (100 mg total) by mouth nightly. Refills: 0     Insulin Pen Needle 32G X 4 MM Misc      May substitute if not on insurance formulary   Quantity: 100 each  Refills: 5     levothyroxine 25 MCG Tabs  Commonly known as: Synthroid      Take 1 tablet (25 mcg total) by mouth before breakfast.   Refills: 0     lidocaine-menthol 4-1 % Ptch      Place 1 patch onto the skin daily. Quantity: 30 patch  Refills: 0     melatonin 1 MG Tabs      Nightly prn   Quantity: 30 tablet  Refills: 0     NIFEdipine ER 60 MG Tb24  Commonly known as: ADALAT CC      Take 1 tablet (60 mg total) by mouth daily. Refills: 0     NovoLOG FlexPen 100 Units/mL Sopn  Generic drug: insulin aspart      Test blood glucose 3 times daily with meals  Inject 1 unit if blood glucose is between 141-180 mg/dL Inject 2 units if blood glucose is between 181-220 mg/dL Inject 3 units if blood glucose is between 221-260 mg/dL Inject 4 units if blood glucose is between 261-300 mg/dL Inject 5 units if blood glucose is between 301-350 mg/dL Call your physician if blood glucose is greater than 351 mg/dL,   Quantity: 5 each  Refills: 2     polyethylene glycol (PEG 3350) 17 g Pack  Commonly known as: Miralax      Take 17 g by mouth daily. Refills: 0     valsartan 80 MG Tabs  Commonly known as: Diovan      Take 1 tablet (80 mg total) by mouth daily.    Refills: 0            STOP taking these medications      buPROPion  MG Tb24  Commonly known as:  Wellbutrin XL        furosemide 40 MG Tabs  Commonly known as: Lasix        potassium chloride 10 MEQ Tbcr  Commonly known as: K-Dur        sodium chloride 0.9% SOLN 100 mL with nafcillin 1 g SOLR 2 g                  Where to Get Your Medications        Please  your prescriptions at the location directed by your doctor or nurse    Bring a paper prescription for each of these medications  carvedilol 25 MG Tabs  HYDROcodone-acetaminophen 5-325 MG Tabs  Levemir FlexTouch 100 UNIT/ML Sopn  triamterene-hydroCHLOROthiazide 37.5-25 MG Caps  vancomycin 125 MG Caps         [place]    Follow-up appointment:   John Nava DO  [address] [phone]    Follow up in 1 week(s)      Appointments for Next 30 Days 7/2/2023 - 8/1/2023      None            -----------------------------------------------------------------------------------------------  PATIENT DISCHARGE INSTRUCTIONS: See electronic chart    Arias Epstein DO    Time spent:  33 minutes

## 2023-07-02 NOTE — CM/SW NOTE
RN informed sw of possible dc today await ID ok for dc. [facility] on will call . PCS done. Updates sent to [facility].       Tatum Stuart LCSW  /Discharge Planner

## 2023-07-03 ENCOUNTER — PATIENT OUTREACH (OUTPATIENT)
Dept: CASE MANAGEMENT | Age: 63
End: 2023-07-03

## 2023-07-03 NOTE — PAYOR COMM NOTE
Discharge Notification    Patient Name: Sarika Stewart  Payor: Clara Dodge #: [id number]  Authorization Number: [id number]  Admit Date/Time: 6/27/2023 4:59 PM  Discharge Date/Time: 7/2/2023 3:36 PM

## 2023-07-05 ENCOUNTER — APPOINTMENT (OUTPATIENT)
Dept: CT IMAGING | Facility: HOSPITAL | Age: 63
End: 2023-07-05
Attending: EMERGENCY MEDICINE
Payer: MEDICAID

## 2023-07-05 ENCOUNTER — HOSPITAL ENCOUNTER (EMERGENCY)
Facility: HOSPITAL | Age: 63
Discharge: HOME | End: 2023-07-05
Attending: EMERGENCY MEDICINE
Payer: MEDICAID

## 2023-07-05 ENCOUNTER — APPOINTMENT (OUTPATIENT)
Dept: GENERAL RADIOLOGY | Facility: HOSPITAL | Age: 63
End: 2023-07-05
Attending: EMERGENCY MEDICINE
Payer: MEDICAID

## 2023-07-05 VITALS
RESPIRATION RATE: 23 BRPM | TEMPERATURE: 98 F | HEART RATE: 79 BPM | SYSTOLIC BLOOD PRESSURE: 139 MMHG | OXYGEN SATURATION: 94 % | DIASTOLIC BLOOD PRESSURE: 81 MMHG | BODY MASS INDEX: 46 KG/M2 | WEIGHT: 315 LBS
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
## 2023-07-05 PROCEDURE — 99284 EMERGENCY DEPT VISIT MOD MDM: CPT

## 2023-07-05 PROCEDURE — 70450 CT HEAD/BRAIN W/O DYE: CPT | Performed by: EMERGENCY MEDICINE

## 2023-07-05 PROCEDURE — 73080 X-RAY EXAM OF ELBOW: CPT | Performed by: EMERGENCY MEDICINE

## 2023-07-05 PROCEDURE — 72125 CT NECK SPINE W/O DYE: CPT | Performed by: EMERGENCY MEDICINE

## 2023-07-05 NOTE — ED INITIAL ASSESSMENT (HPI)
Patient sent from [address] [place] [address] for unwitnessed fall. Patient was found on floor around 8am. On Lovenox. C-collar in place by EMS.

## 2023-07-10 ENCOUNTER — HOSPITAL ENCOUNTER (INPATIENT)
Facility: HOSPITAL | Age: 63
LOS: 4 days | Discharge: NURSING, REHAB OR LONG-TERM CARE | End: 2023-07-14
Attending: EMERGENCY MEDICINE | Admitting: HOSPITALIST
Payer: MEDICAID

## 2023-07-10 ENCOUNTER — APPOINTMENT (OUTPATIENT)
Dept: GENERAL RADIOLOGY | Facility: HOSPITAL | Age: 63
End: 2023-07-10
Attending: EMERGENCY MEDICINE
Payer: MEDICAID

## 2023-07-10 DIAGNOSIS — N28.9 RENAL INSUFFICIENCY: ICD-10-CM

## 2023-07-10 DIAGNOSIS — E87.5 HYPERKALEMIA: ICD-10-CM

## 2023-07-10 DIAGNOSIS — R55 SYNCOPE AND COLLAPSE: Primary | ICD-10-CM

## 2023-07-10 DIAGNOSIS — E86.0 DEHYDRATION: ICD-10-CM

## 2023-07-10 PROBLEM — N17.9 AKI (ACUTE KIDNEY INJURY) (HCC): Status: ACTIVE | Noted: 2023-07-10

## 2023-07-10 PROBLEM — N17.9 AKI (ACUTE KIDNEY INJURY): Status: ACTIVE | Noted: 2023-07-10

## 2023-07-10 PROBLEM — D72.829 LEUKOCYTOSIS: Status: ACTIVE | Noted: 2023-07-10

## 2023-07-10 PROBLEM — R11.2 NAUSEA AND VOMITING: Status: ACTIVE | Noted: 2023-07-10

## 2023-07-10 LAB
ALBUMIN SERPL-MCNC: 3.1 G/DL (ref 3.4–5)
ALBUMIN/GLOB SERPL: 0.6 {RATIO} (ref 1–2)
ALP LIVER SERPL-CCNC: 88 U/L
ALT SERPL-CCNC: 16 U/L
ANION GAP SERPL CALC-SCNC: 10 MMOL/L (ref 0–18)
AST SERPL-CCNC: 13 U/L (ref 15–37)
ATRIAL RATE: 63 BPM
BASOPHILS # BLD AUTO: 0.05 X10(3) UL (ref 0–0.2)
BASOPHILS NFR BLD AUTO: 0.3 %
BILIRUB SERPL-MCNC: 0.7 MG/DL (ref 0.1–2)
BILIRUB UR QL STRIP.AUTO: NEGATIVE
BUN BLD-MCNC: 75 MG/DL (ref 7–18)
CALCIUM BLD-MCNC: 9.5 MG/DL (ref 8.5–10.1)
CHLORIDE SERPL-SCNC: 98 MMOL/L (ref 98–112)
CLARITY UR REFRACT.AUTO: CLEAR
CO2 SERPL-SCNC: 25 MMOL/L (ref 21–32)
COLOR UR AUTO: YELLOW
CREAT BLD-MCNC: 2.33 MG/DL
EOSINOPHIL # BLD AUTO: 0.72 X10(3) UL (ref 0–0.7)
EOSINOPHIL NFR BLD AUTO: 4.5 %
ERYTHROCYTE [DISTWIDTH] IN BLOOD BY AUTOMATED COUNT: 11.7 %
GFR SERPLBLD BASED ON 1.73 SQ M-ARVRAT: 31 ML/MIN/1.73M2 (ref 60–?)
GLOBULIN PLAS-MCNC: 5.1 G/DL (ref 2.8–4.4)
GLUCOSE BLD-MCNC: 159 MG/DL (ref 70–99)
GLUCOSE BLD-MCNC: 226 MG/DL (ref 70–99)
GLUCOSE BLD-MCNC: 240 MG/DL (ref 70–99)
GLUCOSE UR STRIP.AUTO-MCNC: 150 MG/DL
HCT VFR BLD AUTO: 46.2 %
HGB BLD-MCNC: 15.9 G/DL
IMM GRANULOCYTES # BLD AUTO: 0.09 X10(3) UL (ref 0–1)
IMM GRANULOCYTES NFR BLD: 0.6 %
KETONES UR STRIP.AUTO-MCNC: NEGATIVE MG/DL
LACTATE SERPL-SCNC: 1.6 MMOL/L (ref 0.4–2)
LEUKOCYTE ESTERASE UR QL STRIP.AUTO: NEGATIVE
LYMPHOCYTES # BLD AUTO: 1.48 X10(3) UL (ref 1–4)
LYMPHOCYTES NFR BLD AUTO: 9.2 %
MCH RBC QN AUTO: 31.1 PG (ref 26–34)
MCHC RBC AUTO-ENTMCNC: 34.4 G/DL (ref 31–37)
MCV RBC AUTO: 90.4 FL
MONOCYTES # BLD AUTO: 0.82 X10(3) UL (ref 0.1–1)
MONOCYTES NFR BLD AUTO: 5.1 %
NEUTROPHILS # BLD AUTO: 12.98 X10 (3) UL (ref 1.5–7.7)
NEUTROPHILS # BLD AUTO: 12.98 X10(3) UL (ref 1.5–7.7)
NEUTROPHILS NFR BLD AUTO: 80.3 %
NITRITE UR QL STRIP.AUTO: NEGATIVE
OSMOLALITY SERPL CALC.SUM OF ELEC: 305 MOSM/KG (ref 275–295)
P AXIS: 2 DEGREES
P-R INTERVAL: 212 MS
PH UR STRIP.AUTO: 6 [PH] (ref 5–8)
PLATELET # BLD AUTO: 265 10(3)UL (ref 150–450)
POTASSIUM SERPL-SCNC: 5.5 MMOL/L (ref 3.5–5.1)
PROT SERPL-MCNC: 8.2 G/DL (ref 6.4–8.2)
PROT UR STRIP.AUTO-MCNC: NEGATIVE MG/DL
Q-T INTERVAL: 466 MS
QRS DURATION: 90 MS
QTC CALCULATION (BEZET): 476 MS
R AXIS: -18 DEGREES
RBC # BLD AUTO: 5.11 X10(6)UL
RBC UR QL AUTO: NEGATIVE
SODIUM SERPL-SCNC: 133 MMOL/L (ref 136–145)
SP GR UR STRIP.AUTO: 1.01 (ref 1–1.03)
T AXIS: 89 DEGREES
TROPONIN I HIGH SENSITIVITY: 9 NG/L
UROBILINOGEN UR STRIP.AUTO-MCNC: <2 MG/DL
VENTRICULAR RATE: 63 BPM
WBC # BLD AUTO: 16.1 X10(3) UL (ref 4–11)

## 2023-07-10 PROCEDURE — 99223 1ST HOSP IP/OBS HIGH 75: CPT | Performed by: INTERNAL MEDICINE

## 2023-07-10 PROCEDURE — 71045 X-RAY EXAM CHEST 1 VIEW: CPT | Performed by: EMERGENCY MEDICINE

## 2023-07-10 RX ORDER — SODIUM CHLORIDE 9 MG/ML
INJECTION, SOLUTION INTRAVENOUS CONTINUOUS
Status: DISCONTINUED | OUTPATIENT
Start: 2023-07-10 | End: 2023-07-14

## 2023-07-10 RX ORDER — SENNOSIDES 8.6 MG
17.2 TABLET ORAL NIGHTLY PRN
Status: DISCONTINUED | OUTPATIENT
Start: 2023-07-10 | End: 2023-07-14

## 2023-07-10 RX ORDER — CARVEDILOL 12.5 MG/1
25 TABLET ORAL 2 TIMES DAILY WITH MEALS
Status: DISCONTINUED | OUTPATIENT
Start: 2023-07-11 | End: 2023-07-14

## 2023-07-10 RX ORDER — ONDANSETRON 2 MG/ML
4 INJECTION INTRAMUSCULAR; INTRAVENOUS EVERY 6 HOURS PRN
Status: DISCONTINUED | OUTPATIENT
Start: 2023-07-10 | End: 2023-07-14

## 2023-07-10 RX ORDER — LEVOTHYROXINE SODIUM 0.03 MG/1
25 TABLET ORAL
Status: DISCONTINUED | OUTPATIENT
Start: 2023-07-11 | End: 2023-07-14

## 2023-07-10 RX ORDER — NIFEDIPINE 60 MG/1
60 TABLET, EXTENDED RELEASE ORAL DAILY
Status: DISCONTINUED | OUTPATIENT
Start: 2023-07-10 | End: 2023-07-14

## 2023-07-10 RX ORDER — ACETAMINOPHEN 500 MG
1000 TABLET ORAL EVERY 8 HOURS PRN
Status: DISCONTINUED | OUTPATIENT
Start: 2023-07-10 | End: 2023-07-14

## 2023-07-10 RX ORDER — HYDRALAZINE HYDROCHLORIDE 20 MG/ML
10 INJECTION INTRAMUSCULAR; INTRAVENOUS EVERY 6 HOURS PRN
Status: DISCONTINUED | OUTPATIENT
Start: 2023-07-10 | End: 2023-07-14

## 2023-07-10 RX ORDER — MELATONIN
3 NIGHTLY PRN
Status: DISCONTINUED | OUTPATIENT
Start: 2023-07-10 | End: 2023-07-14

## 2023-07-10 RX ORDER — NICOTINE POLACRILEX 4 MG
30 LOZENGE BUCCAL
Status: DISCONTINUED | OUTPATIENT
Start: 2023-07-10 | End: 2023-07-14

## 2023-07-10 RX ORDER — DEXTROSE MONOHYDRATE 25 G/50ML
50 INJECTION, SOLUTION INTRAVENOUS
Status: DISCONTINUED | OUTPATIENT
Start: 2023-07-10 | End: 2023-07-14

## 2023-07-10 RX ORDER — BISACODYL 10 MG
10 SUPPOSITORY, RECTAL RECTAL
Status: DISCONTINUED | OUTPATIENT
Start: 2023-07-10 | End: 2023-07-14

## 2023-07-10 RX ORDER — POLYETHYLENE GLYCOL 3350 17 G/17G
17 POWDER, FOR SOLUTION ORAL DAILY PRN
Status: DISCONTINUED | OUTPATIENT
Start: 2023-07-10 | End: 2023-07-14

## 2023-07-10 RX ORDER — GABAPENTIN 100 MG/1
100 CAPSULE ORAL NIGHTLY
Status: DISCONTINUED | OUTPATIENT
Start: 2023-07-10 | End: 2023-07-14

## 2023-07-10 RX ORDER — PROCHLORPERAZINE EDISYLATE 5 MG/ML
5 INJECTION INTRAMUSCULAR; INTRAVENOUS EVERY 8 HOURS PRN
Status: DISCONTINUED | OUTPATIENT
Start: 2023-07-10 | End: 2023-07-14

## 2023-07-10 RX ORDER — NICOTINE POLACRILEX 4 MG
15 LOZENGE BUCCAL
Status: DISCONTINUED | OUTPATIENT
Start: 2023-07-10 | End: 2023-07-14

## 2023-07-10 RX ORDER — HYDROCODONE BITARTRATE AND ACETAMINOPHEN 5; 325 MG/1; MG/1
1 TABLET ORAL EVERY 4 HOURS PRN
Status: DISCONTINUED | OUTPATIENT
Start: 2023-07-10 | End: 2023-07-14

## 2023-07-10 RX ORDER — ENEMA 19; 7 G/133ML; G/133ML
1 ENEMA RECTAL ONCE AS NEEDED
Status: DISCONTINUED | OUTPATIENT
Start: 2023-07-10 | End: 2023-07-14

## 2023-07-10 RX ORDER — HEPARIN SODIUM 5000 [USP'U]/ML
7500 INJECTION, SOLUTION INTRAVENOUS; SUBCUTANEOUS EVERY 8 HOURS SCHEDULED
Status: DISCONTINUED | OUTPATIENT
Start: 2023-07-10 | End: 2023-07-13

## 2023-07-10 RX ORDER — SODIUM CHLORIDE 9 MG/ML
INJECTION, SOLUTION INTRAVENOUS CONTINUOUS
Status: ACTIVE | OUTPATIENT
Start: 2023-07-10 | End: 2023-07-10

## 2023-07-10 NOTE — ED QUICK NOTES
Orders for admission, patient is aware of plan and ready to go upstairs. Any questions, please call ED RN denis at extension [phone].      Patient Covid vaccination status: Unvaccinated     COVID Test Ordered in ED: None    COVID Suspicion at Admission: N/A    Running Infusions:  None    Mental Status/LOC at time of transport: x3    Other pertinent information:   CIWA score: N/A   NIH score:  N/A

## 2023-07-10 NOTE — ED INITIAL ASSESSMENT (HPI)
Pt presents from SNF, hx of syncope. Recent cdiff per EMS.  Had syncopal event with staff when getting back into wheelchair

## 2023-07-11 LAB
ANION GAP SERPL CALC-SCNC: 8 MMOL/L (ref 0–18)
BASOPHILS # BLD AUTO: 0.04 X10(3) UL (ref 0–0.2)
BASOPHILS NFR BLD AUTO: 0.3 %
BUN BLD-MCNC: 80 MG/DL (ref 7–18)
CALCIUM BLD-MCNC: 9.1 MG/DL (ref 8.5–10.1)
CHLORIDE SERPL-SCNC: 103 MMOL/L (ref 98–112)
CO2 SERPL-SCNC: 24 MMOL/L (ref 21–32)
CREAT BLD-MCNC: 1.94 MG/DL
EOSINOPHIL # BLD AUTO: 0.38 X10(3) UL (ref 0–0.7)
EOSINOPHIL NFR BLD AUTO: 2.8 %
ERYTHROCYTE [DISTWIDTH] IN BLOOD BY AUTOMATED COUNT: 11.6 %
GFR SERPLBLD BASED ON 1.73 SQ M-ARVRAT: 38 ML/MIN/1.73M2 (ref 60–?)
GLUCOSE BLD-MCNC: 132 MG/DL (ref 70–99)
GLUCOSE BLD-MCNC: 133 MG/DL (ref 70–99)
GLUCOSE BLD-MCNC: 148 MG/DL (ref 70–99)
GLUCOSE BLD-MCNC: 162 MG/DL (ref 70–99)
GLUCOSE BLD-MCNC: 181 MG/DL (ref 70–99)
HCT VFR BLD AUTO: 42.7 %
HGB BLD-MCNC: 14.8 G/DL
IMM GRANULOCYTES # BLD AUTO: 0.03 X10(3) UL (ref 0–1)
IMM GRANULOCYTES NFR BLD: 0.2 %
LYMPHOCYTES # BLD AUTO: 1.39 X10(3) UL (ref 1–4)
LYMPHOCYTES NFR BLD AUTO: 10.1 %
MAGNESIUM SERPL-MCNC: 2.5 MG/DL (ref 1.6–2.6)
MCH RBC QN AUTO: 31.3 PG (ref 26–34)
MCHC RBC AUTO-ENTMCNC: 34.7 G/DL (ref 31–37)
MCV RBC AUTO: 90.3 FL
MONOCYTES # BLD AUTO: 0.77 X10(3) UL (ref 0.1–1)
MONOCYTES NFR BLD AUTO: 5.6 %
NEUTROPHILS # BLD AUTO: 11.17 X10 (3) UL (ref 1.5–7.7)
NEUTROPHILS # BLD AUTO: 11.17 X10(3) UL (ref 1.5–7.7)
NEUTROPHILS NFR BLD AUTO: 81 %
OSMOLALITY SERPL CALC.SUM OF ELEC: 308 MOSM/KG (ref 275–295)
PLATELET # BLD AUTO: 217 10(3)UL (ref 150–450)
POTASSIUM SERPL-SCNC: 4.5 MMOL/L (ref 3.5–5.1)
RBC # BLD AUTO: 4.73 X10(6)UL
SODIUM SERPL-SCNC: 135 MMOL/L (ref 136–145)
WBC # BLD AUTO: 13.8 X10(3) UL (ref 4–11)

## 2023-07-11 PROCEDURE — 99232 SBSQ HOSP IP/OBS MODERATE 35: CPT | Performed by: HOSPITALIST

## 2023-07-11 RX ORDER — CALCIUM CARBONATE 500 MG/1
500 TABLET, CHEWABLE ORAL 3 TIMES DAILY PRN
Status: DISCONTINUED | OUTPATIENT
Start: 2023-07-11 | End: 2023-07-14

## 2023-07-11 RX ORDER — HYDROCODONE BITARTRATE AND ACETAMINOPHEN 5; 325 MG/1; MG/1
1 TABLET ORAL EVERY 6 HOURS PRN
Qty: 20 TABLET | Refills: 0 | Status: SHIPPED | OUTPATIENT
Start: 2023-07-11 | End: 2023-07-11

## 2023-07-11 RX ORDER — CYCLOBENZAPRINE HCL 10 MG
10 TABLET ORAL 3 TIMES DAILY PRN
Qty: 15 TABLET | Refills: 0 | Status: SHIPPED | OUTPATIENT
Start: 2023-07-11

## 2023-07-11 NOTE — CM/SW NOTE
Per chart pt is from [address] [place], [place] [address]angie Hernandez, Sister is a support. MSW asked [facility]  to send updates.

## 2023-07-11 NOTE — PROGRESS NOTES
[facility] Pharmacy Note:  Anticoagulation Weight Dose Adjustment for heparin    Amrik Diamond is a 59year old patient who has been prescribed heparin 5000 units every 8 hours. CrCl cannot be calculated (Unknown ideal weight.). There is no height or weight on file to calculate BMI. Wt Readings from Last 1 Encounters:  07/05/23 : (!) 158 kg (348 lb 5.2 oz)       Per P&T approved dose adjustment protocol for weight and renal function, the dose will be adjusted to heparin 7500 units every 8 hours.     Thank you,    Forest Baldwin, PharmD  7/10/2023  7:57 PM

## 2023-07-11 NOTE — PHYSICAL THERAPY NOTE
PHYSICAL THERAPY EVALUATION - INPATIENT     Room Number: 8963/2774-V  Evaluation Date: 7/11/2023  Type of Evaluation: Initial  Physician Order: PT Eval and Treat    Presenting Problem: Syncope  Co-Morbidities : HTN, DM, previous discitis/osteomyelitis at L3-4 dx March 2023 with MSSA bacteremia  Reason for Therapy: Mobility Dysfunction and Discharge Planning    History related to current admission: Patient is a 59year old male admitted on 7/10/2023 from The [address] [place] [address],[address] for syncope. Recent Admissions:  6/27-7/2/2023: Intractable back pain: --> From [facility], dc to [facility]  (Per neuro sx 6/28 no acute intervention indicated, LSO when OOB as needed for comfort)  3/21-3/29/23: MSSA bacteremia/discitis--> From [facility], dc to [facility]  2/27-3/7/2023: non-traumatic rhabdomyolysis --> dc to [facility] (3/7-3/21/2023)    ASSESSMENT   In this PT evaluation, the patient presents with the following impairments decreased strength, functional mobility, trunk control, endurance, balance. These impairments and comorbidities manifest themselves as functional limitations in independent bed mobility, transfers, and gait. The patient is below baseline and would benefit from skilled inpatient PT to address the above deficits to assist patient in returning to prior to level of function. Functional outcome measures completed include [facility]. The -PAC '6-Clicks' Inpatient Basic Mobility Short Form was completed and this patient is demonstrating a Approx Degree of Impairment: 92.36%  degree of impairment in mobility. Research supports that patients with this level of impairment may benefit from [facility]. DISCHARGE RECOMMENDATIONS  PT Discharge Recommendations: Sub-acute rehabilitation    PLAN  PT Treatment Plan: Bed mobility; Body mechanics; Endurance; Energy conservation;Patient education; Family education;Gait training;Strengthening;Transfer training;Balance training  Rehab Potential : Fair  Frequency (Obs):  (2-3x/week)  Number of Visits to Meet Established Goals: 8      CURRENT GOALS    Goal #1 Patient is able to demonstrate supine - sit EOB @ level: moderate assistance     Goal #2 Patient is able to demonstrate transfers Sit to/from Stand at assistance level: maximum assistance     Goal #3 Patient is able to transfer bed<>chair with maxA     Goal #4    Goal #5    Goal #6    Goal Comments: Goals established on 7/11/2023    HOME SITUATION  Type of Home: House   Home Layout: Two level; Able to live on main level  Stairs to Enter : 1     Stairs to Bedroom: 14       Lives With: Alone     Patient Owned Equipment: Rolling walker; Wheelchair       Prior Level of [place]: Pt poor historian, history obtained from chart. From previous IP PT eval dated 6/28, pt states he was ambulating 2x74' with RW at [facility] in March, and has been walking approx 20-30' at [place] [address]  and transferring bed<>wc without assist prior to June admit. SUBJECTIVE  \"I used to work on [employer]      OBJECTIVE  Precautions: Bed/chair alarm  Fall Risk: High fall risk    WEIGHT BEARING RESTRICTION  Weight Bearing Restriction: None                PAIN ASSESSMENT  Rating: Unable to rate  Location: low back  Management Techniques: Body mechanics    COGNITION  Overall Cognitive Status:  Impaired    RANGE OF MOTION AND STRENGTH ASSESSMENT  See OT note for UE assessment    Lower extremity ROM is within functional limits     Lower extremity strength is grossly 2/5 on observation of bed mobility, unable to be formally tested or participate in transfers on this date. BALANCE  Static Sitting: Not tested  Dynamic Sitting: Not tested  Static Standing: Not tested  Dynamic Standing: Not tested    ADDITIONAL TESTS                                    ACTIVITY TOLERANCE  Pulse: 72  Heart Rate Source: Monitor                   O2 WALK       NEUROLOGICAL FINDINGS                        AM-PAC '6-Clicks' INPATIENT SHORT FORM - BASIC MOBILITY  How much difficulty does the patient currently have. ..   Patient Difficulty: Turning over in bed (including adjusting bedclothes, sheets and blankets)?: A Lot   Patient Difficulty: Sitting down on and standing up from a chair with arms (e.g., wheelchair, bedside commode, etc.): Unable   Patient Difficulty: Moving from lying on back to sitting on the side of the bed?: Unable   How much help from another person does the patient currently need. .. Help from Another: Moving to and from a bed to a chair (including a wheelchair)?: Total   Help from Another: Need to walk in hospital room?: Total   Help from Another: Climbing 3-5 steps with a railing?: Total       AM-PAC Score:  Raw Score: 7   Approx Degree of Impairment: 92.36%   Standardized Score (AM-PAC Scale): 26.42   CMS Modifier (G-Code): CM    FUNCTIONAL ABILITY STATUS  Gait Assessment   Functional Mobility/Gait Assessment  Gait Assistance: Not tested    Skilled Therapy Provided       Therapeutic Activity:   Pt cued on bending knee and reaching for bed rail to facilitate in rolling and bed mobility  Pt cued on B knee bending to assist with doffing pants  Pt cued on usage of bed rails with BUEs to facilitate repositioning in bed as pt leaning towards L  Rolling x4    Bed Mobility:  Rolling: maxA  Supine to sit: NT   Sit to supine: NT     Transfer Mobility:  Sit to stand: NT   Stand to sit: NT  Gait = NT    Therapist's Comments: RN cleared for session. Pt agreeable for therapy, received supine. Pt completely soiled through pants, brief, and sheets. PCT called for assistance. Assisted pt and PCT with linen, brief, and gown change. Instructed to call for nursing staff for any needs and OOB mobility. Exercise/Education Provided:  Bed mobility  Body mechanics  Energy conservation  Functional activity tolerated  Posture  Strengthening    Patient End of Session: In bed;Needs met;Call light within reach;RN aware of session/findings; All patient questions and concerns addressed; Alarm set      Patient Evaluation Complexity Level:  History Moderate - 1 or 2 personal factors and/or co-morbidities   Examination of body systems Low - addressing 1-2 elements   Clinical Presentation Moderate - Evolving   Clinical Decision Making Low - Stable       PT Session Time: 30 minutes  Gait Trainin minutes  Therapeutic Activity: 17 minutes

## 2023-07-11 NOTE — CM/SW NOTE
Department  notified of request for tyrel FELDER referrals started. Assigned CM/SW to follow up with pt/family on further discharge planning.       Herbert Telles  [facility]

## 2023-07-11 NOTE — PROGRESS NOTES
Assume care @ 0730  AO X4, Verbally responsive, RA  NSR on Tele. Denies pain. Incontinent. Excoriation/redness to coccyx. Seen by PT today. IVFs 0.9 @ 100ml/hr  Fair appetite meals. QID accu check. Insulin as ordered.  Sub q Heparin   Fall and safety precautions in place

## 2023-07-11 NOTE — PLAN OF CARE
Problem: PAIN - ADULT  Goal: Verbalizes/displays adequate comfort level or patient's stated pain goal  Description: INTERVENTIONS:  - Encourage pt to monitor pain and request assistance  - Assess pain using appropriate pain scale  - Administer analgesics based on type and severity of pain and evaluate response  - Implement non-pharmacological measures as appropriate and evaluate response  - Consider cultural and social influences on pain and pain management  - Manage/alleviate anxiety  - Utilize distraction and/or relaxation techniques  - Monitor for opioid side effects  - Notify MD/LIP if interventions unsuccessful or patient reports new pain  - Anticipate increased pain with activity and pre-medicate as appropriate  Outcome: Progressing     Problem: SAFETY ADULT - FALL  Goal: Free from fall injury  Description: INTERVENTIONS:  - Assess pt frequently for physical needs  - Identify cognitive and physical deficits and behaviors that affect risk of falls.   - [place] fall precautions as indicated by assessment.  - Educate pt/family on patient safety including physical limitations  - Instruct pt to call for assistance with activity based on assessment  - Modify environment to reduce risk of injury  - Provide assistive devices as appropriate  - Consider OT/PT consult to assist with strengthening/mobility  - Encourage toileting schedule  Outcome: Progressing     Problem: DISCHARGE PLANNING  Goal: Discharge to home or other facility with appropriate resources  Description: INTERVENTIONS:  - Identify barriers to discharge w/pt and caregiver  - Include patient/family/discharge partner in discharge planning  - Arrange for needed discharge resources and transportation as appropriate  - Identify discharge learning needs (meds, wound care, etc)  - Arrange for interpreters to assist at discharge as needed  - Consider post-discharge preferences of patient/family/discharge partner  - Complete POLST form as appropriate  - Assess patient's ability to be responsible for managing their own health  - Refer to Case Management Department for coordinating discharge planning if the patient needs post-hospital services based on physician/LIP order or complex needs related to functional status, cognitive ability or social support system  Outcome: Progressing

## 2023-07-11 NOTE — PROGRESS NOTES
NURSING ADMISSION NOTE      Patient admitted via Cart  Oriented to room. Safety precautions initiated. Bed in low position. Call light in reach. Assumed care at 2000. A/Ox4, on room air, NSR on tele. Admission navigator completed with patient by this RN. VTE prophylaxis with heparin. Cardiac electrolyte protocol. Regular cardiac diet. Contact isolation. Recent hx with cdiff (7/2). PT to see. IV to left AC infusing 0.9 nacl at 100 ml/hr. Patient updated with plan of care. All needs met at this time.      PRN Zofran given per mar

## 2023-07-12 LAB
ANION GAP SERPL CALC-SCNC: 7 MMOL/L (ref 0–18)
BASOPHILS # BLD AUTO: 0.08 X10(3) UL (ref 0–0.2)
BASOPHILS NFR BLD AUTO: 0.6 %
BUN BLD-MCNC: 73 MG/DL (ref 7–18)
CALCIUM BLD-MCNC: 8.8 MG/DL (ref 8.5–10.1)
CHLORIDE SERPL-SCNC: 106 MMOL/L (ref 98–112)
CO2 SERPL-SCNC: 24 MMOL/L (ref 21–32)
CREAT BLD-MCNC: 1.43 MG/DL
EOSINOPHIL # BLD AUTO: 0.69 X10(3) UL (ref 0–0.7)
EOSINOPHIL NFR BLD AUTO: 5.3 %
ERYTHROCYTE [DISTWIDTH] IN BLOOD BY AUTOMATED COUNT: 11.8 %
GFR SERPLBLD BASED ON 1.73 SQ M-ARVRAT: 55 ML/MIN/1.73M2 (ref 60–?)
GLUCOSE BLD-MCNC: 136 MG/DL (ref 70–99)
GLUCOSE BLD-MCNC: 137 MG/DL (ref 70–99)
GLUCOSE BLD-MCNC: 141 MG/DL (ref 70–99)
GLUCOSE BLD-MCNC: 160 MG/DL (ref 70–99)
GLUCOSE BLD-MCNC: 206 MG/DL (ref 70–99)
HCT VFR BLD AUTO: 42.7 %
HGB BLD-MCNC: 14.2 G/DL
IMM GRANULOCYTES # BLD AUTO: 0.03 X10(3) UL (ref 0–1)
IMM GRANULOCYTES NFR BLD: 0.2 %
LYMPHOCYTES # BLD AUTO: 2.06 X10(3) UL (ref 1–4)
LYMPHOCYTES NFR BLD AUTO: 16 %
MCH RBC QN AUTO: 30.8 PG (ref 26–34)
MCHC RBC AUTO-ENTMCNC: 33.3 G/DL (ref 31–37)
MCV RBC AUTO: 92.6 FL
MONOCYTES # BLD AUTO: 0.91 X10(3) UL (ref 0.1–1)
MONOCYTES NFR BLD AUTO: 7 %
NEUTROPHILS # BLD AUTO: 9.14 X10 (3) UL (ref 1.5–7.7)
NEUTROPHILS # BLD AUTO: 9.14 X10(3) UL (ref 1.5–7.7)
NEUTROPHILS NFR BLD AUTO: 70.9 %
OSMOLALITY SERPL CALC.SUM OF ELEC: 308 MOSM/KG (ref 275–295)
PLATELET # BLD AUTO: 201 10(3)UL (ref 150–450)
POTASSIUM SERPL-SCNC: 3.9 MMOL/L (ref 3.5–5.1)
RBC # BLD AUTO: 4.61 X10(6)UL
SODIUM SERPL-SCNC: 137 MMOL/L (ref 136–145)
WBC # BLD AUTO: 12.9 X10(3) UL (ref 4–11)

## 2023-07-12 PROCEDURE — 99232 SBSQ HOSP IP/OBS MODERATE 35: CPT | Performed by: INTERNAL MEDICINE

## 2023-07-12 NOTE — CM/SW NOTE
MSW met with pt to discuss dc plan. He would perfer not to return to [facility] but home a new [facility]. We discussed that he would need care at home for dressing,cooking, etc and that is not covered by insurance. We discussed that home health only provides Rn 1x a week and PT 2x a week. Pt states if he goes home he will need w/c , r/w. He wants rails for toliet which he will need to purchase out of pocket. Pt states his 4 sisters can help, I asked him to call each of them and find out if they can all provided total care, if not he will need to return to [facility] or pay for caregivers out of pocket. Pt reminded that PT states he is total care. One sister on speaker phone and she heard MSW state pt needs total care. Pt will talk to his family and MSW will f/u with pt at 1:30 to continue dc plan. Pt wearing 02 at this time-states none at home    [facility] referrals pending-if one is picked MSW will confirm   insurance place. 2pm  MSW met with pt and he is now agreeable for dc plan to be [facility]. 4pm  Update [facility] list with a few choice provided to pt.

## 2023-07-12 NOTE — PLAN OF CARE
[facility] care 0730. Alert and oriented x4.  RA. Respiratory consult placed. 3.5 L at night pt will desat to 60%. Cardiac diet  Accu checks  0.9 @ 100 ml/hr  Tele- NSR  Heparin for VTE prophylaxis  Uses urinal  Incontinent at time  Mepilex on sacrum for redness  Denies pain  PT/OT- [facility]. Placement to be chosen  Madeline lift  Electrolyte Cardiac protocol. Continue to monitor pt. Problem: PAIN - ADULT  Goal: Verbalizes/displays adequate comfort level or patient's stated pain goal  Description: INTERVENTIONS:  - Encourage pt to monitor pain and request assistance  - Assess pain using appropriate pain scale  - Administer analgesics based on type and severity of pain and evaluate response  - Implement non-pharmacological measures as appropriate and evaluate response  - Consider cultural and social influences on pain and pain management  - Manage/alleviate anxiety  - Utilize distraction and/or relaxation techniques  - Monitor for opioid side effects  - Notify MD/LIP if interventions unsuccessful or patient reports new pain  - Anticipate increased pain with activity and pre-medicate as appropriate  Outcome: Progressing     Problem: SAFETY ADULT - FALL  Goal: Free from fall injury  Description: INTERVENTIONS:  - Assess pt frequently for physical needs  - Identify cognitive and physical deficits and behaviors that affect risk of falls.   - [place] fall precautions as indicated by assessment.  - Educate pt/family on patient safety including physical limitations  - Instruct pt to call for assistance with activity based on assessment  - Modify environment to reduce risk of injury  - Provide assistive devices as appropriate  - Consider OT/PT consult to assist with strengthening/mobility  - Encourage toileting schedule  Outcome: Progressing     Problem: DISCHARGE PLANNING  Goal: Discharge to home or other facility with appropriate resources  Description: INTERVENTIONS:  - Identify barriers to discharge w/pt and caregiver  - Include patient/family/discharge partner in discharge planning  - Arrange for needed discharge resources and transportation as appropriate  - Identify discharge learning needs (meds, wound care, etc)  - Arrange for interpreters to assist at discharge as needed  - Consider post-discharge preferences of patient/family/discharge partner  - Complete POLST form as appropriate  - Assess patient's ability to be responsible for managing their own health  - Refer to Case Management Department for coordinating discharge planning if the patient needs post-hospital services based on physician/LIP order or complex needs related to functional status, cognitive ability or social support system  Outcome: Progressing

## 2023-07-12 NOTE — PLAN OF CARE
Assumed care at [address]. No acute distress noted. Isolation precautions for C. Diff   Pt A&Ox4. Room air, 2L O2 PRN NOC. NSR on tele. Cardiac diet. Accucheck QID. X2-3 to turn. Baby Kenn lift for transfer. PT/OT rec [facility]- pt would like to try home health. SW following. Incontinent at times, urinal at bedside. Brief and yousuf in place. Meds per MAR. C/o back pain. Updated on POC. Safety precautions in place. Needs met. Problem: PAIN - ADULT  Goal: Verbalizes/displays adequate comfort level or patient's stated pain goal  Description: INTERVENTIONS:  - Encourage pt to monitor pain and request assistance  - Assess pain using appropriate pain scale  - Administer analgesics based on type and severity of pain and evaluate response  - Implement non-pharmacological measures as appropriate and evaluate response  - Consider cultural and social influences on pain and pain management  - Manage/alleviate anxiety  - Utilize distraction and/or relaxation techniques  - Monitor for opioid side effects  - Notify MD/LIP if interventions unsuccessful or patient reports new pain  - Anticipate increased pain with activity and pre-medicate as appropriate  Outcome: Progressing     Problem: SAFETY ADULT - FALL  Goal: Free from fall injury  Description: INTERVENTIONS:  - Assess pt frequently for physical needs  - Identify cognitive and physical deficits and behaviors that affect risk of falls.   - [place] fall precautions as indicated by assessment.  - Educate pt/family on patient safety including physical limitations  - Instruct pt to call for assistance with activity based on assessment  - Modify environment to reduce risk of injury  - Provide assistive devices as appropriate  - Consider OT/PT consult to assist with strengthening/mobility  - Encourage toileting schedule  Outcome: Progressing     Problem: DISCHARGE PLANNING  Goal: Discharge to home or other facility with appropriate resources  Description: INTERVENTIONS:  - Identify barriers to discharge w/pt and caregiver  - Include patient/family/discharge partner in discharge planning  - Arrange for needed discharge resources and transportation as appropriate  - Identify discharge learning needs (meds, wound care, etc)  - Arrange for interpreters to assist at discharge as needed  - Consider post-discharge preferences of patient/family/discharge partner  - Complete POLST form as appropriate  - Assess patient's ability to be responsible for managing their own health  - Refer to Case Management Department for coordinating discharge planning if the patient needs post-hospital services based on physician/LIP order or complex needs related to functional status, cognitive ability or social support system  Outcome: Progressing

## 2023-07-13 ENCOUNTER — APPOINTMENT (OUTPATIENT)
Dept: CV DIAGNOSTICS | Facility: HOSPITAL | Age: 63
End: 2023-07-13
Attending: INTERNAL MEDICINE
Payer: MEDICAID

## 2023-07-13 LAB
ANION GAP SERPL CALC-SCNC: 6 MMOL/L (ref 0–18)
BASOPHILS # BLD AUTO: 0.06 X10(3) UL (ref 0–0.2)
BASOPHILS NFR BLD AUTO: 0.5 %
BUN BLD-MCNC: 62 MG/DL (ref 7–18)
CALCIUM BLD-MCNC: 8.8 MG/DL (ref 8.5–10.1)
CHLORIDE SERPL-SCNC: 108 MMOL/L (ref 98–112)
CO2 SERPL-SCNC: 24 MMOL/L (ref 21–32)
CREAT BLD-MCNC: 1.17 MG/DL
EOSINOPHIL # BLD AUTO: 0.62 X10(3) UL (ref 0–0.7)
EOSINOPHIL NFR BLD AUTO: 4.8 %
ERYTHROCYTE [DISTWIDTH] IN BLOOD BY AUTOMATED COUNT: 11.9 %
GFR SERPLBLD BASED ON 1.73 SQ M-ARVRAT: 70 ML/MIN/1.73M2 (ref 60–?)
GLUCOSE BLD-MCNC: 105 MG/DL (ref 70–99)
GLUCOSE BLD-MCNC: 111 MG/DL (ref 70–99)
GLUCOSE BLD-MCNC: 120 MG/DL (ref 70–99)
GLUCOSE BLD-MCNC: 136 MG/DL (ref 70–99)
GLUCOSE BLD-MCNC: 152 MG/DL (ref 70–99)
HCT VFR BLD AUTO: 41.3 %
HGB BLD-MCNC: 14 G/DL
IMM GRANULOCYTES # BLD AUTO: 0.05 X10(3) UL (ref 0–1)
IMM GRANULOCYTES NFR BLD: 0.4 %
LYMPHOCYTES # BLD AUTO: 2.45 X10(3) UL (ref 1–4)
LYMPHOCYTES NFR BLD AUTO: 18.9 %
MCH RBC QN AUTO: 31.4 PG (ref 26–34)
MCHC RBC AUTO-ENTMCNC: 33.9 G/DL (ref 31–37)
MCV RBC AUTO: 92.6 FL
MONOCYTES # BLD AUTO: 0.9 X10(3) UL (ref 0.1–1)
MONOCYTES NFR BLD AUTO: 6.9 %
NEUTROPHILS # BLD AUTO: 8.89 X10 (3) UL (ref 1.5–7.7)
NEUTROPHILS # BLD AUTO: 8.89 X10(3) UL (ref 1.5–7.7)
NEUTROPHILS NFR BLD AUTO: 68.5 %
OSMOLALITY SERPL CALC.SUM OF ELEC: 304 MOSM/KG (ref 275–295)
PLATELET # BLD AUTO: 201 10(3)UL (ref 150–450)
POTASSIUM SERPL-SCNC: 3.9 MMOL/L (ref 3.5–5.1)
RBC # BLD AUTO: 4.46 X10(6)UL
SODIUM SERPL-SCNC: 138 MMOL/L (ref 136–145)
WBC # BLD AUTO: 13 X10(3) UL (ref 4–11)

## 2023-07-13 PROCEDURE — 93306 TTE W/DOPPLER COMPLETE: CPT | Performed by: INTERNAL MEDICINE

## 2023-07-13 PROCEDURE — 99232 SBSQ HOSP IP/OBS MODERATE 35: CPT | Performed by: INTERNAL MEDICINE

## 2023-07-13 RX ORDER — HEPARIN SODIUM 5000 [USP'U]/ML
5000 INJECTION, SOLUTION INTRAVENOUS; SUBCUTANEOUS EVERY 8 HOURS SCHEDULED
Status: DISCONTINUED | OUTPATIENT
Start: 2023-07-13 | End: 2023-07-14

## 2023-07-13 NOTE — PLAN OF CARE
Assumed care at 0730  A/Ox4, 3L, refuses CPAP & O2 drops when sleeping, VSS  Held carvedilol & nifedipine, low BP  Pending echo  Tele, NSR  Cardiac diet, QID accucheck  Denies n/v, pain meds per MAR  L forearm, 0.9@ 83mL/hr  VTE: heparin  Mepilex on sacrum  Cardiac electrolyte  Updated w/ POC  All needs met at this time    Problem: PAIN - ADULT  Goal: Verbalizes/displays adequate comfort level or patient's stated pain goal  Description: INTERVENTIONS:  - Encourage pt to monitor pain and request assistance  - Assess pain using appropriate pain scale  - Administer analgesics based on type and severity of pain and evaluate response  - Implement non-pharmacological measures as appropriate and evaluate response  - Consider cultural and social influences on pain and pain management  - Manage/alleviate anxiety  - Utilize distraction and/or relaxation techniques  - Monitor for opioid side effects  - Notify MD/LIP if interventions unsuccessful or patient reports new pain  - Anticipate increased pain with activity and pre-medicate as appropriate  Outcome: Progressing     Problem: SAFETY ADULT - FALL  Goal: Free from fall injury  Description: INTERVENTIONS:  - Assess pt frequently for physical needs  - Identify cognitive and physical deficits and behaviors that affect risk of falls.   - [place] fall precautions as indicated by assessment.  - Educate pt/family on patient safety including physical limitations  - Instruct pt to call for assistance with activity based on assessment  - Modify environment to reduce risk of injury  - Provide assistive devices as appropriate  - Consider OT/PT consult to assist with strengthening/mobility  - Encourage toileting schedule  Outcome: Progressing     Problem: DISCHARGE PLANNING  Goal: Discharge to home or other facility with appropriate resources  Description: INTERVENTIONS:  - Identify barriers to discharge w/pt and caregiver  - Include patient/family/discharge partner in discharge planning  - Arrange for needed discharge resources and transportation as appropriate  - Identify discharge learning needs (meds, wound care, etc)  - Arrange for interpreters to assist at discharge as needed  - Consider post-discharge preferences of patient/family/discharge partner  - Complete POLST form as appropriate  - Assess patient's ability to be responsible for managing their own health  - Refer to Case Management Department for coordinating discharge planning if the patient needs post-hospital services based on physician/LIP order or complex needs related to functional status, cognitive ability or social support system  Outcome: Progressing

## 2023-07-13 NOTE — PLAN OF CARE
Assumed care of 60 y/o male @1  A/Ox4, did not wear CPAP-3 L nasal cannula  NS-Tele, Cardiac EP  Regular diet, QID accuchecks  Continent-urinal @bedside  Denies pain-has Tylenol PRN  LFA-infusing 0.9 @83 ml/hr  Safety prec maintained and all needs met      Problem: SAFETY ADULT - FALL  Goal: Free from fall injury  Description: INTERVENTIONS:  - Assess pt frequently for physical needs  - Identify cognitive and physical deficits and behaviors that affect risk of falls.   - [place] fall precautions as indicated by assessment.  - Educate pt/family on patient safety including physical limitations  - Instruct pt to call for assistance with activity based on assessment  - Modify environment to reduce risk of injury  - Provide assistive devices as appropriate  - Consider OT/PT consult to assist with strengthening/mobility  - Encourage toileting schedule  Outcome: Progressing     Problem: DISCHARGE PLANNING  Goal: Discharge to home or other facility with appropriate resources  Description: INTERVENTIONS:  - Identify barriers to discharge w/pt and caregiver  - Include patient/family/discharge partner in discharge planning  - Arrange for needed discharge resources and transportation as appropriate  - Identify discharge learning needs (meds, wound care, etc)  - Arrange for interpreters to assist at discharge as needed  - Consider post-discharge preferences of patient/family/discharge partner  - Complete POLST form as appropriate  - Assess patient's ability to be responsible for managing their own health  - Refer to Case Management Department for coordinating discharge planning if the patient needs post-hospital services based on physician/LIP order or complex needs related to functional status, cognitive ability or social support system  Outcome: Progressing

## 2023-07-13 NOTE — CM/SW NOTE
MSW met with pt, we discussed [place] list that was provided yesterday (pt asleep) at the time. He is going to talk to family and MSW will f/u later this morning for choice. We discussed that insurance will NOT pay for daily therapy-he states he was aware of this.    1:24pm  MSW met with pt and his sister at bedside. Updated choice list provided, MSW asked for a choice today. Pt updated that he will only get restorative care. Referral made to [facility] to discuss  services.     Barrier to dc: Pt needs Echo    [facility]  [employer] auth if needed

## 2023-07-14 VITALS
BODY MASS INDEX: 37 KG/M2 | TEMPERATURE: 98 F | SYSTOLIC BLOOD PRESSURE: 130 MMHG | RESPIRATION RATE: 18 BRPM | DIASTOLIC BLOOD PRESSURE: 67 MMHG | OXYGEN SATURATION: 86 % | WEIGHT: 279.19 LBS | HEART RATE: 67 BPM

## 2023-07-14 PROBLEM — E11.9 TYPE 2 DIABETES MELLITUS WITHOUT COMPLICATION (HCC): Status: ACTIVE | Noted: 2019-05-03

## 2023-07-14 LAB
GLUCOSE BLD-MCNC: 102 MG/DL (ref 70–99)
GLUCOSE BLD-MCNC: 133 MG/DL (ref 70–99)

## 2023-07-14 PROCEDURE — 99239 HOSP IP/OBS DSCHRG MGMT >30: CPT | Performed by: INTERNAL MEDICINE

## 2023-07-14 RX ORDER — INSULIN DETEMIR 100 [IU]/ML
10 INJECTION, SOLUTION SUBCUTANEOUS NIGHTLY
Qty: 5 EACH | Refills: 2 | Status: SHIPPED | COMMUNITY
Start: 2023-07-14

## 2023-07-14 NOTE — PLAN OF CARE
Assumed care of patient at 1. Pt received alert and oriented x4. VSS on 3L, RA when awake. Continues to refuse cpap. Tele NSR. Cardiac diet, QID accucheck. IVF infusing. Call light within reach. Will continue POC. Problem: PAIN - ADULT  Goal: Verbalizes/displays adequate comfort level or patient's stated pain goal  Description: INTERVENTIONS:  - Encourage pt to monitor pain and request assistance  - Assess pain using appropriate pain scale  - Administer analgesics based on type and severity of pain and evaluate response  - Implement non-pharmacological measures as appropriate and evaluate response  - Consider cultural and social influences on pain and pain management  - Manage/alleviate anxiety  - Utilize distraction and/or relaxation techniques  - Monitor for opioid side effects  - Notify MD/LIP if interventions unsuccessful or patient reports new pain  - Anticipate increased pain with activity and pre-medicate as appropriate  Outcome: Progressing     Problem: SAFETY ADULT - FALL  Goal: Free from fall injury  Description: INTERVENTIONS:  - Assess pt frequently for physical needs  - Identify cognitive and physical deficits and behaviors that affect risk of falls.   - [place] fall precautions as indicated by assessment.  - Educate pt/family on patient safety including physical limitations  - Instruct pt to call for assistance with activity based on assessment  - Modify environment to reduce risk of injury  - Provide assistive devices as appropriate  - Consider OT/PT consult to assist with strengthening/mobility  - Encourage toileting schedule  Outcome: Progressing     Problem: DISCHARGE PLANNING  Goal: Discharge to home or other facility with appropriate resources  Description: INTERVENTIONS:  - Identify barriers to discharge w/pt and caregiver  - Include patient/family/discharge partner in discharge planning  - Arrange for needed discharge resources and transportation as appropriate  - Identify discharge learning needs (meds, wound care, etc)  - Arrange for interpreters to assist at discharge as needed  - Consider post-discharge preferences of patient/family/discharge partner  - Complete POLST form as appropriate  - Assess patient's ability to be responsible for managing their own health  - Refer to Case Management Department for coordinating discharge planning if the patient needs post-hospital services based on physician/LIP order or complex needs related to functional status, cognitive ability or social support system  Outcome: Progressing

## 2023-07-14 NOTE — CM/SW NOTE
CM spoke to patient for [facility] choice and addressed multiple questions regarding therapy availability and out of pocket costs. Patient chose [facility] and [facility] for discharge. CM reserved facility in [facility] system. CM called [facility] Admissions dept for follow up, message left for call back. CM/SW to finalize dc plan when response received from facility.     Nelia Huffman, RN Case Manager [id number]

## 2023-07-14 NOTE — PLAN OF CARE
NURSING DISCHARGE NOTE    Discharged Rehab facility via  [facility] . Accompanied by  [facility] staff  Belongings Taken by patient/family. Paperwork given to [facility] [place]. Report called. Iv removed. Script sent.

## 2023-07-14 NOTE — CM/SW NOTE
07/14/23 1249   Discharge disposition   Expected discharge disposition subacute   Post Acute Care Provider Luis 18   Discharge transportation [facility] for patient discharge today to [facility] via Kishan mcclellan at [address] [place] [address] form completed for transport. RN to call facility at [phone] for transfer report.      [address] [facility], [address] [place] [address]  Phone: [phone]    Angelina Al RN Case Manager [id number]

## 2023-07-14 NOTE — PLAN OF CARE
Patient alert and oriented x4. On RA.  NSR on tele. Denies pain at this time. Blood sugars monitored as per MAR. Resting comfortably in bed. WCTM.

## 2023-08-21 ENCOUNTER — HOSPITAL ENCOUNTER (OUTPATIENT)
Facility: HOSPITAL | Age: 63
Setting detail: OBSERVATION
Discharge: NURSING, REHAB OR LONG-TERM CARE | End: 2023-08-24
Attending: EMERGENCY MEDICINE | Admitting: HOSPITALIST
Payer: MEDICAID

## 2023-08-21 DIAGNOSIS — R53.1 WEAKNESS GENERALIZED: ICD-10-CM

## 2023-08-21 DIAGNOSIS — E86.0 DEHYDRATION: Primary | ICD-10-CM

## 2023-08-21 PROBLEM — R79.89 AZOTEMIA: Status: ACTIVE | Noted: 2023-08-21

## 2023-08-21 PROBLEM — R73.9 HYPERGLYCEMIA: Status: ACTIVE | Noted: 2023-08-21

## 2023-08-21 PROBLEM — E87.20 METABOLIC ACIDOSIS: Status: ACTIVE | Noted: 2023-08-21

## 2023-08-21 LAB
ALBUMIN SERPL-MCNC: 3.1 G/DL (ref 3.4–5)
ALBUMIN/GLOB SERPL: 0.6 {RATIO} (ref 1–2)
ALP LIVER SERPL-CCNC: 87 U/L
ALT SERPL-CCNC: 24 U/L
ANION GAP SERPL CALC-SCNC: 7 MMOL/L (ref 0–18)
AST SERPL-CCNC: 26 U/L (ref 15–37)
BASOPHILS # BLD AUTO: 0.09 X10(3) UL (ref 0–0.2)
BASOPHILS NFR BLD AUTO: 1 %
BILIRUB SERPL-MCNC: 0.3 MG/DL (ref 0.1–2)
BILIRUB UR QL STRIP.AUTO: NEGATIVE
BUN BLD-MCNC: 43 MG/DL (ref 7–18)
CALCIUM BLD-MCNC: 9.2 MG/DL (ref 8.5–10.1)
CHLORIDE SERPL-SCNC: 112 MMOL/L (ref 98–112)
CLARITY UR REFRACT.AUTO: CLEAR
CO2 SERPL-SCNC: 20 MMOL/L (ref 21–32)
COLOR UR AUTO: YELLOW
CREAT BLD-MCNC: 1.07 MG/DL
EGFRCR SERPLBLD CKD-EPI 2021: 78 ML/MIN/1.73M2 (ref 60–?)
EOSINOPHIL # BLD AUTO: 0.76 X10(3) UL (ref 0–0.7)
EOSINOPHIL NFR BLD AUTO: 8.1 %
ERYTHROCYTE [DISTWIDTH] IN BLOOD BY AUTOMATED COUNT: 13 %
GLOBULIN PLAS-MCNC: 4.8 G/DL (ref 2.8–4.4)
GLUCOSE BLD-MCNC: 123 MG/DL (ref 70–99)
GLUCOSE BLD-MCNC: 150 MG/DL (ref 70–99)
GLUCOSE UR STRIP.AUTO-MCNC: NORMAL MG/DL
HCT VFR BLD AUTO: 40 %
HGB BLD-MCNC: 14 G/DL
HYALINE CASTS #/AREA URNS AUTO: PRESENT /LPF
IMM GRANULOCYTES # BLD AUTO: 0.02 X10(3) UL (ref 0–1)
IMM GRANULOCYTES NFR BLD: 0.2 %
KETONES UR STRIP.AUTO-MCNC: NEGATIVE MG/DL
LEUKOCYTE ESTERASE UR QL STRIP.AUTO: NEGATIVE
LYMPHOCYTES # BLD AUTO: 2.48 X10(3) UL (ref 1–4)
LYMPHOCYTES NFR BLD AUTO: 26.4 %
MCH RBC QN AUTO: 30.7 PG (ref 26–34)
MCHC RBC AUTO-ENTMCNC: 35 G/DL (ref 31–37)
MCV RBC AUTO: 87.7 FL
MONOCYTES # BLD AUTO: 0.73 X10(3) UL (ref 0.1–1)
MONOCYTES NFR BLD AUTO: 7.8 %
NEUTROPHILS # BLD AUTO: 5.32 X10 (3) UL (ref 1.5–7.7)
NEUTROPHILS # BLD AUTO: 5.32 X10(3) UL (ref 1.5–7.7)
NEUTROPHILS NFR BLD AUTO: 56.5 %
NITRITE UR QL STRIP.AUTO: NEGATIVE
OSMOLALITY SERPL CALC.SUM OF ELEC: 300 MOSM/KG (ref 275–295)
PH UR STRIP.AUTO: 5.5 [PH] (ref 5–8)
PLATELET # BLD AUTO: 203 10(3)UL (ref 150–450)
POTASSIUM SERPL-SCNC: 3.8 MMOL/L (ref 3.5–5.1)
PROT SERPL-MCNC: 7.9 G/DL (ref 6.4–8.2)
PROT UR STRIP.AUTO-MCNC: 30 MG/DL
RBC # BLD AUTO: 4.56 X10(6)UL
RBC UR QL AUTO: NEGATIVE
SODIUM SERPL-SCNC: 139 MMOL/L (ref 136–145)
SP GR UR STRIP.AUTO: 1.03 (ref 1–1.03)
UROBILINOGEN UR STRIP.AUTO-MCNC: NORMAL MG/DL
WBC # BLD AUTO: 9.4 X10(3) UL (ref 4–11)

## 2023-08-21 PROCEDURE — 99223 1ST HOSP IP/OBS HIGH 75: CPT | Performed by: HOSPITALIST

## 2023-08-22 ENCOUNTER — APPOINTMENT (OUTPATIENT)
Dept: MRI IMAGING | Facility: HOSPITAL | Age: 63
End: 2023-08-22
Attending: STUDENT IN AN ORGANIZED HEALTH CARE EDUCATION/TRAINING PROGRAM
Payer: MEDICAID

## 2023-08-22 PROBLEM — R42 DIZZINESS: Status: ACTIVE | Noted: 2023-08-22

## 2023-08-22 PROBLEM — R53.1 WEAKNESS GENERALIZED: Status: ACTIVE | Noted: 2023-08-22

## 2023-08-22 PROBLEM — I16.0 HYPERTENSIVE URGENCY: Status: ACTIVE | Noted: 2023-08-22

## 2023-08-22 PROBLEM — M46.26 OSTEOMYELITIS OF LOW BACK (HCC): Status: ACTIVE | Noted: 2023-06-29

## 2023-08-22 LAB
ANION GAP SERPL CALC-SCNC: 4 MMOL/L (ref 0–18)
ATRIAL RATE: 76 BPM
BASOPHILS # BLD AUTO: 0.06 X10(3) UL (ref 0–0.2)
BASOPHILS NFR BLD AUTO: 0.8 %
BUN BLD-MCNC: 38 MG/DL (ref 7–18)
CALCIUM BLD-MCNC: 8.9 MG/DL (ref 8.5–10.1)
CHLORIDE SERPL-SCNC: 113 MMOL/L (ref 98–112)
CO2 SERPL-SCNC: 27 MMOL/L (ref 21–32)
CREAT BLD-MCNC: 0.99 MG/DL
EGFRCR SERPLBLD CKD-EPI 2021: 86 ML/MIN/1.73M2 (ref 60–?)
EOSINOPHIL # BLD AUTO: 0.62 X10(3) UL (ref 0–0.7)
EOSINOPHIL NFR BLD AUTO: 8 %
ERYTHROCYTE [DISTWIDTH] IN BLOOD BY AUTOMATED COUNT: 12.8 %
GLUCOSE BLD-MCNC: 121 MG/DL (ref 70–99)
GLUCOSE BLD-MCNC: 124 MG/DL (ref 70–99)
GLUCOSE BLD-MCNC: 138 MG/DL (ref 70–99)
GLUCOSE BLD-MCNC: 139 MG/DL (ref 70–99)
GLUCOSE BLD-MCNC: 156 MG/DL (ref 70–99)
HCT VFR BLD AUTO: 39.8 %
HGB BLD-MCNC: 13.2 G/DL
IMM GRANULOCYTES # BLD AUTO: 0.02 X10(3) UL (ref 0–1)
IMM GRANULOCYTES NFR BLD: 0.3 %
LYMPHOCYTES # BLD AUTO: 2.34 X10(3) UL (ref 1–4)
LYMPHOCYTES NFR BLD AUTO: 30.2 %
MCH RBC QN AUTO: 30.6 PG (ref 26–34)
MCHC RBC AUTO-ENTMCNC: 33.2 G/DL (ref 31–37)
MCV RBC AUTO: 92.3 FL
MONOCYTES # BLD AUTO: 0.63 X10(3) UL (ref 0.1–1)
MONOCYTES NFR BLD AUTO: 8.1 %
NEUTROPHILS # BLD AUTO: 4.07 X10 (3) UL (ref 1.5–7.7)
NEUTROPHILS # BLD AUTO: 4.07 X10(3) UL (ref 1.5–7.7)
NEUTROPHILS NFR BLD AUTO: 52.6 %
OSMOLALITY SERPL CALC.SUM OF ELEC: 308 MOSM/KG (ref 275–295)
P AXIS: 65 DEGREES
P-R INTERVAL: 218 MS
PLATELET # BLD AUTO: 168 10(3)UL (ref 150–450)
POTASSIUM SERPL-SCNC: 3.5 MMOL/L (ref 3.5–5.1)
Q-T INTERVAL: 414 MS
QRS DURATION: 86 MS
QTC CALCULATION (BEZET): 465 MS
R AXIS: 79 DEGREES
RBC # BLD AUTO: 4.31 X10(6)UL
SODIUM SERPL-SCNC: 144 MMOL/L (ref 136–145)
T AXIS: -1 DEGREES
TSI SER-ACNC: 3.2 MIU/ML (ref 0.36–3.74)
VENTRICULAR RATE: 76 BPM
WBC # BLD AUTO: 7.7 X10(3) UL (ref 4–11)

## 2023-08-22 PROCEDURE — 99232 SBSQ HOSP IP/OBS MODERATE 35: CPT | Performed by: STUDENT IN AN ORGANIZED HEALTH CARE EDUCATION/TRAINING PROGRAM

## 2023-08-22 PROCEDURE — 72149 MRI LUMBAR SPINE W/DYE: CPT | Performed by: STUDENT IN AN ORGANIZED HEALTH CARE EDUCATION/TRAINING PROGRAM

## 2023-08-22 RX ORDER — ENOXAPARIN SODIUM 100 MG/ML
40 INJECTION SUBCUTANEOUS DAILY
Status: DISCONTINUED | OUTPATIENT
Start: 2023-08-22 | End: 2023-08-24

## 2023-08-22 RX ORDER — MELATONIN
3 NIGHTLY PRN
Status: DISCONTINUED | OUTPATIENT
Start: 2023-08-22 | End: 2023-08-24

## 2023-08-22 RX ORDER — PROCHLORPERAZINE EDISYLATE 5 MG/ML
5 INJECTION INTRAMUSCULAR; INTRAVENOUS EVERY 8 HOURS PRN
Status: DISCONTINUED | OUTPATIENT
Start: 2023-08-22 | End: 2023-08-24

## 2023-08-22 RX ORDER — BISACODYL 10 MG
10 SUPPOSITORY, RECTAL RECTAL
Status: DISCONTINUED | OUTPATIENT
Start: 2023-08-22 | End: 2023-08-24

## 2023-08-22 RX ORDER — LEVOTHYROXINE SODIUM 0.03 MG/1
25 TABLET ORAL
Status: DISCONTINUED | OUTPATIENT
Start: 2023-08-22 | End: 2023-08-24

## 2023-08-22 RX ORDER — CARVEDILOL 12.5 MG/1
25 TABLET ORAL 2 TIMES DAILY WITH MEALS
Status: DISCONTINUED | OUTPATIENT
Start: 2023-08-22 | End: 2023-08-24

## 2023-08-22 RX ORDER — NIFEDIPINE 30 MG/1
60 TABLET, EXTENDED RELEASE ORAL ONCE
Status: COMPLETED | OUTPATIENT
Start: 2023-08-22 | End: 2023-08-22

## 2023-08-22 RX ORDER — HYDROCODONE BITARTRATE AND ACETAMINOPHEN 5; 325 MG/1; MG/1
2 TABLET ORAL EVERY 4 HOURS PRN
Status: DISCONTINUED | OUTPATIENT
Start: 2023-08-22 | End: 2023-08-24

## 2023-08-22 RX ORDER — SODIUM CHLORIDE 9 MG/ML
INJECTION, SOLUTION INTRAVENOUS CONTINUOUS
Status: DISCONTINUED | OUTPATIENT
Start: 2023-08-22 | End: 2023-08-23

## 2023-08-22 RX ORDER — HYDROCODONE BITARTRATE AND ACETAMINOPHEN 5; 325 MG/1; MG/1
1 TABLET ORAL EVERY 4 HOURS PRN
Status: DISCONTINUED | OUTPATIENT
Start: 2023-08-22 | End: 2023-08-24

## 2023-08-22 RX ORDER — SENNOSIDES 8.6 MG
17.2 TABLET ORAL NIGHTLY PRN
Status: DISCONTINUED | OUTPATIENT
Start: 2023-08-22 | End: 2023-08-24

## 2023-08-22 RX ORDER — POLYETHYLENE GLYCOL 3350 17 G/17G
17 POWDER, FOR SOLUTION ORAL DAILY PRN
Status: DISCONTINUED | OUTPATIENT
Start: 2023-08-22 | End: 2023-08-24

## 2023-08-22 RX ORDER — NICOTINE POLACRILEX 4 MG
15 LOZENGE BUCCAL
Status: DISCONTINUED | OUTPATIENT
Start: 2023-08-22 | End: 2023-08-24

## 2023-08-22 RX ORDER — DEXTROSE MONOHYDRATE 25 G/50ML
50 INJECTION, SOLUTION INTRAVENOUS
Status: DISCONTINUED | OUTPATIENT
Start: 2023-08-22 | End: 2023-08-24

## 2023-08-22 RX ORDER — HYDRALAZINE HYDROCHLORIDE 25 MG/1
25 TABLET, FILM COATED ORAL EVERY 8 HOURS PRN
Status: DISCONTINUED | OUTPATIENT
Start: 2023-08-22 | End: 2023-08-24

## 2023-08-22 RX ORDER — ECHINACEA PURPUREA EXTRACT 125 MG
1 TABLET ORAL
Status: DISCONTINUED | OUTPATIENT
Start: 2023-08-22 | End: 2023-08-24

## 2023-08-22 RX ORDER — MORPHINE SULFATE 2 MG/ML
2 INJECTION, SOLUTION INTRAMUSCULAR; INTRAVENOUS EVERY 2 HOUR PRN
Status: DISCONTINUED | OUTPATIENT
Start: 2023-08-22 | End: 2023-08-24

## 2023-08-22 RX ORDER — HYDROMORPHONE HYDROCHLORIDE 1 MG/ML
0.5 INJECTION, SOLUTION INTRAMUSCULAR; INTRAVENOUS; SUBCUTANEOUS EVERY 30 MIN PRN
Status: DISCONTINUED | OUTPATIENT
Start: 2023-08-22 | End: 2023-08-22

## 2023-08-22 RX ORDER — ENEMA 19; 7 G/133ML; G/133ML
1 ENEMA RECTAL ONCE AS NEEDED
Status: DISCONTINUED | OUTPATIENT
Start: 2023-08-22 | End: 2023-08-24

## 2023-08-22 RX ORDER — ONDANSETRON 2 MG/ML
4 INJECTION INTRAMUSCULAR; INTRAVENOUS EVERY 6 HOURS PRN
Status: DISCONTINUED | OUTPATIENT
Start: 2023-08-22 | End: 2023-08-24

## 2023-08-22 RX ORDER — SODIUM CHLORIDE 9 MG/ML
INJECTION, SOLUTION INTRAVENOUS CONTINUOUS
Status: ACTIVE | OUTPATIENT
Start: 2023-08-22 | End: 2023-08-22

## 2023-08-22 RX ORDER — NICOTINE POLACRILEX 4 MG
30 LOZENGE BUCCAL
Status: DISCONTINUED | OUTPATIENT
Start: 2023-08-22 | End: 2023-08-24

## 2023-08-22 RX ORDER — GADOTERATE MEGLUMINE 376.9 MG/ML
20 INJECTION INTRAVENOUS
Status: COMPLETED | OUTPATIENT
Start: 2023-08-22 | End: 2023-08-22

## 2023-08-22 RX ORDER — NIFEDIPINE 30 MG/1
60 TABLET, EXTENDED RELEASE ORAL DAILY
Status: DISCONTINUED | OUTPATIENT
Start: 2023-08-22 | End: 2023-08-24

## 2023-08-22 RX ORDER — ONDANSETRON 2 MG/ML
4 INJECTION INTRAMUSCULAR; INTRAVENOUS EVERY 4 HOURS PRN
Status: DISCONTINUED | OUTPATIENT
Start: 2023-08-22 | End: 2023-08-22

## 2023-08-22 RX ORDER — ACETAMINOPHEN 500 MG
500 TABLET ORAL EVERY 4 HOURS PRN
Status: DISCONTINUED | OUTPATIENT
Start: 2023-08-22 | End: 2023-08-24

## 2023-08-22 NOTE — PROGRESS NOTES
NURSING ADMISSION NOTE      Patient admitted via Cart  Oriented to room. Safety precautions initiated. Bed in low position. Call light in reach. Pt admitted from ED.

## 2023-08-22 NOTE — CM/SW NOTE
08/22/23 9828   Choice of Post-Acute Provider   Informed patient of right to choose their preferred provider Yes   List of appropriate post-acute services provided to patient/family with quality data Yes   Information given to Patient       List of accepting NH facilities given to pt at bedside. MRI and therapy evals pending. / to remain available for support and/or discharge planning.      [facility]  Discharge Planner  [phone]

## 2023-08-22 NOTE — PHYSICAL THERAPY NOTE
PHYSICAL THERAPY EVALUATION - INPATIENT     Room Number: 381/381-A  Evaluation Date: 8/22/2023  Type of Evaluation: Initial  Physician Order: PT Eval and Treat       Co-Morbidities : discitis, OM of back, dizziness  Reason for Therapy: Mobility Dysfunction and Discharge Planning    History related to current admission: Patient is a 59year old male admitted on 8/21/2023 from home for dizziness and weakness. Pt was just dx from SNF and has been receiving restorative PT. Pt as long history of multiple hosp  and rehab placement for [facility] and [facility] since 2/27/23 with non-traumatic rhabdo then found to have discitis      Recent Admissions:  7/10-7/14/23 dizziness from [facility]  6/27-7/2/2023: Intractable back pain: --> From [facility], dc to [facility]  (Per neuro sx 6/28 no acute intervention indicated, LSO when OOB as needed for comfort)  3/21-3/29/23: MSSA bacteremia/discitis--> From [facility], dc to [facility]  2/27-3/7/2023: non-traumatic rhabdomyolysis --> dc to [facility] (3/7-3/21/2023)      ASSESSMENT   In this PT evaluation, the patient presents with the following impairments pain on B hip/buttocks areas limiting his ability to stand and walk with use of a RW. He has been limited in his functional ambulation with B LE weakness requiring supervision/assistance for longer distance and use of RW prior to this admission. He is able to transfer bed<>w/c with mod I PTA. Pt feels unsafe at home without adequate assistance to meet his needs. While he is not at his \"true\" baseline level of functional skills (prior to 2/23) and has not truly met or able to return to his \"true\" baseline due to his multiple set backs and pending on his recovery from discitis,( which when totally resolve, has an excellent potential to gain back his \"true baseline) he is currently within his new functional skills limited by pain. Overall, he can benefit cont skilled PT to max his functional skills and recovery. Functional outcome measures completed include AMPA.   The AM-PAC '6-Clicks' Inpatient Basic Mobility Short Form was completed and this patient is demonstrating a Approx Degree of Impairment: 54.16%  degree of impairment in mobility. Research supports that patients with this level of impairment may benefit from [facility] placement, if unable to have assist at home. DISCHARGE RECOMMENDATIONS  PT Discharge Recommendations: Sub-acute rehabilitation    PLAN  PT Treatment Plan: Body mechanics;Gait training;Strengthening;Transfer training  Rehab Potential : Good  Frequency (Obs): 3x/week  Number of Visits to Meet Established Goals: 3      CURRENT GOALS    Goal #1 Patient is able to demonstrate supine - sit EOB @ level: modified independent met 8/22/2023       Goal #2 Patient is able to demonstrate transfers EOB to/from Chair/Wheelchair at assistance level: modified independent met 8/22/2023       Goal #3 Patient is able to ambulate 50 feet with assist device: walker - rolling at assistance level: minimum assistance     Goal #4    Goal #5    Goal #6    Goal Comments: Goals established on 8/22/2023    HOME SITUATION  Type of Home: [address]: Able to live on main level  Stairs to Enter : 1     Stairs to [employer]: 0       Lives With: Alone  Drives:  (has been driving prior to early this year)  Patient Owned Equipment: Rolling walker; Wheelchair       Prior Level of [place]: Stated that in [facility] he was, he has was seen by restorative and was walking at least 150' with RW and chair following, otherwise he use w/c and is mod I on his transfers. He as at home for 2 wks then came her.    He is unaware that he cannot received HHPT due to his insurance limitation    SUBJECTIVE  I have no therapy at home so I have not been walking  Pain on my hips , I cannot stand        OBJECTIVE  Precautions: Spine  Fall Risk: Standard fall risk    WEIGHT BEARING RESTRICTION  Weight Bearing Restriction: None                PAIN ASSESSMENT  Rating:  (Unable to rate)  Location: B hips COGNITION  Overall Cognitive Status:  WFL - within functional limits    RANGE OF MOTION AND STRENGTH ASSESSMENT  Upper extremity ROM and strength are within functional limits     Lower extremity ROM is within functional limits     Lower extremity strength: NT      BALANCE  Static Sitting: Good  Dynamic Sitting: Good  Static Standing: Not tested  Dynamic Standing: Not tested      ACTIVITY TOLERANCE; pain limits ability to stand and walk           AM-PAC '6-Clicks' INPATIENT SHORT FORM - BASIC MOBILITY  How much difficulty does the patient currently have. .. Patient Difficulty: Turning over in bed (including adjusting bedclothes, sheets and blankets)?: None   Patient Difficulty: Sitting down on and standing up from a chair with arms (e.g., wheelchair, bedside commode, etc.): A Lot   Patient Difficulty: Moving from lying on back to sitting on the side of the bed?: None   How much help from another person does the patient currently need. ..    Help from Another: Moving to and from a bed to a chair (including a wheelchair)?: None   Help from Another: Need to walk in hospital room?: Total   Help from Another: Climbing 3-5 steps with a railing?: Total       AM-PAC Score:  Raw Score: 16   Approx Degree of Impairment: 54.16%   Standardized Score (AM-PAC Scale): 40.78   CMS Modifier (G-Code): CK    FUNCTIONAL ABILITY STATUS  Gait Assessment   Functional Mobility/Gait Assessment  Gait Assistance:  (unable to walk due to pain on B hips with sit<stand)    Skilled Therapy Provided     Bed Mobility:  Rolling: mod i  Supine to sit: mod I   Sit to supine: mod I     Transfer Mobility:  Sit to stand: Unable to complete task due to B hips pain   Stand to sit: as above  Gait = as above    Therapist's Comments:   Able to transfer bed<recline with squate pivot mod I s any difficulty  Attempted sit<>stand at least 2 stand unable to fully stand due to pain on B hip  Left on the recliner and made comfortable  Nursing is aware of this visit    Exercise/Education Provided: Body mechanics  Functional activity tolerated  Transfer training    Patient End of Session: Up in chair;Call light within reach; Needs met;RN aware of session/findings; All patient questions and concerns addressed      Patient Evaluation Complexity Level:  History Moderate - 1 or 2 personal factors and/or co-morbidities   Examination of body systems Moderate - addressing a total of 3 or more elements   Clinical Presentation Moderate - Evolving   Clinical Decision Making Moderate - Evolving       PT Session Time: 15 minutes  Gait Trainin minutes  Therapeutic Activity: 15 minutes

## 2023-08-22 NOTE — PLAN OF CARE
Pt A&Ox4 vital signs stable on RA. No c/o dizziness or nausea. Unable to ambulate due to generalized weakness. Ambulates with wheelchair at home. Sever pain, managed with PRN medication. IVF infusing per MAR. PT/OT to see. POC discussed, pt verbalized understanding. No further questions at this time. Call light within reach.

## 2023-08-22 NOTE — ED QUICK NOTES
Orders for admission, patient is aware of plan and ready to go upstairs. Any questions, please call ED RN Shabana Field  at extension [phone]. Vaccinated?   Type of COVID test sent:  COVID Suspicion level: Low/High  low    Titratable drug(s) infusing:  Rate:    LOC at time of transport:  aox4  Other pertinent information:    CIWA score=  NIH score=

## 2023-08-22 NOTE — CM/SW NOTE
Received a call from Dr. Kofi Contreras MD requesting assistance for patient's safe discharge planning. EDCM spoke to patient who lives alone and came in with dizziness and increasing generalized weakness. EDCM discussed [facility] placement which patient is agreeable. PASSR done, [facility] initiated. PT/OT/SW ordered. Dr. Shamar Buenrostro made aware of the above.

## 2023-08-22 NOTE — PLAN OF CARE
Pt A&Ox4. VSS on room air, 2L O2 PRN while asleep. Pt refusing SCDs, on lovenox. Tolerating diabetic diet. QID accu-checks. Voiding via urinal. Last BM 8/21/22. Back pain controlled with PO/IV medications. Plan for repeat MRI lumbar spine and blood cultures. IVF infusing per orders. Fall precautions in place and pt reminded to \"call; don't fall. \" Plan for PT/OT evaluations and to discharge to nursing home for long term care when medically cleared.  following for placement. POC discussed with pt.

## 2023-08-22 NOTE — ED INITIAL ASSESSMENT (HPI)
Pt discharged from a rehab facility 2 weeks ago for staph infection to back. Pt states out of his medication for BP, c/o intermittent dizziness.

## 2023-08-22 NOTE — CM/SW NOTE
08/22/23 0900   CM/SW Referral Data   Referral Source Social Work (self-referral)   Reason for Referral Discharge planning   Informant Patient;EMR;Clinical Staff Member   Patient Info   Patient's Current Mental Status at Time of Assessment Alert;Oriented   Patient's [address]   Number of Levels in Home 2   Patient lives with Alone   Patient Status Prior to Admission   Services in place prior to admission DME/Supplies at home   Type of DME/Supplies Mikayla Custard; Wheelchair   Discharge Needs   Anticipated D/C needs To be determined   Services Requested   PASRR Level 1 Submitted Yes       Patient is a 60 y/o man admitted with dehydration. Pt is familiar to me from previous hospital admissions. Pt has been in NH facilities for the past several months for treatment of back infection/discitis and mobility impairments. He had been at [facility] and then changed to [facility]. He is now admitted from home after being discharged from NH 2 weeks ago. Met with pt who stated that he had been doing well at [facility] and felt ready for DC to home, however they did not set up any [address] or services for him when he was discharged. Pt has been staying on the main level of his home, but feels that he has become weaker without therapy services and also that the bed set up on the main level has been difficulty for him to get in/out of. Pt also stated that he was not given sufficient medication supply when he discharged from the NH. He was not aware that he should be going to see his PCP for follow up and renewed prescriptions. Discussed DC planning and challenges in [facility] through pt's [place] Medicaid insurance. Reviewed pending referrals for NH admission. Pt accepted at [facility] and [facility]. Pt expressed concern about issues with BP and back pain. Pt would also like to discuss options when his sister arrives. Updated RN.   Await PT recommendations and medical clearance for DC. / to remain available for support and/or discharge planning.      Boy Valerio [facility]  Discharge Planner  [phone]

## 2023-08-23 ENCOUNTER — TELEPHONE (OUTPATIENT)
Dept: FAMILY MEDICINE CLINIC | Facility: CLINIC | Age: 63
End: 2023-08-23

## 2023-08-23 LAB
ANION GAP SERPL CALC-SCNC: 6 MMOL/L (ref 0–18)
BASOPHILS # BLD AUTO: 0.07 X10(3) UL (ref 0–0.2)
BASOPHILS NFR BLD AUTO: 0.7 %
BUN BLD-MCNC: 25 MG/DL (ref 7–18)
CALCIUM BLD-MCNC: 9 MG/DL (ref 8.5–10.1)
CHLORIDE SERPL-SCNC: 113 MMOL/L (ref 98–112)
CO2 SERPL-SCNC: 24 MMOL/L (ref 21–32)
CREAT BLD-MCNC: 0.69 MG/DL
CRP SERPL-MCNC: 1.14 MG/DL (ref ?–0.3)
EGFRCR SERPLBLD CKD-EPI 2021: 105 ML/MIN/1.73M2 (ref 60–?)
EOSINOPHIL # BLD AUTO: 0.51 X10(3) UL (ref 0–0.7)
EOSINOPHIL NFR BLD AUTO: 5.5 %
ERYTHROCYTE [DISTWIDTH] IN BLOOD BY AUTOMATED COUNT: 12.9 %
ERYTHROCYTE [SEDIMENTATION RATE] IN BLOOD: 38 MM/HR
GLUCOSE BLD-MCNC: 119 MG/DL (ref 70–99)
GLUCOSE BLD-MCNC: 120 MG/DL (ref 70–99)
GLUCOSE BLD-MCNC: 124 MG/DL (ref 70–99)
GLUCOSE BLD-MCNC: 133 MG/DL (ref 70–99)
GLUCOSE BLD-MCNC: 144 MG/DL (ref 70–99)
HCT VFR BLD AUTO: 39.4 %
HGB BLD-MCNC: 13.4 G/DL
IMM GRANULOCYTES # BLD AUTO: 0.03 X10(3) UL (ref 0–1)
IMM GRANULOCYTES NFR BLD: 0.3 %
LYMPHOCYTES # BLD AUTO: 1.66 X10(3) UL (ref 1–4)
LYMPHOCYTES NFR BLD AUTO: 17.8 %
MAGNESIUM SERPL-MCNC: 1.7 MG/DL (ref 1.6–2.6)
MCH RBC QN AUTO: 30.5 PG (ref 26–34)
MCHC RBC AUTO-ENTMCNC: 34 G/DL (ref 31–37)
MCV RBC AUTO: 89.5 FL
MONOCYTES # BLD AUTO: 0.86 X10(3) UL (ref 0.1–1)
MONOCYTES NFR BLD AUTO: 9.2 %
NEUTROPHILS # BLD AUTO: 6.21 X10 (3) UL (ref 1.5–7.7)
NEUTROPHILS # BLD AUTO: 6.21 X10(3) UL (ref 1.5–7.7)
NEUTROPHILS NFR BLD AUTO: 66.5 %
OSMOLALITY SERPL CALC.SUM OF ELEC: 302 MOSM/KG (ref 275–295)
PHOSPHATE SERPL-MCNC: 2.8 MG/DL (ref 2.5–4.9)
PLATELET # BLD AUTO: 172 10(3)UL (ref 150–450)
POTASSIUM SERPL-SCNC: 3.2 MMOL/L (ref 3.5–5.1)
RBC # BLD AUTO: 4.4 X10(6)UL
SODIUM SERPL-SCNC: 143 MMOL/L (ref 136–145)
WBC # BLD AUTO: 9.3 X10(3) UL (ref 4–11)

## 2023-08-23 PROCEDURE — 99232 SBSQ HOSP IP/OBS MODERATE 35: CPT | Performed by: INTERNAL MEDICINE

## 2023-08-23 RX ORDER — MAGNESIUM HYDROXIDE/ALUMINUM HYDROXICE/SIMETHICONE 120; 1200; 1200 MG/30ML; MG/30ML; MG/30ML
30 SUSPENSION ORAL 4 TIMES DAILY PRN
Status: DISCONTINUED | OUTPATIENT
Start: 2023-08-23 | End: 2023-08-24

## 2023-08-23 RX ORDER — POTASSIUM CHLORIDE 20 MEQ/1
40 TABLET, EXTENDED RELEASE ORAL ONCE
Status: COMPLETED | OUTPATIENT
Start: 2023-08-23 | End: 2023-08-23

## 2023-08-23 RX ORDER — TRAMADOL HYDROCHLORIDE 50 MG/1
50 TABLET ORAL EVERY 8 HOURS PRN
Qty: 15 TABLET | Refills: 0 | Status: SHIPPED | OUTPATIENT
Start: 2023-08-23

## 2023-08-23 RX ORDER — MAGNESIUM OXIDE 400 MG/1
400 TABLET ORAL ONCE
Status: COMPLETED | OUTPATIENT
Start: 2023-08-23 | End: 2023-08-23

## 2023-08-23 NOTE — CM/SW NOTE
Met with pt who stated he and his family have chosen [id number] [facility] for NH admission at DC. Provider reserved in [address]. Updated RN. Possible DC tomorrow. Received confirmation from [facility] that bed will be available for pt tomorrow. Await medical clearance for discharge. / to remain available for support and/or discharge planning.      [id number] [facility] Phone [phone]     Diamond Harden LCSW  Discharge Planner  [phone]

## 2023-08-23 NOTE — PLAN OF CARE
Received pt this am.  Alert and oriented. LE edema. Generalized weakness, able to pivot to chair and commode with assist of two persons and walker. Taking Tylenol prn for c/o back pain with relief. Using abdo binder for comfort when OOB. Seen by Dr. Kimberley Harman. Plan EMERITA tomorrow after final blood culture results. Pt verbalized understanding of POC.

## 2023-08-23 NOTE — TELEPHONE ENCOUNTER
Spoke w/ Pt regarding overdue health maintenance. Reminded him that our last conversation (12/22/22) that he was planning on finding a new provider and medical group. Pt states that is no longer the case and would like to stay under Dr. Jude castro. Pt states that he has been in an out of the hospital a lot but understands he needs to see Dr. Baldev Browning soon. Pt states once he is out of the hospital he will make an appointment w/ Dr. Baldev Browning.

## 2023-08-24 VITALS
BODY MASS INDEX: 36.45 KG/M2 | SYSTOLIC BLOOD PRESSURE: 145 MMHG | DIASTOLIC BLOOD PRESSURE: 83 MMHG | RESPIRATION RATE: 18 BRPM | HEIGHT: 73 IN | HEART RATE: 72 BPM | TEMPERATURE: 98 F | OXYGEN SATURATION: 95 % | WEIGHT: 275 LBS

## 2023-08-24 LAB
GLUCOSE BLD-MCNC: 103 MG/DL (ref 70–99)
GLUCOSE BLD-MCNC: 134 MG/DL (ref 70–99)
MAGNESIUM SERPL-MCNC: 1.8 MG/DL (ref 1.6–2.6)
POTASSIUM SERPL-SCNC: 3.4 MMOL/L (ref 3.5–5.1)

## 2023-08-24 PROCEDURE — 99239 HOSP IP/OBS DSCHRG MGMT >30: CPT | Performed by: INTERNAL MEDICINE

## 2023-08-24 RX ORDER — POTASSIUM CHLORIDE 20 MEQ/1
40 TABLET, EXTENDED RELEASE ORAL ONCE
Status: COMPLETED | OUTPATIENT
Start: 2023-08-24 | End: 2023-08-24

## 2023-08-24 RX ORDER — ACETAMINOPHEN 500 MG
TABLET ORAL
Qty: 20 TABLET | Refills: 0 | Status: SHIPPED | OUTPATIENT
Start: 2023-08-24

## 2023-08-24 RX ORDER — ACETAMINOPHEN 500 MG
1000 TABLET ORAL EVERY 4 HOURS PRN
Status: DISCONTINUED | OUTPATIENT
Start: 2023-08-24 | End: 2023-08-24

## 2023-08-24 RX ORDER — TRAMADOL HYDROCHLORIDE 50 MG/1
50 TABLET ORAL EVERY 6 HOURS PRN
Status: DISCONTINUED | OUTPATIENT
Start: 2023-08-24 | End: 2023-08-24

## 2023-08-24 RX ORDER — ACETAMINOPHEN 500 MG
500 TABLET ORAL ONCE
Status: DISCONTINUED | OUTPATIENT
Start: 2023-08-24 | End: 2023-08-24

## 2023-08-24 NOTE — CM/SW NOTE
Informed by RN that pt can discharge to NH today. [address] and received confirmation that pt can be accepted after 2pm today. [facility] transport scheduled for 4pm today. PCS form completed and available for RN to print. SW to update pt/RN. / to remain available for support and/or discharge planning.      Keke Mcgarry [place] Phone [phone] [address] [facility]  [phone]    Branden Dorman [facility]  Discharge Planner  [phone]

## 2023-08-24 NOTE — PROGRESS NOTES
Verbalized  having increasing lower back and groin pain today. Tylenol 500 mg 2 tablets given. Patient got up to the chair with 2 person moderate assistance using a walker, abdominal binder on for lower back support. Dr. Pruitt Kidney seen patient, medically clear for discharge to [facility] today.   [facility] transport arranged for 4 pm.

## 2023-08-29 ENCOUNTER — INITIAL APN SNF VISIT (OUTPATIENT)
Facility: SKILLED NURSING FACILITY | Age: 63
End: 2023-08-29

## 2023-08-29 DIAGNOSIS — E03.9 HYPOTHYROIDISM (ACQUIRED): ICD-10-CM

## 2023-08-29 DIAGNOSIS — G51.0 BELL'S PALSY: ICD-10-CM

## 2023-08-29 DIAGNOSIS — E11.9 TYPE 2 DIABETES MELLITUS WITHOUT COMPLICATION, WITH LONG-TERM CURRENT USE OF INSULIN (HCC): ICD-10-CM

## 2023-08-29 DIAGNOSIS — M46.46 DISCITIS OF LUMBAR REGION: ICD-10-CM

## 2023-08-29 DIAGNOSIS — Z79.4 TYPE 2 DIABETES MELLITUS WITHOUT COMPLICATION, WITH LONG-TERM CURRENT USE OF INSULIN (HCC): ICD-10-CM

## 2023-08-29 DIAGNOSIS — R53.1 WEAKNESS GENERALIZED: ICD-10-CM

## 2023-08-29 DIAGNOSIS — I16.0 HYPERTENSIVE URGENCY: Primary | ICD-10-CM

## 2023-08-29 PROCEDURE — 99306 1ST NF CARE HIGH MDM 50: CPT | Performed by: NURSE PRACTITIONER

## 2023-08-29 PROCEDURE — 1123F ACP DISCUSS/DSCN MKR DOCD: CPT | Performed by: NURSE PRACTITIONER

## 2023-09-05 ENCOUNTER — SNF VISIT (OUTPATIENT)
Facility: SKILLED NURSING FACILITY | Age: 63
End: 2023-09-05

## 2023-09-05 DIAGNOSIS — I16.0 HYPERTENSIVE URGENCY: ICD-10-CM

## 2023-09-05 DIAGNOSIS — E11.9 TYPE 2 DIABETES MELLITUS WITHOUT COMPLICATION, WITH LONG-TERM CURRENT USE OF INSULIN (HCC): Primary | ICD-10-CM

## 2023-09-05 DIAGNOSIS — M46.46 DISCITIS OF LUMBAR REGION: ICD-10-CM

## 2023-09-05 DIAGNOSIS — Z79.4 TYPE 2 DIABETES MELLITUS WITHOUT COMPLICATION, WITH LONG-TERM CURRENT USE OF INSULIN (HCC): Primary | ICD-10-CM

## 2023-09-05 DIAGNOSIS — R60.0 BILATERAL LOWER EXTREMITY EDEMA: ICD-10-CM

## 2023-09-05 DIAGNOSIS — R53.1 WEAKNESS GENERALIZED: ICD-10-CM

## 2023-09-05 PROCEDURE — 99310 SBSQ NF CARE HIGH MDM 45: CPT | Performed by: NURSE PRACTITIONER

## 2023-09-05 NOTE — PROGRESS NOTES
Salinas Vidal  : 1960  Age 59year old  male patient is admitted to [address] [place] [address] for rehabilitation and strengthening     [facility] Admission: 23 - 23     Chief complaint: Hypertensive emergency, L3-$ discitis/osteomyelitis, DM type 2, [place] Palsy, Hypothyroidism, FRANSISCO     HPI  59year old male with history of hypertension, hyperlipidemia type 2 diabetes hypothyroidism presents emergency room with increased generalized weakness patient was at a facility for physical therapy was discharged from there patient had no further therapy arranged and so continued to get weaker and weaker. No fevers, chills, nausea, vomiting, diarrhea, constipation. No chest pain or shortness of breath. no numbness or tingling in extremities or focalized weakness. MRI lumbar spine showed discitis/osteomyelitis at L3-4 level is slightly worsened compared to previous MRI in 2023 though this could be chronic changes per radiology. In addition his CRP is lower than past and he has no fever. He was stabilized and sent to [address] [place] [address]    Patient seen in follow up, he states that his leg have been feeling heavier and he has noticed that they are swollen more. He has no shortness of breath or chest pain, no nausea or vomiting. Feels ok otherwise. States he used to be on lasix but he is unsure why that wasn't restarted      ALLERGIES:  No Known Allergies    IMMUNIZATIONS    Immunization History  Administered            Date(s) Administered    TDAP                  2015       CODE STATUS:  Full Code    ADVANCED CARE PLANNING TEAM: Will need family care plan    CURRENT MEDICATIONS - reviewed and updated on SNF EMR     SUBJECTIVE      Patient seen in follow up, he states that his leg have been feeling heavier and he has noticed that they are swollen more. He has no shortness of breath or chest pain, no nausea or vomiting. Feels ok otherwise.  States he used to be on lasix but he is unsure why that wasn't restarted    PHYSICAL EXAM:  GENERAL HEALTH:well developed, well nourished, in no apparent distress   LINES, TUBES, DRAINS:  none  SKIN: no rashes, no suspicious lesions, warm, dry  WOUND: see wound assessment,   EYES: PERRLA, EOMI, sclera anicteric, conjunctiva normal; there is no nystagmus, no drainage from eyes  HENT: normocephalic; normal nose, no nasal drainage, mucous membranes pink, moist, pharynx no exudate, no visible cerumen. NECK:supple, FROM; no JVD, no TMG, no carotid bruits   BREAST: deferred exam   RESPIRATORY:clear to percussion and auscultation  CARDIOVASCULAR: S1, S2 normal, RRR; no S3, no S4 and no murmur  ABDOMEN:  normal active BS+, soft, nondistended; no organomegaly, no masses; no bruits; nontender, no guarding, no rebound tenderness. :Deferred  LYMPHATIC:no lymphedema  MUSCULOSKELETAL: no acute synovitis upper or lower extremity   EXTREMITIES/VASCULAR:no cyanosis, clubbing or edema  NEUROLOGIC:intact; no sensorimotor deficit and follows commands Right Facial Droop (chronic)   PSYCHIATRIC: alert and oriented x 3; affect appropriate    SEE PLAN BELOW  Bilateral Lower Extremity Edema  - 2+ pitting edema bilaterally  - started lasix 40mg daily x5 days, to be completed 09/09/23  - may need to keep him on 20mg daily  - monitor    Weakness/deconditioning  - PT/OT  - monitor     Hypertensive urgency - resolved  - Qshift vitals  - continue carvedilol 25mg BID  - continue NIFEdipine ER  60mg daily  - continue hydralazine 25mg HS  - cardiology to follow in rehab  - monitor      Hx MSSA bacteremia  L3-4 Discitis/Osteomyelitis   Lumbar radiculopathy  - Off abx therapy given improved MRI findings on 6/2023.   - SP CT biopsy, no fluid to aspirate, biopsy was taken and cultures were negative  - Lumbar spine repeat 8/22/23 shows discitis/osteomyelitis at L3-4 level is slightly worsened compared to 6/27/2023 though could be chronic changes.   - continue tylenol 1000mg Q4PRN  - continue Tramadol 50mg Q8PRN - continue lidocain patch Q12 on/12 off  - monitor      DM2   - A1C, 5.4 on 06/27/23  - accuchecks ACHS  - continue Novolog sliding scale  - continue levemir 10U HS  - monitor      Hypothyroidism  - continue levothyroxine 25mcg daily  - monitor      Bell's palsy   - right facial droop, chronic      Obstructive Sleep Apnea   - CPAP at night per protocol     Insomnia  - continue melatonin 1mg HSPRN  - monitor     Constipation  - continue miralax dailyPRN    FOLLOW UP APPOINTMENTS  No future appointments. This is a 35 minute visit and greater than 50% of the time was spent counseling the patient and/or coordinating care.       Obi Sanches, APRN  09/05/23

## 2023-09-07 ENCOUNTER — SNF VISIT (OUTPATIENT)
Facility: SKILLED NURSING FACILITY | Age: 63
End: 2023-09-07

## 2023-09-07 DIAGNOSIS — M46.26 OSTEOMYELITIS OF LOW BACK (HCC): ICD-10-CM

## 2023-09-07 DIAGNOSIS — I16.0 HYPERTENSIVE URGENCY: ICD-10-CM

## 2023-09-07 DIAGNOSIS — Z79.4 TYPE 2 DIABETES MELLITUS WITHOUT COMPLICATION, WITH LONG-TERM CURRENT USE OF INSULIN (HCC): Primary | ICD-10-CM

## 2023-09-07 DIAGNOSIS — E11.9 TYPE 2 DIABETES MELLITUS WITHOUT COMPLICATION, WITH LONG-TERM CURRENT USE OF INSULIN (HCC): Primary | ICD-10-CM

## 2023-09-07 DIAGNOSIS — R60.0 BILATERAL LOWER EXTREMITY EDEMA: ICD-10-CM

## 2023-09-07 DIAGNOSIS — M46.46 DISCITIS OF LUMBAR REGION: ICD-10-CM

## 2023-09-07 DIAGNOSIS — I10 BENIGN ESSENTIAL HTN: ICD-10-CM

## 2023-09-07 PROCEDURE — 99310 SBSQ NF CARE HIGH MDM 45: CPT | Performed by: NURSE PRACTITIONER

## 2023-09-07 NOTE — PROGRESS NOTES
Yamilet Gotti  : 1960  Age 59year old  male patient is admitted to [address] [place] [address] for rehabilitation and strengthening     [facility] Admission: 23 - 23     Chief complaint: Hypertensive emergency, L3-$ discitis/osteomyelitis, DM type 2, [place] Palsy, Hypothyroidism, FRANSISCO     HPI  59year old male with history of hypertension, hyperlipidemia type 2 diabetes hypothyroidism presents emergency room with increased generalized weakness patient was at a facility for physical therapy was discharged from there patient had no further therapy arranged and so continued to get weaker and weaker. No fevers, chills, nausea, vomiting, diarrhea, constipation. No chest pain or shortness of breath. no numbness or tingling in extremities or focalized weakness. MRI lumbar spine showed discitis/osteomyelitis at L3-4 level is slightly worsened compared to previous MRI in 2023 though this could be chronic changes per radiology. In addition his CRP is lower than past and he has no fever. He was stabilized and sent to [address] [place] [address]     Patient seen in follow up, states the leg swelling is decreasing since he is back on the lasix. He is feeling like they aren't has heavy now and he noticed his blood pressure stabilized too. He is working with therapy more. No shortness of breath or chest pain, no nausea, no current complaints      ALLERGIES:  No Known Allergies    IMMUNIZATIONS    Immunization History  Administered            Date(s) Administered    TDAP                  2015       CODE STATUS:  Full Code    ADVANCED CARE PLANNING TEAM: Will need family care plan    CURRENT MEDICATIONS - reviewed and updated on SNF EMR     SUBJECTIVE    Patient seen in follow up, states the leg swelling is decreasing since he is back on the lasix. He is feeling like they aren't has heavy now and he noticed his blood pressure stabilized too. He is working with therapy more.  No shortness of breath or chest pain, no nausea, no current complaints    PHYSICAL EXAM:  GENERAL HEALTH:well developed, well nourished, in no apparent distress   LINES, TUBES, DRAINS:  none  SKIN: no rashes, no suspicious lesions, warm, dry  WOUND: see wound assessment,   EYES: PERRLA, EOMI, sclera anicteric, conjunctiva normal; there is no nystagmus, no drainage from eyes  HENT: normocephalic; normal nose, no nasal drainage, mucous membranes pink, moist, pharynx no exudate, no visible cerumen. NECK:supple, FROM; no JVD, no TMG, no carotid bruits   BREAST: deferred exam   RESPIRATORY:clear to percussion and auscultation  CARDIOVASCULAR: S1, S2 normal, RRR; no S3, no S4 and no murmur  ABDOMEN:  normal active BS+, soft, nondistended; no organomegaly, no masses; no bruits; nontender, no guarding, no rebound tenderness. :Deferred  LYMPHATIC:no lymphedema  MUSCULOSKELETAL: no acute synovitis upper or lower extremity   EXTREMITIES/VASCULAR:no cyanosis, clubbing or edema  NEUROLOGIC:intact; no sensorimotor deficit and follows commands Right Facial Droop (chronic)   PSYCHIATRIC: alert and oriented x 3; affect appropriate     SEE PLAN BELOW  Bilateral Lower Extremity Edema  - 1+ pitting edema bilaterally  - continue 40mg daily   - may need to keep him on 20mg daily  - monitor     Weakness/deconditioning  - PT/OT  - monitor     Hypertensive urgency - resolved  - Qshift vitals  - continue carvedilol 25mg BID  - continue NIFEdipine ER  60mg daily  - continue hydralazine 25mg HS  - cardiology to follow in rehab  - monitor      Hx MSSA bacteremia  L3-4 Discitis/Osteomyelitis   Lumbar radiculopathy  - Off abx therapy given improved MRI findings on 6/2023.   - SP CT biopsy, no fluid to aspirate, biopsy was taken and cultures were negative  - Lumbar spine repeat 8/22/23 shows discitis/osteomyelitis at L3-4 level is slightly worsened compared to 6/27/2023 though could be chronic changes.   - continue tylenol 1000mg Q4PRN  - continue Tramadol 50mg Q8PRN   - continue lidocain patch Q12 on/12 off  - monitor      DM2   - A1C, 5.4 on 06/27/23  - li ACHS  - continue Novolog sliding scale  - continue levemir 10U HS  - monitor      Hypothyroidism  - continue levothyroxine 25mcg daily  - monitor      Bell's palsy   - right facial droop, chronic      Obstructive Sleep Apnea   - CPAP at night per protocol     Insomnia  - continue melatonin 1mg HSPRN  - monitor     Constipation  - continue miralax dailyPRN    FOLLOW UP APPOINTMENTS  No future appointments. This is a 35 minute visit and greater than 50% of the time was spent counseling the patient and/or coordinating care.       Selene Sanchez, APRN  09/07/23

## 2023-09-12 ENCOUNTER — SNF VISIT (OUTPATIENT)
Facility: SKILLED NURSING FACILITY | Age: 63
End: 2023-09-12

## 2023-09-12 DIAGNOSIS — M46.46 DISCITIS OF LUMBAR REGION: ICD-10-CM

## 2023-09-12 DIAGNOSIS — R53.1 GENERALIZED WEAKNESS: Primary | ICD-10-CM

## 2023-09-12 DIAGNOSIS — Z79.4 TYPE 2 DIABETES MELLITUS WITHOUT COMPLICATION, WITH LONG-TERM CURRENT USE OF INSULIN (HCC): ICD-10-CM

## 2023-09-12 DIAGNOSIS — A04.72 C. DIFFICILE DIARRHEA: ICD-10-CM

## 2023-09-12 DIAGNOSIS — E11.9 TYPE 2 DIABETES MELLITUS WITHOUT COMPLICATION, WITH LONG-TERM CURRENT USE OF INSULIN (HCC): ICD-10-CM

## 2023-09-12 DIAGNOSIS — R78.81 MSSA BACTEREMIA: ICD-10-CM

## 2023-09-12 DIAGNOSIS — B95.61 MSSA BACTEREMIA: ICD-10-CM

## 2023-09-12 DIAGNOSIS — I10 BENIGN ESSENTIAL HTN: ICD-10-CM

## 2023-09-12 DIAGNOSIS — I16.0 HYPERTENSIVE URGENCY: ICD-10-CM

## 2023-09-12 PROCEDURE — 99310 SBSQ NF CARE HIGH MDM 45: CPT | Performed by: NURSE PRACTITIONER

## 2023-09-19 ENCOUNTER — SNF VISIT (OUTPATIENT)
Facility: SKILLED NURSING FACILITY | Age: 63
End: 2023-09-19

## 2023-09-19 DIAGNOSIS — B95.61 MSSA BACTEREMIA: ICD-10-CM

## 2023-09-19 DIAGNOSIS — J96.01 ACUTE HYPOXEMIC RESPIRATORY FAILURE (HCC): Primary | ICD-10-CM

## 2023-09-19 DIAGNOSIS — R78.81 MSSA BACTEREMIA: ICD-10-CM

## 2023-09-19 DIAGNOSIS — K30 INDIGESTION: ICD-10-CM

## 2023-09-19 DIAGNOSIS — I16.0 HYPERTENSIVE URGENCY: ICD-10-CM

## 2023-09-19 DIAGNOSIS — R19.7 DIARRHEA, UNSPECIFIED TYPE: ICD-10-CM

## 2023-09-19 DIAGNOSIS — I10 BENIGN ESSENTIAL HTN: ICD-10-CM

## 2023-09-19 DIAGNOSIS — E78.2 MIXED HYPERLIPIDEMIA: ICD-10-CM

## 2023-09-19 DIAGNOSIS — Z79.4 TYPE 2 DIABETES MELLITUS WITHOUT COMPLICATION, WITH LONG-TERM CURRENT USE OF INSULIN (HCC): ICD-10-CM

## 2023-09-19 DIAGNOSIS — E11.9 TYPE 2 DIABETES MELLITUS WITHOUT COMPLICATION, WITH LONG-TERM CURRENT USE OF INSULIN (HCC): ICD-10-CM

## 2023-09-19 NOTE — PROGRESS NOTES
Noel Gong  : 1960  Age 59year old  male patient is admitted to [address] [place] [address] for rehabilitation and strengthening     [facility] Admission: 23 - 23     Chief complaint: Hypertensive emergency, L3-4 discitis/osteomyelitis, DM type 2, [place] Palsy, Hypothyroidism, FRANSISCO     HPI  59year old male with history of hypertension, hyperlipidemia type 2 diabetes hypothyroidism presents emergency room with increased generalized weakness patient was at a facility for physical therapy was discharged from there patient had no further therapy arranged and so continued to get weaker and weaker. No fevers, chills, nausea, vomiting, diarrhea, constipation. No chest pain or shortness of breath. no numbness or tingling in extremities or focalized weakness. MRI lumbar spine showed discitis/osteomyelitis at L3-4 level is slightly worsened compared to previous MRI in 2023 though this could be chronic changes per radiology. In addition his CRP is lower than past and he has no fever. He was stabilized and sent to [address] [place] [address]    Patient seen in follow up, he is doing well. Feels like he made more progress this week. He is having a lot of gas and is unsure if it is from the food. Has no other complaints. No shortness of breath or chest pain, no nausea or vomiting. ALLERGIES:  No Known Allergies    IMMUNIZATIONS    Immunization History  Administered            Date(s) Administered    TDAP                  2015       CODE STATUS:  Full Code    ADVANCED CARE PLANNING TEAM:Possible discharge 23    CURRENT MEDICATIONS - reviewed and updated on SNF EMR     SUBJECTIVE      Patient seen in follow up, he is doing well. Feels like he made more progress this week. He is having a lot of gas and is unsure if it is from the food. Has no other complaints. No shortness of breath or chest pain, no nausea or vomiting.     PHYSICAL EXAM:  GENERAL HEALTH:well developed, well nourished, in no apparent distress LINES, TUBES, DRAINS:  none  SKIN: no rashes, no suspicious lesions, warm, dry  WOUND: see wound assessment,   EYES: PERRLA, EOMI, sclera anicteric, conjunctiva normal; there is no nystagmus, no drainage from eyes  HENT: normocephalic; normal nose, no nasal drainage, mucous membranes pink, moist, pharynx no exudate, no visible cerumen. NECK:supple, FROM; no JVD, no TMG, no carotid bruits   BREAST: deferred exam   RESPIRATORY:clear to percussion and auscultation  CARDIOVASCULAR: S1, S2 normal, RRR; no S3, no S4 and no murmur  ABDOMEN:  normal active BS+, soft, nondistended; no organomegaly, no masses; no bruits; nontender, no guarding, no rebound tenderness. :Deferred  LYMPHATIC:no lymphedema  MUSCULOSKELETAL: no acute synovitis upper or lower extremity   EXTREMITIES/VASCULAR:no cyanosis, clubbing or edema  NEUROLOGIC:intact; no sensorimotor deficit and follows commands Right Facial Droop (chronic)   PSYCHIATRIC: alert and oriented x 3; affect appropriate     SEE PLAN BELOW  Indigestion/Gas  - continue TUMS TID  - started Gax X TID  - monitor     Bilateral Lower Extremity Edema - improving  - trace pitting edema bilaterally  - continue 40mg daily   - monitor     Weakness/deconditioning  - PT/OT  - monitor     Hypertensive urgency - resolved  - Qshift vitals  - continue carvedilol 25mg BID  - continue NIFEdipine ER  60mg daily  - continue hydralazine 25mg HS  - cardiology to follow in rehab  - monitor      Hx MSSA bacteremia  L3-4 Discitis/Osteomyelitis   Lumbar radiculopathy  - Off abx therapy given improved MRI findings on 6/2023.   - SP CT biopsy, no fluid to aspirate, biopsy was taken and cultures were negative  - Lumbar spine repeat 8/22/23 shows discitis/osteomyelitis at L3-4 level is slightly worsened compared to 6/27/2023 though could be chronic changes.   - continue tylenol 1000mg Q4PRN  - continue Tramadol 50mg Q8PRN   - continue lidocain patch Q12 on/12 off  - monitor      DM2   - A1C, 5.4 on 06/27/23  - li ACHS  - continue Novolog sliding scale  - continue levemir 10U HS  - monitor      Hypothyroidism  - continue levothyroxine 25mcg daily  - monitor      Bell's palsy   - right facial droop, chronic      Obstructive Sleep Apnea   - CPAP at night per protocol     Insomnia  - continue melatonin 1mg HSPRN  - monitor     Constipation  - continue miralax dailyPRN    FOLLOW UP APPOINTMENTS  No future appointments. This is a 35 minute visit and greater than 50% of the time was spent counseling the patient and/or coordinating care.       Selene Sanchez, APRN  09/19/23

## 2023-09-21 ENCOUNTER — SNF VISIT (OUTPATIENT)
Facility: SKILLED NURSING FACILITY | Age: 63
End: 2023-09-21

## 2023-09-21 DIAGNOSIS — E78.2 MIXED HYPERLIPIDEMIA: Primary | ICD-10-CM

## 2023-09-21 DIAGNOSIS — M46.26 OSTEOMYELITIS OF LOW BACK (HCC): ICD-10-CM

## 2023-09-21 DIAGNOSIS — Z79.4 TYPE 2 DIABETES MELLITUS WITHOUT COMPLICATION, WITH LONG-TERM CURRENT USE OF INSULIN (HCC): ICD-10-CM

## 2023-09-21 DIAGNOSIS — R78.81 MSSA BACTEREMIA: ICD-10-CM

## 2023-09-21 DIAGNOSIS — M46.46 DISCITIS OF LUMBAR REGION: ICD-10-CM

## 2023-09-21 DIAGNOSIS — B95.61 MSSA BACTEREMIA: ICD-10-CM

## 2023-09-21 DIAGNOSIS — E11.9 TYPE 2 DIABETES MELLITUS WITHOUT COMPLICATION, WITH LONG-TERM CURRENT USE OF INSULIN (HCC): ICD-10-CM

## 2023-09-21 DIAGNOSIS — E03.8 SUBCLINICAL HYPOTHYROIDISM: ICD-10-CM

## 2023-09-21 NOTE — PROGRESS NOTES
Clifm Camillas  : 1960  Age 59year old  male patient is admitted to [address] [place] [address] for rehabilitation and strengthening       Tc Patel Admission: 23 - 23     Chief complaint: Hypertensive emergency, L3-4 discitis/osteomyelitis, DM type 2, [place] Palsy, Hypothyroidism, FRANSISCO     HPI  59year old male with history of hypertension, hyperlipidemia type 2 diabetes hypothyroidism presents emergency room with increased generalized weakness patient was at a facility for physical therapy was discharged from there patient had no further therapy arranged and so continued to get weaker and weaker. No fevers, chills, nausea, vomiting, diarrhea, constipation. No chest pain or shortness of breath. no numbness or tingling in extremities or focalized weakness. MRI lumbar spine showed discitis/osteomyelitis at L3-4 level is slightly worsened compared to previous MRI in 2023 though this could be chronic changes per radiology. In addition his CRP is lower than past and he has no fever. He was stabilized and sent to [address] [place] [address]    Patient seen in follow up, feeling better today but feels like he lost the strength he had from the other day. He is frustrated he isn't improving faster. His BP was also elevated the last couple of days so adding a PRN medication. No other complaints. No shortness of breath or chest pain, no nausea or vomiting      ALLERGIES:  No Known Allergies    IMMUNIZATIONS    Immunization History  Administered            Date(s) Administered    TDAP                  2015       CODE STATUS:  Full Code    ADVANCED CARE PLANNING TEAM: Will need family care plan    CURRENT MEDICATIONS - reviewed and updated on SNF EMR     SUBJECTIVE    Patient seen in follow up, feeling better today but feels like he lost the strength he had from the other day. He is frustrated he isn't improving faster. His BP was also elevated the last couple of days so adding a PRN medication. No other complaints.  No shortness of breath or chest pain, no nausea or vomiting    PHYSICAL EXAM:  GENERAL HEALTH:well developed, well nourished, in no apparent distress   LINES, TUBES, DRAINS:  none  SKIN: no rashes, no suspicious lesions, warm, dry  WOUND: see wound assessment,   EYES: PERRLA, EOMI, sclera anicteric, conjunctiva normal; there is no nystagmus, no drainage from eyes  HENT: normocephalic; normal nose, no nasal drainage, mucous membranes pink, moist, pharynx no exudate, no visible cerumen. NECK:supple, FROM; no JVD, no TMG, no carotid bruits   BREAST: deferred exam   RESPIRATORY:clear to percussion and auscultation  CARDIOVASCULAR: S1, S2 normal, RRR; no S3, no S4 and no murmur  ABDOMEN:  normal active BS+, soft, nondistended; no organomegaly, no masses; no bruits; nontender, no guarding, no rebound tenderness.   :Deferred  LYMPHATIC:no lymphedema  MUSCULOSKELETAL: no acute synovitis upper or lower extremity   EXTREMITIES/VASCULAR:no cyanosis, clubbing or edema  NEUROLOGIC:intact; no sensorimotor deficit and follows commands Right Facial Droop (chronic)   PSYCHIATRIC: alert and oriented x 3; affect appropriate     SEE PLAN BELOW  Indigestion/Gas  - continue TUMS TID  - started Gax X TID  - monitor      Bilateral Lower Extremity Edema - improving  - trace pitting edema bilaterally  - continue 40mg daily   - monitor     Weakness/deconditioning  - PT/OT  - monitor     Hypertensive urgency - resolved  - Qshift vitals  - continue carvedilol 25mg BID  - continue NIFEdipine ER  60mg daily  - continue hydralazine 25mg HS, switched to TID  - cardiology to follow in rehab  - monitor      Hx MSSA bacteremia  L3-4 Discitis/Osteomyelitis   Lumbar radiculopathy  - Off abx therapy given improved MRI findings on 6/2023.   - SP CT biopsy, no fluid to aspirate, biopsy was taken and cultures were negative  - Lumbar spine repeat 8/22/23 shows discitis/osteomyelitis at L3-4 level is slightly worsened compared to 6/27/2023 though could be chronic changes. - continue tylenol 1000mg Q4PRN  - continue Tramadol 50mg Q8PRN   - continue lidocain patch Q12 on/12 off  - monitor      DM2   - A1C, 5.4 on 06/27/23  - accuchecks ACHS  - continue Novolog sliding scale  - continue levemir 10U HS  - monitor      Hypothyroidism  - continue levothyroxine 25mcg daily  - monitor      Bell's palsy   - right facial droop, chronic      Obstructive Sleep Apnea   - CPAP at night per protocol     Insomnia  - continue melatonin 1mg HSPRN  - monitor     Constipation  - continue miralax dailyPRN       FOLLOW UP APPOINTMENTS  No future appointments. This is a 35 minute visit and greater than 50% of the time was spent counseling the patient and/or coordinating care.       Ninfa Taylor, APRN  09/21/23

## 2023-09-26 ENCOUNTER — SNF VISIT (OUTPATIENT)
Facility: SKILLED NURSING FACILITY | Age: 63
End: 2023-09-26

## 2023-09-26 DIAGNOSIS — B95.61 MSSA BACTEREMIA: ICD-10-CM

## 2023-09-26 DIAGNOSIS — M46.46 DISCITIS OF LUMBAR REGION: ICD-10-CM

## 2023-09-26 DIAGNOSIS — R11.2 NAUSEA AND VOMITING, UNSPECIFIED VOMITING TYPE: ICD-10-CM

## 2023-09-26 DIAGNOSIS — A04.72 C. DIFFICILE DIARRHEA: ICD-10-CM

## 2023-09-26 DIAGNOSIS — I10 BENIGN ESSENTIAL HTN: Primary | ICD-10-CM

## 2023-09-26 DIAGNOSIS — E78.2 MIXED HYPERLIPIDEMIA: ICD-10-CM

## 2023-09-26 DIAGNOSIS — Z02.9 DISCHARGE PLANNING ISSUES: ICD-10-CM

## 2023-09-26 DIAGNOSIS — I16.0 HYPERTENSIVE URGENCY: ICD-10-CM

## 2023-09-26 DIAGNOSIS — R78.81 MSSA BACTEREMIA: ICD-10-CM

## 2023-09-26 DIAGNOSIS — R53.1 GENERALIZED WEAKNESS: ICD-10-CM

## 2023-09-26 PROCEDURE — 99310 SBSQ NF CARE HIGH MDM 45: CPT | Performed by: NURSE PRACTITIONER

## 2023-09-26 NOTE — PROGRESS NOTES
Patrica Argueta  : 1960  Age 59year old  male patient is admitted to [address] [place] [address] for rehabilitation and strengthening     Nikole Schulz Admission: 23 - 23     Chief complaint: Hypertensive emergency, L3-4 discitis/osteomyelitis, DM type 2, [place] Palsy, Hypothyroidism, FRANSISCO     HPI  59year old male with history of hypertension, hyperlipidemia type 2 diabetes hypothyroidism presents emergency room with increased generalized weakness patient was at a facility for physical therapy was discharged from there patient had no further therapy arranged and so continued to get weaker and weaker. No fevers, chills, nausea, vomiting, diarrhea, constipation. No chest pain or shortness of breath. no numbness or tingling in extremities or focalized weakness. MRI lumbar spine showed discitis/osteomyelitis at L3-4 level is slightly worsened compared to previous MRI in 2023 though this could be chronic changes per radiology. In addition his CRP is lower than past and he has no fever. He was stabilized and sent to [address] [place] [address]    Patient seen in follow up, planning for home this weekend. He is working with  to get everything set up and equipment for home. He has no complaints. No shortness of breath, no chest pain, eating well.states therapy is going well. ALLERGIES:  No Known Allergies    IMMUNIZATIONS    Immunization History  Administered            Date(s) Administered    TDAP                  2015       CODE STATUS:  Full Code    ADVANCED CARE PLANNING TEAM: possible discharge     CURRENT MEDICATIONS - reviewed and updated on SNF EMR     SUBJECTIVE    Patient seen in follow up, planning for home this weekend. He is working with  to get everything set up and equipment for home. He has no complaints. No shortness of breath, no chest pain, eating well.states therapy is going well.      PHYSICAL EXAM:  GENERAL HEALTH:well developed, well nourished, in no apparent distress   LINES, TUBES, DRAINS:  none  SKIN: no rashes, no suspicious lesions, warm, dry  WOUND: see wound assessment,   EYES: PERRLA, EOMI, sclera anicteric, conjunctiva normal; there is no nystagmus, no drainage from eyes  HENT: normocephalic; normal nose, no nasal drainage, mucous membranes pink, moist, pharynx no exudate, no visible cerumen. NECK:supple, FROM; no JVD, no TMG, no carotid bruits   BREAST: deferred exam   RESPIRATORY:clear to percussion and auscultation  CARDIOVASCULAR: S1, S2 normal, RRR; no S3, no S4 and no murmur  ABDOMEN:  normal active BS+, soft, nondistended; no organomegaly, no masses; no bruits; nontender, no guarding, no rebound tenderness. :Deferred  LYMPHATIC:no lymphedema  MUSCULOSKELETAL: no acute synovitis upper or lower extremity   EXTREMITIES/VASCULAR:no cyanosis, clubbing or edema  NEUROLOGIC:intact; no sensorimotor deficit and follows commands Right Facial Droop (chronic)   PSYCHIATRIC: alert and oriented x 3; affect appropriate     SEE PLAN BELOW  Indigestion/Gas  - continue TUMS TID  - started Gax X TID  - monitor      Bilateral Lower Extremity Edema - improving  - trace pitting edema bilaterally  - continue 40mg daily   - monitor     Weakness/deconditioning  - PT/OT  - monitor     Hypertensive urgency - resolved  - Qshift vitals  - continue carvedilol 25mg BID  - continue NIFEdipine ER  60mg daily  - continue hydralazine 25mg HS, switched to TID  - cardiology to follow in rehab  - monitor      Hx MSSA bacteremia  L3-4 Discitis/Osteomyelitis   Lumbar radiculopathy  - Off abx therapy given improved MRI findings on 6/2023.   - SP CT biopsy, no fluid to aspirate, biopsy was taken and cultures were negative  - Lumbar spine repeat 8/22/23 shows discitis/osteomyelitis at L3-4 level is slightly worsened compared to 6/27/2023 though could be chronic changes.   - continue tylenol 1000mg Q4PRN  - continue Tramadol 50mg Q8PRN   - continue lidocain patch Q12 on/12 off  - monitor      DM2   - A1C, 5.4 on 06/27/23  - li ACHS  - continue Novolog sliding scale  - continue levemir 10U HS  - monitor      Hypothyroidism  - continue levothyroxine 25mcg daily  - monitor      Bell's palsy   - right facial droop, chronic      Obstructive Sleep Apnea   - CPAP at night per protocol     Insomnia  - continue melatonin 1mg HSPRN  - monitor     Constipation  - continue miralax dailyPRN    Discharge Planning   - SW following  - needs PT/OT/RN  - needs equipment for home     FOLLOW UP APPOINTMENTS  No future appointments. This is a 35 minute visit and greater than 50% of the time was spent counseling the patient and/or coordinating care.       Angelia Ramirez, APRN  09/26/23

## 2023-09-28 ENCOUNTER — SNF DISCHARGE (OUTPATIENT)
Facility: SKILLED NURSING FACILITY | Age: 63
End: 2023-09-28

## 2023-09-28 DIAGNOSIS — E78.2 MIXED HYPERLIPIDEMIA: ICD-10-CM

## 2023-09-28 DIAGNOSIS — R78.81 MSSA BACTEREMIA: ICD-10-CM

## 2023-09-28 DIAGNOSIS — N17.9 AKI (ACUTE KIDNEY INJURY) (HCC): ICD-10-CM

## 2023-09-28 DIAGNOSIS — B95.61 MSSA BACTEREMIA: ICD-10-CM

## 2023-09-28 DIAGNOSIS — E03.8 SUBCLINICAL HYPOTHYROIDISM: ICD-10-CM

## 2023-09-28 DIAGNOSIS — M54.9 INTRACTABLE BACK PAIN: ICD-10-CM

## 2023-09-28 DIAGNOSIS — I10 BENIGN ESSENTIAL HTN: ICD-10-CM

## 2023-09-28 DIAGNOSIS — R53.1 WEAKNESS GENERALIZED: ICD-10-CM

## 2023-09-28 DIAGNOSIS — A04.72 C. DIFFICILE DIARRHEA: Primary | ICD-10-CM

## 2023-09-28 DIAGNOSIS — M46.26 OSTEOMYELITIS OF LOW BACK (HCC): ICD-10-CM

## 2023-09-28 PROCEDURE — 99310 SBSQ NF CARE HIGH MDM 45: CPT | Performed by: NURSE PRACTITIONER

## 2023-09-28 NOTE — PROGRESS NOTES
Kelton [place]burg, 99/1960, 59year old, male is being discharged from [address] [place] [address] to home      [address]    Date of Admission to [facility] Sender EMERITA:08/24/23    Date of Discharge from [facility] Send EMERITA:LCD 09/29/23, going home 10/3                         Admitting Diagnoses: Hypertensive emergency, L3-4 discitis/osteomyelitis, DM type 2, [place] Palsy, Hypothyroidism, FRANSISCO     Reason for Admission to [facility]: Rehabilitation and strengthening      PHYSICAL EXAM:    GENERAL HEALTH:well developed, well nourished, in no apparent distress   LINES, TUBES, DRAINS:  none  SKIN: no rashes, no suspicious lesions, warm, dry  WOUND: see wound assessment,   EYES: PERRLA, EOMI, sclera anicteric, conjunctiva normal; there is no nystagmus, no drainage from eyes  HENT: normocephalic; normal nose, no nasal drainage, mucous membranes pink, moist, pharynx no exudate, no visible cerumen. NECK:supple, FROM; no JVD, no TMG, no carotid bruits   BREAST: deferred exam   RESPIRATORY:clear to percussion and auscultation  CARDIOVASCULAR: S1, S2 normal, RRR; no S3, no S4 and no murmur  ABDOMEN:  normal active BS+, soft, nondistended; no organomegaly, no masses; no bruits; nontender, no guarding, no rebound tenderness.   :Deferred  LYMPHATIC:no lymphedema  MUSCULOSKELETAL: no acute synovitis upper or lower extremity   EXTREMITIES/VASCULAR:no cyanosis, clubbing or edema  NEUROLOGIC:intact; no sensorimotor deficit and follows commands Right Facial Droop (chronic)   PSYCHIATRIC: alert and oriented x 3; affect appropriate     SEE PLAN BELOW  Indigestion/Gas  - continue TUMS TID  - started Gax X TID  - monitor      Bilateral Lower Extremity Edema - improving  - trace pitting edema bilaterally  - continue 40mg daily   - monitor     Weakness/deconditioning  - PT/OT  - monitor     Hypertensive urgency - resolved  - Qshift vitals  - continue carvedilol 25mg BID  - continue NIFEdipine ER  60mg daily  - continue hydralazine 25mg HS, switched to TID  - cardiology to follow in rehab  - monitor      Hx MSSA bacteremia  L3-4 Discitis/Osteomyelitis   Lumbar radiculopathy  - Off abx therapy given improved MRI findings on 6/2023.   - SP CT biopsy, no fluid to aspirate, biopsy was taken and cultures were negative  - Lumbar spine repeat 8/22/23 shows discitis/osteomyelitis at L3-4 level is slightly worsened compared to 6/27/2023 though could be chronic changes. - continue tylenol 1000mg Q4PRN  - continue Tramadol 50mg Q8PRN   - continue cyclobenzaprine 10mg TIDPRN  - continue lidocain patch Q12 on/12 off  - monitor      DM2   - A1C, 5.4 on 06/27/23  - accuchecks ACHS  - continue Novolog sliding scale  - continue levemir 10U HS  - monitor      Hypothyroidism  - continue levothyroxine 25mcg daily  - monitor      Bell's palsy   - right facial droop, chronic      Obstructive Sleep Apnea   - CPAP at night per protocol     Insomnia  - continue melatonin 1mg HSPRN  - monitor     Constipation  - continue miralax dailyPRN     Discharge Planning   - SW following  - needs PT/OT/RN  - needs equipment for home        Medication Reconciliation Completed:  Yes - All medications and side effects reviewed with patient. Patient has all medications at home, please provide narcotics and muscle relaxers at discharge    [phone]  No future appointments. This is a 35 minute visit and greater than 50% of the time was spent counseling the patient and/or coordinating care.     Blake Hinkle, APRN  9/28/2023

## 2023-10-06 ENCOUNTER — APPOINTMENT (OUTPATIENT)
Dept: CT IMAGING | Age: 63
End: 2023-10-06
Attending: EMERGENCY MEDICINE

## 2023-10-06 ENCOUNTER — HOSPITAL ENCOUNTER (EMERGENCY)
Age: 63
Discharge: HOME | End: 2023-10-06
Attending: EMERGENCY MEDICINE

## 2023-10-06 VITALS
OXYGEN SATURATION: 95 % | SYSTOLIC BLOOD PRESSURE: 170 MMHG | DIASTOLIC BLOOD PRESSURE: 89 MMHG | RESPIRATION RATE: 19 BRPM | TEMPERATURE: 97.8 F | HEIGHT: 73 IN | HEART RATE: 78 BPM
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
## 2023-10-06 LAB
ALBUMIN SERPL-MCNC: 3.5 G/DL (ref 3.6–5.1)
ALBUMIN/GLOB SERPL: 0.7 {RATIO} (ref 1–2.4)
ALP SERPL-CCNC: 102 UNITS/L (ref 45–117)
ALT SERPL-CCNC: 23 UNITS/L
ANION GAP SERPL CALC-SCNC: 10 MMOL/L (ref 7–19)
APPEARANCE UR: CLEAR
AST SERPL-CCNC: 25 UNITS/L
BASE EXCESS / DEFICIT, ARTERIAL - RESPIRATORY: 3 MMOL/L (ref -2–3)
BASOPHILS # BLD: 0 K/MCL (ref 0–0.3)
BASOPHILS NFR BLD: 1 %
BDY SITE: ABNORMAL
BILIRUB SERPL-MCNC: 0.5 MG/DL (ref 0.2–1)
BILIRUB UR QL STRIP: NEGATIVE
BODY TEMPERATURE: 37 DEGREES C
BUN SERPL-MCNC: 26 MG/DL (ref 6–20)
BUN/CREAT SERPL: 28 (ref 7–25)
CALCIUM SERPL-MCNC: 9.1 MG/DL (ref 8.4–10.2)
CHLORIDE SERPL-SCNC: 107 MMOL/L (ref 97–110)
CO2 SERPL-SCNC: 27 MMOL/L (ref 21–32)
COHGB MFR BLDV: 2.5 %
COLOR UR: ABNORMAL
CONDITION: ABNORMAL
CREAT SERPL-MCNC: 0.94 MG/DL (ref 0.67–1.17)
DEPRECATED RDW RBC: 46.3 FL (ref 39–50)
EGFRCR SERPLBLD CKD-EPI 2021: >90 ML/MIN/{1.73_M2}
EOSINOPHIL # BLD: 0.2 K/MCL (ref 0–0.5)
EOSINOPHIL NFR BLD: 2 %
ERYTHROCYTE [DISTWIDTH] IN BLOOD: 13.5 % (ref 11–15)
FASTING DURATION TIME PATIENT: ABNORMAL H
FIO2 ON VENT: 21 %
GLOBULIN SER-MCNC: 4.7 G/DL (ref 2–4)
GLUCOSE SERPL-MCNC: 204 MG/DL (ref 70–99)
GLUCOSE UR STRIP-MCNC: 100 MG/DL
HCO3 BLDA-SCNC: 27 MMOL/L (ref 22–28)
HCT VFR BLD CALC: 40 % (ref 39–51)
HCT VFR BLD CALC: 40.5 % (ref 39–51)
HGB BLD-MCNC: 13.3 G/DL (ref 13–17)
HGB BLD-MCNC: 13.4 G/DL (ref 13–17)
HGB UR QL STRIP: NEGATIVE
HOROWITZ INDEX BLDA+IHG-RTO: 271 MM[HG] (ref 300–500)
IMM GRANULOCYTES # BLD AUTO: 0 K/MCL (ref 0–0.2)
IMM GRANULOCYTES # BLD: 1 %
KETONES UR STRIP-MCNC: NEGATIVE MG/DL
LEUKOCYTE ESTERASE UR QL STRIP: NEGATIVE
LYMPHOCYTES # BLD: 0.8 K/MCL (ref 1–4)
LYMPHOCYTES NFR BLD: 10 %
MAGNESIUM SERPL-MCNC: 2.1 MG/DL (ref 1.7–2.4)
MCH RBC QN AUTO: 30.8 PG (ref 26–34)
MCHC RBC AUTO-ENTMCNC: 33.1 G/DL (ref 32–36.5)
MCV RBC AUTO: 93.1 FL (ref 78–100)
METHGB MFR BLDMV: 0 %
MONOCYTES # BLD: 0.6 K/MCL (ref 0.3–0.9)
MONOCYTES NFR BLD: 7 %
NEUTROPHILS # BLD: 7.2 K/MCL (ref 1.8–7.7)
NEUTROPHILS NFR BLD: 79 %
NITRITE UR QL STRIP: NEGATIVE
NRBC BLD MANUAL-RTO: 0 /100 WBC
OXYHGB MFR BLDA: 88.2 % (ref 94–98)
PCO2 BLDA: 39 MM HG (ref 35–48)
PH BLDA: 7.45 UNITS (ref 7.35–7.45)
PH UR STRIP: 6 [PH] (ref 5–7)
PLATELET # BLD AUTO: 181 K/MCL (ref 140–450)
PO2 BLDA: 57 MM HG (ref 83–108)
POTASSIUM SERPL-SCNC: 3.5 MMOL/L (ref 3.4–5.1)
PROT SERPL-MCNC: 8.2 G/DL (ref 6.4–8.2)
PROT UR STRIP-MCNC: NEGATIVE MG/DL
RAINBOW EXTRA TUBES HOLD SPECIMEN: NORMAL
RBC # BLD: 4.35 MIL/MCL (ref 4.5–5.9)
SAO2 % BLDA: 16 % (ref 15–23)
SAO2 % BLDA: 91 % (ref 95–99)
SODIUM SERPL-SCNC: 140 MMOL/L (ref 135–145)
SP GR UR STRIP: 1.01 (ref 1–1.03)
UROBILINOGEN UR STRIP-MCNC: 0.2 MG/DL
WBC # BLD: 8.9 K/MCL (ref 4.2–11)

## 2023-10-06 PROCEDURE — 81003 URINALYSIS AUTO W/O SCOPE: CPT | Performed by: EMERGENCY MEDICINE

## 2023-10-06 PROCEDURE — 85025 COMPLETE CBC W/AUTO DIFF WBC: CPT | Performed by: EMERGENCY MEDICINE

## 2023-10-06 PROCEDURE — 70450 CT HEAD/BRAIN W/O DYE: CPT

## 2023-10-06 PROCEDURE — 99284 EMERGENCY DEPT VISIT MOD MDM: CPT

## 2023-10-06 PROCEDURE — 10002803 HB RX 637: Performed by: EMERGENCY MEDICINE

## 2023-10-06 PROCEDURE — 36600 WITHDRAWAL OF ARTERIAL BLOOD: CPT

## 2023-10-06 PROCEDURE — 36415 COLL VENOUS BLD VENIPUNCTURE: CPT

## 2023-10-06 PROCEDURE — 82375 ASSAY CARBOXYHB QUANT: CPT

## 2023-10-06 PROCEDURE — P9612 CATHETERIZE FOR URINE SPEC: HCPCS

## 2023-10-06 PROCEDURE — 85018 HEMOGLOBIN: CPT

## 2023-10-06 PROCEDURE — 80053 COMPREHEN METABOLIC PANEL: CPT | Performed by: EMERGENCY MEDICINE

## 2023-10-06 PROCEDURE — 10004180 HB COUNTER-TRANSPORT

## 2023-10-06 PROCEDURE — 93005 ELECTROCARDIOGRAM TRACING: CPT | Performed by: EMERGENCY MEDICINE

## 2023-10-06 PROCEDURE — 83735 ASSAY OF MAGNESIUM: CPT | Performed by: EMERGENCY MEDICINE

## 2023-10-06 RX ORDER — NIFEDIPINE 60 MG/1
TABLET, FILM COATED, EXTENDED RELEASE ORAL
COMMUNITY
Start: 2023-09-20

## 2023-10-06 RX ORDER — POTASSIUM CHLORIDE 20 MEQ/1
TABLET, EXTENDED RELEASE ORAL
COMMUNITY
Start: 2023-10-04

## 2023-10-06 RX ORDER — TRAMADOL HYDROCHLORIDE 50 MG/1
TABLET ORAL
COMMUNITY
Start: 2023-09-06

## 2023-10-06 RX ORDER — LEVOTHYROXINE SODIUM 0.03 MG/1
TABLET ORAL
COMMUNITY
Start: 2023-09-21

## 2023-10-06 RX ORDER — CALCIUM CARBONATE 500 MG/1
500 TABLET, CHEWABLE ORAL ONCE
Status: COMPLETED | OUTPATIENT
Start: 2023-10-06 | End: 2023-10-06

## 2023-10-06 RX ORDER — FUROSEMIDE 40 MG/1
TABLET ORAL
COMMUNITY
Start: 2023-10-04

## 2023-10-06 RX ORDER — HYDRALAZINE HYDROCHLORIDE 25 MG/1
TABLET, FILM COATED ORAL
COMMUNITY
Start: 2023-09-24

## 2023-10-06 RX ORDER — INSULIN DETEMIR 100 [IU]/ML
INJECTION, SOLUTION SUBCUTANEOUS
COMMUNITY
Start: 2023-09-20

## 2023-10-06 RX ORDER — CARVEDILOL 25 MG/1
TABLET ORAL
COMMUNITY
Start: 2023-09-20

## 2023-10-06 RX ORDER — CYCLOBENZAPRINE HCL 10 MG
TABLET ORAL
COMMUNITY
Start: 2023-10-03

## 2023-10-06 RX ADMIN — ANTACID TABLETS 500 MG: 500 TABLET, CHEWABLE ORAL at 15:45

## 2023-10-06 ASSESSMENT — PAIN SCALES - GENERAL: PAINLEVEL_OUTOF10: 5

## 2023-10-07 LAB
ATRIAL RATE (BPM): 89
P AXIS (DEGREES): 87
PR-INTERVAL (MSEC): 224
QRS-INTERVAL (MSEC): 94
QT-INTERVAL (MSEC): 404
QTC: 492
R AXIS (DEGREES): -13
REPORT TEXT: NORMAL
T AXIS (DEGREES): 39
VENTRICULAR RATE EKG/MIN (BPM): 89

## 2023-10-18 ENCOUNTER — TELEPHONE (OUTPATIENT)
Dept: FAMILY MEDICINE CLINIC | Facility: CLINIC | Age: 63
End: 2023-10-18

## 2023-10-18 NOTE — TELEPHONE ENCOUNTER
Spoke with patient regarding overdue health maintenance. Patient has been in the hospital and rehab facility for the pst 8 month. He will call the office for an appointment when he is able. Reminded patient that he is due for an eye exam.  He will schedule an appointment at his earliest convenience.

## 2023-11-24 ENCOUNTER — LAB ENCOUNTER (OUTPATIENT)
Dept: LAB | Age: 63
End: 2023-11-24
Attending: FAMILY MEDICINE
Payer: MEDICAID

## 2023-12-12 ENCOUNTER — APPOINTMENT (OUTPATIENT)
Dept: GENERAL RADIOLOGY | Facility: HOSPITAL | Age: 63
End: 2023-12-12
Attending: EMERGENCY MEDICINE
Payer: MEDICAID

## 2023-12-12 ENCOUNTER — APPOINTMENT (OUTPATIENT)
Dept: ULTRASOUND IMAGING | Facility: HOSPITAL | Age: 63
End: 2023-12-12
Attending: EMERGENCY MEDICINE
Payer: MEDICAID

## 2023-12-12 ENCOUNTER — HOSPITAL ENCOUNTER (INPATIENT)
Facility: HOSPITAL | Age: 63
LOS: 4 days | Discharge: NURSING, REHAB OR LONG-TERM CARE | End: 2023-12-17
Attending: EMERGENCY MEDICINE | Admitting: HOSPITALIST
Payer: MEDICAID

## 2023-12-12 DIAGNOSIS — R60.0 EXTREMITY EDEMA: Primary | ICD-10-CM

## 2023-12-12 LAB
ALBUMIN SERPL-MCNC: 3.5 G/DL (ref 3.4–5)
ALBUMIN/GLOB SERPL: 0.8 {RATIO} (ref 1–2)
ALP LIVER SERPL-CCNC: 116 U/L
ALT SERPL-CCNC: 34 U/L
ANION GAP SERPL CALC-SCNC: 3 MMOL/L (ref 0–18)
AST SERPL-CCNC: 45 U/L (ref 15–37)
BASOPHILS # BLD AUTO: 0.04 X10(3) UL (ref 0–0.2)
BASOPHILS NFR BLD AUTO: 0.5 %
BILIRUB SERPL-MCNC: 0.4 MG/DL (ref 0.1–2)
BUN BLD-MCNC: 39 MG/DL (ref 9–23)
CALCIUM BLD-MCNC: 9.1 MG/DL (ref 8.5–10.1)
CHLORIDE SERPL-SCNC: 106 MMOL/L (ref 98–112)
CO2 SERPL-SCNC: 30 MMOL/L (ref 21–32)
CREAT BLD-MCNC: 1.35 MG/DL
EGFRCR SERPLBLD CKD-EPI 2021: 59 ML/MIN/1.73M2 (ref 60–?)
EOSINOPHIL # BLD AUTO: 0.25 X10(3) UL (ref 0–0.7)
EOSINOPHIL NFR BLD AUTO: 2.8 %
ERYTHROCYTE [DISTWIDTH] IN BLOOD BY AUTOMATED COUNT: 12.9 %
GLOBULIN PLAS-MCNC: 4.6 G/DL (ref 2.8–4.4)
GLUCOSE BLD-MCNC: 178 MG/DL (ref 70–99)
HCT VFR BLD AUTO: 39 %
HGB BLD-MCNC: 13.3 G/DL
IMM GRANULOCYTES # BLD AUTO: 0.02 X10(3) UL (ref 0–1)
IMM GRANULOCYTES NFR BLD: 0.2 %
LYMPHOCYTES # BLD AUTO: 1.04 X10(3) UL (ref 1–4)
LYMPHOCYTES NFR BLD AUTO: 11.8 %
MCH RBC QN AUTO: 31.4 PG (ref 26–34)
MCHC RBC AUTO-ENTMCNC: 34.1 G/DL (ref 31–37)
MCV RBC AUTO: 92 FL
MONOCYTES # BLD AUTO: 0.94 X10(3) UL (ref 0.1–1)
MONOCYTES NFR BLD AUTO: 10.7 %
NEUTROPHILS # BLD AUTO: 6.52 X10 (3) UL (ref 1.5–7.7)
NEUTROPHILS # BLD AUTO: 6.52 X10(3) UL (ref 1.5–7.7)
NEUTROPHILS NFR BLD AUTO: 74 %
NT-PROBNP SERPL-MCNC: 158 PG/ML (ref ?–125)
OSMOLALITY SERPL CALC.SUM OF ELEC: 302 MOSM/KG (ref 275–295)
PLATELET # BLD AUTO: 161 10(3)UL (ref 150–450)
POTASSIUM SERPL-SCNC: 5.2 MMOL/L (ref 3.5–5.1)
PROT SERPL-MCNC: 8.1 G/DL (ref 6.4–8.2)
RBC # BLD AUTO: 4.24 X10(6)UL
SODIUM SERPL-SCNC: 139 MMOL/L (ref 136–145)
WBC # BLD AUTO: 8.8 X10(3) UL (ref 4–11)

## 2023-12-12 PROCEDURE — 93970 EXTREMITY STUDY: CPT | Performed by: EMERGENCY MEDICINE

## 2023-12-12 PROCEDURE — 99223 1ST HOSP IP/OBS HIGH 75: CPT | Performed by: HOSPITALIST

## 2023-12-12 PROCEDURE — 71045 X-RAY EXAM CHEST 1 VIEW: CPT | Performed by: EMERGENCY MEDICINE

## 2023-12-12 RX ORDER — FUROSEMIDE 40 MG/1
40 TABLET ORAL 3 TIMES DAILY
Status: ON HOLD | COMMUNITY
End: 2023-12-16

## 2023-12-12 RX ORDER — FUROSEMIDE 10 MG/ML
60 INJECTION INTRAMUSCULAR; INTRAVENOUS ONCE
Status: COMPLETED | OUTPATIENT
Start: 2023-12-12 | End: 2023-12-12

## 2023-12-12 RX ORDER — CYCLOBENZAPRINE HCL 10 MG
TABLET ORAL
COMMUNITY
Start: 2023-10-03 | End: 2023-12-17

## 2023-12-12 RX ORDER — HYDRALAZINE HYDROCHLORIDE 20 MG/ML
10 INJECTION INTRAMUSCULAR; INTRAVENOUS
Status: DISCONTINUED | OUTPATIENT
Start: 2023-12-12 | End: 2023-12-17

## 2023-12-12 RX ORDER — HYDRALAZINE HYDROCHLORIDE 25 MG/1
TABLET, FILM COATED ORAL
COMMUNITY
Start: 2023-09-24 | End: 2023-12-17

## 2023-12-12 NOTE — ED INITIAL ASSESSMENT (HPI)
63YM c/c of swelling Pt state that he been having bilateral legs swelling for the last two weeks Pt state that been getting progressive worse during that time

## 2023-12-13 PROBLEM — E66.01 MORBID OBESITY WITH BMI OF 40.0-44.9, ADULT (HCC): Status: ACTIVE | Noted: 2019-05-03

## 2023-12-13 LAB
ANION GAP SERPL CALC-SCNC: 4 MMOL/L (ref 0–18)
ATRIAL RATE: 99 BPM
BASOPHILS # BLD AUTO: 0.04 X10(3) UL (ref 0–0.2)
BASOPHILS NFR BLD AUTO: 0.4 %
BUN BLD-MCNC: 33 MG/DL (ref 9–23)
CALCIUM BLD-MCNC: 8.8 MG/DL (ref 8.5–10.1)
CHLORIDE SERPL-SCNC: 108 MMOL/L (ref 98–112)
CO2 SERPL-SCNC: 28 MMOL/L (ref 21–32)
CREAT BLD-MCNC: 0.97 MG/DL
EGFRCR SERPLBLD CKD-EPI 2021: 88 ML/MIN/1.73M2 (ref 60–?)
EOSINOPHIL # BLD AUTO: 0.14 X10(3) UL (ref 0–0.7)
EOSINOPHIL NFR BLD AUTO: 1.4 %
ERYTHROCYTE [DISTWIDTH] IN BLOOD BY AUTOMATED COUNT: 12.9 %
EST. AVERAGE GLUCOSE BLD GHB EST-MCNC: 137 MG/DL (ref 68–126)
GLUCOSE BLD-MCNC: 136 MG/DL (ref 70–99)
GLUCOSE BLD-MCNC: 141 MG/DL (ref 70–99)
GLUCOSE BLD-MCNC: 146 MG/DL (ref 70–99)
GLUCOSE BLD-MCNC: 161 MG/DL (ref 70–99)
GLUCOSE BLD-MCNC: 170 MG/DL (ref 70–99)
HBA1C MFR BLD: 6.4 % (ref ?–5.7)
HCT VFR BLD AUTO: 35.3 %
HGB BLD-MCNC: 12.1 G/DL
IMM GRANULOCYTES # BLD AUTO: 0.02 X10(3) UL (ref 0–1)
IMM GRANULOCYTES NFR BLD: 0.2 %
LYMPHOCYTES # BLD AUTO: 1.52 X10(3) UL (ref 1–4)
LYMPHOCYTES NFR BLD AUTO: 15.3 %
MCH RBC QN AUTO: 30.6 PG (ref 26–34)
MCHC RBC AUTO-ENTMCNC: 34.3 G/DL (ref 31–37)
MCV RBC AUTO: 89.4 FL
MONOCYTES # BLD AUTO: 1.21 X10(3) UL (ref 0.1–1)
MONOCYTES NFR BLD AUTO: 12.2 %
NEUTROPHILS # BLD AUTO: 7.02 X10 (3) UL (ref 1.5–7.7)
NEUTROPHILS # BLD AUTO: 7.02 X10(3) UL (ref 1.5–7.7)
NEUTROPHILS NFR BLD AUTO: 70.5 %
OSMOLALITY SERPL CALC.SUM OF ELEC: 300 MOSM/KG (ref 275–295)
P AXIS: 49 DEGREES
P-R INTERVAL: 212 MS
PLATELET # BLD AUTO: 183 10(3)UL (ref 150–450)
POTASSIUM SERPL-SCNC: 3.3 MMOL/L (ref 3.5–5.1)
POTASSIUM SERPL-SCNC: 3.8 MMOL/L (ref 3.5–5.1)
Q-T INTERVAL: 368 MS
QRS DURATION: 88 MS
QTC CALCULATION (BEZET): 472 MS
R AXIS: -24 DEGREES
RBC # BLD AUTO: 3.95 X10(6)UL
SODIUM SERPL-SCNC: 140 MMOL/L (ref 136–145)
T AXIS: 60 DEGREES
VENTRICULAR RATE: 99 BPM
WBC # BLD AUTO: 10 X10(3) UL (ref 4–11)

## 2023-12-13 PROCEDURE — 99232 SBSQ HOSP IP/OBS MODERATE 35: CPT | Performed by: INTERNAL MEDICINE

## 2023-12-13 RX ORDER — ACETAMINOPHEN 500 MG
1000 TABLET ORAL EVERY 4 HOURS PRN
Status: DISCONTINUED | OUTPATIENT
Start: 2023-12-13 | End: 2023-12-17

## 2023-12-13 RX ORDER — MELATONIN
3 NIGHTLY PRN
Status: DISCONTINUED | OUTPATIENT
Start: 2023-12-13 | End: 2023-12-17

## 2023-12-13 RX ORDER — ONDANSETRON 2 MG/ML
4 INJECTION INTRAMUSCULAR; INTRAVENOUS EVERY 6 HOURS PRN
Status: DISCONTINUED | OUTPATIENT
Start: 2023-12-13 | End: 2023-12-17

## 2023-12-13 RX ORDER — HYDROCODONE BITARTRATE AND ACETAMINOPHEN 5; 325 MG/1; MG/1
2 TABLET ORAL EVERY 4 HOURS PRN
Status: DISCONTINUED | OUTPATIENT
Start: 2023-12-13 | End: 2023-12-17

## 2023-12-13 RX ORDER — HYDROCODONE BITARTRATE AND ACETAMINOPHEN 5; 325 MG/1; MG/1
1 TABLET ORAL EVERY 4 HOURS PRN
Status: DISCONTINUED | OUTPATIENT
Start: 2023-12-13 | End: 2023-12-17

## 2023-12-13 RX ORDER — NICOTINE POLACRILEX 4 MG
30 LOZENGE BUCCAL
Status: DISCONTINUED | OUTPATIENT
Start: 2023-12-13 | End: 2023-12-17

## 2023-12-13 RX ORDER — BISACODYL 10 MG
10 SUPPOSITORY, RECTAL RECTAL
Status: DISCONTINUED | OUTPATIENT
Start: 2023-12-13 | End: 2023-12-17

## 2023-12-13 RX ORDER — CARVEDILOL 12.5 MG/1
25 TABLET ORAL 2 TIMES DAILY WITH MEALS
Status: DISCONTINUED | OUTPATIENT
Start: 2023-12-13 | End: 2023-12-17

## 2023-12-13 RX ORDER — ENOXAPARIN SODIUM 100 MG/ML
0.5 INJECTION SUBCUTANEOUS DAILY
Status: DISCONTINUED | OUTPATIENT
Start: 2023-12-13 | End: 2023-12-17

## 2023-12-13 RX ORDER — POTASSIUM CHLORIDE 20 MEQ/1
40 TABLET, EXTENDED RELEASE ORAL ONCE
Status: COMPLETED | OUTPATIENT
Start: 2023-12-13 | End: 2023-12-13

## 2023-12-13 RX ORDER — PROCHLORPERAZINE EDISYLATE 5 MG/ML
5 INJECTION INTRAMUSCULAR; INTRAVENOUS EVERY 8 HOURS PRN
Status: DISCONTINUED | OUTPATIENT
Start: 2023-12-13 | End: 2023-12-17

## 2023-12-13 RX ORDER — POLYETHYLENE GLYCOL 3350 17 G/17G
17 POWDER, FOR SOLUTION ORAL DAILY PRN
Status: DISCONTINUED | OUTPATIENT
Start: 2023-12-13 | End: 2023-12-17

## 2023-12-13 RX ORDER — GABAPENTIN 100 MG/1
100 CAPSULE ORAL 3 TIMES DAILY
Status: DISCONTINUED | OUTPATIENT
Start: 2023-12-13 | End: 2023-12-15

## 2023-12-13 RX ORDER — ENOXAPARIN SODIUM 100 MG/ML
40 INJECTION SUBCUTANEOUS DAILY
Status: DISCONTINUED | OUTPATIENT
Start: 2023-12-13 | End: 2023-12-13

## 2023-12-13 RX ORDER — ACETAMINOPHEN 500 MG
500 TABLET ORAL EVERY 4 HOURS PRN
Status: DISCONTINUED | OUTPATIENT
Start: 2023-12-13 | End: 2023-12-13

## 2023-12-13 RX ORDER — NICOTINE POLACRILEX 4 MG
15 LOZENGE BUCCAL
Status: DISCONTINUED | OUTPATIENT
Start: 2023-12-13 | End: 2023-12-17

## 2023-12-13 RX ORDER — DEXTROSE MONOHYDRATE 25 G/50ML
50 INJECTION, SOLUTION INTRAVENOUS
Status: DISCONTINUED | OUTPATIENT
Start: 2023-12-13 | End: 2023-12-17

## 2023-12-13 RX ORDER — ECHINACEA PURPUREA EXTRACT 125 MG
1 TABLET ORAL
Status: DISCONTINUED | OUTPATIENT
Start: 2023-12-13 | End: 2023-12-17

## 2023-12-13 RX ORDER — FUROSEMIDE 10 MG/ML
60 INJECTION INTRAMUSCULAR; INTRAVENOUS
Status: COMPLETED | OUTPATIENT
Start: 2023-12-13 | End: 2023-12-15

## 2023-12-13 RX ORDER — POTASSIUM CHLORIDE 20 MEQ/1
40 TABLET, EXTENDED RELEASE ORAL EVERY 4 HOURS
Status: COMPLETED | OUTPATIENT
Start: 2023-12-13 | End: 2023-12-13

## 2023-12-13 RX ORDER — NIFEDIPINE 60 MG/1
60 TABLET, EXTENDED RELEASE ORAL DAILY
Status: DISCONTINUED | OUTPATIENT
Start: 2023-12-13 | End: 2023-12-17

## 2023-12-13 RX ORDER — SENNOSIDES 8.6 MG
17.2 TABLET ORAL NIGHTLY PRN
Status: DISCONTINUED | OUTPATIENT
Start: 2023-12-13 | End: 2023-12-17

## 2023-12-13 RX ORDER — LEVOTHYROXINE SODIUM 0.03 MG/1
25 TABLET ORAL
Status: DISCONTINUED | OUTPATIENT
Start: 2023-12-13 | End: 2023-12-17

## 2023-12-13 RX ORDER — ENEMA 19; 7 G/133ML; G/133ML
1 ENEMA RECTAL ONCE AS NEEDED
Status: DISCONTINUED | OUTPATIENT
Start: 2023-12-13 | End: 2023-12-17

## 2023-12-13 NOTE — PHYSICAL THERAPY NOTE
PHYSICAL THERAPY EVALUATION - INPATIENT     Room Number: 2604/2604-A  Evaluation Date: 12/13/2023  Type of Evaluation: Initial  Physician Order: PT Eval and Treat    Presenting Problem: LE edema  Co-Morbidities : HTN, DM  Reason for Therapy: Mobility Dysfunction and Discharge Planning    History related to current admission: Patient is a 59year old male admitted on 12/12/2023 from home for LE edema. US doppler (-) for B DVT. ASSESSMENT   In this PT evaluation, the patient presents with the following impairments LE edema, LE strength deficits, limited endurance, and decreased activity tolerance. These impairments and comorbidities manifest themselves as functional limitations in independent bed mobility, transfers, and gait. The patient is below baseline and would benefit from skilled inpatient PT to address the above deficits to assist patient in returning to prior to level of function. Functional outcome measures completed include [facility]. The AM-PAC '6-Clicks' Inpatient Basic Mobility Short Form was completed and this patient is demonstrating a Approx Degree of Impairment: 92.36%  degree of impairment in mobility. Research supports that patients with this level of impairment may benefit from EMERITA, pending progress with functional mobility assessment. DISCHARGE RECOMMENDATIONS  PT Discharge Recommendations: Sub-acute rehabilitation    PLAN  PT Treatment Plan: Bed mobility; Endurance; Energy conservation;Patient education;Gait training;Balance training;Transfer training;Strengthening  Rehab Potential : Good  Frequency (Obs): 3-5x/week  Number of Visits to Meet Established Goals: 5      CURRENT GOALS    Goal #1 Patient is able to demonstrate supine - sit EOB @ level: minimum assistance     Goal #2 Patient is able to demonstrate transfers Sit to/from Stand at assistance level: minimum assistance     Goal #3 Patient is able to transfer to bedside chair with MIN assist     Goal #4    Goal #5    Goal #6 Goal Comments: Goals established on 2023    HOME SITUATION  Type of Home: House   Home Layout: One level                Lives With: Alone  Drives: Yes          Prior Level of [place]: Pt lives in single story home alone. Pt typically independent with ADL and mobility. Pt is able to ambulate short distances with RW. Pt reports recent difficulty getting OOB so he has been sleeping in his W/C.    SUBJECTIVE  \"I want to wait until I get more water off my legs before I get up. \"       OBJECTIVE  Precautions: Bed/chair alarm  Fall Risk: High fall risk    WEIGHT BEARING RESTRICTION  Weight Bearing Restriction: None                PAIN ASSESSMENT  Ratin          COGNITION  Overall Cognitive Status:  WFL - within functional limits       RANGE OF MOTION AND STRENGTH ASSESSMENT  Upper extremity ROM and strength are within functional limits     Lower extremity ROM is within functional limits     Lower extremity strength is within functional limits except for the following:    Right Hip flexion  2+/5  Left Hip flexion  2+/5  Right Knee extension  2+/5  Left Knee extension  2+/5      BALANCE  Static Sitting: Not tested  Dynamic Sitting: Not tested  Static Standing: Not tested  Dynamic Standing: Not tested    ADDITIONAL TESTS                                    ACTIVITY TOLERANCE                         O2 WALK       NEUROLOGICAL FINDINGS                        AM-PAC '6-Clicks' INPATIENT SHORT FORM - BASIC MOBILITY  How much difficulty does the patient currently have. .. Patient Difficulty: Turning over in bed (including adjusting bedclothes, sheets and blankets)?: A Lot   Patient Difficulty: Sitting down on and standing up from a chair with arms (e.g., wheelchair, bedside commode, etc.): Unable   Patient Difficulty: Moving from lying on back to sitting on the side of the bed?: Unable   How much help from another person does the patient currently need. ..    Help from Another: Moving to and from a bed to a chair (including a wheelchair)?: Total   Help from Another: Need to walk in hospital room?: Total   Help from Another: Climbing 3-5 steps with a railing?: Total       AM-PAC Score:  Raw Score: 7   Approx Degree of Impairment: 92.36%   Standardized Score (AM-PAC Scale): 26.42   CMS Modifier (G-Code): CM    FUNCTIONAL ABILITY STATUS  Gait Assessment   Functional Mobility/Gait Assessment  Gait Assistance: Not tested  Distance (ft): 0    Skilled Therapy Provided     Therapeutic exercise:  Pt assisted with brief change. VC for UE placement for rolling. Pt able to remain in sidelying with supervision with use of UE on bed rail. Pt participated in [address] in supine with good tolerance. Pt assisted with repositioning in bed- requires MAX assist. VC provided for use of UEs on bed rails. Bed Mobility:  Rolling: MOD  Supine to sit: NT   Sit to supine: NT     Transfer Mobility:  Sit to stand: NT   Stand to sit: NT  Gait = NT    Therapist comments:   Pt educated on importance of mobility, positional change, and atrophy prevention. Pt declining OOB mobility. Exercise/Education Provided:  Bed mobility  Energy conservation  Functional activity tolerated  Gait training  Posture  Strengthening  Transfer training    Patient End of Session: In bed;Needs met;Call light within reach;RN aware of session/findings; All patient questions and concerns addressed; Alarm set      Patient Evaluation Complexity Level:  History Moderate - 1 or 2 personal factors and/or co-morbidities   Examination of body systems Low - addressing 1-2 elements   Clinical Presentation Low - Stable   Clinical Decision Making Low - Stable       PT Session Time: 25 minutes  Therapeutic Activity: 8 minutes

## 2023-12-13 NOTE — PLAN OF CARE
Admitted this 58 y/o male c/o BLE edema . Swelling has increased so much that is inhibited patient's ability to ambulate around his own home. Database /skin assessment done with co TABITHA Chinchilla ( see LAD flowsheet)          Tele shows SR with 1st AVB. C/o BLE pain lesia. With movement , Tylenol given as needed. Cont. Monitor per tele/labs/v/s. Instructed  to call staff when needed help , placed call light w/in reach.

## 2023-12-13 NOTE — PLAN OF CARE
Assumed care of patient at 1. Alert and oriented x4. On room air with adequate O2 saturations. NSR on tele. Denies complaints of chest pain or shortness of breath. Voiding per male purewick with good UOP. Continent of bowel. PRN tylenol given for pain in legs. Legs wrapped in ace wraps. Mepilexes on both heels until wound care sees. Clean,dry,intact. Patient updated on plan of care, verbalized understanding. Plan:  -continue diuresis  -daily wt and I/O  -PT/OT to see  -wound care to see    Problem: CARDIOVASCULAR - ADULT  Goal: Maintains optimal cardiac output and hemodynamic stability  Description: INTERVENTIONS:  - Monitor vital signs, rhythm, and trends  - Monitor for bleeding, hypotension and signs of decreased cardiac output  - Evaluate effectiveness of vasoactive medications to optimize hemodynamic stability  - Monitor arterial and/or venous puncture sites for bleeding and/or hematoma  - Assess quality of pulses, skin color and temperature  - Assess for signs of decreased coronary artery perfusion - ex. Angina  - Evaluate fluid balance, assess for edema, trend weights  Outcome: Progressing  Goal: Absence of cardiac arrhythmias or at baseline  Description: INTERVENTIONS:  - Continuous cardiac monitoring, monitor vital signs, obtain 12 lead EKG if indicated  - Evaluate effectiveness of antiarrhythmic and heart rate control medications as ordered  - Initiate emergency measures for life threatening arrhythmias  - Monitor electrolytes and administer replacement therapy as ordered  Outcome: Progressing     Problem: SAFETY ADULT - FALL  Goal: Free from fall injury  Description: INTERVENTIONS:  - Assess pt frequently for physical needs  - Identify cognitive and physical deficits and behaviors that affect risk of falls.   - [place] fall precautions as indicated by assessment.  - Educate pt/family on patient safety including physical limitations  - Instruct pt to call for assistance with activity based on assessment  - Modify environment to reduce risk of injury  - Provide assistive devices as appropriate  - Consider OT/PT consult to assist with strengthening/mobility  - Encourage toileting schedule  Outcome: Progressing     Problem: SKIN/TISSUE INTEGRITY - ADULT  Goal: Skin integrity remains intact  Description: INTERVENTIONS  - Assess and document risk factors for pressure ulcer development  - Assess and document skin integrity  - Monitor for areas of redness and/or skin breakdown  - Initiate interventions, skin care algorithm/standards of care as needed  Outcome: Progressing  Goal: Incision(s), wounds(s) or drain site(s) healing without S/S of infection  Description: INTERVENTIONS:  - Assess and document risk factors for pressure ulcer development  - Assess and document skin integrity  - Assess and document dressing/incision, wound bed, drain sites and surrounding tissue  - Implement wound care per orders  - Initiate isolation precautions as appropriate  - Initiate Pressure Ulcer prevention bundle as indicated  Outcome: Progressing  Goal: Oral mucous membranes remain intact  Description: INTERVENTIONS  - Assess oral mucosa and hygiene practices  - Implement preventative oral hygiene regimen  - Implement oral medicated treatments as ordered  Outcome: Progressing

## 2023-12-13 NOTE — ED QUICK NOTES
Orders for admission, patient is aware of plan and ready to go upstairs. Any questions, please call ED RN Pippa at extension [phone]. Patient Covid vaccination status: Unvaccinated     COVID Test Ordered in ED: None    COVID Suspicion at Admission: N/A    Running Infusions:  None    Mental Status/LOC at time of transport: A&Ox4    Other pertinent information: Patient using urinal to void post lasix. In ultrasound now.    CIWA score: N/A   NIH score:  N/A

## 2023-12-14 ENCOUNTER — APPOINTMENT (OUTPATIENT)
Dept: GENERAL RADIOLOGY | Facility: HOSPITAL | Age: 63
End: 2023-12-14
Attending: INTERNAL MEDICINE
Payer: MEDICAID

## 2023-12-14 LAB
GLUCOSE BLD-MCNC: 137 MG/DL (ref 70–99)
GLUCOSE BLD-MCNC: 154 MG/DL (ref 70–99)
GLUCOSE BLD-MCNC: 158 MG/DL (ref 70–99)
GLUCOSE BLD-MCNC: 171 MG/DL (ref 70–99)
GLUCOSE BLD-MCNC: 173 MG/DL (ref 70–99)
POTASSIUM SERPL-SCNC: 3.7 MMOL/L (ref 3.5–5.1)

## 2023-12-14 PROCEDURE — 73523 X-RAY EXAM HIPS BI 5/> VIEWS: CPT | Performed by: INTERNAL MEDICINE

## 2023-12-14 PROCEDURE — 72110 X-RAY EXAM L-2 SPINE 4/>VWS: CPT | Performed by: INTERNAL MEDICINE

## 2023-12-14 PROCEDURE — 99232 SBSQ HOSP IP/OBS MODERATE 35: CPT | Performed by: INTERNAL MEDICINE

## 2023-12-14 RX ORDER — HYDRALAZINE HYDROCHLORIDE 25 MG/1
25 TABLET, FILM COATED ORAL EVERY 8 HOURS SCHEDULED
Status: DISCONTINUED | OUTPATIENT
Start: 2023-12-14 | End: 2023-12-17

## 2023-12-14 RX ORDER — POTASSIUM CHLORIDE 20 MEQ/1
40 TABLET, EXTENDED RELEASE ORAL ONCE
Status: COMPLETED | OUTPATIENT
Start: 2023-12-14 | End: 2023-12-14

## 2023-12-14 NOTE — PLAN OF CARE
Pt complains of BLE pain treated with tylenol. A&O x 4. Spo2  95% on RA. Patient NSR on monitor with 1st degree HB. Abdomen soft and non tender with active bowel sounds in all 4 quadrants. Male purwick in place. BLE swelling, Bilaterally foot wounds on heels/soles of feet covered with mepilex. Wound care to see. Patient updated with POC. Bed in lowest position, bed alarm on, call light within reach. Problem: CARDIOVASCULAR - ADULT  Goal: Maintains optimal cardiac output and hemodynamic stability  Description: INTERVENTIONS:  - Monitor vital signs, rhythm, and trends  - Monitor for bleeding, hypotension and signs of decreased cardiac output  - Evaluate effectiveness of vasoactive medications to optimize hemodynamic stability  - Monitor arterial and/or venous puncture sites for bleeding and/or hematoma  - Assess quality of pulses, skin color and temperature  - Assess for signs of decreased coronary artery perfusion - ex. Angina  - Evaluate fluid balance, assess for edema, trend weights  Outcome: Progressing  Goal: Absence of cardiac arrhythmias or at baseline  Description: INTERVENTIONS:  - Continuous cardiac monitoring, monitor vital signs, obtain 12 lead EKG if indicated  - Evaluate effectiveness of antiarrhythmic and heart rate control medications as ordered  - Initiate emergency measures for life threatening arrhythmias  - Monitor electrolytes and administer replacement therapy as ordered  Outcome: Progressing     Problem: SAFETY ADULT - FALL  Goal: Free from fall injury  Description: INTERVENTIONS:  - Assess pt frequently for physical needs  - Identify cognitive and physical deficits and behaviors that affect risk of falls.   - [place] fall precautions as indicated by assessment.  - Educate pt/family on patient safety including physical limitations  - Instruct pt to call for assistance with activity based on assessment  - Modify environment to reduce risk of injury  - Provide assistive devices as appropriate  - Consider OT/PT consult to assist with strengthening/mobility  - Encourage toileting schedule  Outcome: Progressing     Problem: SKIN/TISSUE INTEGRITY - ADULT  Goal: Skin integrity remains intact  Description: INTERVENTIONS  - Assess and document risk factors for pressure ulcer development  - Assess and document skin integrity  - Monitor for areas of redness and/or skin breakdown  - Initiate interventions, skin care algorithm/standards of care as needed  Outcome: Progressing  Goal: Incision(s), wounds(s) or drain site(s) healing without S/S of infection  Description: INTERVENTIONS:  - Assess and document risk factors for pressure ulcer development  - Assess and document skin integrity  - Assess and document dressing/incision, wound bed, drain sites and surrounding tissue  - Implement wound care per orders  - Initiate isolation precautions as appropriate  - Initiate Pressure Ulcer prevention bundle as indicated  Outcome: Progressing  Goal: Oral mucous membranes remain intact  Description: INTERVENTIONS  - Assess oral mucosa and hygiene practices  - Implement preventative oral hygiene regimen  - Implement oral medicated treatments as ordered  Outcome: Progressing

## 2023-12-14 NOTE — PLAN OF CARE
Assumed care of patient at 0730. Alert and oriented x4. On room air with adequate O2 saturations. NSR on tele. Denies any complaints of chest pain or shortness of breath. Male Alejandro Johnson in place with good output. Legs wrapped in ACE bandages. Mepilexes on bilateral heels with legs elevated. Pt is max assist. Patient updated on plan of care, verbalized understanding. Plan:  -daily wt  -I/O  -Renal/BLE Arterial ultrasound  -wound care to see    Problem: CARDIOVASCULAR - ADULT  Goal: Maintains optimal cardiac output and hemodynamic stability  Description: INTERVENTIONS:  - Monitor vital signs, rhythm, and trends  - Monitor for bleeding, hypotension and signs of decreased cardiac output  - Evaluate effectiveness of vasoactive medications to optimize hemodynamic stability  - Monitor arterial and/or venous puncture sites for bleeding and/or hematoma  - Assess quality of pulses, skin color and temperature  - Assess for signs of decreased coronary artery perfusion - ex. Angina  - Evaluate fluid balance, assess for edema, trend weights  Outcome: Progressing  Goal: Absence of cardiac arrhythmias or at baseline  Description: INTERVENTIONS:  - Continuous cardiac monitoring, monitor vital signs, obtain 12 lead EKG if indicated  - Evaluate effectiveness of antiarrhythmic and heart rate control medications as ordered  - Initiate emergency measures for life threatening arrhythmias  - Monitor electrolytes and administer replacement therapy as ordered  Outcome: Progressing     Problem: SAFETY ADULT - FALL  Goal: Free from fall injury  Description: INTERVENTIONS:  - Assess pt frequently for physical needs  - Identify cognitive and physical deficits and behaviors that affect risk of falls.   - [place] fall precautions as indicated by assessment.  - Educate pt/family on patient safety including physical limitations  - Instruct pt to call for assistance with activity based on assessment  - Modify environment to reduce risk of injury  - Provide assistive devices as appropriate  - Consider OT/PT consult to assist with strengthening/mobility  - Encourage toileting schedule  Outcome: Progressing     Problem: SKIN/TISSUE INTEGRITY - ADULT  Goal: Skin integrity remains intact  Description: INTERVENTIONS  - Assess and document risk factors for pressure ulcer development  - Assess and document skin integrity  - Monitor for areas of redness and/or skin breakdown  - Initiate interventions, skin care algorithm/standards of care as needed  Outcome: Progressing  Goal: Incision(s), wounds(s) or drain site(s) healing without S/S of infection  Description: INTERVENTIONS:  - Assess and document risk factors for pressure ulcer development  - Assess and document skin integrity  - Assess and document dressing/incision, wound bed, drain sites and surrounding tissue  - Implement wound care per orders  - Initiate isolation precautions as appropriate  - Initiate Pressure Ulcer prevention bundle as indicated  Outcome: Progressing  Goal: Oral mucous membranes remain intact  Description: INTERVENTIONS  - Assess oral mucosa and hygiene practices  - Implement preventative oral hygiene regimen  - Implement oral medicated treatments as ordered  Outcome: Progressing

## 2023-12-14 NOTE — CM/SW NOTE
MSW met with pt at bedside, provided [facility] list . Pt however stating he plans to go home and do op pt. MSW will page md to see if he will sign OP PT order. Pt agreeable to resume [facility] until he can get into OP PT. Pt states he has a county caregiver for 3 hours 2x a week. States he can transfer from w/c to bathroom. and has access to food and meals on wheels. Pt states sister will come today and MSW left # so they can talk as a group about dc planning if pt wishes. Choice list provided to pt, other options still pending. PASRR completed and loaded into referral.    MD paged to discuss dc plan. ( Referral for OP PT)    Current DC plan: Home with [facility]. Pt does not really want [facility] because his insurance does not pay for rehab and he only gets restorative care.  Referral still pending in case pt changes mind

## 2023-12-14 NOTE — CM/SW NOTE
HOME SITUATION  Type of Home: House   Home Layout: One level     Lives With: Alone  Drives: Yes     Prior Level of [place]: Pt lives in single story home alone. Pt typically independent with ADL and mobility. Pt is able to ambulate short distances with RW. Pt reports recent difficulty getting OOB so he has been sleeping in his W/C. (PT evaluation note)    Patient admitted from home for lower extremity edema. Wound care consult ordered and is pending at this time. Therapy recommending [facility] for discharge; CM started referral in [facility] system. Patient has had past admissions to [address] [place] [address] and [facility] for [facility] per chart review of SW/CM progress notes. CM/SW to follow up with patient for [facility] choice list when available.      Katharine Elizabeth RN Case Manager [id number]

## 2023-12-15 ENCOUNTER — APPOINTMENT (OUTPATIENT)
Dept: ULTRASOUND IMAGING | Facility: HOSPITAL | Age: 63
End: 2023-12-15
Attending: INTERNAL MEDICINE
Payer: MEDICAID

## 2023-12-15 LAB
ANION GAP SERPL CALC-SCNC: 5 MMOL/L (ref 0–18)
BUN BLD-MCNC: 37 MG/DL (ref 9–23)
CALCIUM BLD-MCNC: 8.9 MG/DL (ref 8.5–10.1)
CHLORIDE SERPL-SCNC: 107 MMOL/L (ref 98–112)
CO2 SERPL-SCNC: 27 MMOL/L (ref 21–32)
CREAT BLD-MCNC: 1.24 MG/DL
EGFRCR SERPLBLD CKD-EPI 2021: 66 ML/MIN/1.73M2 (ref 60–?)
GLUCOSE BLD-MCNC: 135 MG/DL (ref 70–99)
GLUCOSE BLD-MCNC: 141 MG/DL (ref 70–99)
GLUCOSE BLD-MCNC: 149 MG/DL (ref 70–99)
GLUCOSE BLD-MCNC: 158 MG/DL (ref 70–99)
GLUCOSE BLD-MCNC: 172 MG/DL (ref 70–99)
OSMOLALITY SERPL CALC.SUM OF ELEC: 301 MOSM/KG (ref 275–295)
POTASSIUM SERPL-SCNC: 3.6 MMOL/L (ref 3.5–5.1)
POTASSIUM SERPL-SCNC: 3.8 MMOL/L (ref 3.5–5.1)
SODIUM SERPL-SCNC: 139 MMOL/L (ref 136–145)

## 2023-12-15 PROCEDURE — 99232 SBSQ HOSP IP/OBS MODERATE 35: CPT | Performed by: INTERNAL MEDICINE

## 2023-12-15 PROCEDURE — 93925 LOWER EXTREMITY STUDY: CPT | Performed by: INTERNAL MEDICINE

## 2023-12-15 PROCEDURE — 93975 VASCULAR STUDY: CPT | Performed by: INTERNAL MEDICINE

## 2023-12-15 PROCEDURE — 76775 US EXAM ABDO BACK WALL LIM: CPT | Performed by: INTERNAL MEDICINE

## 2023-12-15 RX ORDER — FUROSEMIDE 40 MG/1
40 TABLET ORAL
Status: DISCONTINUED | OUTPATIENT
Start: 2023-12-16 | End: 2023-12-17

## 2023-12-15 RX ORDER — GABAPENTIN 300 MG/1
300 CAPSULE ORAL 2 TIMES DAILY
Status: DISCONTINUED | OUTPATIENT
Start: 2023-12-15 | End: 2023-12-16

## 2023-12-15 RX ORDER — TIZANIDINE 4 MG/1
4 TABLET ORAL EVERY 6 HOURS PRN
Status: DISCONTINUED | OUTPATIENT
Start: 2023-12-15 | End: 2023-12-17

## 2023-12-15 NOTE — WOUND PROGRESS NOTE
[facility]  Inpatient Wound Care Contact Note    Stefanie Tam Patient Status:  Inpatient    1960 MRN [id number]   [facility] 2NE-A Attending Florina Wharton MD   Hosp Day # 2 PCP MADELINE FENTON, DO     Attempted to see patient for follow up wound care session. Patient is currently unavailable due to a procedure/ ultrasound per RN. We will continue to follow this patient while in-house and assist with wound care discharge planning. Please call me at [phone] or page me at [phone] if you have any questions about this consultation and plan of care. If unable to reach me at these, please call the Inpatient Wound Care pager at #[phone].       Thank you,    Kenzie Hill RN BSN [facility] [place]  [address] [place] [address]  [facility]  [facility], [address]  [phone]  Spectralink: [phone]  Pager: [phone]  VM: [phone]

## 2023-12-15 NOTE — CM/SW NOTE
11:49pm  MSW called [facility] to follow up on [address] referral. MSW spoke to [facility] and they can accept pt. MSW met with pt, he wants to talk to his sister more about dc home vs. [facility]. He has the [facility] list, he is just not sure his next step yet as he is waiting to see what happens medically.  MSW to follow up

## 2023-12-15 NOTE — PLAN OF CARE
Pt complains of right leg/hip pain relieved with tylenol 1000 mg. A&O x 4.3L nasal canula for when sleeping spo2 98%. Patient NSR on monitor. Abdomen soft and non tender with active bowel sounds in all 4 quadrants. Wounds on bilateral soles of heels cleansed with saline and changed mepliex. Patient updated with POC. Bed in lowest position, call light within reach, and bed alarm on. Pt NPO at [facility] for [address] of kidney tomorrow. Problem: CARDIOVASCULAR - ADULT  Goal: Maintains optimal cardiac output and hemodynamic stability  Description: INTERVENTIONS:  - Monitor vital signs, rhythm, and trends  - Monitor for bleeding, hypotension and signs of decreased cardiac output  - Evaluate effectiveness of vasoactive medications to optimize hemodynamic stability  - Monitor arterial and/or venous puncture sites for bleeding and/or hematoma  - Assess quality of pulses, skin color and temperature  - Assess for signs of decreased coronary artery perfusion - ex. Angina  - Evaluate fluid balance, assess for edema, trend weights  Outcome: Progressing  Goal: Absence of cardiac arrhythmias or at baseline  Description: INTERVENTIONS:  - Continuous cardiac monitoring, monitor vital signs, obtain 12 lead EKG if indicated  - Evaluate effectiveness of antiarrhythmic and heart rate control medications as ordered  - Initiate emergency measures for life threatening arrhythmias  - Monitor electrolytes and administer replacement therapy as ordered  Outcome: Progressing     Problem: SAFETY ADULT - FALL  Goal: Free from fall injury  Description: INTERVENTIONS:  - Assess pt frequently for physical needs  - Identify cognitive and physical deficits and behaviors that affect risk of falls.   - [place] fall precautions as indicated by assessment.  - Educate pt/family on patient safety including physical limitations  - Instruct pt to call for assistance with activity based on assessment  - Modify environment to reduce risk of injury  - Provide assistive devices as appropriate  - Consider OT/PT consult to assist with strengthening/mobility  - Encourage toileting schedule  Outcome: Progressing     Problem: SKIN/TISSUE INTEGRITY - ADULT  Goal: Skin integrity remains intact  Description: INTERVENTIONS  - Assess and document risk factors for pressure ulcer development  - Assess and document skin integrity  - Monitor for areas of redness and/or skin breakdown  - Initiate interventions, skin care algorithm/standards of care as needed  Outcome: Progressing  Goal: Incision(s), wounds(s) or drain site(s) healing without S/S of infection  Description: INTERVENTIONS:  - Assess and document risk factors for pressure ulcer development  - Assess and document skin integrity  - Assess and document dressing/incision, wound bed, drain sites and surrounding tissue  - Implement wound care per orders  - Initiate isolation precautions as appropriate  - Initiate Pressure Ulcer prevention bundle as indicated  Outcome: Progressing  Goal: Oral mucous membranes remain intact  Description: INTERVENTIONS  - Assess oral mucosa and hygiene practices  - Implement preventative oral hygiene regimen  - Implement oral medicated treatments as ordered  Outcome: Progressing

## 2023-12-15 NOTE — PROGRESS NOTES
12/15/23 0347 12/15/23 0348 12/15/23 0349   Oxygen Therapy   SpO2 (!) 79 % (!) 83 % 94 %   O2 Device Nasal cannula Nasal cannula Nasal cannula   O2 Flow Rate (L/min) 3 L/min 3 L/min 3 L/min      12/15/23 0350 12/15/23 0351 12/15/23 0356   Oxygen Therapy   SpO2 (!) 80 % (!) 75 % 98 %   O2 Device Nasal cannula Nasal cannula Nasal cannula   O2 Flow Rate (L/min) 3 L/min 3 L/min 4 L/min      12/15/23 0357   Oxygen Therapy   SpO2 98 %   O2 Device Nasal cannula   O2 Flow Rate (L/min) 4 L/min     Oxygen needed while sleeping, pt is a mouth breathing, spo2 increased with adding a pillow under head and increasing to 4L. RA when awake.

## 2023-12-15 NOTE — CONSULTS
[facility]  Report of Inpatient Wound Care Consultation    Daniella Watters Patient Status:  Inpatient    1960 MRN [id number]   [facility] 2NE-A Attending Cristóbal Oliva MD   Hosp Day # 2 PCP Enid Fong DO     Reason for Consultation:  Foot ulcers    History of Present Illness:  Daniella Watters is a a(n) 59year old male. Patient presents with diabetic foot ulcers. Patient states he\" noticed the wounds on his feet about a week ago due to the swelling on his legs\". Denies pain in the wounds but has difficulty lifting and repostioning the lower legs. History:  Past Medical History:   Diagnosis Date    Diabetes ([address]     Dyslipidemia 2011    High blood pressure     HTN (hypertension) 2011    Hyperlipidemia     Impaired fasting glucose 2011    Proteinuria 2011    Subclinical hypothyroidism 2011    Unspecified essential hypertension     Visual impairment      Past Surgical History:   Procedure Laterality Date    MASTOIDECTOMY,SIMPLE      done 30 years ago. Revised in       reports that he has never smoked. He has never used smokeless tobacco. He reports that he does not drink alcohol and does not use drugs.     Allergies:  @ALLERGY    Laboratory Data:  Lab Results   Component Value Date    K 3.6 12/15/2023    PGLU 158 12/15/2023         Impression:  Wound 23 Heel Left;Plantar (Active)   Date First Assessed/Time First Assessed: 23 0400   Present on Original Admission: Yes  Primary Wound Type: Diabetic Ulcer  Location: Heel  Wound Location Orientation: Left;Plantar      Assessments 12/15/2023 11:44 AM   Wound Image      Drainage Amount Moderate   Drainage Description Serosanguineous   Wound Length (cm) 6.5 cm   Wound Width (cm) 6 cm   Wound Surface Area (cm^2) 39 cm^2   Wound Depth (cm) 0.1 cm   Wound Volume (cm^3) 3.9 cm^3   Margins Well-defined edges   Yue-wound Assessment Maceration;Moist;Edema   Wound Granulation Tissue Pink;Pale [place];Spongy   Wound Bed Granulation (%) 95 % (with some fibrin)   Wound Bed Epithelium (%) 5 %   Wound Odor None   Angeles Scale Grade 2       Wound 12/13/23 Heel Right;Plantar (Active)   Date First Assessed/Time First Assessed: 12/13/23 0400   Primary Wound Type: Diabetic Ulcer  Location: Heel  Wound Location Orientation: Right;Plantar      Assessments 12/15/2023 11:04 AM   Wound Image      Drainage Amount Small   Drainage Description Serosanguineous   Wound Length (cm) 3.3 cm   Wound Width (cm) 2.3 cm   Wound Surface Area (cm^2) 7.59 cm^2   Wound Depth (cm) 0.1 cm   Wound Volume (cm^3) 0.759 cm^3   Margins Well-defined edges   Non-staged Wound Description Full thickness   Yue-wound Assessment Edema; Maceration;Moist   Wound Granulation Tissue Pink;Red   Wound Bed Granulation (%) 50 %   Wound Bed Slough (%) 50 %   Wound Odor None   Angeles Scale Grade 2       Wound 12/13/23 Ankle Anterior; Left (Active)   Date First Assessed/Time First Assessed: 12/13/23 0511   Location: Ankle  Wound Location Orientation: Anterior; Left      Assessments 12/15/2023 11:10 AM   Wound Image     Drainage Amount None   Wound Length (cm) 0.8 cm   Wound Width (cm) 2.4 cm   Wound Surface Area (cm^2) 1.92 cm^2   Wound Depth (cm) 0 cm   Wound Volume (cm^3) 0 cm^3   Margins Attached edges   Yue-wound Assessment Dry   Shape Intact blood filled blister        Wound Cleaning and Dressings: 1. Right  heel  . Showering directions: May shower with protection- use cast cover or shower boot at home  Wound cleansing:  Cleanse with saline  Wound product: Honey gel and Bordered foam  Dressing change frequency:  Change dressing daily and/or PRN      2. Left heel  . Showering directions: May shower with protection- use cast cover or shower boot at home  Wound cleansing:  Cleanse with saline  Wound product: Silver foam/bordered silver foam  Dressing change frequency:  Change dressing every other day and prn    3. Left anterior ankle  . Showering directions:  May shower with protection- use cast cover or shower boot at home  Wound cleansing:  Cleanse with saline  Wound product: Bordered foam  Dressing change frequency:  Change dressing every other day and prn      Compression Therapy:   Spandagrip and Elevate leg(s) as much as possible- apply spandagrip F [ low compression] at the base of the toes to below the knee area. Should there be any issues or pain with the spandagrip , remove the compression. See flow sheet on edema for lower extremity assessment      Miscellaneous/Additional Orders:  Offloading: Turn Q 2 hours and Heel off-loading boot(s), waffle cushion  Miscellaneous orders: Increase dietary protein intake. Dietitian to evaluate amount of protein supplements     Care Summary:  Care Summary: Discussed Plan of Care at beside with patient. Patient verbally acknowledges understanding of all instructions and all questions were answered. Additional Notes: Arterial ultrasound completed today      Recommendations:  Podiatry consult  May need to follow up at the wound clinic and with  homehealth care if discharged to home    Thank you for allowing me to participate in the care of your patient. Time Spent 45 minutes , Thank you.     Bernadette Rasmussen RN, BSN [facility]   Wound Care pager [phone]  12/15/2023  11:51 AM

## 2023-12-16 PROBLEM — M54.16 LUMBAR RADICULOPATHY: Status: ACTIVE | Noted: 2023-12-16

## 2023-12-16 LAB
GLUCOSE BLD-MCNC: 126 MG/DL (ref 70–99)
GLUCOSE BLD-MCNC: 158 MG/DL (ref 70–99)
GLUCOSE BLD-MCNC: 159 MG/DL (ref 70–99)
GLUCOSE BLD-MCNC: 164 MG/DL (ref 70–99)

## 2023-12-16 PROCEDURE — 99232 SBSQ HOSP IP/OBS MODERATE 35: CPT | Performed by: INTERNAL MEDICINE

## 2023-12-16 RX ORDER — GABAPENTIN 100 MG/1
100 CAPSULE ORAL 3 TIMES DAILY
Status: DISCONTINUED | OUTPATIENT
Start: 2023-12-16 | End: 2023-12-17

## 2023-12-16 RX ORDER — TRAMADOL HYDROCHLORIDE 50 MG/1
50 TABLET ORAL EVERY 8 HOURS PRN
Qty: 15 TABLET | Refills: 0 | Status: SHIPPED | OUTPATIENT
Start: 2023-12-16

## 2023-12-16 RX ORDER — TRAMADOL HYDROCHLORIDE 50 MG/1
50 TABLET ORAL EVERY 6 HOURS PRN
Status: DISCONTINUED | OUTPATIENT
Start: 2023-12-16 | End: 2023-12-17

## 2023-12-16 RX ORDER — HYDROCODONE BITARTRATE AND ACETAMINOPHEN 5; 325 MG/1; MG/1
1 TABLET ORAL EVERY 4 HOURS PRN
Qty: 15 TABLET | Refills: 0 | Status: SHIPPED | OUTPATIENT
Start: 2023-12-16

## 2023-12-16 RX ORDER — FUROSEMIDE 40 MG/1
40 TABLET ORAL 2 TIMES DAILY
Qty: 60 TABLET | Refills: 1 | Status: SHIPPED | OUTPATIENT
Start: 2023-12-16

## 2023-12-16 RX ORDER — GABAPENTIN 100 MG/1
100 CAPSULE ORAL 3 TIMES DAILY
Qty: 90 CAPSULE | Refills: 0 | Status: SHIPPED | OUTPATIENT
Start: 2023-12-16

## 2023-12-16 RX ORDER — HYDRALAZINE HYDROCHLORIDE 25 MG/1
25 TABLET, FILM COATED ORAL EVERY 8 HOURS SCHEDULED
Qty: 90 TABLET | Refills: 0 | Status: SHIPPED | OUTPATIENT
Start: 2023-12-16

## 2023-12-16 RX ORDER — POTASSIUM CHLORIDE 20 MEQ/1
40 TABLET, EXTENDED RELEASE ORAL ONCE
Status: COMPLETED | OUTPATIENT
Start: 2023-12-16 | End: 2023-12-16

## 2023-12-16 RX ORDER — TIZANIDINE 4 MG/1
4 TABLET ORAL EVERY 6 HOURS PRN
Qty: 20 TABLET | Refills: 0 | Status: SHIPPED | OUTPATIENT
Start: 2023-12-16

## 2023-12-16 NOTE — PLAN OF CARE
Assumed care of pt at 0730. A/Ox4. 4L O2 weaned down to 2L, satting at 94%. NSR with occasional PVC on monitor. Lung sounds diminished bilaterally lower bases. Abdomen soft and nontender with active bowel sounds to all 4 quadrants. Male purewick in place. Pt requires bedpan for BM. Wounds covered. Call light within reach, safety precautions in place. 1216 - Pt weaned to room air. Problem: CARDIOVASCULAR - ADULT  Goal: Maintains optimal cardiac output and hemodynamic stability  Description: INTERVENTIONS:  - Monitor vital signs, rhythm, and trends  - Monitor for bleeding, hypotension and signs of decreased cardiac output  - Evaluate effectiveness of vasoactive medications to optimize hemodynamic stability  - Monitor arterial and/or venous puncture sites for bleeding and/or hematoma  - Assess quality of pulses, skin color and temperature  - Assess for signs of decreased coronary artery perfusion - ex. Angina  - Evaluate fluid balance, assess for edema, trend weights  Outcome: Progressing  Goal: Absence of cardiac arrhythmias or at baseline  Description: INTERVENTIONS:  - Continuous cardiac monitoring, monitor vital signs, obtain 12 lead EKG if indicated  - Evaluate effectiveness of antiarrhythmic and heart rate control medications as ordered  - Initiate emergency measures for life threatening arrhythmias  - Monitor electrolytes and administer replacement therapy as ordered  Outcome: Progressing     Problem: SAFETY ADULT - FALL  Goal: Free from fall injury  Description: INTERVENTIONS:  - Assess pt frequently for physical needs  - Identify cognitive and physical deficits and behaviors that affect risk of falls.   - [place] fall precautions as indicated by assessment.  - Educate pt/family on patient safety including physical limitations  - Instruct pt to call for assistance with activity based on assessment  - Modify environment to reduce risk of injury  - Provide assistive devices as appropriate  - Consider OT/PT consult to assist with strengthening/mobility  - Encourage toileting schedule  Outcome: Progressing     Problem: SKIN/TISSUE INTEGRITY - ADULT  Goal: Skin integrity remains intact  Description: INTERVENTIONS  - Assess and document risk factors for pressure ulcer development  - Assess and document skin integrity  - Monitor for areas of redness and/or skin breakdown  - Initiate interventions, skin care algorithm/standards of care as needed  Outcome: Progressing  Goal: Incision(s), wounds(s) or drain site(s) healing without S/S of infection  Description: INTERVENTIONS:  - Assess and document risk factors for pressure ulcer development  - Assess and document skin integrity  - Assess and document dressing/incision, wound bed, drain sites and surrounding tissue  - Implement wound care per orders  - Initiate isolation precautions as appropriate  - Initiate Pressure Ulcer prevention bundle as indicated  Outcome: Progressing  Goal: Oral mucous membranes remain intact  Description: INTERVENTIONS  - Assess oral mucosa and hygiene practices  - Implement preventative oral hygiene regimen  - Implement oral medicated treatments as ordered  Outcome: Progressing

## 2023-12-16 NOTE — PLAN OF CARE
Patient complains of pain in right leg/hip treated with tylenol. A&O x 4. Pt on RA when awake and 2L nasal cannula when asleep spo2 94%. Patient NSR on monitor with occasional pvc. Abdomen soft and non tender with active bowel sounds in all 4 quadrants. Male purwick in place and bed pain used for lbm. Pt mepilex changed 12/15/23. Patient updated with POC. Bed in lowest position, call light within reach, and bed alarm on. Problem: CARDIOVASCULAR - ADULT  Goal: Maintains optimal cardiac output and hemodynamic stability  Description: INTERVENTIONS:  - Monitor vital signs, rhythm, and trends  - Monitor for bleeding, hypotension and signs of decreased cardiac output  - Evaluate effectiveness of vasoactive medications to optimize hemodynamic stability  - Monitor arterial and/or venous puncture sites for bleeding and/or hematoma  - Assess quality of pulses, skin color and temperature  - Assess for signs of decreased coronary artery perfusion - ex. Angina  - Evaluate fluid balance, assess for edema, trend weights  Outcome: Progressing  Goal: Absence of cardiac arrhythmias or at baseline  Description: INTERVENTIONS:  - Continuous cardiac monitoring, monitor vital signs, obtain 12 lead EKG if indicated  - Evaluate effectiveness of antiarrhythmic and heart rate control medications as ordered  - Initiate emergency measures for life threatening arrhythmias  - Monitor electrolytes and administer replacement therapy as ordered  Outcome: Progressing     Problem: SAFETY ADULT - FALL  Goal: Free from fall injury  Description: INTERVENTIONS:  - Assess pt frequently for physical needs  - Identify cognitive and physical deficits and behaviors that affect risk of falls.   - [place] fall precautions as indicated by assessment.  - Educate pt/family on patient safety including physical limitations  - Instruct pt to call for assistance with activity based on assessment  - Modify environment to reduce risk of injury  - Provide assistive devices as appropriate  - Consider OT/PT consult to assist with strengthening/mobility  - Encourage toileting schedule  Outcome: Progressing     Problem: SKIN/TISSUE INTEGRITY - ADULT  Goal: Skin integrity remains intact  Description: INTERVENTIONS  - Assess and document risk factors for pressure ulcer development  - Assess and document skin integrity  - Monitor for areas of redness and/or skin breakdown  - Initiate interventions, skin care algorithm/standards of care as needed  Outcome: Progressing  Goal: Incision(s), wounds(s) or drain site(s) healing without S/S of infection  Description: INTERVENTIONS:  - Assess and document risk factors for pressure ulcer development  - Assess and document skin integrity  - Assess and document dressing/incision, wound bed, drain sites and surrounding tissue  - Implement wound care per orders  - Initiate isolation precautions as appropriate  - Initiate Pressure Ulcer prevention bundle as indicated  Outcome: Progressing  Goal: Oral mucous membranes remain intact  Description: INTERVENTIONS  - Assess oral mucosa and hygiene practices  - Implement preventative oral hygiene regimen  - Implement oral medicated treatments as ordered  Outcome: Progressing

## 2023-12-16 NOTE — CM/SW NOTE
RN informed MATTEO pt chose [address] [place] [address] as his 1st choice facility. RN stated possible DC tomorrow, 12/17. MATTEO spoke w/ Kalee Floyd from [address] [place] [address] and stated they'll have a bed for pt on Sunday. MATTEO reserved their facility in [place] and sent clinical updates.      PLAN: Palmer Moscoso [address]

## 2023-12-17 VITALS
RESPIRATION RATE: 18 BRPM | TEMPERATURE: 98 F | OXYGEN SATURATION: 98 % | DIASTOLIC BLOOD PRESSURE: 76 MMHG | BODY MASS INDEX: 40 KG/M2 | WEIGHT: 303.13 LBS | SYSTOLIC BLOOD PRESSURE: 139 MMHG | HEART RATE: 65 BPM

## 2023-12-17 LAB
GLUCOSE BLD-MCNC: 155 MG/DL (ref 70–99)
GLUCOSE BLD-MCNC: 191 MG/DL (ref 70–99)
POTASSIUM SERPL-SCNC: 3.7 MMOL/L (ref 3.5–5.1)

## 2023-12-17 PROCEDURE — 99239 HOSP IP/OBS DSCHRG MGMT >30: CPT | Performed by: STUDENT IN AN ORGANIZED HEALTH CARE EDUCATION/TRAINING PROGRAM

## 2023-12-17 NOTE — PLAN OF CARE
Pt okay to discharge per hospitalist. Discharge AVS including medication information, follow-ups, and printed prescriptions given. Copies provided to EMS. IV removed, telemetry discontinued. All belongings taken with patient. Transported off unit via EMS. Report called to [facility] including wound care dressing instructions. Approx 10 minutes after pt left unit EMS returned patient to unit to discuss prescriptions. Pt stated he believed that the hospital was going to dispense the medications to him to bring to [facility]. This RN advised that our inpatient pharmacy was closed on Sundays and we would be unable to dispense the medications to him, however [facility] would be able to provide the medications. Pt expressed understanding and left unit with EMS.

## 2023-12-17 NOTE — CM/SW NOTE
SHAVONNE messaged Blanca Tong in [facility] system for bed status update, await response to finalize dc plan. Will call facility to follow up if no response received.      Robe Sanchez RN Case Manager [phone]

## 2023-12-17 NOTE — PLAN OF CARE
Assumed care for pt at 19:30 on 12/16    A&Ox4. Pain managed with tylenol and tizanidine. BP elevated, not enough for prn iv hydralazine. Other VSS on RA when awake, spO2 80s when sleeping on RA, placed on 3L. NSR on tele. Primofit in place. BLE dressings c/d/I, due to change 12/17. Stood pt with nonslip socks to BSC. BM 12/16. Lovenox for VTE prophylaxis. Plan: Daily weight, I&O, qid accucheck, anticipated dc to [facility]. Needs sleep study. Bed alarm on, call light in reach, able to make needs known.

## 2023-12-17 NOTE — PLAN OF CARE
Assumed care of patient @ 0730 patient resting in bed, AOX4, reports mild to moderate muscle spasms/pain, prn medication provided per order. Lung sounds clear but diminished bilaterally, O2 saturation adequate on room air when awake, 3-4L nasal cannula needed when sleeping. No complaints of shortness of breath or chest pain. Sinus rhythm on tele, S1-S2 present, no adventitious sounds noted. Bowel sounds present and active in all quadrants. Patient voiding with increased frequency due to diuretics. Dressings in place to BL lower extremities. Plan of Care: Wound care. PO diuretics. Possible discharge to [address] [place] [address] today. Discussed plan of care with patient, verbalized understanding. Call light within reach. Problem: CARDIOVASCULAR - ADULT  Goal: Maintains optimal cardiac output and hemodynamic stability  Description: INTERVENTIONS:  - Monitor vital signs, rhythm, and trends  - Monitor for bleeding, hypotension and signs of decreased cardiac output  - Evaluate effectiveness of vasoactive medications to optimize hemodynamic stability  - Monitor arterial and/or venous puncture sites for bleeding and/or hematoma  - Assess quality of pulses, skin color and temperature  - Assess for signs of decreased coronary artery perfusion - ex.  Angina  - Evaluate fluid balance, assess for edema, trend weights  Outcome: Progressing  Goal: Absence of cardiac arrhythmias or at baseline  Description: INTERVENTIONS:  - Continuous cardiac monitoring, monitor vital signs, obtain 12 lead EKG if indicated  - Evaluate effectiveness of antiarrhythmic and heart rate control medications as ordered  - Initiate emergency measures for life threatening arrhythmias  - Monitor electrolytes and administer replacement therapy as ordered  Outcome: Progressing     Problem: SAFETY ADULT - FALL  Goal: Free from fall injury  Description: INTERVENTIONS:  - Assess pt frequently for physical needs  - Identify cognitive and physical deficits and behaviors that affect risk of falls.   - Belk fall precautions as indicated by assessment.  - Educate pt/family on patient safety including physical limitations  - Instruct pt to call for assistance with activity based on assessment  - Modify environment to reduce risk of injury  - Provide assistive devices as appropriate  - Consider OT/PT consult to assist with strengthening/mobility  - Encourage toileting schedule  Outcome: Progressing     Problem: SKIN/TISSUE INTEGRITY - ADULT  Goal: Skin integrity remains intact  Description: INTERVENTIONS  - Assess and document risk factors for pressure ulcer development  - Assess and document skin integrity  - Monitor for areas of redness and/or skin breakdown  - Initiate interventions, skin care algorithm/standards of care as needed  Outcome: Progressing  Goal: Incision(s), wounds(s) or drain site(s) healing without S/S of infection  Description: INTERVENTIONS:  - Assess and document risk factors for pressure ulcer development  - Assess and document skin integrity  - Assess and document dressing/incision, wound bed, drain sites and surrounding tissue  - Implement wound care per orders  - Initiate isolation precautions as appropriate  - Initiate Pressure Ulcer prevention bundle as indicated  Outcome: Progressing  Goal: Oral mucous membranes remain intact  Description: INTERVENTIONS  - Assess oral mucosa and hygiene practices  - Implement preventative oral hygiene regimen  - Implement oral medicated treatments as ordered  Outcome: Progressing

## 2023-12-17 NOTE — CM/SW NOTE
12/17/23 1137   Discharge disposition   Expected discharge disposition subacute   Post Acute Care Provider Other  (Graciela Villalba)   Discharge transportation [facility] for patient discharge to [address] today via [employer] at [place], [place] form completed for transport. Rn to call facility at [phone] for transfer report. Patient updated on discharge plan and informed only semi-private rooms are currently available at facility, patient agreeable.      Volanda [facility] [place] ([facility] [employer] [facility])  [address]  [facility], [address]  Phone: [phone]    Jada Doherty RN Case Manager [id number]

## 2023-12-21 ENCOUNTER — SNF VISIT (OUTPATIENT)
Facility: SKILLED NURSING FACILITY | Age: 63
End: 2023-12-21

## 2023-12-21 DIAGNOSIS — E11.9 TYPE 2 DIABETES MELLITUS WITHOUT COMPLICATION, WITH LONG-TERM CURRENT USE OF INSULIN (HCC): ICD-10-CM

## 2023-12-21 DIAGNOSIS — I16.0 HYPERTENSIVE URGENCY: Primary | ICD-10-CM

## 2023-12-21 DIAGNOSIS — R60.0 BILATERAL LOWER EXTREMITY EDEMA: ICD-10-CM

## 2023-12-21 DIAGNOSIS — E03.8 SUBCLINICAL HYPOTHYROIDISM: ICD-10-CM

## 2023-12-21 DIAGNOSIS — M54.9 INTRACTABLE BACK PAIN: ICD-10-CM

## 2023-12-21 DIAGNOSIS — E78.2 MIXED HYPERLIPIDEMIA: ICD-10-CM

## 2023-12-21 DIAGNOSIS — Z79.4 TYPE 2 DIABETES MELLITUS WITHOUT COMPLICATION, WITH LONG-TERM CURRENT USE OF INSULIN (HCC): ICD-10-CM

## 2023-12-21 NOTE — PROGRESS NOTES
Maria C Chowdhury  : 1960  Age 59year old  male patient is admitted to [address] [place] [address] for rehabilitation and strengthening     EDW Admission:  -     Chief complaint: chronic lower extremity edema, wounds on heels, DM type 2, HTN, hypothyroidism     HPI   59year old male with history of hypertension, hyperlipidemia, type 2 diabetes presents emergency room with lower extremity swelling has been getting worse over the last 2 to 3 weeks. Patient had his diuretics increased to 3 times a day but patient does not really have much effect. Swelling has increased so much that is inhibited patient's ability to ambulate around his own home. Patient does not regularly weigh himself. No chest pain, shortness of breath. No dizziness, lightheadedness, or syncope. No numbness or tingling in the lower extremities. No focal weakness. he was stabilized and sent to [facility] for rehabilitation    Patient seen in follow up, he is working in the gym. He is happy to be back here and everyone was welcoming. States the edema to his BLE is improved. They seemed to blow up over a few weeks at home and it got hard for him to walk. No current complaints. No shortness of breath or chest pain. ALLERGIES:  No Known Allergies    IMMUNIZATIONS  Immunization History   Administered Date(s) Administered    TDAP 2015        CODE STATUS:  Full Code    ADVANCED CARE PLANNING TEAM: Will need family care plan    CURRENT MEDICATIONS - reviewed and updated on SNF EMR     SUBJECTIVE  Patient seen in follow up, he is working in the gym. He is happy to be back here and everyone was welcoming. States the edema to his BLE is improved. They seemed to blow up over a few weeks at home and it got hard for him to walk. No current complaints. No shortness of breath or chest pain.      PHYSICAL EXAM:  GENERAL HEALTH:well developed, well nourished, in no apparent distress obese  LINES, TUBES, DRAINS:  none  SKIN: no rashes, no suspicious lesions, warm, dry  WOUND: see wound assessment,   EYES: PERRLA, EOMI, sclera anicteric, conjunctiva normal; there is no nystagmus, no drainage from eyes  HENT: normocephalic; normal nose, no nasal drainage, mucous membranes pink, moist, pharynx no exudate, no visible cerumen. NECK:supple, FROM; no JVD, no TMG, no carotid bruits   BREAST: deferred exam   RESPIRATORY:clear to percussion and auscultation  CARDIOVASCULAR: S1, S2 normal, RRR; no S3, no S4  ABDOMEN:  normal active BS+, soft, nondistended; no organomegaly, no masses; no bruits; nontender, no guarding, no rebound tenderness.   :Deferred  LYMPHATIC:no lymphedema  MUSCULOSKELETAL: no acute synovitis upper or lower extremity , BLE edema 1+  EXTREMITIES/VASCULAR:no cyanosis, clubbing or edema  NEUROLOGIC:intact; no sensorimotor deficit and follows commands  PSYCHIATRIC: alert and oriented x 3; affect appropriate     SEE PLAN BELOW  Lower extremity edema likely chronic in nature   - ALYSSA/ compression stocking , elevate  - Echo 7/2023-> normal EF  - US legs neg for DVT  - arterial US of leg neg for significant stenosis  - continue lasix 40mg BID  - CBC and BMP weekly and PRN  - PT/OT  - monitor      chronic wounds on heels POA  - arterial US as above   - continue TheraHoney External Gel Qshigt  - continue to cover with Xeroform   - Wound RN following  - montior      Type 2 diabetes  - A1C,   - accuchecks  - continue HumaLOG KwikPen sliding scale  - continue Levemir FlexTouch 10U HS  - monitor       Hypertensive urgency  - Qshift vitals  - continue coreg 25mg BID  - continue nifedipine 60mg daily   - continue hydralazine 25mg Q8PRN  - US kidney w/o CHRISTINA  - monitor      Hypothyroidism  - continue levothyroxine 25mcg  - monitor      Peripheral neuropathy  - continue gabapentin 100mg TID  - monitor      chronic back pain with radiculopathy   - need to cont PT OT   - ortho spine as outpatient /pain service referral   - MRI lumbar spine from last summer reviewed   Known lumbar stenosis   - continue lidocaine patch to back  - continue tizanidine 4mg QIDPRN   - continue norco 5-325 Q4PRN  - continue tylenol 650mg Q6PRN   - continue tramadol 50mg Q8PRN  - PT/OT  - monitor        FOLLOW UP APPOINTMENTS  No future appointments. This is a 25 minute visit and greater than 50% of the time was spent counseling the patient and/or coordinating care. Note to patient: The [facility] makes medical notes like these available to patients in the interest of transparency. However, this is a medical document intended as peer to peer communication. It is written in medical language and may contain abbreviations or verbiage that are unfamiliar. It may appear blunt or direct. Medical documents are intended to carry relevant information, facts as evident, and the clinical opinion of the practitioner who signs the document.      Blake Hinkle, APRN  12/21/23

## 2023-12-30 ENCOUNTER — APPOINTMENT (OUTPATIENT)
Dept: MRI IMAGING | Facility: HOSPITAL | Age: 63
End: 2023-12-30
Attending: EMERGENCY MEDICINE
Payer: MEDICAID

## 2023-12-30 ENCOUNTER — HOSPITAL ENCOUNTER (EMERGENCY)
Facility: HOSPITAL | Age: 63
Discharge: STILL ADMITTED OR ADMINISTRATIVE | End: 2023-12-30
Payer: MEDICAID

## 2023-12-30 ENCOUNTER — HOSPITAL ENCOUNTER (INPATIENT)
Facility: HOSPITAL | Age: 63
LOS: 10 days | Discharge: NURSING, REHAB OR LONG-TERM CARE | End: 2024-01-09
Attending: EMERGENCY MEDICINE | Admitting: HOSPITALIST
Payer: MEDICAID

## 2023-12-30 DIAGNOSIS — M46.40 DISCITIS, UNSPECIFIED SPINAL REGION: Primary | ICD-10-CM

## 2023-12-30 DIAGNOSIS — M54.9 MODERATE BACK PAIN: ICD-10-CM

## 2023-12-30 PROBLEM — E11.69 TYPE 2 DIABETES MELLITUS WITH OTHER SPECIFIED COMPLICATION (HCC): Status: ACTIVE | Noted: 2019-05-03

## 2023-12-30 PROBLEM — E78.5 DYSLIPIDEMIA: Status: ACTIVE | Noted: 2023-12-30

## 2023-12-30 LAB
ALBUMIN SERPL-MCNC: 3.2 G/DL (ref 3.4–5)
ALBUMIN/GLOB SERPL: 0.7 {RATIO} (ref 1–2)
ALP LIVER SERPL-CCNC: 103 U/L
ALT SERPL-CCNC: 23 U/L
ANION GAP SERPL CALC-SCNC: 4 MMOL/L (ref 0–18)
AST SERPL-CCNC: 14 U/L (ref 15–37)
BASOPHILS # BLD AUTO: 0.03 X10(3) UL (ref 0–0.2)
BASOPHILS NFR BLD AUTO: 0.3 %
BILIRUB SERPL-MCNC: 0.3 MG/DL (ref 0.1–2)
BILIRUB UR QL STRIP.AUTO: NEGATIVE
BUN BLD-MCNC: 25 MG/DL (ref 9–23)
CALCIUM BLD-MCNC: 9.4 MG/DL (ref 8.5–10.1)
CHLORIDE SERPL-SCNC: 109 MMOL/L (ref 98–112)
CLARITY UR REFRACT.AUTO: CLEAR
CO2 SERPL-SCNC: 29 MMOL/L (ref 21–32)
COLOR UR AUTO: COLORLESS
CREAT BLD-MCNC: 0.98 MG/DL
CRP SERPL-MCNC: 1.47 MG/DL (ref ?–0.3)
EGFRCR SERPLBLD CKD-EPI 2021: 87 ML/MIN/1.73M2 (ref 60–?)
EOSINOPHIL # BLD AUTO: 0.17 X10(3) UL (ref 0–0.7)
EOSINOPHIL NFR BLD AUTO: 1.9 %
ERYTHROCYTE [DISTWIDTH] IN BLOOD BY AUTOMATED COUNT: 12.5 %
GLOBULIN PLAS-MCNC: 4.7 G/DL (ref 2.8–4.4)
GLUCOSE BLD-MCNC: 134 MG/DL (ref 70–99)
GLUCOSE BLD-MCNC: 142 MG/DL (ref 70–99)
GLUCOSE UR STRIP.AUTO-MCNC: NORMAL MG/DL
HCT VFR BLD AUTO: 42 %
HGB BLD-MCNC: 14 G/DL
HYALINE CASTS #/AREA URNS AUTO: PRESENT /LPF
IMM GRANULOCYTES # BLD AUTO: 0.02 X10(3) UL (ref 0–1)
IMM GRANULOCYTES NFR BLD: 0.2 %
KETONES UR STRIP.AUTO-MCNC: NEGATIVE MG/DL
LEUKOCYTE ESTERASE UR QL STRIP.AUTO: 75
LYMPHOCYTES # BLD AUTO: 1.28 X10(3) UL (ref 1–4)
LYMPHOCYTES NFR BLD AUTO: 14.4 %
MCH RBC QN AUTO: 30.5 PG (ref 26–34)
MCHC RBC AUTO-ENTMCNC: 33.3 G/DL (ref 31–37)
MCV RBC AUTO: 91.5 FL
MONOCYTES # BLD AUTO: 0.58 X10(3) UL (ref 0.1–1)
MONOCYTES NFR BLD AUTO: 6.5 %
NEUTROPHILS # BLD AUTO: 6.78 X10 (3) UL (ref 1.5–7.7)
NEUTROPHILS # BLD AUTO: 6.78 X10(3) UL (ref 1.5–7.7)
NEUTROPHILS NFR BLD AUTO: 76.7 %
NITRITE UR QL STRIP.AUTO: NEGATIVE
OSMOLALITY SERPL CALC.SUM OF ELEC: 301 MOSM/KG (ref 275–295)
PH UR STRIP.AUTO: 5.5 [PH] (ref 5–8)
PLATELET # BLD AUTO: 249 10(3)UL (ref 150–450)
POTASSIUM SERPL-SCNC: 3.9 MMOL/L (ref 3.5–5.1)
PROT SERPL-MCNC: 7.9 G/DL (ref 6.4–8.2)
PROT UR STRIP.AUTO-MCNC: NEGATIVE MG/DL
RBC # BLD AUTO: 4.59 X10(6)UL
RBC UR QL AUTO: NEGATIVE
SODIUM SERPL-SCNC: 142 MMOL/L (ref 136–145)
SP GR UR STRIP.AUTO: 1.01 (ref 1–1.03)
UROBILINOGEN UR STRIP.AUTO-MCNC: NORMAL MG/DL
WBC # BLD AUTO: 8.9 X10(3) UL (ref 4–11)

## 2023-12-30 PROCEDURE — 72158 MRI LUMBAR SPINE W/O & W/DYE: CPT | Performed by: EMERGENCY MEDICINE

## 2023-12-30 PROCEDURE — 99223 1ST HOSP IP/OBS HIGH 75: CPT | Performed by: INTERNAL MEDICINE

## 2023-12-30 RX ORDER — LEVOTHYROXINE SODIUM 0.03 MG/1
25 TABLET ORAL
Status: DISCONTINUED | OUTPATIENT
Start: 2023-12-31 | End: 2024-01-09

## 2023-12-30 RX ORDER — SODIUM CHLORIDE 9 MG/ML
INJECTION, SOLUTION INTRAVENOUS CONTINUOUS
Status: ACTIVE | OUTPATIENT
Start: 2023-12-30 | End: 2023-12-30

## 2023-12-30 RX ORDER — CARVEDILOL 12.5 MG/1
25 TABLET ORAL 2 TIMES DAILY WITH MEALS
Status: DISCONTINUED | OUTPATIENT
Start: 2023-12-30 | End: 2024-01-09

## 2023-12-30 RX ORDER — MAGNESIUM OXIDE 400 MG (241.3 MG MAGNESIUM) TABLET
1 TABLET NIGHTLY PRN
Status: DISCONTINUED | OUTPATIENT
Start: 2023-12-30 | End: 2024-01-09

## 2023-12-30 RX ORDER — NIFEDIPINE 30 MG/1
60 TABLET, EXTENDED RELEASE ORAL DAILY
Status: DISCONTINUED | OUTPATIENT
Start: 2023-12-31 | End: 2024-01-09

## 2023-12-30 RX ORDER — POLYETHYLENE GLYCOL 3350 17 G/17G
17 POWDER, FOR SOLUTION ORAL DAILY
Status: DISCONTINUED | OUTPATIENT
Start: 2023-12-30 | End: 2023-12-30

## 2023-12-30 RX ORDER — ONDANSETRON 2 MG/ML
4 INJECTION INTRAMUSCULAR; INTRAVENOUS EVERY 4 HOURS PRN
Status: DISCONTINUED | OUTPATIENT
Start: 2023-12-30 | End: 2023-12-30 | Stop reason: HOSPADM

## 2023-12-30 RX ORDER — HYDROCODONE BITARTRATE AND ACETAMINOPHEN 5; 325 MG/1; MG/1
1 TABLET ORAL ONCE
Status: COMPLETED | OUTPATIENT
Start: 2023-12-30 | End: 2023-12-30

## 2023-12-30 RX ORDER — POLYETHYLENE GLYCOL 3350 17 G/17G
17 POWDER, FOR SOLUTION ORAL DAILY PRN
Status: DISCONTINUED | OUTPATIENT
Start: 2023-12-30 | End: 2024-01-09

## 2023-12-30 RX ORDER — ONDANSETRON 2 MG/ML
4 INJECTION INTRAMUSCULAR; INTRAVENOUS ONCE
Status: COMPLETED | OUTPATIENT
Start: 2023-12-30 | End: 2023-12-30

## 2023-12-30 RX ORDER — ONDANSETRON 2 MG/ML
4 INJECTION INTRAMUSCULAR; INTRAVENOUS EVERY 6 HOURS PRN
Status: DISCONTINUED | OUTPATIENT
Start: 2023-12-30 | End: 2024-01-09

## 2023-12-30 RX ORDER — HYDRALAZINE HYDROCHLORIDE 25 MG/1
25 TABLET, FILM COATED ORAL EVERY 8 HOURS SCHEDULED
Status: DISCONTINUED | OUTPATIENT
Start: 2023-12-30 | End: 2024-01-01

## 2023-12-30 RX ORDER — DEXTROSE MONOHYDRATE 25 G/50ML
50 INJECTION, SOLUTION INTRAVENOUS
Status: DISCONTINUED | OUTPATIENT
Start: 2023-12-30 | End: 2024-01-09

## 2023-12-30 RX ORDER — NICOTINE POLACRILEX 4 MG
30 LOZENGE BUCCAL
Status: DISCONTINUED | OUTPATIENT
Start: 2023-12-30 | End: 2024-01-09

## 2023-12-30 RX ORDER — VANCOMYCIN HYDROCHLORIDE
15 EVERY 12 HOURS
Status: DISCONTINUED | OUTPATIENT
Start: 2023-12-31 | End: 2023-12-31

## 2023-12-30 RX ORDER — MAGNESIUM OXIDE 400 MG (241.3 MG MAGNESIUM) TABLET
1 TABLET NIGHTLY
Status: DISCONTINUED | OUTPATIENT
Start: 2023-12-30 | End: 2023-12-30

## 2023-12-30 RX ORDER — NICOTINE POLACRILEX 4 MG
15 LOZENGE BUCCAL
Status: DISCONTINUED | OUTPATIENT
Start: 2023-12-30 | End: 2024-01-09

## 2023-12-30 RX ORDER — HYDROMORPHONE HYDROCHLORIDE 1 MG/ML
0.8 INJECTION, SOLUTION INTRAMUSCULAR; INTRAVENOUS; SUBCUTANEOUS EVERY 2 HOUR PRN
Status: DISCONTINUED | OUTPATIENT
Start: 2023-12-30 | End: 2024-01-09

## 2023-12-30 RX ORDER — GADOTERATE MEGLUMINE 376.9 MG/ML
20 INJECTION INTRAVENOUS
Status: COMPLETED | OUTPATIENT
Start: 2023-12-30 | End: 2023-12-30

## 2023-12-30 RX ORDER — HYDROCODONE BITARTRATE AND ACETAMINOPHEN 5; 325 MG/1; MG/1
1 TABLET ORAL EVERY 4 HOURS PRN
Status: DISCONTINUED | OUTPATIENT
Start: 2023-12-30 | End: 2024-01-09

## 2023-12-30 RX ORDER — ACETAMINOPHEN 500 MG
500 TABLET ORAL EVERY 4 HOURS PRN
Status: DISCONTINUED | OUTPATIENT
Start: 2023-12-30 | End: 2024-01-09

## 2023-12-30 RX ORDER — PROCHLORPERAZINE EDISYLATE 5 MG/ML
5 INJECTION INTRAMUSCULAR; INTRAVENOUS EVERY 8 HOURS PRN
Status: DISCONTINUED | OUTPATIENT
Start: 2023-12-30 | End: 2024-01-09

## 2023-12-30 RX ORDER — TIZANIDINE 2 MG/1
4 TABLET ORAL EVERY 6 HOURS PRN
Status: DISCONTINUED | OUTPATIENT
Start: 2023-12-30 | End: 2023-12-31

## 2023-12-30 RX ORDER — MORPHINE SULFATE 4 MG/ML
4 INJECTION, SOLUTION INTRAMUSCULAR; INTRAVENOUS ONCE
Status: COMPLETED | OUTPATIENT
Start: 2023-12-30 | End: 2023-12-30

## 2023-12-30 RX ORDER — HEPARIN SODIUM 5000 [USP'U]/ML
5000 INJECTION, SOLUTION INTRAVENOUS; SUBCUTANEOUS EVERY 8 HOURS SCHEDULED
Status: DISCONTINUED | OUTPATIENT
Start: 2023-12-30 | End: 2024-01-09

## 2023-12-30 RX ORDER — MORPHINE SULFATE 4 MG/ML
4 INJECTION, SOLUTION INTRAMUSCULAR; INTRAVENOUS EVERY 30 MIN PRN
Status: DISCONTINUED | OUTPATIENT
Start: 2023-12-30 | End: 2023-12-30 | Stop reason: HOSPADM

## 2023-12-30 RX ORDER — HYDROMORPHONE HYDROCHLORIDE 1 MG/ML
0.4 INJECTION, SOLUTION INTRAMUSCULAR; INTRAVENOUS; SUBCUTANEOUS EVERY 2 HOUR PRN
Status: DISCONTINUED | OUTPATIENT
Start: 2023-12-30 | End: 2024-01-09

## 2023-12-30 RX ORDER — FUROSEMIDE 40 MG/1
40 TABLET ORAL
Status: DISCONTINUED | OUTPATIENT
Start: 2023-12-30 | End: 2024-01-09

## 2023-12-30 RX ORDER — TRAMADOL HYDROCHLORIDE 50 MG/1
50 TABLET ORAL EVERY 8 HOURS PRN
Status: DISCONTINUED | OUTPATIENT
Start: 2023-12-30 | End: 2023-12-30

## 2023-12-30 RX ORDER — GABAPENTIN 100 MG/1
100 CAPSULE ORAL 3 TIMES DAILY
Status: DISCONTINUED | OUTPATIENT
Start: 2023-12-30 | End: 2024-01-09

## 2023-12-30 RX ORDER — SODIUM CHLORIDE 9 MG/ML
125 INJECTION, SOLUTION INTRAVENOUS CONTINUOUS
Status: DISCONTINUED | OUTPATIENT
Start: 2023-12-30 | End: 2023-12-31

## 2023-12-30 RX ORDER — HYDROMORPHONE HYDROCHLORIDE 1 MG/ML
0.2 INJECTION, SOLUTION INTRAMUSCULAR; INTRAVENOUS; SUBCUTANEOUS EVERY 2 HOUR PRN
Status: DISCONTINUED | OUTPATIENT
Start: 2023-12-30 | End: 2024-01-09

## 2023-12-30 NOTE — H&P
[facility]     Troy Feliz Patient Status:  Emergency    1960 MRN [id number]   Location [facility] EMERGENCY DEPARTMENT Attending Donavon Lipscomb MD   Hosp Day # 0 PCP MADELINE FENTON DO     Chief Complaint: back pain/hip pain    Subjective:    History of Present Illness:     Troy Feliz is a 63 year old male with PMhx of DM, HTN, DL, discitis presents with back and hip pain and muscle spasms. Pt is wheelchair bound due to muscle spasms. He states symptoms all started back in March. He has been going to rehab for this. He states he took norco this morning without relief. He denies any F/C. No new focal weakness, numbness or tingling. No CP or SOB. No N/V/D/C or abd pain. He denies any loss of bowel or urine function.     History/Other:    Past Medical History:  Past Medical History:   Diagnosis Date    Diabetes (HCC)     Dyslipidemia 2011    High blood pressure     HTN (hypertension) 2011    Hyperlipidemia     Impaired fasting glucose 2011    Proteinuria 2011    Subclinical hypothyroidism 2011    Unspecified essential hypertension     Visual impairment      Past Surgical History:   Past Surgical History:   Procedure Laterality Date    MASTOIDECTOMY,SIMPLE      done 30 years ago.  Revised in       Family History:   Family History   Problem Relation Age of Onset    Diabetes Father     Heart Disorder Mother     Heart Disorder Brother      Social History:    reports that he has never smoked. He has never used smokeless tobacco. He reports that he does not drink alcohol and does not use drugs.     Allergies: No Known Allergies    Medications:    Current Facility-Administered Medications on File Prior to Encounter   Medication Dose Route Frequency Provider Last Rate Last Admin    [COMPLETED] potassium chloride (K-Dur) tab 40 mEq  40 mEq Oral Once Marcella Burton MD   40 mEq at 23 1101    [COMPLETED] potassium chloride (K-Dur)  tab 40 mEq  40 mEq Oral Once Marcella Burton MD   40 mEq at 12/14/23 0824    [COMPLETED] furosemide (Lasix) 10 mg/mL injection 60 mg  60 mg Intravenous BID (Diuretic) Marcella Burton MD   60 mg at 12/15/23 1737    [COMPLETED] potassium chloride (K-Dur) tab 40 mEq  40 mEq Oral Q4H Marcella Burton MD   40 mEq at 12/13/23 1202    [COMPLETED] potassium chloride (K-Dur) tab 40 mEq  40 mEq Oral Once Marcella Burton MD   40 mEq at 12/13/23 1725    [COMPLETED] furosemide (Lasix) 10 mg/mL injection 60 mg  60 mg Intravenous Once Jazmine Martinez MD   60 mg at 12/12/23 2216     Current Outpatient Medications on File Prior to Encounter   Medication Sig Dispense Refill    furosemide 40 MG Oral Tab Take 1 tablet (40 mg total) by mouth 2 (two) times daily. 60 tablet 1    hydrALAZINE 25 MG Oral Tab Take 1 tablet (25 mg total) by mouth every 8 (eight) hours. 90 tablet 0    traMADol 50 MG Oral Tab Take 1 tablet (50 mg total) by mouth every 8 (eight) hours as needed for Pain. 15 tablet 0    tiZANidine 4 MG Oral Tab Take 1 tablet (4 mg total) by mouth every 6 (six) hours as needed. 20 tablet 0    HYDROcodone-acetaminophen 5-325 MG Oral Tab Take 1 tablet by mouth every 4 (four) hours as needed. 15 tablet 0    gabapentin 100 MG Oral Cap Take 1 capsule (100 mg total) by mouth 3 (three) times daily. 90 capsule 0    acetaminophen 500 MG Oral Tab Take 1-2 tablet every 4 hours as needed for pain 20 tablet 0    insulin detemir (LEVEMIR FLEXTOUCH) 100 UNIT/ML Subcutaneous Solution Pen-injector Inject 10 Units into the skin nightly. 5 each 2    carvedilol 25 MG Oral Tab Take 1 tablet (25 mg total) by mouth 2 (two) times daily with meals. 60 tablet 0    levothyroxine 25 MCG Oral Tab Take 1 tablet (25 mcg total) by mouth before breakfast.      polyethylene glycol, PEG 3350, 17 g Oral Powd Pack Take 17 g by mouth daily.      NIFEdipine ER 60 MG Oral Tablet 24 Hr Take 1 tablet (60 mg total) by mouth daily.      melatonin 1 MG Oral Tab Nightly prn  30 tablet 0    insulin aspart (NOVOLOG FLEXPEN) 100 Units/mL Subcutaneous Solution Pen-injector Test blood glucose 3 times daily with meals  Inject 1 unit if blood glucose is between 141-180 mg/dL Inject 2 units if blood glucose is between 181-220 mg/dL Inject 3 units if blood glucose is between 221-260 mg/dL Inject 4 units if blood glucose is between 261-300 mg/dL Inject 5 units if blood glucose is between 301-350 mg/dL Call your physician if blood glucose is greater than 351 mg/dL, 5 each 2    Insulin Pen Needle 32G X 4 MM Does not apply Misc May substitute if not on insurance formulary 100 each 5    lidocaine-menthol 4-1 % External Patch Place 1 patch onto the skin daily. 30 patch 0       Review of Systems:   A comprehensive review of systems was completed.    Pertinent positives and negatives noted in the HPI.    Objective:   Physical Exam:    BP (!) 174/86   Pulse 72   Temp 98.4 °F (36.9 °C) (Temporal)   Resp 21   Wt 292 lb (132.5 kg)   SpO2 97%   BMI 38.95 kg/m²   General: No acute distress, Alert  Respiratory: No rhonchi, no wheezes  Cardiovascular: S1, S2. Regular rate and rhythm  Abdomen: Soft, Non-tender, non-distended, positive bowel sounds  Neuro: No new focal deficits  Extremities: No edema      Results:    Labs:      Labs Last 24 Hours:    Recent Labs   Lab 12/30/23  1403   RBC 4.59   HGB 14.0   HCT 42.0   MCV 91.5   MCH 30.5   MCHC 33.3   RDW 12.5   NEPRELIM 6.78   WBC 8.9   .0       Recent Labs   Lab 12/30/23  1403   *   BUN 25*   CREATSERUM 0.98   EGFRCR 87   CA 9.4   ALB 3.2*      K 3.9      CO2 29.0   ALKPHO 103   AST 14*   ALT 23   BILT 0.3   TP 7.9       Lab Results   Component Value Date    INR 1.14 06/29/2023    INR 1.25 (H) 03/23/2023       No results for input(s): \"TROP\", \"TROPHS\", \"CK\" in the last 168 hours.    No results for input(s): \"TROP\", \"PBNP\" in the last 168 hours.    No results for input(s): \"PCT\" in the last 168 hours.    Imaging: Imaging data  reviewed in Epic.    Assessment & Plan:      #Back Pain  #Discitis/OM  -continue IV abx  -follow cultures  -ID and NeuroSx to eval  -check ESR/CRP  -LSO brace    #Ess HTN  -resume home meds    #Dyslipidemia  -statin    #DM2  -SSI        Plan of care discussed with patient, ED Physician     Coy Dorman MD    Supplementary Documentation:     The 21st Century Cures Act makes medical notes like these available to patients in the interest of transparency. Please be advised this is a medical document. Medical documents are intended to carry relevant information, facts as evident, and the clinical opinion of the practitioner. The medical note is intended as peer to peer communication and may appear blunt or direct. It is written in medical language and may contain abbreviations or verbiage that are unfamiliar.

## 2023-12-30 NOTE — ED INITIAL ASSESSMENT (HPI)
Pt presents to ED via [facility] from [facility]. Pt c/o back pain, along with right hip and groin and leg pain and spasms while sitting in wheelchair. Took norco @ 0900 and states that it helps with the pain. Pt is wheelchair bound due to muscles spasms and states he is currently in rehab for this.

## 2023-12-30 NOTE — ED QUICK NOTES
Orders for admission, patient is aware of plan and ready to go upstairs. Any questions, please call ED TABITHA Stewart at extension [phone].     Patient Covid vaccination status: Unvaccinated     COVID Test Ordered in ED: None    COVID Suspicion at Admission: N/A    Running Infusions:    sodium chloride 125 mL/hr (12/30/23 1414)      Vancomycin 2250mg 200ml/hr  Mental Status/LOC at time of transport: a/o x4    Other pertinent information: pt has Rx for back brace in chart  CIWA score: N/A   NIH score:  N/A

## 2023-12-30 NOTE — ED PROVIDER NOTES
Patient Seen in: [facility] Emergency Department      History     Chief Complaint   Patient presents with    Back Pain     Stated Complaint:     Subjective:   HPI    This is a 63-year-old male who has a long history of back problems has had discitis previously was on antibiotics it was symptoms started somewhere in March.  He states that he is getting physical therapy but he states that since July he has gotten physical therapy and then he gets pain in his lower back and spasms while sitting in a wheelchair or when he tries walking.  He states he took Norco at 9:00 which helps with his pain presently has no abdominal pain he is described as pain in his lower back, radiating to his hips and his buttock area with movement.  And ambulation.  The patient has had no fevers no numbness or weakness no loss of urine or bowel he does have generalized weakness which he says is chronic for him but no new weakness numbness.  He has no fevers no abdominal pain no loss of urine or bowel no fevers no diarrhea no dysuria.  The patient is generally wheelchair-bound.  He states he can take a couple steps with his walker.  This is not new for him but it is gotten worse over the last several months.findings.  Objective:   Past Medical History:   Diagnosis Date    Diabetes (HCC)     Dyslipidemia 09/19/2011    High blood pressure     HTN (hypertension) 09/19/2011    Hyperlipidemia     Impaired fasting glucose 09/19/2011    Proteinuria 09/19/2011    Subclinical hypothyroidism 09/19/2011    Unspecified essential hypertension     Visual impairment               Past Surgical History:   Procedure Laterality Date    MASTOIDECTOMY,SIMPLE      done 30 years ago.  Revised in 1995                Social History     Socioeconomic History    Marital status: Single   Tobacco Use    Smoking status: Never    Smokeless tobacco: Never   Vaping Use    Vaping Use: Never used   Substance and Sexual Activity    Alcohol use: No     Alcohol/week: 0.0  standard drinks of alcohol    Drug use: No    Sexual activity: Not Currently   Other Topics Concern    Caffeine Concern Yes     Comment: coffee one q day,or tea    Exercise Yes     Comment: walking 2 mi x5 q week     Social Determinants of Health     Food Insecurity: No Food Insecurity (12/13/2023)    Food Insecurity     Food Insecurity: Never true   Transportation Needs: No Transportation Needs (12/13/2023)    Transportation Needs     Lack of Transportation: No   Housing Stability: Low Risk  (12/13/2023)    Housing Stability     Housing Instability: No              Review of Systems    Positive for stated complaint:   Other systems are as noted in HPI.  Constitutional and vital signs reviewed.      All other systems reviewed and negative except as noted above.    Physical Exam     ED Triage Vitals [12/30/23 1249]   BP (!) 175/64   Pulse 73   Resp 16   Temp 98.2 °F (36.8 °C)   Temp src Oral   SpO2 100 %   O2 Device None (Room air)       Current:BP (!) 175/64   Pulse 73   Temp 98.2 °F (36.8 °C) (Oral)   Resp 16   Wt 132.5 kg   SpO2 100%   BMI 38.95 kg/m²         Physical Exam    General: .  Patient is in some moderate discomfort secondary to pain patient is presently pain-free.   Patient has an elevated BMI  The patient is in no respiratory distress. The patient is not septic or toxic    HEENT: Atraumatic, conjunctiva are not pale.  There is no icterus.  Oral mucosa Is wet.  No facial trauma.  The neck is supple.    LUNGS: Clear to auscultation, there is no wheezing or retraction.  No crackles.    CV: Cardiovascular is regular without murmurs or rubs.    ABD: The abdomen is soft nondistended nontender.  There is no rebound.  There is no guarding.  I cannot reproduce the specific tenderness in his abdomen.  EXT: There is good pulses bilaterally.  There is no calf tenderness.  There is no rash noted.  There is bilateral pitting edema lower extremity which is chronic for him.    Back tenderness is noted it seems  to be just movement related bilateral edema which is chronic for him he has muscle strength is 5 out of 5 he is able to move his extremities and lift the legs above the bed  NEURO: Alert and oriented x3.  Muscle strength and sensory exam is grossly normal.  And the patient is neurologically intact with no focal findings.      ED Course     Labs Reviewed   COMP METABOLIC PANEL (14) - Abnormal; Notable for the following components:       Result Value    Glucose 142 (*)     BUN 25 (*)     Calculated Osmolality 301 (*)     AST 14 (*)     Albumin 3.2 (*)     Globulin  4.7 (*)     A/G Ratio 0.7 (*)     All other components within normal limits   CBC WITH DIFFERENTIAL WITH PLATELET    Narrative:     The following orders were created for panel order CBC With Differential With Platelet.  Procedure                               Abnormality         Status                     ---------                               -----------         ------                     CBC W/ DIFFERENTIAL[[id number]]                              Final result                 Please view results for these tests on the individual orders.   URINALYSIS, ROUTINE   C-REACTIVE PROTEIN   BLOOD CULTURE   BLOOD CULTURE   CBC W/ DIFFERENTIAL                      MDM      The patient was placed on monitors, IV was started, blood was drawn.  Admission disposition: 12/30/2023  4:51 PM         Was done to rule out an acute abscess, discitis, as he has a history of it or mass or tumor.      We reviewed the infectious disease specialist notes which showed      ASSESSMENT:  Staph aureus bacteremia, 2/2 Bcxs + for GPC (MSSA by PCR).   R/o OM/abscess to lumbar/thoracic spine. Patient with h/o chronic lower back pain s/p fall at work 8 months ago. This pain has been improving over the past few months but now with acute mid-back pain 4-5 days prior to admission. CT lumbar spine with degenerative disc disease with disc bulge and stenosis.  Some urinary retention but UA not c/w  UTI; Feet with some erythema/small wounds but they do not appear actively infected. CXR w/o pna; TTE w/o obvious vegetation.   Syncope/fall with associated weakness prior to admission. MRI/MRA head/neck without acute intracranial process. Suspect related to #1.  Rhabdomyolysis. CK trending downward.  TARIK, improving  Uncontrolled DM II, Hgb A1c 10.6  HTN, HLD  Morbid obesity, BMI 45.64          From his previous visit     Patient's white count is normal.  Hemoglobin of 14, white count of 8.9  The patient's comprehensive was within normal limits.     I personally reviewed the radiographs and my individual interpretation shows    Findings of some abnormalities noted at L4 3 L4 areas progressive changes and inflammation at the L3-L4 area    Also reviewed official report and it shows    MRI SPINE LUMBAR (W+WO) (CPT=72158)    Result Date: 12/30/2023  PROCEDURE:  MRI SPINE LUMBAR (W+WO) (CPT=72158)   COMPARISON:  [facility] , MR, MRI SPINE LUMBAR (W+WO) (CPT=72158), 6/27/2023, 10:07 PM.  [facility] , MR, MRI SPINE LUMBAR(CONTRAST ONLY) (CPT=72149), 8/22/2023, 9:58 PM.  [facility] , XR, XR LUMBAR SPINE (MIN 4 VIEWS) (CPT=72110), 12/14/2023, 2:48 PM.  INDICATIONS:  Severe low back pain.  History of discitis/osteomyelitis at the L3-4 level.  TECHNIQUE:  Multiplanar T1 and T2 weighted images including fat suppression sequences.  Images acquired in sagittal and axial planes. Intravenous gadolinium was administered followed by multiplanar post-infusion T1 weighted sequences.   PATIENT STATED HISTORY:(As transcribed by Technologist)  The patient stated he is being evaluated for chronic, severe lower back pain and muscle spasms. He is wheelchair bound due to pain.   CONTRAST USED:  20 mL of Dotarem  FINDINGS:  LUMBAR DISC LEVELS L1-L2:  No significant disc/facet abnormality, spinal stenosis, or foraminal narrowing.  L2-L3:  There is a moderate broad-based degenerative disc bulge.  There is posterior epidural fat hypertrophy.  This causes  moderate central canal stenosis of 8 mm.  The facet joints are unremarkable. L3-L4:  There continues to be fluid signal within the disc space.  There is destructive changes of the endplates of L3 and L4 which is slightly more advanced than the study of 8/22/2023.  There is enhancement of the endplates which is slightly more vigorous than on the previous study.  Additionally, there is new intense mint along the posterior articular facets at both levels suspicious for interval progression of septic arthropathy into the bilateral posterior articular facet joints.  There is also increasing adjacent psoas edema diffusely.  There is no abdi epidural fluid collection or abscess.  There is moderate central canal stenosis of 7.5 mm.  L4-L5:  There is mild degenerative disc bulge osteophyte complex.  AP diameter canal is normal 12 mm.  There is mild subarticular zone and neural foraminal narrowing.  The facet joints unremarkable. L5-S1:  Very mild posterior degenerative disc bulge.  There is mild bilateral facet joint degenerative change.  There is mild bilateral neural foraminal narrowing.  PARASPINAL AREA:  Increasing bilateral psoas muscle edema/inflammation.  No evidence of psoas muscle abscess. BONES:  No fracture, pars defect, or osseous lesion.  CORD/CAUDA EQUINA:  There is thickening of the cauda equina nerve roots diffusely suggestive of chronic arachnoiditis, which can be retrospectively seen on previous imaging studies and appears relatively stable.  No abnormal enhancement along the conus or nerve roots.            CONCLUSION:  Findings are concerning for progression of discitis/osteomyelitis centered at the L3-4 disc level, with suggestion of interval extension into the bilateral posterior articular facets at this level concerning for the development of bilateral septic arthropathy.  There is also increasing bilateral psoas muscle edema/inflammation.  No discrete evidence of epidural abscess or psoas abscess.   Diffuse thickening of the cauda equina suggesting chronic arachnoiditis, which can be retrospectively seen and appears similar to previous imaging.  No abnormal post-contrast enhancement.   LOCATION:  [id number]      Dictated by (CST): Darrel, Bearmami,  on 12/30/2023 at 4:19 PM     Finalized by (CST): Bear Rojomami, DO on 12/30/2023 at 4:29 PM         Medical Decision Making      The patient and the emergency room stated that he had this back pain for several months for the last week every time he stands he will get increasing pain.  He has had some chills no fevers.  The MRI did show findings of what seems he has progressive discitis.  And some inflammation along the muscles.  No discernible abscess.  Discussed this case with Dr. Wahl from neurosurgery who recommended an LSO brace, IV antibiotics and they will see him on consultation.  He is moving all of his extremities.  His pain and spasm is what brought him in.  I will discuss this case was also the hospitalist I did talk to infectious disease Dr. Gamble who recommended nafcillin, vancomycin which will be dosed by pharmacist who I did talk to Tracey the patient will be admitted for further batch I read go back and reexamined he said he did have some chills he moves everything no focal findings.      Disposition and Plan     Clinical Impression:  1. Discitis, unspecified spinal region    2. Moderate back pain         Disposition:  Admit  12/30/2023  4:51 pm    Follow-up:  No follow-up provider specified.        Medications Prescribed:  Current Discharge Medication List                            Hospital Problems       Present on Admission  Date Reviewed: 4/17/2023            ICD-10-CM Noted POA    * (Principal) Discitis, unspecified spinal region M46.40 12/30/2023 Unknown

## 2023-12-30 NOTE — CM/SW NOTE
EDCM called to assist with getting a LSO brace for patient.      [facility] called [facility] however it was closed, received VM for the oncall technician.  Dr. Lipscomb informed and stated it was not urgent since the patient is being admitted but the pt does need to have a custom LSO brace prior to discharge.     Pt has a prescription/order for the LSO brace on the chart. Parvin [facility] informed and requested to ensure the prescription goes to the floor with the patient.  will need that when them come to customize the LSO brace.

## 2023-12-31 LAB
ANION GAP SERPL CALC-SCNC: 5 MMOL/L (ref 0–18)
BASOPHILS # BLD AUTO: 0.04 X10(3) UL (ref 0–0.2)
BASOPHILS NFR BLD AUTO: 0.5 %
BUN BLD-MCNC: 19 MG/DL (ref 9–23)
CALCIUM BLD-MCNC: 9 MG/DL (ref 8.5–10.1)
CHLORIDE SERPL-SCNC: 109 MMOL/L (ref 98–112)
CO2 SERPL-SCNC: 28 MMOL/L (ref 21–32)
CREAT BLD-MCNC: 0.81 MG/DL
CREAT BLD-MCNC: 0.81 MG/DL
EGFRCR SERPLBLD CKD-EPI 2021: 100 ML/MIN/1.73M2 (ref 60–?)
EGFRCR SERPLBLD CKD-EPI 2021: 100 ML/MIN/1.73M2 (ref 60–?)
EOSINOPHIL # BLD AUTO: 0.19 X10(3) UL (ref 0–0.7)
EOSINOPHIL NFR BLD AUTO: 2.3 %
ERYTHROCYTE [DISTWIDTH] IN BLOOD BY AUTOMATED COUNT: 12.3 %
GLUCOSE BLD-MCNC: 111 MG/DL (ref 70–99)
GLUCOSE BLD-MCNC: 114 MG/DL (ref 70–99)
GLUCOSE BLD-MCNC: 121 MG/DL (ref 70–99)
GLUCOSE BLD-MCNC: 133 MG/DL (ref 70–99)
GLUCOSE BLD-MCNC: 134 MG/DL (ref 70–99)
HCT VFR BLD AUTO: 38.7 %
HGB BLD-MCNC: 13.2 G/DL
IMM GRANULOCYTES # BLD AUTO: 0.02 X10(3) UL (ref 0–1)
IMM GRANULOCYTES NFR BLD: 0.2 %
LYMPHOCYTES # BLD AUTO: 1.02 X10(3) UL (ref 1–4)
LYMPHOCYTES NFR BLD AUTO: 12.6 %
MCH RBC QN AUTO: 31 PG (ref 26–34)
MCHC RBC AUTO-ENTMCNC: 34.1 G/DL (ref 31–37)
MCV RBC AUTO: 90.8 FL
MONOCYTES # BLD AUTO: 0.69 X10(3) UL (ref 0.1–1)
MONOCYTES NFR BLD AUTO: 8.5 %
NEUTROPHILS # BLD AUTO: 6.13 X10 (3) UL (ref 1.5–7.7)
NEUTROPHILS # BLD AUTO: 6.13 X10(3) UL (ref 1.5–7.7)
NEUTROPHILS NFR BLD AUTO: 75.9 %
OSMOLALITY SERPL CALC.SUM OF ELEC: 297 MOSM/KG (ref 275–295)
PLATELET # BLD AUTO: 234 10(3)UL (ref 150–450)
POTASSIUM SERPL-SCNC: 3.2 MMOL/L (ref 3.5–5.1)
RBC # BLD AUTO: 4.26 X10(6)UL
SODIUM SERPL-SCNC: 142 MMOL/L (ref 136–145)
WBC # BLD AUTO: 8.1 X10(3) UL (ref 4–11)

## 2023-12-31 PROCEDURE — 99232 SBSQ HOSP IP/OBS MODERATE 35: CPT | Performed by: INTERNAL MEDICINE

## 2023-12-31 PROCEDURE — 99222 1ST HOSP IP/OBS MODERATE 55: CPT | Performed by: NEUROLOGICAL SURGERY

## 2023-12-31 RX ORDER — DIAZEPAM 5 MG/1
5 TABLET ORAL EVERY 8 HOURS PRN
Status: DISCONTINUED | OUTPATIENT
Start: 2023-12-31 | End: 2024-01-02

## 2023-12-31 RX ORDER — POTASSIUM CHLORIDE 20 MEQ/1
40 TABLET, EXTENDED RELEASE ORAL ONCE
Status: COMPLETED | OUTPATIENT
Start: 2023-12-31 | End: 2023-12-31

## 2023-12-31 NOTE — PLAN OF CARE
Pt admitted from ER at 1840.  Diagnosis Discitis.  Pt currently receiving IV Vanco and Nafcillin.  Oriented to room.  Call light in reach.  Alert and oriented.  Will give report to oncoming RN.

## 2023-12-31 NOTE — PLAN OF CARE
A&O x 4. VSS. O2 2L per NC. Norco/Zanaflex for lower back pain and muscle spasms. Voiding large amounts per urinal. Declines to sit up in bed or get out of bed. SCD's/Heparin. Reviewed POC, pain management, and fall precautions with pt. Plan TBD, possible bx? Will continue to monitor.

## 2023-12-31 NOTE — CONSULTS
INFECTIOUS DISEASE CONSULTATION    Troy Feliz Patient Status:  Inpatient    1960 MRN [id number]   [facility] 3SW-A Attending Roverto Obregon MD   Hosp Day # 1 PCP MADELINE FENTON DO       Requested by Dr. Dorman     Reason for Consultation:  Discitis     History of Present Illness:  Troy Feliz is a a(n) 63 year old male with a PMH as below who presents to ED from [facility] on  c/o R hip, groin and low back pain with muscle spasms.     Patient was previously hospitalized 23 with worsening back pain, found to have MSSA bacteremia and L3/4 discitis. He was discharged on cefazolin. Readmitted 3/22/23 with ongoing symptoms. MRI 3/22/23 demonstrated new epidural phlegmon, discharged on nafcillin EOT  (13 wk course). Presented to ED  with ongoing back pain. MRI  shows discitis/osteomyelitis with improvement, decreased L3/4 disc fluid, no abscess. CRP was elevated and patient planned for IR biopsy (off abx) though there was no fluid to aspirate, biopsy was taken , discharged off abx. Cultures finalized negative. Developed C diff .     He has been admitted several times since  for gastroenteritis, syncope, fall out of bed. Most recently  with ble swelling despite lasix 40mg 3 times daily, discharged . Had a repeat MRI lumbar spine 23 that showed similar discitis/om changes at L3-4, unclear if worsened for evolving changes of infection. Blood cultures were negative and patient afebrile at that time and ID not consulted.     Presents to ED  with low back, R hip/groin pains that aren't controlled with pan medication. MRI  with contrast c/f progression of discitis / om at L3/4 level with extension into the bilateral posterior articular facets and increasing bilateral psoas muscle edema/inflammation without definite abscess in the epidural or psoas space.     Afebrile since  admission. Blood cultures collected 12/30 pending. Was given nafcillin and vancomycin. No abx given at NH per patient. No fevers at NH per patient. CRP 1.47. patient reports that he has lower back soreness and BLE muscle spasms.     History:  Past Medical History:   Diagnosis Date    Diabetes (HCC)     Dyslipidemia 09/19/2011    High blood pressure     HTN (hypertension) 09/19/2011    Hyperlipidemia     Impaired fasting glucose 09/19/2011    Proteinuria 09/19/2011    Subclinical hypothyroidism 09/19/2011    Unspecified essential hypertension     Visual impairment      Past Surgical History:   Procedure Laterality Date    MASTOIDECTOMY,SIMPLE      done 30 years ago.  Revised in 1995     Family History   Problem Relation Age of Onset    Diabetes Father     Heart Disorder Mother     Heart Disorder Brother       reports that he has never smoked. He has never used smokeless tobacco. He reports that he does not drink alcohol and does not use drugs.      Allergies:  No Known Allergies    Medications:    Current Facility-Administered Medications:     sodium chloride 0.9% infusion, 125 mL/hr, Intravenous, Continuous    carvedilol (Coreg) tab 25 mg, 25 mg, Oral, BID with meals    furosemide (Lasix) tab 40 mg, 40 mg, Oral, BID (Diuretic)    gabapentin (Neurontin) cap 100 mg, 100 mg, Oral, TID    hydrALAZINE (Apresoline) tab 25 mg, 25 mg, Oral, Q8H OSEI    HYDROcodone-acetaminophen (Norco) 5-325 MG per tab 1 tablet, 1 tablet, Oral, Q4H PRN    insulin detemir (Levemir) 100 UNIT/ML FlexPen/FlexTouch 10 Units, 10 Units, Subcutaneous, Nightly    levothyroxine (Synthroid) tab 25 mcg, 25 mcg, Oral, Before breakfast    NIFEdipine ER (Procardia-XL) 24 hr tab 60 mg, 60 mg, Oral, Daily    tiZANidine (Zanaflex) tab 4 mg, 4 mg, Oral, Q6H PRN    nafcillin 2 g in sodium chloride 0.9% 100 mL IVPB, 2 g, Intravenous, Q6H    vancomycin (Vancocin) 1.5 g in sodium chloride 0.9% 250mL IVPB premix, 15 mg/kg (Adjusted), Intravenous, Q12H    glucose  (Dex4) 15 GM/59ML oral liquid 15 g, 15 g, Oral, Q15 Min PRN **OR** glucose (Glutose) 40% oral gel 15 g, 15 g, Oral, Q15 Min PRN **OR** glucose-vitamin C (Dex-4) chewable tab 4 tablet, 4 tablet, Oral, Q15 Min PRN **OR** dextrose 50% injection 50 mL, 50 mL, Intravenous, Q15 Min PRN **OR** glucose (Dex4) 15 GM/59ML oral liquid 30 g, 30 g, Oral, Q15 Min PRN **OR** glucose (Glutose) 40% oral gel 30 g, 30 g, Oral, Q15 Min PRN **OR** glucose-vitamin C (Dex-4) chewable tab 8 tablet, 8 tablet, Oral, Q15 Min PRN    heparin (Porcine) 5000 UNIT/ML injection 5,000 Units, 5,000 Units, Subcutaneous, Q8H OSEI    acetaminophen (Tylenol Extra Strength) tab 500 mg, 500 mg, Oral, Q4H PRN    HYDROmorphone (Dilaudid) 1 MG/ML injection 0.2 mg, 0.2 mg, Intravenous, Q2H PRN **OR** HYDROmorphone (Dilaudid) 1 MG/ML injection 0.4 mg, 0.4 mg, Intravenous, Q2H PRN **OR** HYDROmorphone (Dilaudid) 1 MG/ML injection 0.8 mg, 0.8 mg, Intravenous, Q2H PRN    ondansetron (Zofran) 4 MG/2ML injection 4 mg, 4 mg, Intravenous, Q6H PRN    prochlorperazine (Compazine) 10 MG/2ML injection 5 mg, 5 mg, Intravenous, Q8H PRN    insulin aspart (NovoLOG) 100 Units/mL FlexPen 1-10 Units, 1-10 Units, Subcutaneous, TID AC and HS    lidocaine-menthol 4-1 % patch 1 patch, 1 patch, Transdermal, Daily PRN    melatonin tab 1 mg, 1 mg, Oral, Nightly PRN    polyethylene glycol (PEG 3350) (Miralax) 17 g oral packet 17 g, 17 g, Oral, Daily PRN  Current Facility-Administered Medications on File Prior to Encounter   Medication Dose Route Frequency Provider Last Rate Last Admin    [COMPLETED] potassium chloride (K-Dur) tab 40 mEq  40 mEq Oral Once Marcella Burton MD   40 mEq at 12/16/23 1101    [COMPLETED] potassium chloride (K-Dur) tab 40 mEq  40 mEq Oral Once Marcella Burton MD   40 mEq at 12/14/23 0824    [COMPLETED] furosemide (Lasix) 10 mg/mL injection 60 mg  60 mg Intravenous BID (Diuretic) Marcella Burton MD   60 mg at 12/15/23 1737    [COMPLETED] potassium chloride (K-Dur)  tab 40 mEq  40 mEq Oral Q4H Marcella Burton MD   40 mEq at 12/13/23 1202    [COMPLETED] potassium chloride (K-Dur) tab 40 mEq  40 mEq Oral Once Marcella Burton MD   40 mEq at 12/13/23 1725    [COMPLETED] furosemide (Lasix) 10 mg/mL injection 60 mg  60 mg Intravenous Once Jazmine Martinez MD   60 mg at 12/12/23 2216     Current Outpatient Medications on File Prior to Encounter   Medication Sig Dispense Refill    furosemide 40 MG Oral Tab Take 1 tablet (40 mg total) by mouth 2 (two) times daily. 60 tablet 1    hydrALAZINE 25 MG Oral Tab Take 1 tablet (25 mg total) by mouth every 8 (eight) hours. 90 tablet 0    tiZANidine 4 MG Oral Tab Take 1 tablet (4 mg total) by mouth every 6 (six) hours as needed. 20 tablet 0    HYDROcodone-acetaminophen 5-325 MG Oral Tab Take 1 tablet by mouth every 4 (four) hours as needed. 15 tablet 0    gabapentin 100 MG Oral Cap Take 1 capsule (100 mg total) by mouth 3 (three) times daily. 90 capsule 0    carvedilol 25 MG Oral Tab Take 1 tablet (25 mg total) by mouth 2 (two) times daily with meals. 60 tablet 0    levothyroxine 25 MCG Oral Tab Take 1 tablet (25 mcg total) by mouth before breakfast.      NIFEdipine ER 60 MG Oral Tablet 24 Hr Take 1 tablet (60 mg total) by mouth daily.      traMADol 50 MG Oral Tab Take 1 tablet (50 mg total) by mouth every 8 (eight) hours as needed for Pain. (Patient not taking: Reported on 12/30/2023) 15 tablet 0    acetaminophen 500 MG Oral Tab Take 1-2 tablet every 4 hours as needed for pain 20 tablet 0    insulin detemir (LEVEMIR FLEXTOUCH) 100 UNIT/ML Subcutaneous Solution Pen-injector Inject 10 Units into the skin nightly. 5 each 2    polyethylene glycol, PEG 3350, 17 g Oral Powd Pack Take 17 g by mouth daily.      melatonin 1 MG Oral Tab Nightly prn 30 tablet 0    insulin aspart (NOVOLOG FLEXPEN) 100 Units/mL Subcutaneous Solution Pen-injector Test blood glucose 3 times daily with meals  Inject 1 unit if blood glucose is between 141-180 mg/dL Inject 2  units if blood glucose is between 181-220 mg/dL Inject 3 units if blood glucose is between 221-260 mg/dL Inject 4 units if blood glucose is between 261-300 mg/dL Inject 5 units if blood glucose is between 301-350 mg/dL Call your physician if blood glucose is greater than 351 mg/dL, 5 each 2    Insulin Pen Needle 32G X 4 MM Does not apply Misc May substitute if not on insurance formulary 100 each 5    lidocaine-menthol 4-1 % External Patch Place 1 patch onto the skin daily. 30 patch 0       Review of Systems:    A comprehensive 10 point review of systems was completed.  Pertinent positives and negatives noted in the the HPI.      Physical Exam:    General: No acute distress. Alert and oriented x 3.  Vital signs: Temp:  [98 °F (36.7 °C)-98.4 °F (36.9 °C)] 98.3 °F (36.8 °C)  Pulse:  [64-79] 64  Resp:  [16-21] 16  BP: (142-177)/(64-89) 142/86  SpO2:  [92 %-100 %] 96 %  HEENT: Moist mucous membranes. Extraocular muscles are intact.  Neck: No swelling, no masses  Respiratory: Non labored, symmetric exursion  Cardiovascular: No irregularities in rhythm  Abdomen: Soft, nontender, nondistended.    Musculoskeletal: Full range of motion of all extremities.  No swelling noted.  Joints: no effusions  Skin: No lesions. No erythema, no open wounds. Dry skin diffusely.       Laboratory Data:  Laboratory data reviewed      Recent Labs   Lab 12/31/23  0514   RBC 4.26*   HGB 13.2   HCT 38.7*   MCV 90.8   MCH 31.0   MCHC 34.1   RDW 12.3   NEPRELIM 6.13   WBC 8.1   .0       Recent Labs   Lab 12/30/23  1403 12/31/23  0514   * 114*   BUN 25* 19   CREATSERUM 0.98 0.81  0.81   CA 9.4 9.0   ALB 3.2*  --     142   K 3.9 3.2*    109   CO2 29.0 28.0   ALKPHO 103  --    AST 14*  --    ALT 23  --    BILT 0.3  --    TP 7.9  --          Lab Results   Component Value Date    CRP 1.47 12/30/2023    PGLU 111 12/31/2023         Microbiologic Data:   No results found for this visit on 12/30/23.      Imaging:  Narrative    PROCEDURE:  MRI SPINE LUMBAR (W+WO) (CPT=72158)      COMPARISON:  [facility] , MR, MRI SPINE LUMBAR (W+WO) (CPT=72158), 6/27/2023, 10:07 PM.  [facility] , MR, MRI SPINE LUMBAR(CONTRAST ONLY) (CPT=72149), 8/22/2023, 9:58 PM.  [facility] , XR, XR LUMBAR SPINE (MIN 4 VIEWS) (CPT=72110), 12/14/2023, 2:48 PM.     INDICATIONS:  Severe low back pain.  History of discitis/osteomyelitis at the L3-4 level.     TECHNIQUE:  Multiplanar T1 and T2 weighted images including fat suppression sequences.  Images acquired in sagittal and axial planes. Intravenous gadolinium was administered followed by multiplanar post-infusion T1 weighted sequences.       PATIENT STATED HISTORY:(As transcribed by Technologist)  The patient stated he is being evaluated for chronic, severe lower back pain and muscle spasms. He is wheelchair bound due to pain.      CONTRAST USED:  20 mL of Dotarem     FINDINGS:    LUMBAR DISC LEVELS  L1-L2:  No significant disc/facet abnormality, spinal stenosis, or foraminal narrowing.    L2-L3:  There is a moderate broad-based degenerative disc bulge.  There is posterior epidural fat hypertrophy.  This causes moderate central canal stenosis of 8 mm.  The facet joints are unremarkable.  L3-L4:  There continues to be fluid signal within the disc space.  There is destructive changes of the endplates of L3 and L4 which is slightly more advanced than the study of 8/22/2023.  There is enhancement of the endplates which is slightly more  vigorous than on the previous study.  Additionally, there is new intense mint along the posterior articular facets at both levels suspicious for interval progression of septic arthropathy into the bilateral posterior articular facet joints.  There is  also increasing adjacent psoas edema diffusely.  There is no abdi epidural fluid collection or abscess.  There is moderate central canal stenosis of 7.5 mm.    L4-L5:  There is mild degenerative disc bulge osteophyte complex.  AP diameter canal is normal  12 mm.  There is mild subarticular zone and neural foraminal narrowing.  The facet joints unremarkable.  L5-S1:  Very mild posterior degenerative disc bulge.  There is mild bilateral facet joint degenerative change.  There is mild bilateral neural foraminal narrowing.     PARASPINAL AREA:  Increasing bilateral psoas muscle edema/inflammation.  No evidence of psoas muscle abscess.  BONES:  No fracture, pars defect, or osseous lesion.    CORD/CAUDA EQUINA:  There is thickening of the cauda equina nerve roots diffusely suggestive of chronic arachnoiditis, which can be retrospectively seen on previous imaging studies and appears relatively stable.  No abnormal enhancement along the conus  or nerve roots.                   Impression   CONCLUSION:    Findings are concerning for progression of discitis/osteomyelitis centered at the L3-4 disc level, with suggestion of interval extension into the bilateral posterior articular facets at this level concerning for the development of bilateral septic  arthropathy.  There is also increasing bilateral psoas muscle edema/inflammation.  No discrete evidence of epidural abscess or psoas abscess.     Diffuse thickening of the cauda equina suggesting chronic arachnoiditis, which can be retrospectively seen and appears similar to previous imaging.  No abnormal post-contrast enhancement.        Established Problem list:  Patient Active Problem List   Diagnosis    Mixed hyperlipidemia    Subclinical hypothyroidism    Proteinuria    Benign essential HTN    Type 2 diabetes mellitus with other specified complication (HCC)    Peripheral edema    Morbid obesity with BMI of 40.0-44.9, adult ([facility])    Non-traumatic rhabdomyolysis    Acute hypoxemic respiratory failure ([facility])    Right leg weakness    Generalized weakness    HHNC (hyperglycemic hyperosmolar nonketotic coma) ([facility])    MSSA bacteremia    Discitis of lumbar region    Epidural abscess    Intractable back pain    Osteomyelitis of low  back (HCC)    Diarrhea    C. difficile diarrhea    Syncope and collapse    Nausea and vomiting    Leukocytosis    TARIK (acute kidney injury) (HCC)    Dehydration    Renal insufficiency    Hyperkalemia    Azotemia    Metabolic acidosis    Hyperglycemia    Weakness generalized    Dizziness    Hypertensive urgency    Extremity edema    Lumbar radiculopathy    Discitis, unspecified spinal region    Dyslipidemia    Moderate back pain       ASSESSMENT/PLAN:  1. MSSA L3/4 Discitis / OM     - First diagnosed February 2023 at which time patient had MSSA bacteremia and L3/4 discitis s/p cefazolin  - 3/22/23 with ongoing symptoms. MRI 3/22/23 demonstrated new epidural phlegmon, discharged on nafcillin EOT 5/29 (13 wk course).  - MRI 6/27 shows discitis/osteomyelitis with improvement, decreased L3/4 disc fluid, no abscess. CRP was elevated and patient planned for IR biopsy (off abx) though there was no fluid to aspirate, biopsy was taken 6/29, discharged off abx. Cultures finalized negative. Path with some neutrophils stains negative.   - MRI 8/22/23 showed similar discitis/om changes at L3-4, unclear if worsened for evolving changes of infection. Blood cultures were negative and patient afebrile at that time  - Presents to ED 12/30 with low back, R hip/groin pains that aren't controlled with pan medication.   - MRI 12/30 with contrast c/f progression of discitis / om at L3/4 level with extension into the bilateral posterior articular facets and increasing bilateral psoas muscle edema/inflammation without definite abscess in the epidural or psoas space.     No ABX given PTA. Given nafcillin + vancomycin in house.   Blood cxs 12/30 pending  Afebrile  WBC 8  CRP 1.47     - hold abx for now as patient is hemodynamically stable, afebrile and has a normal WBC count. If any of these should change would re-start empiric abx therapy.  - appreciate Ortho spine evaluation - per note no planned surgical intervention and consider IR biopsy  of disc space  - follow up blood cultures   - Agree with IR guided disc biopsy for diagnosis and to provide culture directed therapy.   - monitor clinically     2. H/o C diff   - monitor BMs    BETZAIDA [facility]  [phone]

## 2023-12-31 NOTE — CONSULTS
[facility]    Neurosurgery Consult  2023    Troy Feliz Patient Status:  Inpatient    1960 MRN [id number]   Location [facility] 3SW-A Attending Roverto Obregon MD   Hosp Day # 1 PCP MADELINE FENTON DO     REASON FOR CONSULT:    L3-4 discitis    HISTORY OF PRESENT ILLNESS:  Troy Feliz is a(n) 63 year old male known well to our service.  Patient was seen earlier this year for discitis of L3-4 and back pain.  He was started on and treated with abx with improvement in his back pain.  He states he began having increasing back pain the past few weeks without weakness nor bowel/bladder habit changes which led to his admission.  He has no focal deficits on exam.    MRI shows inflammation and stranding within the psoas muscle and continued enhancement within the disc space at L3-4.  There appears to be effusions of the joints at L3-4 B/L.  No discrete fluid collection nor abscess is noted.      REVIEW OF SYSTEMS:  The 12 point checklist was reviewed and was negative except:none    ALLERGIES:  No Known Allergies    MEDICATIONS:  Medications Prior to Admission   Medication Sig Dispense Refill Last Dose    furosemide 40 MG Oral Tab Take 1 tablet (40 mg total) by mouth 2 (two) times daily. 60 tablet 1 2023 at 0900    hydrALAZINE 25 MG Oral Tab Take 1 tablet (25 mg total) by mouth every 8 (eight) hours. 90 tablet 0 2023 at 0900    tiZANidine 4 MG Oral Tab Take 1 tablet (4 mg total) by mouth every 6 (six) hours as needed. 20 tablet 0 2023 at 0900    HYDROcodone-acetaminophen 5-325 MG Oral Tab Take 1 tablet by mouth every 4 (four) hours as needed. 15 tablet 0 2023 at 0900    gabapentin 100 MG Oral Cap Take 1 capsule (100 mg total) by mouth 3 (three) times daily. 90 capsule 0 2023 at 0900    carvedilol 25 MG Oral Tab Take 1 tablet (25 mg total) by mouth 2 (two) times daily with meals. 60 tablet 0 2023 at 0900    levothyroxine 25 MCG Oral Tab Take 1 tablet (25 mcg  total) by mouth before breakfast.   12/30/2023 at 0900    NIFEdipine ER 60 MG Oral Tablet 24 Hr Take 1 tablet (60 mg total) by mouth daily.   12/30/2023 at 0900    traMADol 50 MG Oral Tab Take 1 tablet (50 mg total) by mouth every 8 (eight) hours as needed for Pain. (Patient not taking: Reported on 12/30/2023) 15 tablet 0 Not Taking    acetaminophen 500 MG Oral Tab Take 1-2 tablet every 4 hours as needed for pain 20 tablet 0     insulin detemir (LEVEMIR FLEXTOUCH) 100 UNIT/ML Subcutaneous Solution Pen-injector Inject 10 Units into the skin nightly. 5 each 2     polyethylene glycol, PEG 3350, 17 g Oral Powd Pack Take 17 g by mouth daily.       melatonin 1 MG Oral Tab Nightly prn 30 tablet 0     insulin aspart (NOVOLOG FLEXPEN) 100 Units/mL Subcutaneous Solution Pen-injector Test blood glucose 3 times daily with meals  Inject 1 unit if blood glucose is between 141-180 mg/dL Inject 2 units if blood glucose is between 181-220 mg/dL Inject 3 units if blood glucose is between 221-260 mg/dL Inject 4 units if blood glucose is between 261-300 mg/dL Inject 5 units if blood glucose is between 301-350 mg/dL Call your physician if blood glucose is greater than 351 mg/dL, 5 each 2     Insulin Pen Needle 32G X 4 MM Does not apply Misc May substitute if not on insurance formulary 100 each 5     lidocaine-menthol 4-1 % External Patch Place 1 patch onto the skin daily. 30 patch 0        HISTORY:  Past Medical History:   Diagnosis Date    Diabetes (HCC)     Dyslipidemia 09/19/2011    High blood pressure     HTN (hypertension) 09/19/2011    Hyperlipidemia     Impaired fasting glucose 09/19/2011    Proteinuria 09/19/2011    Subclinical hypothyroidism 09/19/2011    Unspecified essential hypertension     Visual impairment      Past Surgical History:   Procedure Laterality Date    MASTOIDECTOMY,SIMPLE      done 30 years ago.  Revised in 1995     Family History   Problem Relation Age of Onset    Diabetes Father     Heart Disorder Mother      Heart Disorder Brother      Troy  reports that he has never smoked. He has never used smokeless tobacco. He reports that he does not drink alcohol and does not use drugs.    PHYSICAL EXAMINATION:  Vital Signs:  /73 (BP Location: Left arm)   Pulse 66   Temp 98.1 °F (36.7 °C) (Oral)   Resp 18   Wt 292 lb (132.5 kg)   SpO2 100%   BMI 38.95 kg/m²     Neuro:   Motor: CARR[place] with good strength throughout   Reflexes: 2 + throughout   Sensory: intact to LT throughout with nl JPS B/L      REVIEW OF STUDIES:  As above      ASSESSMENT AND PLAN:  L3-4 discitis without abscess  Consider IR biopsy of disc space for organism if BC negative  Recommend LSO brace but patient is reluctant to wear  No surgical intervention recommended at this time  Will follow  Please call with questions    Andres Christina DO    12/31/2023  12:52 PM

## 2023-12-31 NOTE — PROGRESS NOTES
[facility] Dosing Service      Initial Pharmacokinetic Consult for Vancomycin Dosing     Troy Feliz is a 63 year old male who is being initiated on vancomycin therapy for discitis/osteomyelitis.  Pharmacy has been asked to dose vancomycin by Dr. Dorman.  The initial treatment and monitoring approach will be steady state AUC strategy.        Weight and Temperature:    Wt Readings from Last 1 Encounters:   23 132.5 kg (292 lb)        Temp Readings from Last 1 Encounters:   23 98 °F (36.7 °C) (Oral)      Labs:   Recent Labs   Lab 23  1403   CREATSERUM 0.98      Estimated Creatinine Clearance: 87.3 mL/min (based on SCr of 0.98 mg/dL).     Recent Labs   Lab 23  1403   WBC 8.9          The Pharmacokinetic Target is:     to 600 mg-h/L and trough <=15 mg/L    Renal Dosing Considerations:    None     Assessment/Plan:   Initial/Loading dose: Has received 2250 mg IV (17 mg/kg, capped at 2250 mg) x 1 initial dose.      Maintenance dose: Pharmacy will dose vancomycin at 1500 mg IV every 12 hours    Monitorin) Plan for vancomycin peak and trough to be obtained at steady state    2) Pharmacy will order SCr as clinically indicated to assess renal function.    3) Pharmacy will monitor for toxicity and efficacy, adjust vancomycin dose and/or frequency, and order vancomycin levels as appropriate per the Pharmacy and Therapeutics Committee approved protocol until discontinuation of the medication.       We appreciate the opportunity to assist in the care of this patient.     Alycia Garduno, PharmD  2023  6:55 PM  [facility] Extension: [phone]

## 2023-12-31 NOTE — PLAN OF CARE
ED admit for discitis. AxO4. VSS on RA. On 0.9 @ 125. SCD/heparin. See MAR for blood glucose control. Denies nausea, voids via urinal. Last BM 12/28. C/o of back pain, stiffness radiating to bilateral lower extremities, weakness. BLE edema, Ambulates via wheelchair @ baseline. From [facility] for rehab - updated on admission. On IV nafcillin, vancomycin; cultures pending. LSO brace ordered - refused off-the-shelf brace, will get custom brace prior to discharge. Pending spine and ID consult. Updated on POC. Safety measures placed. Care ongoing.

## 2023-12-31 NOTE — PROGRESS NOTES
[facility]     Hospitalist Progress Note     Troy Feliz Patient Status:  Inpatient    1960 MRN [id number]   [facility] 3SW-A Attending Roverto Obregon MD   Hosp Day # 1 PCP MADELINE FENTON DO     Chief Complaint: back pain    Subjective:     Patient without acute events overnight. Back pain about the same. No F/C. Tolerating abx.     Objective:    Review of Systems:   A comprehensive review of systems was completed; pertinent positive and negatives stated in subjective.    Vital signs:  Temp:  [98 °F (36.7 °C)-98.4 °F (36.9 °C)] 98.3 °F (36.8 °C)  Pulse:  [64-79] 64  Resp:  [16-21] 16  BP: (142-177)/(64-89) 142/86  SpO2:  [92 %-100 %] 96 %    Physical Exam:    General: No acute distress  Respiratory: no wheezes, no rhonchi  Cardiovascular: S1, S2, regular rate and rhythm  Abdomen: Soft, Non-tender, non-distended, positive bowel sounds  Neuro: No new focal deficits.   Extremities: no edema      Diagnostic Data:    Labs:  Recent Labs   Lab 23  1403 23  0514   WBC 8.9 8.1   HGB 14.0 13.2   MCV 91.5 90.8   .0 234.0       Recent Labs   Lab 23  1403 23  0514   * 114*   BUN 25* 19   CREATSERUM 0.98 0.81  0.81   CA 9.4 9.0   ALB 3.2*  --     142   K 3.9 3.2*    109   CO2 29.0 28.0   ALKPHO 103  --    AST 14*  --    ALT 23  --    BILT 0.3  --    TP 7.9  --        Estimated Creatinine Clearance: 105.7 mL/min (based on SCr of 0.81 mg/dL).    No results for input(s): \"TROP\", \"TROPHS\", \"CK\" in the last 168 hours.    No results for input(s): \"PTP\", \"INR\" in the last 168 hours.               Microbiology    No results found for this visit on 23.      Imaging: Reviewed in Epic.    Medications:    carvedilol  25 mg Oral BID with meals    furosemide  40 mg Oral BID (Diuretic)    gabapentin  100 mg Oral TID    hydrALAZINE  25 mg Oral Q8H OSEI    insulin detemir  10 Units Subcutaneous Nightly    levothyroxine  25 mcg Oral Before breakfast     NIFEdipine ER  60 mg Oral Daily    nafcillin  2 g Intravenous Q6H    vancomycin  15 mg/kg (Adjusted) Intravenous Q12H    heparin  5,000 Units Subcutaneous Q8H OSEI    insulin aspart  1-10 Units Subcutaneous TID AC and HS       Assessment & Plan:      #Back Pain  #Discitis/OM  -continue IV abx  -follow cultures  -ID and NeuroSx to eval  -ESR CRP elevated  -LSO brace     #Ess HTN  -resume home meds     #Dyslipidemia  -statin     #DM2  -SSI      Coy Dorman MD    Supplementary Documentation:     Quality:  DVT Mechanical Prophylaxis:   SCDs,    DVT Pharmacologic Prophylaxis   Medication    heparin (Porcine) 5000 UNIT/ML injection 5,000 Units                Code Status: Full Code  Pruitt: No urinary catheter in place  Pruitt Duration (in days):   Central line:    KIKE:     Discharge is dependent on: progress  At this point Mr. Feliz is expected to be discharge to: home    The 21st Century Cures Act makes medical notes like these available to patients in the interest of transparency. Please be advised this is a medical document. Medical documents are intended to carry relevant information, facts as evident, and the clinical opinion of the practitioner. The medical note is intended as peer to peer communication and may appear blunt or direct. It is written in medical language and may contain abbreviations or verbiage that are unfamiliar.

## 2024-01-01 LAB
GLUCOSE BLD-MCNC: 111 MG/DL (ref 70–99)
GLUCOSE BLD-MCNC: 187 MG/DL (ref 70–99)
GLUCOSE BLD-MCNC: 192 MG/DL (ref 70–99)
GLUCOSE BLD-MCNC: 99 MG/DL (ref 70–99)
POTASSIUM SERPL-SCNC: 3.2 MMOL/L (ref 3.5–5.1)

## 2024-01-01 PROCEDURE — 99232 SBSQ HOSP IP/OBS MODERATE 35: CPT | Performed by: HOSPITALIST

## 2024-01-01 RX ORDER — POTASSIUM CHLORIDE 20 MEQ/1
40 TABLET, EXTENDED RELEASE ORAL ONCE
Status: COMPLETED | OUTPATIENT
Start: 2024-01-01 | End: 2024-01-01

## 2024-01-01 RX ORDER — HYDRALAZINE HYDROCHLORIDE 50 MG/1
50 TABLET, FILM COATED ORAL EVERY 8 HOURS SCHEDULED
Status: DISCONTINUED | OUTPATIENT
Start: 2024-01-01 | End: 2024-01-09

## 2024-01-01 NOTE — PROGRESS NOTES
Patient BP elevated in 170/180's this AM. PO hydralazine given. Per hospitalist stated to give morning meds early.

## 2024-01-01 NOTE — PLAN OF CARE
Pt Aox4, RA, 2l O2 at noc. . Heparin for VTE. Voiding per urinal. Norco and Valium for pain. WC bound at baseline, stand pivot transfer. BLE flakey and red, dressings CDI.

## 2024-01-01 NOTE — PLAN OF CARE
Patient is alert and oriented x 4. Vitals stable on 2L oxygen prn. Pain managed by valium and norco. Dressings changed to bilateral feet and wound care consult ordered. Patient voiding in urinal. Up maximum assist stand pivot to commode. Last BM 12/28/23 patient refused miralax. Tolerating 1800 diet. Plan for possible IR biopsy of disc space. LSO brace when OOB, patient refusing. Plan of care discussed with patient.

## 2024-01-01 NOTE — PROGRESS NOTES
[facility]     Hospitalist Progress Note     Troy Feliz Patient Status:  Inpatient    1960 MRN [id number]   [facility] 3SW-A Attending Roverto Obregon MD   Hosp Day # 2 PCP MADELINE FENTON DO     Chief Complaint: back pain    Subjective:     Patient without acute events overnight. Back pain about the same. No F/C. Tolerating abx.     Objective:    Review of Systems:   A comprehensive review of systems was completed; pertinent positive and negatives stated in subjective.    Vital signs:  Temp:  [97.9 °F (36.6 °C)-98.4 °F (36.9 °C)] 97.9 °F (36.6 °C)  Pulse:  [63-72] 63  Resp:  [16-19] 16  BP: (166-183)/(70-87) 183/85  SpO2:  [96 %-99 %] 99 %    Physical Exam:    General: No acute distress  Respiratory: no wheezes, no rhonchi  Cardiovascular: S1, S2, regular rate and rhythm  Abdomen: Soft, Non-tender, non-distended, positive bowel sounds  Neuro: No new focal deficits.   Extremities: no edema      Diagnostic Data:    Labs:  Recent Labs   Lab 23  1403 23  0514   WBC 8.9 8.1   HGB 14.0 13.2   MCV 91.5 90.8   .0 234.0       Recent Labs   Lab 23  1403 23  0514 24  0531   * 114*  --    BUN 25* 19  --    CREATSERUM 0.98 0.81  0.81  --    CA 9.4 9.0  --    ALB 3.2*  --   --     142  --    K 3.9 3.2* 3.2*    109  --    CO2 29.0 28.0  --    ALKPHO 103  --   --    AST 14*  --   --    ALT 23  --   --    BILT 0.3  --   --    TP 7.9  --   --        Estimated Creatinine Clearance: 105.7 mL/min (based on SCr of 0.81 mg/dL).    No results for input(s): \"TROP\", \"TROPHS\", \"CK\" in the last 168 hours.    No results for input(s): \"PTP\", \"INR\" in the last 168 hours.               Microbiology    Hospital Encounter on 23   1. Blood Culture     Status: None (Preliminary result)    Collection Time: 23  5:14 PM    Specimen: Blood,peripheral   Result Value Ref Range    Blood Culture Result No Growth 1 Day N/A         Imaging: Reviewed in  Epic.    Medications:    potassium chloride  40 mEq Oral Once    hydrALAZINE  50 mg Oral Q8H OSEI    carvedilol  25 mg Oral BID with meals    furosemide  40 mg Oral BID (Diuretic)    gabapentin  100 mg Oral TID    insulin detemir  10 Units Subcutaneous Nightly    levothyroxine  25 mcg Oral Before breakfast    NIFEdipine ER  60 mg Oral Daily    heparin  5,000 Units Subcutaneous Q8H OSEI    insulin aspart  1-10 Units Subcutaneous TID AC and HS       Assessment & Plan:      #Back Pain  #Discitis/OM  -hold IV abx  -plan IR biopsy tomorrow  -follow cultures  -ID and NeuroSx to eval  -ESR CRP elevated  -LSO brace     #Ess HTN  -resume home meds  -incr meds     #Dyslipidemia  -statin     #DM2  -SSI    Tristan Holloway MD    Supplementary Documentation:     Quality:  DVT Mechanical Prophylaxis:   SCDs,    DVT Pharmacologic Prophylaxis   Medication    heparin (Porcine) 5000 UNIT/ML injection 5,000 Units                Code Status: Full Code  Pruitt: No urinary catheter in place  Pruitt Duration (in days):   Central line:    KIKE:     Discharge is dependent on: progress  At this point Mr. Feliz is expected to be discharge to: home    The 21st Century Cures Act makes medical notes like these available to patients in the interest of transparency. Please be advised this is a medical document. Medical documents are intended to carry relevant information, facts as evident, and the clinical opinion of the practitioner. The medical note is intended as peer to peer communication and may appear blunt or direct. It is written in medical language and may contain abbreviations or verbiage that are unfamiliar.

## 2024-01-02 ENCOUNTER — APPOINTMENT (OUTPATIENT)
Dept: CT IMAGING | Facility: HOSPITAL | Age: 64
End: 2024-01-02
Attending: INTERNAL MEDICINE
Payer: MEDICAID

## 2024-01-02 LAB
GLUCOSE BLD-MCNC: 108 MG/DL (ref 70–99)
GLUCOSE BLD-MCNC: 126 MG/DL (ref 70–99)
GLUCOSE BLD-MCNC: 126 MG/DL (ref 70–99)
GLUCOSE BLD-MCNC: 156 MG/DL (ref 70–99)
GLUCOSE BLD-MCNC: 156 MG/DL (ref 70–99)
INR BLD: 1.17 (ref 0.8–1.2)
POTASSIUM SERPL-SCNC: 3.9 MMOL/L (ref 3.5–5.1)
PROTHROMBIN TIME: 14.9 SECONDS (ref 11.6–14.8)

## 2024-01-02 PROCEDURE — 62267 INTERDISCAL PERQ ASPIR DX: CPT | Performed by: INTERNAL MEDICINE

## 2024-01-02 PROCEDURE — 87176 TISSUE HOMOGENIZATION CULTR: CPT | Performed by: INTERNAL MEDICINE

## 2024-01-02 PROCEDURE — 87205 SMEAR GRAM STAIN: CPT | Performed by: INTERNAL MEDICINE

## 2024-01-02 PROCEDURE — 99152 MOD SED SAME PHYS/QHP 5/>YRS: CPT | Performed by: INTERNAL MEDICINE

## 2024-01-02 PROCEDURE — 99232 SBSQ HOSP IP/OBS MODERATE 35: CPT | Performed by: INTERNAL MEDICINE

## 2024-01-02 PROCEDURE — 87070 CULTURE OTHR SPECIMN AEROBIC: CPT | Performed by: INTERNAL MEDICINE

## 2024-01-02 PROCEDURE — 87075 CULTR BACTERIA EXCEPT BLOOD: CPT | Performed by: INTERNAL MEDICINE

## 2024-01-02 PROCEDURE — 0SB23ZX EXCISION OF LUMBAR VERTEBRAL DISC, PERCUTANEOUS APPROACH, DIAGNOSTIC: ICD-10-PCS | Performed by: RADIOLOGY

## 2024-01-02 PROCEDURE — 77012 CT SCAN FOR NEEDLE BIOPSY: CPT | Performed by: INTERNAL MEDICINE

## 2024-01-02 RX ORDER — FLUMAZENIL 0.1 MG/ML
0.2 INJECTION INTRAVENOUS AS NEEDED
Status: DISCONTINUED | OUTPATIENT
Start: 2024-01-02 | End: 2024-01-02 | Stop reason: HOSPADM

## 2024-01-02 RX ORDER — NALOXONE HYDROCHLORIDE 0.4 MG/ML
80 INJECTION, SOLUTION INTRAMUSCULAR; INTRAVENOUS; SUBCUTANEOUS AS NEEDED
Status: DISCONTINUED | OUTPATIENT
Start: 2024-01-02 | End: 2024-01-02 | Stop reason: HOSPADM

## 2024-01-02 RX ORDER — MIDAZOLAM HYDROCHLORIDE 1 MG/ML
INJECTION INTRAMUSCULAR; INTRAVENOUS
Status: COMPLETED
Start: 2024-01-02 | End: 2024-01-02

## 2024-01-02 RX ORDER — SODIUM CHLORIDE 9 MG/ML
INJECTION, SOLUTION INTRAVENOUS CONTINUOUS
Status: DISCONTINUED | OUTPATIENT
Start: 2024-01-02 | End: 2024-01-02 | Stop reason: HOSPADM

## 2024-01-02 RX ORDER — MIDAZOLAM HYDROCHLORIDE 1 MG/ML
1 INJECTION INTRAMUSCULAR; INTRAVENOUS EVERY 5 MIN PRN
Status: DISCONTINUED | OUTPATIENT
Start: 2024-01-02 | End: 2024-01-02 | Stop reason: HOSPADM

## 2024-01-02 NOTE — IMAGING NOTE
Imaging reviewed after receiving order for lumbar spine biopsy for possible discitis with Dr Rg. Plan for this afternoon. Discussed plan with bedside TABITHA Preciado. Labs resulted and he will check blood sugar before leaving unit. Pt alert and can consent in department.

## 2024-01-02 NOTE — CONSULTS
[facility]  Report of Inpatient Wound Care Consultation    Troy Feliz Patient Status:  Inpatient    1960 MRN [id number]   Location [facility] 3SW-A Attending Tristan Holloway MD   Hosp Day # 3 PCP MADELINE FENTON DO     Reason for Consultation:  Feet ulcers    History of Present Illness:  Troy Feliz is a a(n) 63 year old male. Patient presents with non healing diabetic foot ulcers.Denies pain in the ulcers but complains of pain on the feet with turning and lifting.    History:  Past Medical History:   Diagnosis Date    Diabetes (HCC)     Dyslipidemia 2011    High blood pressure     HTN (hypertension) 2011    Hyperlipidemia     Impaired fasting glucose 2011    Proteinuria 2011    Subclinical hypothyroidism 2011    Unspecified essential hypertension     Visual impairment      Past Surgical History:   Procedure Laterality Date    MASTOIDECTOMY,SIMPLE      done 30 years ago.  Revised in       reports that he has never smoked. He has never used smokeless tobacco. He reports that he does not drink alcohol and does not use drugs.    Allergies:  @ALLERGY    Laboratory Data:  Lab Results   Component Value Date    K 3.9 2024    PGLU 108 2024         Impression:  Wound 23 Heel Left;Plantar (Active)   Date First Assessed/Time First Assessed: 23 0400   Present on Original Admission: Yes  Primary Wound Type: Diabetic Ulcer  Location: Heel  Wound Location Orientation: Left;Plantar      Assessments 2024  9:59 AM   Wound Image      Drainage Amount Small   Drainage Description Serosanguineous   Wound Length (cm) 2.5 cm   Wound Width (cm) 2.4 cm   Wound Surface Area (cm^2) 6 cm^2   Wound Depth (cm) 0.1 cm   Wound Volume (cm^3) 0.6 cm^3   Wound Healing % 85   Margins Well-defined edges   Non-staged Wound Description Full thickness   Yue-wound Assessment Edema (Blood filled blister lateral to the wound.Measures 2.5 cm x 2 cm x 0 depth.No  drainage.)   Wound Granulation Tissue Pink;Pale Pittman;Spongy   Wound Bed Granulation (%) 95 %   Wound Bed Slough (%) 5 %   Wound Odor None   Tunneling? No   Undermining? No   Sinus Tracts? No       Wound 12/13/23 Ankle Anterior;Left (Active)   Date First Assessed/Time First Assessed: 12/13/23 0511   Location: Ankle  Wound Location Orientation: Anterior;Left      Assessments 1/2/2024  9:56 AM   Wound Image     Drainage Amount None   Wound Length (cm) 0.6 cm   Wound Width (cm) 1 cm   Wound Surface Area (cm^2) 0.6 cm^2   Wound Depth (cm) 0.1 cm   Wound Volume (cm^3) 0.06 cm^3   Margins Well-defined edges   Non-staged Wound Description Full thickness   Wound Bed Slough (%) 100 % (slough/eschar- wound bed  is very dry)   Wound Odor None   Tunneling? No   Undermining? No   Sinus Tracts? No       Wound 12/31/23 Heel Right;Plantar (Active)   Date First Assessed/Time First Assessed: 12/31/23 2305   Location: Heel  Wound Location Orientation: Right;Plantar      Assessments 1/2/2024  9:53 AM   Wound Image     Drainage Amount None   Wound Length (cm) 2.5 cm   Wound Width (cm) 2 cm   Wound Surface Area (cm^2) 5 cm^2   Wound Depth (cm) 0.1 cm   Wound Volume (cm^3) 0.5 cm^3   Margins Well-defined edges   Non-staged Wound Description Full thickness   Yue-wound Assessment Edema;Dry   Wound Granulation Tissue Pink;Pale Pittman   Wound Bed Granulation (%) 10 %   Wound Bed Eschar (%) 90 %   Wound Odor None   Tunneling? No   Undermining? No   Sinus Tracts? No   Lower extremity assessment :  Edema to BLE   Local pulse :bilateral, palpable dorsalis pedis  Capillary refill : >3 seconds  Temperature : warm     Wound Cleaning and Dressings:  1.Right plantar heel, left anterior ankle  Showering directions: May shower with protection  Wound cleansing:  Cleanse with saline  Wound product: Paint with betadine.Cover with bordered gauze.  Dressing change frequency:  Change dressing daily and/or PRN    2.Left plantar heel  Showering directions: May  shower with protection  Wound cleansing:  Cleanse with saline  Wound product: Bordered Silver foam  Dressing change frequency:  Change dressing every other day  and/or PRN      Compression Therapy:   Elevate leg(s) as much as possible-      Miscellaneous/Additional Orders:  Offloading: Turn Q 2 hours and Heel off-loading boot(s)    Miscellaneous orders: Increase dietary protein intake.Dietitian may need to evaluate amount of protein supplements    Care Summary:  Care Summary: Discussed Plan of Care at beside with patient. Patient verbally acknowledges understanding of all instructions and all questions were answered.      Recommendations:  Recent arterial studies  12/15- shows bilateral 50% stenosis , may need to consult vascular to facilitate wound healing if intervention is necessary.  Podiatry consult     Thank you for allowing me to participate in the care of your patient.  Time Spent 40 minutes , Thank you.    Michelle Smith RN, BSN [facility]   Wound Care pager [phone]  1/2/2024  10:33 AM

## 2024-01-02 NOTE — PROGRESS NOTES
INFECTIOUS DISEASE PROGRESS NOTE    Troy Feliz Patient Status:  Inpatient    1960 MRN [id number]   [facility] 3SW-A Attending Roverto Obregon MD   Hosp Day # 3 PCP MADELINE FENTON,      Abx: None    Subjective: Patient seen and examined today. Reports back feels slightly better today but continues to have lower back pain with spasms. Denies any diarrhea. Afebrile.     Allergies:  No Known Allergies    Medications:    Current Facility-Administered Medications:     methocarbamol (Robaxin) partial tab 250 mg, 250 mg, Oral, QID    hydrALAZINE (Apresoline) tab 50 mg, 50 mg, Oral, Q8H OSEI    carvedilol (Coreg) tab 25 mg, 25 mg, Oral, BID with meals    furosemide (Lasix) tab 40 mg, 40 mg, Oral, BID (Diuretic)    gabapentin (Neurontin) cap 100 mg, 100 mg, Oral, TID    HYDROcodone-acetaminophen (Norco) 5-325 MG per tab 1 tablet, 1 tablet, Oral, Q4H PRN    insulin detemir (Levemir) 100 UNIT/ML FlexPen/FlexTouch 10 Units, 10 Units, Subcutaneous, Nightly    levothyroxine (Synthroid) tab 25 mcg, 25 mcg, Oral, Before breakfast    NIFEdipine ER (Procardia-XL) 24 hr tab 60 mg, 60 mg, Oral, Daily    glucose (Dex4) 15 GM/59ML oral liquid 15 g, 15 g, Oral, Q15 Min PRN **OR** glucose (Glutose) 40% oral gel 15 g, 15 g, Oral, Q15 Min PRN **OR** glucose-vitamin C (Dex-4) chewable tab 4 tablet, 4 tablet, Oral, Q15 Min PRN **OR** dextrose 50% injection 50 mL, 50 mL, Intravenous, Q15 Min PRN **OR** glucose (Dex4) 15 GM/59ML oral liquid 30 g, 30 g, Oral, Q15 Min PRN **OR** glucose (Glutose) 40% oral gel 30 g, 30 g, Oral, Q15 Min PRN **OR** glucose-vitamin C (Dex-4) chewable tab 8 tablet, 8 tablet, Oral, Q15 Min PRN    heparin (Porcine) 5000 UNIT/ML injection 5,000 Units, 5,000 Units, Subcutaneous, Q8H OSEI    acetaminophen (Tylenol Extra Strength) tab 500 mg, 500 mg, Oral, Q4H PRN    HYDROmorphone (Dilaudid) 1 MG/ML injection 0.2 mg, 0.2 mg, Intravenous, Q2H PRN  **OR** HYDROmorphone (Dilaudid) 1 MG/ML injection 0.4 mg, 0.4 mg, Intravenous, Q2H PRN **OR** HYDROmorphone (Dilaudid) 1 MG/ML injection 0.8 mg, 0.8 mg, Intravenous, Q2H PRN    ondansetron (Zofran) 4 MG/2ML injection 4 mg, 4 mg, Intravenous, Q6H PRN    prochlorperazine (Compazine) 10 MG/2ML injection 5 mg, 5 mg, Intravenous, Q8H PRN    insulin aspart (NovoLOG) 100 Units/mL FlexPen 1-10 Units, 1-10 Units, Subcutaneous, TID AC and HS    lidocaine-menthol 4-1 % patch 1 patch, 1 patch, Transdermal, Daily PRN    melatonin tab 1 mg, 1 mg, Oral, Nightly PRN    polyethylene glycol (PEG 3350) (Miralax) 17 g oral packet 17 g, 17 g, Oral, Daily PRN  Current Facility-Administered Medications on File Prior to Encounter   Medication Dose Route Frequency Provider Last Rate Last Admin    [COMPLETED] potassium chloride (K-Dur) tab 40 mEq  40 mEq Oral Once Marcella Burton MD   40 mEq at 12/16/23 1101    [COMPLETED] potassium chloride (K-Dur) tab 40 mEq  40 mEq Oral Once Marcella Burton MD   40 mEq at 12/14/23 0824    [COMPLETED] furosemide (Lasix) 10 mg/mL injection 60 mg  60 mg Intravenous BID (Diuretic) Marcella Burton MD   60 mg at 12/15/23 1737    [COMPLETED] potassium chloride (K-Dur) tab 40 mEq  40 mEq Oral Q4H Marcella Burton MD   40 mEq at 12/13/23 1202    [COMPLETED] potassium chloride (K-Dur) tab 40 mEq  40 mEq Oral Once Marcella Burton MD   40 mEq at 12/13/23 1725    [COMPLETED] furosemide (Lasix) 10 mg/mL injection 60 mg  60 mg Intravenous Once Jazmine Martinez MD   60 mg at 12/12/23 2216     Current Outpatient Medications on File Prior to Encounter   Medication Sig Dispense Refill    furosemide 40 MG Oral Tab Take 1 tablet (40 mg total) by mouth 2 (two) times daily. 60 tablet 1    hydrALAZINE 25 MG Oral Tab Take 1 tablet (25 mg total) by mouth every 8 (eight) hours. 90 tablet 0    tiZANidine 4 MG Oral Tab Take 1 tablet (4 mg total) by mouth every 6 (six) hours as needed. 20 tablet 0    HYDROcodone-acetaminophen 5-325  MG Oral Tab Take 1 tablet by mouth every 4 (four) hours as needed. 15 tablet 0    gabapentin 100 MG Oral Cap Take 1 capsule (100 mg total) by mouth 3 (three) times daily. 90 capsule 0    carvedilol 25 MG Oral Tab Take 1 tablet (25 mg total) by mouth 2 (two) times daily with meals. 60 tablet 0    levothyroxine 25 MCG Oral Tab Take 1 tablet (25 mcg total) by mouth before breakfast.      NIFEdipine ER 60 MG Oral Tablet 24 Hr Take 1 tablet (60 mg total) by mouth daily.      traMADol 50 MG Oral Tab Take 1 tablet (50 mg total) by mouth every 8 (eight) hours as needed for Pain. (Patient not taking: Reported on 12/30/2023) 15 tablet 0    acetaminophen 500 MG Oral Tab Take 1-2 tablet every 4 hours as needed for pain 20 tablet 0    insulin detemir (LEVEMIR FLEXTOUCH) 100 UNIT/ML Subcutaneous Solution Pen-injector Inject 10 Units into the skin nightly. 5 each 2    polyethylene glycol, PEG 3350, 17 g Oral Powd Pack Take 17 g by mouth daily.      melatonin 1 MG Oral Tab Nightly prn 30 tablet 0    insulin aspart (NOVOLOG FLEXPEN) 100 Units/mL Subcutaneous Solution Pen-injector Test blood glucose 3 times daily with meals  Inject 1 unit if blood glucose is between 141-180 mg/dL Inject 2 units if blood glucose is between 181-220 mg/dL Inject 3 units if blood glucose is between 221-260 mg/dL Inject 4 units if blood glucose is between 261-300 mg/dL Inject 5 units if blood glucose is between 301-350 mg/dL Call your physician if blood glucose is greater than 351 mg/dL, 5 each 2    Insulin Pen Needle 32G X 4 MM Does not apply Misc May substitute if not on insurance formulary 100 each 5    lidocaine-menthol 4-1 % External Patch Place 1 patch onto the skin daily. 30 patch 0       Review of Systems:    A comprehensive 10 point review of systems was completed.  Pertinent positives and negatives noted in the the HPI.      Physical Exam:    General: No acute distress. Alert and oriented x 3.  Vital signs: Temp:  [97.9 °F (36.6 °C)-98.3 °F  (36.8 °C)] 97.9 °F (36.6 °C)  Pulse:  [64-75] 67  Resp:  [16-20] 16  BP: (112-161)/(56-76) 136/75  SpO2:  [93 %-99 %] 99 %  HEENT: Moist mucous membranes. Extraocular muscles are intact.  Neck: No swelling, no masses  Respiratory: Clear to auscultation bilaterally. No wheezing. No rhonchi.  Cardiovascular: RRR  Abdomen: Soft, nontender, nondistended.    Musculoskeletal: Movement of extremities.  Joints: no effusions  Skin: Open wound to R heel without any purulent drainage. Non-tender to palpation.  L ankle wound without any drainage or signs of infection.      Laboratory Data:  Laboratory data reviewed      Recent Labs   Lab 12/31/23  0514   RBC 4.26*   HGB 13.2   HCT 38.7*   MCV 90.8   MCH 31.0   MCHC 34.1   RDW 12.3   NEPRELIM 6.13   WBC 8.1   .0       Recent Labs   Lab 12/30/23  1403 12/31/23  0514 01/01/24  0531 01/02/24  0535   * 114*  --   --    BUN 25* 19  --   --    CREATSERUM 0.98 0.81  0.81  --   --    CA 9.4 9.0  --   --    ALB 3.2*  --   --   --     142  --   --    K 3.9 3.2* 3.2* 3.9    109  --   --    CO2 29.0 28.0  --   --    ALKPHO 103  --   --   --    AST 14*  --   --   --    ALT 23  --   --   --    BILT 0.3  --   --   --    TP 7.9  --   --   --          Lab Results   Component Value Date    PGLU 108 01/02/2024         Microbiologic Data:   Hospital Encounter on 12/30/23   1. Blood Culture     Status: None (Preliminary result)    Collection Time: 12/30/23  5:14 PM    Specimen: Blood,peripheral   Result Value Ref Range    Blood Culture Result No Growth 2 Days N/A         Imaging:  Narrative   PROCEDURE:  MRI SPINE LUMBAR (W+WO) (CPT=72158)      COMPARISON:  MR JOSE RAMON, MRI SPINE LUMBAR (W+WO) (CPT=72158), 6/27/2023, 10:07 PM.  EDWARD , MR, MRI SPINE LUMBAR(CONTRAST ONLY) (CPT=72149), 8/22/2023, 9:58 PM.  [facility]WARD , XR, XR LUMBAR SPINE (MIN 4 VIEWS) (CPT=72110), 12/14/2023, 2:48 PM.     INDICATIONS:  Severe low back pain.  History of discitis/osteomyelitis at the L3-4  level.     TECHNIQUE:  Multiplanar T1 and T2 weighted images including fat suppression sequences.  Images acquired in sagittal and axial planes. Intravenous gadolinium was administered followed by multiplanar post-infusion T1 weighted sequences.       PATIENT STATED HISTORY:(As transcribed by Technologist)  The patient stated he is being evaluated for chronic, severe lower back pain and muscle spasms. He is wheelchair bound due to pain.      CONTRAST USED:  20 mL of Dotarem     FINDINGS:    LUMBAR DISC LEVELS  L1-L2:  No significant disc/facet abnormality, spinal stenosis, or foraminal narrowing.    L2-L3:  There is a moderate broad-based degenerative disc bulge.  There is posterior epidural fat hypertrophy.  This causes moderate central canal stenosis of 8 mm.  The facet joints are unremarkable.  L3-L4:  There continues to be fluid signal within the disc space.  There is destructive changes of the endplates of L3 and L4 which is slightly more advanced than the study of 8/22/2023.  There is enhancement of the endplates which is slightly more  vigorous than on the previous study.  Additionally, there is new intense mint along the posterior articular facets at both levels suspicious for interval progression of septic arthropathy into the bilateral posterior articular facet joints.  There is  also increasing adjacent psoas edema diffusely.  There is no abdi epidural fluid collection or abscess.  There is moderate central canal stenosis of 7.5 mm.    L4-L5:  There is mild degenerative disc bulge osteophyte complex.  AP diameter canal is normal 12 mm.  There is mild subarticular zone and neural foraminal narrowing.  The facet joints unremarkable.  L5-S1:  Very mild posterior degenerative disc bulge.  There is mild bilateral facet joint degenerative change.  There is mild bilateral neural foraminal narrowing.     PARASPINAL AREA:  Increasing bilateral psoas muscle edema/inflammation.  No evidence of psoas muscle  abscess.  BONES:  No fracture, pars defect, or osseous lesion.    CORD/CAUDA EQUINA:  There is thickening of the cauda equina nerve roots diffusely suggestive of chronic arachnoiditis, which can be retrospectively seen on previous imaging studies and appears relatively stable.  No abnormal enhancement along the conus  or nerve roots.                   Impression   CONCLUSION:    Findings are concerning for progression of discitis/osteomyelitis centered at the L3-4 disc level, with suggestion of interval extension into the bilateral posterior articular facets at this level concerning for the development of bilateral septic  arthropathy.  There is also increasing bilateral psoas muscle edema/inflammation.  No discrete evidence of epidural abscess or psoas abscess.     Diffuse thickening of the cauda equina suggesting chronic arachnoiditis, which can be retrospectively seen and appears similar to previous imaging.  No abnormal post-contrast enhancement.        Established Problem list:  Patient Active Problem List   Diagnosis    Mixed hyperlipidemia    Subclinical hypothyroidism    Proteinuria    Benign essential HTN    Type 2 diabetes mellitus with other specified complication ([facility])    Peripheral edema    Morbid obesity with BMI of 40.0-44.9, adult ([facility])    Non-traumatic rhabdomyolysis    Acute hypoxemic respiratory failure ([facility])    Right leg weakness    Generalized weakness    HHNC (hyperglycemic hyperosmolar nonketotic coma) ([facility])    MSSA bacteremia    Discitis of lumbar region    Epidural abscess    Intractable back pain    Osteomyelitis of low back ([facility])    Diarrhea    C. difficile diarrhea    Syncope and collapse    Nausea and vomiting    Leukocytosis    TARIK (acute kidney injury) ([facility])    Dehydration    Renal insufficiency    Hyperkalemia    Azotemia    Metabolic acidosis    Hyperglycemia    Weakness generalized    Dizziness    Hypertensive urgency    Extremity edema    Lumbar radiculopathy    Discitis,  unspecified spinal region    Dyslipidemia    Moderate back pain       ASSESSMENT/PLAN:  1. MSSA L3/4 Discitis/OM     - First diagnosed February 2023 at which time patient had MSSA bacteremia and L3/4 discitis s/p cefazolin  - 3/22/23 with ongoing symptoms. MRI 3/22/23 demonstrated new epidural phlegmon, discharged on nafcillin EOT 5/29 (13 wk course).  - MRI 6/27 shows discitis/osteomyelitis with improvement, decreased L3/4 disc fluid, no abscess. CRP was elevated and patient planned for IR biopsy (off abx) though there was no fluid to aspirate, biopsy was taken 6/29, discharged off abx. Cultures finalized negative. Path with some neutrophils stains negative.   - MRI 8/22/23 showed similar discitis/om changes at L3-4, unclear if worsened for evolving changes of infection. Blood cultures were negative and patient afebrile at that time  - Presents to ED 12/30 with low back, R hip/groin pains that aren't controlled with pan medication.   - MRI 12/30 with contrast c/f progression of discitis / om at L3/4 level with extension into the bilateral posterior articular facets and increasing bilateral psoas muscle edema/inflammation without definite abscess in the epidural or psoas space.     No ABX given PTA. Given nafcillin + vancomycin in house; now off abx  Blood cxs 12/30- ngtd  Afebrile  WBC 8  CRP 1.47     2. H/o C diff   - monitor BMs  - no diarrhea now    3. Foot wounds    PLAN:  - hold abx for now as patient is hemodynamically stable, afebrile and has a normal WBC count. If any of these should change would re-start empiric abx therapy.  - plans for IR guided disc biopsy today for diagnosis and to provide culture directed therapy.   - NS following  - monitor clinically   - monitor feet clinically; wound care as per wound care team, consider podiatry consult    Discussed case with RN, wound care and Dr. Tan.     Kasia Harley PA-C

## 2024-01-02 NOTE — PROCEDURES
[facility]  Procedure Note    Troy Feliz Patient Status:  Inpatient    1960 MRN [id number]   Location [facility] 3SW-A Attending Tristan Holloway MD   Hosp Day # 3 PCP MADELINE FENTON, DO     Procedure: CT guided lumbar disc biopsy    Pre-Procedure Diagnosis:  discitis    Post-Procedure Diagnosis: same    Anesthesia:  Local and Sedation    Findings:  bloody fluid and solid core    Specimens: 1 18 g core, 2 ml bloody fluid    Blood Loss:  <5 ml    Tourniquet Time: n/a  Complications:  None  Drains:  none    Secondary Diagnosis:  none    Jaycob Rg MD  2024

## 2024-01-02 NOTE — PLAN OF CARE
Patient for biopsy today. Vitals stable, pain not controlled from spasms. Dressings dry and intact.

## 2024-01-02 NOTE — PROGRESS NOTES
[facility]     Hospitalist Progress Note     Troy Feliz Patient Status:  Inpatient    1960 MRN [id number]   [facility] 3SW-A Attending Roverto Obregon MD   Hosp Day # 3 PCP MADELINE FENTON DO     Chief Complaint: back pain    Subjective:     Patient without acute events overnight. Back pain about the same. No fevers. Awaiting CT guided biopsy.     Objective:    Review of Systems:   A comprehensive review of systems was completed; pertinent positive and negatives stated in subjective.    Vital signs:  Temp:  [97.9 °F (36.6 °C)-98.3 °F (36.8 °C)] 97.9 °F (36.6 °C)  Pulse:  [64-75] 67  Resp:  [16-20] 16  BP: (112-161)/(56-76) 136/75  SpO2:  [93 %-99 %] 99 %    Physical Exam:    General: No acute distress  Respiratory: no wheezes, no rhonchi  Cardiovascular: S1, S2, regular rate and rhythm  Abdomen: Soft, Non-tender, non-distended, positive bowel sounds  Neuro: No new focal deficits.   Extremities: no edema      Diagnostic Data:    Labs:  Recent Labs   Lab 23  1403 23  0514   WBC 8.9 8.1   HGB 14.0 13.2   MCV 91.5 90.8   .0 234.0       Recent Labs   Lab 23  1403 23  0514 24  0531 24  0535   * 114*  --   --    BUN 25* 19  --   --    CREATSERUM 0.98 0.81  0.81  --   --    CA 9.4 9.0  --   --    ALB 3.2*  --   --   --     142  --   --    K 3.9 3.2* 3.2* 3.9    109  --   --    CO2 29.0 28.0  --   --    ALKPHO 103  --   --   --    AST 14*  --   --   --    ALT 23  --   --   --    BILT 0.3  --   --   --    TP 7.9  --   --   --        Estimated Creatinine Clearance: 105.7 mL/min (based on SCr of 0.81 mg/dL).    No results for input(s): \"TROP\", \"TROPHS\", \"CK\" in the last 168 hours.    No results for input(s): \"PTP\", \"INR\" in the last 168 hours.               Microbiology    Hospital Encounter on 23   1. Blood Culture     Status: None (Preliminary result)    Collection Time: 23  5:14 PM    Specimen: Blood,peripheral    Result Value Ref Range    Blood Culture Result No Growth 2 Days N/A         Imaging: Reviewed in Epic.    Medications:    methocarbamol  250 mg Oral QID    hydrALAZINE  50 mg Oral Q8H OSEI    carvedilol  25 mg Oral BID with meals    furosemide  40 mg Oral BID (Diuretic)    gabapentin  100 mg Oral TID    insulin detemir  10 Units Subcutaneous Nightly    levothyroxine  25 mcg Oral Before breakfast    NIFEdipine ER  60 mg Oral Daily    heparin  5,000 Units Subcutaneous Q8H OSEI    insulin aspart  1-10 Units Subcutaneous TID AC and HS       Assessment & Plan:      #Back Pain  #Discitis/OM  -hold IV abx  -plan IR biopsy today  -follow cultures  -ID and NeuroSx to eval  -ESR CRP elevated  -LSO brace     #Ess HTN  -resume home meds  -incr meds     #Dyslipidemia  -statin     #DM2  -SSI    Coy Dorman MD      Supplementary Documentation:     Quality:  DVT Mechanical Prophylaxis:   SCDs,    DVT Pharmacologic Prophylaxis   Medication    heparin (Porcine) 5000 UNIT/ML injection 5,000 Units                Code Status: Full Code  Pruitt: No urinary catheter in place  Pruitt Duration (in days):   Central line:    KIKE:     Discharge is dependent on: progress  At this point Mr. Feliz is expected to be discharge to: home    The 21st Century Cures Act makes medical notes like these available to patients in the interest of transparency. Please be advised this is a medical document. Medical documents are intended to carry relevant information, facts as evident, and the clinical opinion of the practitioner. The medical note is intended as peer to peer communication and may appear blunt or direct. It is written in medical language and may contain abbreviations or verbiage that are unfamiliar.

## 2024-01-02 NOTE — PAYOR COMM NOTE
--------------  ADMISSION REVIEW     Payor: DANDY  Subscriber #:  [id number]  Authorization Number: [id number]    Admit date: 12/30/23  Admit time:  6:26 PM       REVIEW DOCUMENTATION:     ED Provider Notes        ED Provider Notes signed by Donavon Lipscomb MD at 12/30/2023  5:09 PM       Author: Donavon Lipscomb MD Service: -- Author Type: Physician    Filed: 12/30/2023  5:09 PM Date of Service: 12/30/2023  1:40 PM Status: Signed    : Donavon Lipscomb MD (Physician)           Patient Seen in: [facility] Emergency Department      History     Chief Complaint   Patient presents with    Back Pain     Stated Complaint:     Subjective:   HPI    This is a 63-year-old male who has a long history of back problems has had discitis previously was on antibiotics it was symptoms started somewhere in March.  He states that he is getting physical therapy but he states that since July he has gotten physical therapy and then he gets pain in his lower back and spasms while sitting in a wheelchair or when he tries walking.  He states he took Norco at 9:00 which helps with his pain presently has no abdominal pain he is described as pain in his lower back, radiating to his hips and his buttock area with movement.  And ambulation.  The patient has had no fevers no numbness or weakness no loss of urine or bowel he does have generalized weakness which he says is chronic for him but no new weakness numbness.  He has no fevers no abdominal pain no loss of urine or bowel no fevers no diarrhea no dysuria.  The patient is generally wheelchair-bound.  He states he can take a couple steps with his walker.  This is not new for him but it is gotten worse over the last several months.findings.  Objective:   Past Medical History:   Diagnosis Date    Diabetes (HCC)     Dyslipidemia 09/19/2011    High blood pressure     HTN (hypertension) 09/19/2011    Hyperlipidemia     Impaired fasting glucose 09/19/2011    Proteinuria 09/19/2011     Subclinical hypothyroidism 09/19/2011    Unspecified essential hypertension     Visual impairment      Past Surgical History:   Procedure Laterality Date    MASTOIDECTOMY,SIMPLE      done 30 years ago.  Revised in 1995       Physical Exam     ED Triage Vitals [12/30/23 1249]   BP (!) 175/64   Pulse 73   Resp 16   Temp 98.2 °F (36.8 °C)   Temp src Oral   SpO2 100 %   O2 Device None (Room air)       Current:BP (!) 175/64   Pulse 73   Temp 98.2 °F (36.8 °C) (Oral)   Resp 16   Wt 132.5 kg   SpO2 100%   BMI 38.95 kg/m²         Physical Exam    General: .  Patient is in some moderate discomfort secondary to pain patient is presently pain-free.   Patient has an elevated BMI  The patient is in no respiratory distress. The patient is not septic or toxic    HEENT: Atraumatic, conjunctiva are not pale.  There is no icterus.  Oral mucosa Is wet.  No facial trauma.  The neck is supple.    LUNGS: Clear to auscultation, there is no wheezing or retraction.  No crackles.    CV: Cardiovascular is regular without murmurs or rubs.    ABD: The abdomen is soft nondistended nontender.  There is no rebound.  There is no guarding.  I cannot reproduce the specific tenderness in his abdomen.  EXT: There is good pulses bilaterally.  There is no calf tenderness.  There is no rash noted.  There is bilateral pitting edema lower extremity which is chronic for him.    Back tenderness is noted it seems to be just movement related bilateral edema which is chronic for him he has muscle strength is 5 out of 5 he is able to move his extremities and lift the legs above the bed  NEURO: Alert and oriented x3.  Muscle strength and sensory exam is grossly normal.  And the patient is neurologically intact with no focal findings.      ED Course     Labs Reviewed   COMP METABOLIC PANEL (14) - Abnormal; Notable for the following components:       Result Value    Glucose 142 (*)     BUN 25 (*)     Calculated Osmolality 301 (*)     AST 14 (*)     Albumin 3.2  (*)     Globulin  4.7 (*)     A/G Ratio 0.7 (*)     All other components within normal limits            MRI SPINE LUMBAR (W+WO) (CPT=72158)    Result Date: 2023  PROCEDURE:  MRI SPINE LUMBAR (W+WO) (CPT=72158)     CONCLUSION:  Findings are concerning for progression of discitis/osteomyelitis centered at the L3-4 disc level, with suggestion of interval extension into the bilateral posterior articular facets at this level concerning for the development of bilateral septic arthropathy.  There is also increasing bilateral psoas muscle edema/inflammation.  No discrete evidence of epidural abscess or psoas abscess.  Diffuse thickening of the cauda equina suggesting chronic arachnoiditis, which can be retrospectively seen and appears similar to previous imaging.  No abnormal post-contrast enhancement.     Disposition and Plan     Clinical Impression:  1. Discitis, unspecified spinal region    2. Moderate back pain         Disposition:  Admit  2023  4:51 pm      Signed by Donavon Lipscomb MD on 2023  5:09 PM            H&P - H&P Note        H&P signed by Coy Dorman MD at 2023  5:54 PM       Author: Coy Dorman MD Service: -- Author Type: Physician    Filed: 2023  5:54 PM Date of Service: 2023  5:08 PM Status: Addendum    : Coy Dorman MD (Physician)    Related Notes: Original Note by Coy Dorman MD (Physician) filed at 2023  5:53 PM           [facility]     Troy Feliz Patient Status:  Emergency    1960 MRN [id number]   Location [facility] EMERGENCY DEPARTMENT Attending Donavon Lipscomb MD   Hosp Day # 0 PCP MADELINE FENTON DO     Chief Complaint: back pain/hip pain    Subjective:    History of Present Illness:     Troy Feliz is a 63 year old male with PMhx of DM, HTN, DL, discitis presents with back and hip pain and muscle spasms. Pt is wheelchair bound due to muscle spasms. He states symptoms  all started back in March. He has been going to rehab for this. He states he took norco this morning without relief. He denies any F/C. No new focal weakness, numbness or tingling. No CP or SOB. No N/V/D/C or abd pain. He denies any loss of bowel or urine function.     Objective:   Physical Exam:    BP (!) 174/86   Pulse 72   Temp 98.4 °F (36.9 °C) (Temporal)   Resp 21   Wt 292 lb (132.5 kg)   SpO2 97%   BMI 38.95 kg/m²   General: No acute distress, Alert  Respiratory: No rhonchi, no wheezes  Cardiovascular: S1, S2. Regular rate and rhythm  Abdomen: Soft, Non-tender, non-distended, positive bowel sounds  Neuro: No new focal deficits  Extremities: No edema      Results:    Labs:      Labs Last 24 Hours:    Recent Labs   Lab 12/30/23  1403   RBC 4.59   HGB 14.0   HCT 42.0   MCV 91.5   MCH 30.5   MCHC 33.3   RDW 12.5   NEPRELIM 6.78   WBC 8.9   .0       Recent Labs   Lab 12/30/23  1403   *   BUN 25*   CREATSERUM 0.98   EGFRCR 87   CA 9.4   ALB 3.2*      K 3.9      CO2 29.0   ALKPHO 103   AST 14*   ALT 23   BILT 0.3   TP 7.9       Assessment & Plan:      #Back Pain  #Discitis/OM  -continue IV abx  -follow cultures  -ID and NeuroSx to eval  -check ESR/CRP  -LSO brace    #Ess HTN  -resume home meds    #Dyslipidemia  -statin    #DM2  -SSI    oCy Dorman MD  Electronically signed by Coy Dorman MD on 12/30/2023  5:54 PM           12-31-23 NEUROLOGY CONSULT      REASON FOR CONSULT:    L3-4 discitis     HISTORY OF PRESENT ILLNESS:  Troy Feliz is a(n) 63 year old male known well to our service.  Patient was seen earlier this year for discitis of L3-4 and back pain.  He was started on and treated with abx with improvement in his back pain.  He states he began having increasing back pain the past few weeks without weakness nor bowel/bladder habit changes which led to his admission.  He has no focal deficits on exam.     MRI shows inflammation and stranding within the psoas muscle  and continued enhancement within the disc space at L3-4.  There appears to be effusions of the joints at L3-4 B/L.  No discrete fluid collection nor abscess is noted.      Neuro:   Motor: CARRINGTON with good strength throughout   Reflexes: 2 + throughout   Sensory: intact to LT throughout with nl JPS B/L     ASSESSMENT AND PLAN:  L3-4 discitis without abscess  Consider IR biopsy of disc space for organism if BC negative  Recommend LSO brace but patient is reluctant to wear  No surgical intervention recommended at this time  Will follow        ID CONSULT      Troy Feliz is a a(n) 63 year old male with a PMH as below who presents to ED from [facility] on 12/30 c/o R hip, groin and low back pain with muscle spasms.      Patient was previously hospitalized 2/27/23 with worsening back pain, found to have MSSA bacteremia and L3/4 discitis. He was discharged on cefazolin. Readmitted 3/22/23 with ongoing symptoms. MRI 3/22/23 demonstrated new epidural phlegmon, discharged on nafcillin EOT 5/29 (13 wk course). Presented to ED 6/27 with ongoing back pain. MRI 6/27 shows discitis/osteomyelitis with improvement, decreased L3/4 disc fluid, no abscess. CRP was elevated and patient planned for IR biopsy (off abx) though there was no fluid to aspirate, biopsy was taken 6/29, discharged off abx. Cultures finalized negative. Developed C diff 6/29.      He has been admitted several times since June for gastroenteritis, syncope, fall out of bed. Most recently 12/13 with ble swelling despite lasix 40mg 3 times daily, discharged 12/17. Had a repeat MRI lumbar spine 8/22/23 that showed similar discitis/om changes at L3-4, unclear if worsened for evolving changes of infection. Blood cultures were negative and patient afebrile at that time and ID not consulted.      Presents to ED 12/30 with low back, R hip/groin pains that aren't controlled with pan medication. MRI 12/30 with contrast c/f progression of discitis / om at L3/4 level with  extension into the bilateral posterior articular facets and increasing bilateral psoas muscle edema/inflammation without definite abscess in the epidural or psoas space.      Afebrile since admission. Blood cultures collected 12/30 pending. Was given nafcillin and vancomycin. No abx given at NH per patient. No fevers at NH per patient. CRP 1.47. patient reports that he has lower back soreness and BLE muscle spasms.      No ABX given PTA. Given nafcillin + vancomycin in house.   Blood cxs 12/30 pending  Afebrile  WBC 8  CRP 1.47      - hold abx for now as patient is hemodynamically stable, afebrile and has a normal WBC count. If any of these should change would re-start empiric abx therapy.  - appreciate Ortho spine evaluation - per note no planned surgical intervention and consider IR biopsy of disc space  - follow up blood cultures   - Agree with IR guided disc biopsy for diagnosis and to provide culture directed therapy.   - monitor clinically      2. H/o C diff       NEURO SURGERY  CONSULT         PHYSICAL EXAMINATION:  Vital Signs:  /73 (BP Location: Left arm)   Pulse 66   Temp 98.1 °F (36.7 °C) (Oral)   Resp 18   Wt 292 lb (132.5 kg)   SpO2 100%   BMI 38.95 kg/m²      Neuro:   Motor: CARRINGTON with good strength throughout   Reflexes: 2 + throughout   Sensory: intact to LT throughout with nl JPS B/L        REVIEW OF STUDIES:  As above        ASSESSMENT AND PLAN:  L3-4 discitis without abscess  Consider IR biopsy of disc space for organism if BC negative  Recommend LSO brace but patient is reluctant to wear  No surgical intervention recommended at this time  Will follow        MEDICATIONS ADMINISTERED IN LAST 1 DAY:  carvedilol (Coreg) tab 25 mg       Date Action Dose Route User    1/2/2024 0846 Given 25 mg Oral Ozzy Meraz RN    1/1/2024 1828 Given 25 mg Oral Cordelia Ortez, TABITHA          diazePAM (Valium) tab 5 mg       Date Action Dose Route User    1/2/2024 0002 Given 5 mg Oral Silvia Harrison, TABITHA     1/1/2024 1515 Given 5 mg Oral Cordelia Ortez RN          furosemide (Lasix) tab 40 mg       Date Action Dose Route User    1/1/2024 1620 Given 40 mg Oral Cordelia Ortez RN          gabapentin (Neurontin) cap 100 mg       Date Action Dose Route User    1/1/2024 2201 Given 100 mg Oral Silvia Harrison RN    1/1/2024 1515 Given 100 mg Oral Cordelia Ortez RN          heparin (Porcine) 5000 UNIT/ML injection 5,000 Units       Date Action Dose Route User    1/1/2024 2201 Given 5,000 Units Subcutaneous (Left Upper Abdomen) Silvia Harrison RN    1/1/2024 1515 Given 5,000 Units Subcutaneous (Left Lower Abdomen) Cordelia Ortez RN          hydrALAZINE (Apresoline) tab 50 mg       Date Action Dose Route User    1/2/2024 0447 Given 50 mg Oral Silvia Harrison RN    1/1/2024 1515 Given 50 mg Oral Cordelia Ortez RN          HYDROcodone-acetaminophen (Norco) 5-325 MG per tab 1 tablet       Date Action Dose Route User    1/2/2024 0846 Given 1 tablet Oral Ozzy Meraz RN    1/2/2024 0440 Given 1 tablet Oral Silvia Harrison RN    1/1/2024 2212 Given 1 tablet Oral Silvia Harrison RN    1/1/2024 1620 Given 1 tablet Oral Cordelia Ortez RN    1/1/2024 1140 Given 1 tablet Oral Sheree Castro RN          insulin aspart (NovoLOG) 100 Units/mL FlexPen 1-10 Units       Date Action Dose Route User    1/1/2024 2201 Given 2 Units Subcutaneous (Right Upper Arm) Silvia Harrison RN          insulin detemir (Levemir) 100 UNIT/ML FlexPen/FlexTouch 10 Units       Date Action Dose Route User    1/1/2024 2201 Given 10 Units Subcutaneous (Left Upper Arm) Silvia Harrison RN          levothyroxine (Synthroid) tab 25 mcg       Date Action Dose Route User    1/2/2024 0847 Given 25 mcg Oral Ozzy Meraz RN          methocarbamol (Robaxin) partial tab 250 mg       Date Action Dose Route User    1/2/2024 0846 Given 250 mg Oral Ozzy Meraz, RN          NIFEdipine ER (Procardia-XL) 24 hr tab 60 mg       Date Action Dose Route User     1/2/2024 0846 Given 60 mg Oral Ozzy Meraz, RN            Vitals (last day)       Date/Time Temp Pulse Resp BP SpO2 Weight O2 Device O2 Flow Rate (L/min) [facility]    01/02/24 0838 97.9 °F (36.6 °C) 67 16 136/75 99 % -- Nasal cannula 2 L/min SM    01/02/24 0450 98.2 °F (36.8 °C) 64 -- 161/76 99 % -- -- -- CM    01/01/24 2000 98.3 °F (36.8 °C) 72 20 112/56 95 % -- None (Room air) 0 L/min     01/01/24 1628 98 °F (36.7 °C) 75 18 125/56 93 % -- None (Room air) --     01/01/24 0819 98.3 °F (36.8 °C) 63 18 157/86 95 % -- Nasal cannula 2 L/min     01/01/24 0558 -- -- -- 183/85 -- -- -- -- CK    01/01/24 0407 97.9 °F (36.6 °C) 63 16 175/87 99 % -- None (Room air) 2 L/min MS             21

## 2024-01-02 NOTE — PROGRESS NOTES
INPATIENT PROGRESS NOTE    Assessment:   Troy Feliz in room 354/354-A, is a 63 year old male, Hospital Day ( LOS: 3 days ) hospitalized for Discitis, unspecified spinal region.      The patient's other hospital problems include:   Active Hospital Problems    *Discitis, unspecified spinal region      Dyslipidemia      Moderate back pain      Type 2 diabetes mellitus with other specified complication (HCC)        Plan:   1.  No acute neurosurgical intervention recommended time  2.  Medical management and pain management per hospitalist  3.  Patient with continued muscle spasms without relief with alternative therapy, Valium.  Will trial methocarbamol.  Likely the patient spasms are secondary to his underlying OM/DM.  Treatment will likely improve his spasms.  4.  Antibiotics per ID  5.  Plan for CT-guided biopsy.  6.  Recommend LSO brace.  Patient states he brought a brace from home, he is open to wearing this when out of bed.  He states he does not want to wear it in bed as it is uncomfortable.  7.  Will discuss with Dr. Christina    The patient was seen and examined.  The patient agreed to the plan, verbalized understanding and was very appreciative.  Discussed with nursing.    Subjective:   The patient endorses muscle spasms, no relief with Valium.  He has no new lower extremity radiating pain, numbness, tingling or weakness.  No change to bladder/bowel function.    Objective:   I&O: I/O last 3 completed shifts:  In: -   Out: 2750 [Urine:2750]     VITALS: /75 (BP Location: Left arm)   Pulse 67   Temp 97.9 °F (36.6 °C) (Oral)   Resp 16   Wt 292 lb (132.5 kg)   SpO2 99%   BMI 38.95 kg/m²  Temp (24hrs), Av.1 °F (36.7 °C), Min:97.9 °F (36.6 °C), Max:98.3 °F (36.8 °C)       Clinical exam:  The patient is awake, alert and orientated.  Speech fluent.  Comprehension intact.  Answers questions appropriately.  Easily follows commands.  Breathing easy and even.  Skin warm and dry.  Laying in bed.  Moving  bilateral upper extremities spontaneously to full resistance  5/5 strength of bilateral IP, dorsiflexion, plantarflexion.  No clonus.  Bilateral lower extremity sensation intact, proximally and distally    Imaging reviewed:    Study Result    Narrative   PROCEDURE:  MRI SPINE LUMBAR (W+WO) (CPT=72158)      COMPARISON:  [facility] , MR, MRI SPINE LUMBAR (W+WO) (CPT=72158), 6/27/2023, 10:07 PM.  [facility] , MR, MRI SPINE LUMBAR(CONTRAST ONLY) (CPT=72149), 8/22/2023, 9:58 PM.  [facility] , XR, XR LUMBAR SPINE (MIN 4 VIEWS) (CPT=72110), 12/14/2023, 2:48 PM.     INDICATIONS:  Severe low back pain.  History of discitis/osteomyelitis at the L3-4 level.     TECHNIQUE:  Multiplanar T1 and T2 weighted images including fat suppression sequences.  Images acquired in sagittal and axial planes. Intravenous gadolinium was administered followed by multiplanar post-infusion T1 weighted sequences.       PATIENT STATED HISTORY:(As transcribed by Technologist)  The patient stated he is being evaluated for chronic, severe lower back pain and muscle spasms. He is wheelchair bound due to pain.      CONTRAST USED:  20 mL of Dotarem     FINDINGS:    LUMBAR DISC LEVELS  L1-L2:  No significant disc/facet abnormality, spinal stenosis, or foraminal narrowing.    L2-L3:  There is a moderate broad-based degenerative disc bulge.  There is posterior epidural fat hypertrophy.  This causes moderate central canal stenosis of 8 mm.  The facet joints are unremarkable.  L3-L4:  There continues to be fluid signal within the disc space.  There is destructive changes of the endplates of L3 and L4 which is slightly more advanced than the study of 8/22/2023.  There is enhancement of the endplates which is slightly more  vigorous than on the previous study.  Additionally, there is new intense mint along the posterior articular facets at both levels suspicious for interval progression of septic arthropathy into the bilateral posterior articular facet joints.  There  is  also increasing adjacent psoas edema diffusely.  There is no abdi epidural fluid collection or abscess.  There is moderate central canal stenosis of 7.5 mm.    L4-L5:  There is mild degenerative disc bulge osteophyte complex.  AP diameter canal is normal 12 mm.  There is mild subarticular zone and neural foraminal narrowing.  The facet joints unremarkable.  L5-S1:  Very mild posterior degenerative disc bulge.  There is mild bilateral facet joint degenerative change.  There is mild bilateral neural foraminal narrowing.     PARASPINAL AREA:  Increasing bilateral psoas muscle edema/inflammation.  No evidence of psoas muscle abscess.  BONES:  No fracture, pars defect, or osseous lesion.    CORD/CAUDA EQUINA:  There is thickening of the cauda equina nerve roots diffusely suggestive of chronic arachnoiditis, which can be retrospectively seen on previous imaging studies and appears relatively stable.  No abnormal enhancement along the conus  or nerve roots.                   Impression   CONCLUSION:    Findings are concerning for progression of discitis/osteomyelitis centered at the L3-4 disc level, with suggestion of interval extension into the bilateral posterior articular facets at this level concerning for the development of bilateral septic  arthropathy.  There is also increasing bilateral psoas muscle edema/inflammation.  No discrete evidence of epidural abscess or psoas abscess.     Diffuse thickening of the cauda equina suggesting chronic arachnoiditis, which can be retrospectively seen and appears similar to previous imaging.  No abnormal post-contrast enhancement.        LOCATION:  [facility]            Dictated by (CST): Tyree Rojo DO on 12/30/2023 at 4:19 PM      Finalized by (CST): Tyree Rojo DO on 12/30/2023 at 4:29 PM        Virgie Orellana M.S., PA-C  [address], [address]  [place] [address]  [phone]  1/2/2024 9:28 AM    Dragon speech recognition  software was used to prepare this note. If a word or phrase is confusing, it is likely due to a failure of recognition. Please contact me with any questions or clarifications.

## 2024-01-02 NOTE — PLAN OF CARE
Patient A&O X4 on RA- 2L O2 PRN. VSS, /IS. SCDs refused, subQ Heparin. Voiding freely via urinal, LBM 12/28. Wounds to bilateral legs covered with coverlet, c/d/i. Edema to BLE. Admitted with LBP and weakness. Pain controlled with PO medication. Up max x2-3. Reminded to use call light. Plan for possible biopsy tomorrow- NPO at midnight.

## 2024-01-02 NOTE — IMAGING NOTE
Received pt to radiology holding.  Pt verified name and  as well as allergies.  Pt states NPO since  last night.  Pt last dose of heparin 24.  Lab results of PT/INR and PLT reviewed.  Informed consent obtained by Dr. Rg.  Pt positioned prone on scanner.  CRM and pulse ox monitoring applied, alarms enabled. Sterile prep and 1% lido to area by Dr. Rg.  Aspirate and biopsy specimens obtained by Dr. Rg.  Neosporin and bandage dressing applied to site. CDI.  Pt positioned supine on bed.  Pt awake and conversing appropriately with this RN and in no apparent distress.  SBAR called to Ozzy, inpatient RN.  Pt transported to room 354 with belongings. Pt accompanied by radiology RN and transporter.

## 2024-01-03 ENCOUNTER — APPOINTMENT (OUTPATIENT)
Dept: GENERAL RADIOLOGY | Facility: HOSPITAL | Age: 64
End: 2024-01-03
Attending: PODIATRIST
Payer: MEDICAID

## 2024-01-03 PROBLEM — I73.9 PVD (PERIPHERAL VASCULAR DISEASE): Status: ACTIVE | Noted: 2024-01-03

## 2024-01-03 PROBLEM — I73.9 PVD (PERIPHERAL VASCULAR DISEASE) (HCC): Status: ACTIVE | Noted: 2024-01-03

## 2024-01-03 PROBLEM — L89.622 PRESSURE INJURY OF LEFT HEEL, STAGE 2 (HCC): Status: ACTIVE | Noted: 2024-01-03

## 2024-01-03 PROBLEM — L89.610 PRESSURE ULCER, HEEL, RIGHT, UNSTAGEABLE (HCC): Status: ACTIVE | Noted: 2024-01-03

## 2024-01-03 LAB
GLUCOSE BLD-MCNC: 122 MG/DL (ref 70–99)
GLUCOSE BLD-MCNC: 132 MG/DL (ref 70–99)
GLUCOSE BLD-MCNC: 150 MG/DL (ref 70–99)
GLUCOSE BLD-MCNC: 170 MG/DL (ref 70–99)

## 2024-01-03 PROCEDURE — 99232 SBSQ HOSP IP/OBS MODERATE 35: CPT | Performed by: HOSPITALIST

## 2024-01-03 PROCEDURE — 73630 X-RAY EXAM OF FOOT: CPT | Performed by: PODIATRIST

## 2024-01-03 PROCEDURE — 99223 1ST HOSP IP/OBS HIGH 75: CPT | Performed by: PODIATRIST

## 2024-01-03 NOTE — PLAN OF CARE
Assumed care at 1500  A/Ox4, RA, 2L at night, VSS  Denies n/v & pain meds per MAR  QID accucheck  Dressings to bilateral heels & left anterior ankle changed  Bunny boots  Safety measures in place  All needs met at this time

## 2024-01-03 NOTE — PHYSICAL THERAPY NOTE
PT orders received and chart reviewed. Per discussion with podiatrist, pt is allowed to WBAT bilaterally in post op shoes for limited ambulation only. When in bed should offload heels with bunny boots. Per neurosurgery pt should wear LSO when OOB. Per discussion with pt, pt refusing to mobilize with PT at this time. Requests that due to pain and feeling tired, that PT try again tomorrow.

## 2024-01-03 NOTE — CM/SW NOTE
01/03/24 0900   CM/MATTEO Referral Data   Referral Source Social Work (self-referral)   Reason for Referral Discharge planning   Informant EMR;Patient   Readmission Assessment   Factors that patient feels contributed to this readmission Acute/Chronic Clinical Presentation   Pt's living situation prior to admission? Subacute rehab   Pt's level of independence at discharge? Some assist (mod)   Pt. received education on diagnoses at time of discharge? Yes   Did you know who and how to call someone if you felt worse? Yes   Did any new symptoms or issues develop after you were discharged? Yes  (muscle spasms)   ----Post D/C symptoms: Symptoms/issue related to previous hospitalization No   Were medications taken as indicated on discharge instructions? Yes   Did you have a follow-up appointment scheduled at time of discharge? Yes   ----F/U appt: Did you go to that appointment? Yes  (f/u provided at [facility])   Was full assessment completed by MATTEO/SHAVONNE on prior admission? Yes   Was the recommended discharge plan achieved? Yes   Was pt. discharged w/out services? No   Patient Info   Advanced directives? Yes   Patient's Current Mental Status at Time of Assessment Alert   Patient's Home Environment House   Number of Levels in Home 1   Patient lives with Alone   Patient Status Prior to Admission   Independent with ADLs and Mobility No  (needs assistance with wound care)     Met w/ pt to discuss DC planning. Pt admitted from [facility]. Pt is open to returning to facility for [facility] but is also considering potentially discharging to his home. Pt recognizes he would need some assistance at home and would be unable to complete his own wound care to his heels. He has a sister that lives near by and may be able to provide some assistance. Pt plans to discuss w/ his siblings and update CM with his plans. Pt inquired about enrolling in a new medicaid plan in February d/t his limited rehab benefits. Confirmed that any medicaid plan would  only provide limited PT while at [facility] but some facilities will provide additional PT on a case by case basis. Pt could also elect to privately pay for additional PT if feasible.     CM/SW will remain available for DC planning and/or support.     DOMINGA HendrixN, CMSRN    [id number]

## 2024-01-03 NOTE — CONSULTS
[facility]    Report of Consultation    Troy Feliz Patient Status:  Inpatient    1960 MRN [id number]   Location [facility] 3SW-A Attending Christopher Gustafson MD   Hosp Day # 4 PCP MADELINE FENTON,      Date of Admission:  2023  Date of Consult: 1/3/2024    Reason for Consultation:  Consulted by Dr. Dorman for bilateral heel ulceration    History of Present Illness:  Troy Feliz is a a(n) 63 year old male with diabetes who is seen this morning for bilateral heel ulcerations.  Patient states that he is had these heel ulcerations for approximately 3-4 weeks.  He states that they started as blisters.  Currently have them covered with honey and a large Band-Aids.  He denies noticing any signs of infection to his heels.  Patient also has a small wound to the front of his left ankle, which he denies noticing any signs of infection.  Patient states he also has a history of swelling to both lower extremities.  He states that he did have an arterial ultrasound his last admission at the beginning of 2023.  Patient is also currently being treated for discitis.  Denies any current pain to bilateral heels.  No other concerns    History:  Past Medical History:   Diagnosis Date    Diabetes (HCC)     Dyslipidemia 2011    High blood pressure     HTN (hypertension) 2011    Hyperlipidemia     Impaired fasting glucose 2011    Proteinuria 2011    Subclinical hypothyroidism 2011    Unspecified essential hypertension     Visual impairment      Past Surgical History:   Procedure Laterality Date    MASTOIDECTOMY,SIMPLE      done 30 years ago.  Revised in      Family History   Problem Relation Age of Onset    Diabetes Father     Heart Disorder Mother     Heart Disorder Brother       reports that he has never smoked. He has never used smokeless tobacco. He reports that he does not drink alcohol and does not use drugs.    Allergies:  No Known  Allergies    Medications:    Current Facility-Administered Medications:     methocarbamol (Robaxin) partial tab 250 mg, 250 mg, Oral, QID    hydrALAZINE (Apresoline) tab 50 mg, 50 mg, Oral, Q8H OSEI    carvedilol (Coreg) tab 25 mg, 25 mg, Oral, BID with meals    furosemide (Lasix) tab 40 mg, 40 mg, Oral, BID (Diuretic)    gabapentin (Neurontin) cap 100 mg, 100 mg, Oral, TID    HYDROcodone-acetaminophen (Norco) 5-325 MG per tab 1 tablet, 1 tablet, Oral, Q4H PRN    insulin detemir (Levemir) 100 UNIT/ML FlexPen/FlexTouch 10 Units, 10 Units, Subcutaneous, Nightly    levothyroxine (Synthroid) tab 25 mcg, 25 mcg, Oral, Before breakfast    NIFEdipine ER (Procardia-XL) 24 hr tab 60 mg, 60 mg, Oral, Daily    glucose (Dex4) 15 GM/59ML oral liquid 15 g, 15 g, Oral, Q15 Min PRN **OR** glucose (Glutose) 40% oral gel 15 g, 15 g, Oral, Q15 Min PRN **OR** glucose-vitamin C (Dex-4) chewable tab 4 tablet, 4 tablet, Oral, Q15 Min PRN **OR** dextrose 50% injection 50 mL, 50 mL, Intravenous, Q15 Min PRN **OR** glucose (Dex4) 15 GM/59ML oral liquid 30 g, 30 g, Oral, Q15 Min PRN **OR** glucose (Glutose) 40% oral gel 30 g, 30 g, Oral, Q15 Min PRN **OR** glucose-vitamin C (Dex-4) chewable tab 8 tablet, 8 tablet, Oral, Q15 Min PRN    heparin (Porcine) 5000 UNIT/ML injection 5,000 Units, 5,000 Units, Subcutaneous, Q8H OSEI    acetaminophen (Tylenol Extra Strength) tab 500 mg, 500 mg, Oral, Q4H PRN    HYDROmorphone (Dilaudid) 1 MG/ML injection 0.2 mg, 0.2 mg, Intravenous, Q2H PRN **OR** HYDROmorphone (Dilaudid) 1 MG/ML injection 0.4 mg, 0.4 mg, Intravenous, Q2H PRN **OR** HYDROmorphone (Dilaudid) 1 MG/ML injection 0.8 mg, 0.8 mg, Intravenous, Q2H PRN    ondansetron (Zofran) 4 MG/2ML injection 4 mg, 4 mg, Intravenous, Q6H PRN    prochlorperazine (Compazine) 10 MG/2ML injection 5 mg, 5 mg, Intravenous, Q8H PRN    insulin aspart (NovoLOG) 100 Units/mL FlexPen 1-10 Units, 1-10 Units, Subcutaneous, TID AC and HS    lidocaine-menthol 4-1 % patch 1  patch, 1 patch, Transdermal, Daily PRN    melatonin tab 1 mg, 1 mg, Oral, Nightly PRN    polyethylene glycol (PEG 3350) (Miralax) 17 g oral packet 17 g, 17 g, Oral, Daily PRN    Review of Systems:  10 point ROS completed and was negative, except for pertinent positive and negatives stated in subjective.        1/2/2024    11:20 PM 1/3/2024     4:06 AM   Vitals History   /68 128/74   BP Location Left arm Left arm   Pulse 64 67   Resp  16   Temp  97.2 °F (36.2 °C)   SpO2 90 % 95 %        Physical Exam:  GENERAL: well developed, well nourished, in no apparent distress  EXTREMITIES:   1. Integument: Wound sites x 3 as pictured below.  Stable looking eschar to central aspect of right plantar heel wound.  Minimal surrounding erythema, no current purulent drainage, no other signs of infection.  Left plantar heel wound does have a granular wound base with minimal depth.  There is also pressure injury to lateral portion of the left heel with no current drainage or other signs of infection.  Left anterior ankle wound is stable with eschar present.  No surrounding erythema, current drainage, or other signs of infection  2. Vascular: Dorsalis pedis 2/4 bilateral and nonpalpable PT pulses bilaterally.  Cap refill is brisk bilaterally   3. Musculoskeletal: All muscle groups are graded 5/5 in the foot and ankle.   4. Neurological: gross sensation intact via light touch bilaterally.                  Imaging:  CT BIOPSY INTERVERTEBRAL DISC(KSH=03396/[id number])    Result Date: 1/2/2024  CONCLUSION:  CT-guided aspiration and core biopsy of L3-4 intervertebral disc performed.   Dictated by (CST): Jaycob Rg MD on 1/02/2024 at 5:19 PM     Finalized by (CST): Jaycob Rg MD on 1/02/2024 at 5:23 PM         Laboratory Data:  Recent Labs   Lab 12/31/23  0514   RBC 4.26*   HGB 13.2   HCT 38.7*   MCV 90.8   MCH 31.0   MCHC 34.1   RDW 12.3   NEPRELIM 6.13   WBC 8.1   .0       Impression and Plan:  Patient Active Problem List    Diagnosis    Mixed hyperlipidemia    Subclinical hypothyroidism    Proteinuria    Benign essential HTN    Type 2 diabetes mellitus with other specified complication ([facility])    Peripheral edema    Morbid obesity with BMI of 40.0-44.9, adult ([facility])    Non-traumatic rhabdomyolysis    Acute hypoxemic respiratory failure ([facility])    Right leg weakness    Generalized weakness    HHNC (hyperglycemic hyperosmolar nonketotic coma) ([facility])    MSSA bacteremia    Discitis of lumbar region    Epidural abscess    Intractable back pain    Osteomyelitis of low back ([facility])    Diarrhea    C. difficile diarrhea    Syncope and collapse    Nausea and vomiting    Leukocytosis    TARIK (acute kidney injury) ([facility])    Dehydration    Renal insufficiency    Hyperkalemia    Azotemia    Metabolic acidosis    Hyperglycemia    Weakness generalized    Dizziness    Hypertensive urgency    Extremity edema    Lumbar radiculopathy    Discitis, unspecified spinal region    Dyslipidemia    Moderate back pain    Pressure ulcer, heel, right, unstageable ([facility])    Pressure injury of left heel, stage 2 ([facility])    PVD (peripheral vascular disease) ([facility])         Plan:  Patient seen resting comfortably at the bedside this morning.  Chart history reviewed.    Patient does have pressure ulcers of bilateral plantar heels.  They do not appear acutely infected at this time.  Will start with bilateral foot x-rays    I also recommend a consult to vascular surgery.  Patient did have an arterial duplex on 12/15/2023 revealing 50% stenosis of bilateral distal posterior tibial arteries.  Vascular consult placed.  Appreciate any recommendations    Pending x-rays and vascular surgery recommendations, patient will most likely need outpatient local wound care management.    Recommend keeping patient's wounds covered with Medihoney and large Band-Aids.    Recommend patient utilize PRAFO boots to bilateral lower extremities to prevent any increased pressure to heels.      Case was  discussed with the nurse and I will continue to monitor patient while he is in-house    Thank you for the opportunity to participate in patient's care.    Christopher Gracia DPM   1/3/2024  8:24 AM

## 2024-01-03 NOTE — PLAN OF CARE
Patient A&O X4 on RA- 2L O2 PRN. VSS, /IS. SCDs refused, subQ Heparin. Voiding freely via urinal, LBM 12/28- passing gas, requesting Miralax in AM. Wounds to bilateral legs covered with coverlet, c/d/i. Edema to BLE. Admitted with LBP and weakness. Pain controlled with PO medication. Up max x2-3. Reminded to use call light. Plan for podiatry to see.

## 2024-01-03 NOTE — OCCUPATIONAL THERAPY NOTE
Attempted to see the patient for OT evaluation. Patient asked OT and PT to see him in the morning. Pt is reporting fatigue from being in pain when he tried to use the bedside commode earlier today. PT communicated with pt's podiatrist earlier today. Per podiatrist, \"Offloading boots should be used when nonambulatory.  I would recommend surgical shoes for ambulation.  He can be WBAT but would recommend against excessive ambulation d/t possibility of worsening the heel wounds with ambulation.\" OT will attempt to see the pt tomorrow for eval.

## 2024-01-03 NOTE — PLAN OF CARE
A&Ox4, VSS on room air. Xray completed, see charts. PT/OT to see. Continue to monitor off antibiotics.

## 2024-01-03 NOTE — PROGRESS NOTES
[facility]     Hospitalist Progress Note     Troy Feliz Patient Status:  Inpatient    1960 MRN [id number]   [facility] 3SW-A Attending Christopher Gustafson MD   Hosp Day # 4 PCP MADELINE FENTON,      Chief Complaint: Back pain    Subjective:     Patient doing better, still complaining of back spasms, worse with working with PT.  Denies fever, chills, n/v.  No other acute complaints.      Objective:    Review of Systems:   A comprehensive review of systems was completed; pertinent positive and negatives stated in subjective.    Vital signs:  Temp:  [97.2 °F (36.2 °C)-97.9 °F (36.6 °C)] 97.2 °F (36.2 °C)  Pulse:  [62-73] 67  Resp:  [7-17] 16  BP: (100-178)/(60-95) 128/74  SpO2:  [89 %-99 %] 95 %    Physical Exam:    General: No acute distress, pleasant   Respiratory: no wheezes, no rhonchi  Cardiovascular: S1, S2, regular rate and rhythm  Abdomen: Soft, Non-tender, non-distended, positive bowel sounds  Neuro: No new focal deficits.   Extremities: no edema, chronic stasis dermatitis, dressing in place    Diagnostic Data:    Labs:  Recent Labs   Lab 23  1403 23  0514 24  1049   WBC 8.9 8.1  --    HGB 14.0 13.2  --    MCV 91.5 90.8  --    .0 234.0  --    INR  --   --  1.17       Recent Labs   Lab 23  1403 23  0514 24  0531 24  0535   * 114*  --   --    BUN 25* 19  --   --    CREATSERUM 0.98 0.81  0.81  --   --    CA 9.4 9.0  --   --    ALB 3.2*  --   --   --     142  --   --    K 3.9 3.2* 3.2* 3.9    109  --   --    CO2 29.0 28.0  --   --    ALKPHO 103  --   --   --    AST 14*  --   --   --    ALT 23  --   --   --    BILT 0.3  --   --   --    TP 7.9  --   --   --        Estimated Creatinine Clearance: 105.7 mL/min (based on SCr of 0.81 mg/dL).    No results for input(s): \"TROP\", \"TROPHS\", \"CK\" in the last 168 hours.    Recent Labs   Lab 24  1049   PTP 14.9*   INR 1.17                  Microbiology    Hospital Encounter  on 12/30/23   1. Blood Culture     Status: None (Preliminary result)    Collection Time: 12/30/23  5:14 PM    Specimen: Blood,peripheral   Result Value Ref Range    Blood Culture Result No Growth 3 Days N/A         Imaging: Reviewed in Epic.    Medications:    methocarbamol  250 mg Oral QID    hydrALAZINE  50 mg Oral Q8H OSIE    carvedilol  25 mg Oral BID with meals    furosemide  40 mg Oral BID (Diuretic)    gabapentin  100 mg Oral TID    insulin detemir  10 Units Subcutaneous Nightly    levothyroxine  25 mcg Oral Before breakfast    NIFEdipine ER  60 mg Oral Daily    heparin  5,000 Units Subcutaneous Q8H OSEI    insulin aspart  1-10 Units Subcutaneous TID AC and HS       Assessment & Plan:      #MSSA L3/L4 Discitis/OM  Patient with recurrent OM, multiple treatment courses in past year  MRI 12/30 with progression of discitis  S/p CT guided aspiration/core biopsy on 1/2  Now off abx, cultures NGTD  Awaiting bx report  ID following    #Back Pain  Neurosurgery eval appreciated  LSO brace recommended    #Bilateral plantar heel pressure ulcers  Podiatry eval   Vascular surgery consult  Foot xrays ordered    #Essential HTN - coreg, lasix, hydralazin e  #DLD   #DM Type 2 - levemir 10U, iss          Christopher Gustafson MD    Supplementary Documentation:     Quality:  DVT Mechanical Prophylaxis:   SCDs,    DVT Pharmacologic Prophylaxis   Medication    heparin (Porcine) 5000 UNIT/ML injection 5,000 Units                Code Status: Full Code  Pruitt: No urinary catheter in place  Pruitt Duration (in days):   Central line:    KIKE:     Discharge is dependent on: biopsy results   At this point Mr. Feliz is expected to be discharge to: Rehab     The 21st Century Cures Act makes medical notes like these available to patients in the interest of transparency. Please be advised this is a medical document. Medical documents are intended to carry relevant information, facts as evident, and the clinical opinion of the practitioner. The medical  note is intended as peer to peer communication and may appear blunt or direct. It is written in medical language and may contain abbreviations or verbiage that are unfamiliar.

## 2024-01-03 NOTE — PROGRESS NOTES
INFECTIOUS DISEASE PROGRESS NOTE    Troy Feliz Patient Status:  Inpatient    1960 MRN [id number]   [facility] 3SW-A Attending Roverto Obregon MD   Hosp Day # 4 PCP MADELINE FENTON,      Abx: None    Subjective: Patient seen and examined today. Feels that his back pain has improved, rates it has a 3/10 at rest, 7-8/10 with exertion. + back spasms. Denies any pain to his feet. Afebrile.     Allergies:  No Known Allergies    Medications:    Current Facility-Administered Medications:     methocarbamol (Robaxin) partial tab 250 mg, 250 mg, Oral, QID    hydrALAZINE (Apresoline) tab 50 mg, 50 mg, Oral, Q8H OSEI    carvedilol (Coreg) tab 25 mg, 25 mg, Oral, BID with meals    furosemide (Lasix) tab 40 mg, 40 mg, Oral, BID (Diuretic)    gabapentin (Neurontin) cap 100 mg, 100 mg, Oral, TID    HYDROcodone-acetaminophen (Norco) 5-325 MG per tab 1 tablet, 1 tablet, Oral, Q4H PRN    insulin detemir (Levemir) 100 UNIT/ML FlexPen/FlexTouch 10 Units, 10 Units, Subcutaneous, Nightly    levothyroxine (Synthroid) tab 25 mcg, 25 mcg, Oral, Before breakfast    NIFEdipine ER (Procardia-XL) 24 hr tab 60 mg, 60 mg, Oral, Daily    glucose (Dex4) 15 GM/59ML oral liquid 15 g, 15 g, Oral, Q15 Min PRN **OR** glucose (Glutose) 40% oral gel 15 g, 15 g, Oral, Q15 Min PRN **OR** glucose-vitamin C (Dex-4) chewable tab 4 tablet, 4 tablet, Oral, Q15 Min PRN **OR** dextrose 50% injection 50 mL, 50 mL, Intravenous, Q15 Min PRN **OR** glucose (Dex4) 15 GM/59ML oral liquid 30 g, 30 g, Oral, Q15 Min PRN **OR** glucose (Glutose) 40% oral gel 30 g, 30 g, Oral, Q15 Min PRN **OR** glucose-vitamin C (Dex-4) chewable tab 8 tablet, 8 tablet, Oral, Q15 Min PRN    heparin (Porcine) 5000 UNIT/ML injection 5,000 Units, 5,000 Units, Subcutaneous, Q8H [facility]    acetaminophen (Tylenol Extra Strength) tab 500 mg, 500 mg, Oral, Q4H PRN    HYDROmorphone (Dilaudid) 1 MG/ML injection 0.2 mg, 0.2 mg,  Intravenous, Q2H PRN **OR** HYDROmorphone (Dilaudid) 1 MG/ML injection 0.4 mg, 0.4 mg, Intravenous, Q2H PRN **OR** HYDROmorphone (Dilaudid) 1 MG/ML injection 0.8 mg, 0.8 mg, Intravenous, Q2H PRN    ondansetron (Zofran) 4 MG/2ML injection 4 mg, 4 mg, Intravenous, Q6H PRN    prochlorperazine (Compazine) 10 MG/2ML injection 5 mg, 5 mg, Intravenous, Q8H PRN    insulin aspart (NovoLOG) 100 Units/mL FlexPen 1-10 Units, 1-10 Units, Subcutaneous, TID AC and HS    lidocaine-menthol 4-1 % patch 1 patch, 1 patch, Transdermal, Daily PRN    melatonin tab 1 mg, 1 mg, Oral, Nightly PRN    polyethylene glycol (PEG 3350) (Miralax) 17 g oral packet 17 g, 17 g, Oral, Daily PRN  Current Facility-Administered Medications on File Prior to Encounter   Medication Dose Route Frequency Provider Last Rate Last Admin    [COMPLETED] potassium chloride (K-Dur) tab 40 mEq  40 mEq Oral Once Marcella Burton MD   40 mEq at 12/16/23 1101    [COMPLETED] potassium chloride (K-Dur) tab 40 mEq  40 mEq Oral Once Marcella Burton MD   40 mEq at 12/14/23 0824    [COMPLETED] furosemide (Lasix) 10 mg/mL injection 60 mg  60 mg Intravenous BID (Diuretic) Marcella Burton MD   60 mg at 12/15/23 1737    [COMPLETED] potassium chloride (K-Dur) tab 40 mEq  40 mEq Oral Q4H Marcella Burton MD   40 mEq at 12/13/23 1202    [COMPLETED] potassium chloride (K-Dur) tab 40 mEq  40 mEq Oral Once Marcella Burton MD   40 mEq at 12/13/23 1725    [COMPLETED] furosemide (Lasix) 10 mg/mL injection 60 mg  60 mg Intravenous Once Jazmine Martinez MD   60 mg at 12/12/23 2216     Current Outpatient Medications on File Prior to Encounter   Medication Sig Dispense Refill    furosemide 40 MG Oral Tab Take 1 tablet (40 mg total) by mouth 2 (two) times daily. 60 tablet 1    hydrALAZINE 25 MG Oral Tab Take 1 tablet (25 mg total) by mouth every 8 (eight) hours. 90 tablet 0    tiZANidine 4 MG Oral Tab Take 1 tablet (4 mg total) by mouth every 6 (six) hours as needed. 20 tablet 0     HYDROcodone-acetaminophen 5-325 MG Oral Tab Take 1 tablet by mouth every 4 (four) hours as needed. 15 tablet 0    gabapentin 100 MG Oral Cap Take 1 capsule (100 mg total) by mouth 3 (three) times daily. 90 capsule 0    carvedilol 25 MG Oral Tab Take 1 tablet (25 mg total) by mouth 2 (two) times daily with meals. 60 tablet 0    levothyroxine 25 MCG Oral Tab Take 1 tablet (25 mcg total) by mouth before breakfast.      NIFEdipine ER 60 MG Oral Tablet 24 Hr Take 1 tablet (60 mg total) by mouth daily.      traMADol 50 MG Oral Tab Take 1 tablet (50 mg total) by mouth every 8 (eight) hours as needed for Pain. (Patient not taking: Reported on 12/30/2023) 15 tablet 0    acetaminophen 500 MG Oral Tab Take 1-2 tablet every 4 hours as needed for pain 20 tablet 0    insulin detemir (LEVEMIR FLEXTOUCH) 100 UNIT/ML Subcutaneous Solution Pen-injector Inject 10 Units into the skin nightly. 5 each 2    polyethylene glycol, PEG 3350, 17 g Oral Powd Pack Take 17 g by mouth daily.      melatonin 1 MG Oral Tab Nightly prn 30 tablet 0    insulin aspart (NOVOLOG FLEXPEN) 100 Units/mL Subcutaneous Solution Pen-injector Test blood glucose 3 times daily with meals  Inject 1 unit if blood glucose is between 141-180 mg/dL Inject 2 units if blood glucose is between 181-220 mg/dL Inject 3 units if blood glucose is between 221-260 mg/dL Inject 4 units if blood glucose is between 261-300 mg/dL Inject 5 units if blood glucose is between 301-350 mg/dL Call your physician if blood glucose is greater than 351 mg/dL, 5 each 2    Insulin Pen Needle 32G X 4 MM Does not apply Misc May substitute if not on insurance formulary 100 each 5    lidocaine-menthol 4-1 % External Patch Place 1 patch onto the skin daily. 30 patch 0       Review of Systems:    A comprehensive 10 point review of systems was completed.  Pertinent positives and negatives noted in the the HPI.      Physical Exam:    General: No acute distress. Alert and oriented x 3.  Vital signs: Temp:   [97.2 °F (36.2 °C)-98.3 °F (36.8 °C)] 98.3 °F (36.8 °C)  Pulse:  [62-73] 70  Resp:  [7-17] 17  BP: (100-178)/(59-95) 133/59  SpO2:  [89 %-98 %] 92 %  HEENT: Moist mucous membranes. Extraocular muscles are intact.  Neck: No swelling, no masses  Respiratory: Clear to auscultation bilaterally. No wheezing. No rhonchi.  Cardiovascular: RRR  Abdomen: Soft, nontender, nondistended.    Musculoskeletal: Movement of all extremities.  Joints: no effusions  Skin: No cellulitis noted.     Laboratory Data:  Laboratory data reviewed      Recent Labs   Lab 12/31/23  0514   RBC 4.26*   HGB 13.2   HCT 38.7*   MCV 90.8   MCH 31.0   MCHC 34.1   RDW 12.3   NEPRELIM 6.13   WBC 8.1   .0       Recent Labs   Lab 12/30/23  1403 12/31/23  0514 01/01/24  0531 01/02/24  0535   * 114*  --   --    BUN 25* 19  --   --    CREATSERUM 0.98 0.81  0.81  --   --    CA 9.4 9.0  --   --    ALB 3.2*  --   --   --     142  --   --    K 3.9 3.2* 3.2* 3.9    109  --   --    CO2 29.0 28.0  --   --    ALKPHO 103  --   --   --    AST 14*  --   --   --    ALT 23  --   --   --    BILT 0.3  --   --   --    TP 7.9  --   --   --          Lab Results   Component Value Date    INR 1.17 01/02/2024    PTP 14.9 01/02/2024    PGLU 122 01/03/2024         Microbiologic Data:   Hospital Encounter on 12/30/23   1. Blood Culture     Status: None (Preliminary result)    Collection Time: 12/30/23  5:14 PM    Specimen: Blood,peripheral   Result Value Ref Range    Blood Culture Result No Growth 3 Days N/A         Imaging:  Narrative   PROCEDURE:  MRI SPINE LUMBAR (W+WO) (CPT=72158)      COMPARISON:  [facility] , MR, MRI SPINE LUMBAR (W+WO) (CPT=72158), 6/27/2023, 10:07 PM.  [facility] , MR, MRI SPINE LUMBAR(CONTRAST ONLY) (CPT=72149), 8/22/2023, 9:58 PM.  [facility] , XR, XR LUMBAR SPINE (MIN 4 VIEWS) (CPT=72110), 12/14/2023, 2:48 PM.     INDICATIONS:  Severe low back pain.  History of discitis/osteomyelitis at the L3-4 level.     TECHNIQUE:  Multiplanar T1 and T2  weighted images including fat suppression sequences.  Images acquired in sagittal and axial planes. Intravenous gadolinium was administered followed by multiplanar post-infusion T1 weighted sequences.       PATIENT STATED HISTORY:(As transcribed by Technologist)  The patient stated he is being evaluated for chronic, severe lower back pain and muscle spasms. He is wheelchair bound due to pain.      CONTRAST USED:  20 mL of Dotarem     FINDINGS:    LUMBAR DISC LEVELS  L1-L2:  No significant disc/facet abnormality, spinal stenosis, or foraminal narrowing.    L2-L3:  There is a moderate broad-based degenerative disc bulge.  There is posterior epidural fat hypertrophy.  This causes moderate central canal stenosis of 8 mm.  The facet joints are unremarkable.  L3-L4:  There continues to be fluid signal within the disc space.  There is destructive changes of the endplates of L3 and L4 which is slightly more advanced than the study of 8/22/2023.  There is enhancement of the endplates which is slightly more  vigorous than on the previous study.  Additionally, there is new intense mint along the posterior articular facets at both levels suspicious for interval progression of septic arthropathy into the bilateral posterior articular facet joints.  There is  also increasing adjacent psoas edema diffusely.  There is no abdi epidural fluid collection or abscess.  There is moderate central canal stenosis of 7.5 mm.    L4-L5:  There is mild degenerative disc bulge osteophyte complex.  AP diameter canal is normal 12 mm.  There is mild subarticular zone and neural foraminal narrowing.  The facet joints unremarkable.  L5-S1:  Very mild posterior degenerative disc bulge.  There is mild bilateral facet joint degenerative change.  There is mild bilateral neural foraminal narrowing.     PARASPINAL AREA:  Increasing bilateral psoas muscle edema/inflammation.  No evidence of psoas muscle abscess.  BONES:  No fracture, pars defect, or  osseous lesion.    CORD/CAUDA EQUINA:  There is thickening of the cauda equina nerve roots diffusely suggestive of chronic arachnoiditis, which can be retrospectively seen on previous imaging studies and appears relatively stable.  No abnormal enhancement along the conus  or nerve roots.                   Impression   CONCLUSION:    Findings are concerning for progression of discitis/osteomyelitis centered at the L3-4 disc level, with suggestion of interval extension into the bilateral posterior articular facets at this level concerning for the development of bilateral septic  arthropathy.  There is also increasing bilateral psoas muscle edema/inflammation.  No discrete evidence of epidural abscess or psoas abscess.     Diffuse thickening of the cauda equina suggesting chronic arachnoiditis, which can be retrospectively seen and appears similar to previous imaging.  No abnormal post-contrast enhancement.        Established Problem list:  Patient Active Problem List   Diagnosis    Mixed hyperlipidemia    Subclinical hypothyroidism    Proteinuria    Benign essential HTN    Type 2 diabetes mellitus with other specified complication ([facility])    Peripheral edema    Morbid obesity with BMI of 40.0-44.9, adult ([facility])    Non-traumatic rhabdomyolysis    Acute hypoxemic respiratory failure ([facility])    Right leg weakness    Generalized weakness    HHNC (hyperglycemic hyperosmolar nonketotic coma) ([facility])    MSSA bacteremia    Discitis of lumbar region    Epidural abscess    Intractable back pain    Osteomyelitis of low back ([facility])    Diarrhea    C. difficile diarrhea    Syncope and collapse    Nausea and vomiting    Leukocytosis    TARIK (acute kidney injury) ([facility])    Dehydration    Renal insufficiency    Hyperkalemia    Azotemia    Metabolic acidosis    Hyperglycemia    Weakness generalized    Dizziness    Hypertensive urgency    Extremity edema    Lumbar radiculopathy    Discitis, unspecified spinal region    Dyslipidemia    Moderate  back pain    Pressure ulcer, heel, right, unstageable ([facility])    Pressure injury of left heel, stage 2 ([facility])    PVD (peripheral vascular disease) ([facility])       ASSESSMENT/PLAN:  1. MSSA L3/4 Discitis/OM     - First diagnosed February 2023 at which time patient had MSSA bacteremia and L3/4 discitis s/p cefazolin  - 3/22/23 with ongoing symptoms. MRI 3/22/23 demonstrated new epidural phlegmon, discharged on nafcillin EOT 5/29 (13 wk course).  - MRI 6/27 shows discitis/osteomyelitis with improvement, decreased L3/4 disc fluid, no abscess. CRP was elevated and patient planned for IR biopsy (off abx) though there was no fluid to aspirate, biopsy was taken 6/29, discharged off abx. Cultures finalized negative. Path with some neutrophils stains negative.   - MRI 8/22/23 showed similar discitis/om changes at L3-4, unclear if worsened for evolving changes of infection. Blood cultures were negative and patient afebrile at that time  - Presents to ED 12/30 with low back, R hip/groin pains that aren't controlled with pan medication.   - MRI 12/30 with contrast c/f progression of discitis / om at L3/4 level with extension into the bilateral posterior articular facets and increasing bilateral psoas muscle edema/inflammation without definite abscess in the epidural or psoas space.   -S/p CT guided aspiration and core bx of L3-L4 1/2 by IR, bloody fluid and solid core.    No ABX given PTA. Given nafcillin + vancomycin in house; now off abx  Blood cxs 12/30- ngtd  Afebrile  WBC 8  CRP 1.47     2. H/o C diff   - monitor BMs  - no diarrhea now    3. Foot wounds  - did not appear acutely infected   - arterial dopplers 12/15 with 50% stenosis at the bilateral distal posterior tibial arteries    PLAN:  - continue to hold abx for now as patient is hemodynamically stable, afebrile with normal WBC. If any of these should change would re-start empiric abx therapy.   - follow cultures from IR drainage; d/w micro- they will send remaining sample  to pathology to see if able to perform pathology on core bx from L3-L4 (order placed).   - NS following  - monitor clinically   - monitor feet clinically; wound care as per wound care team/podiatry; podiatry eval appreciated--> plans for XR of feet and vascular to see.    Discussed case with RN, wound care, micro and Dr. Tan.     Kasia Harley, PA-C

## 2024-01-03 NOTE — PAYOR COMM NOTE
--------------  CONTINUED STAY REVIEW    Payor: [employer]AND[employer]  Subscriber #:  [id number]  Authorization Number: [id number]    Admit date: 12/30/23  Admit time:  6:26 PM    Admitting Physician: Roverto Obregon MD  Attending Physician:  Christopher Gustafson MD  Primary Care Physician: Jimenez Bailey,     REVIEW DOCUMENTATION:  1-2-24    Procedure: CT guided lumbar disc biopsy     Pre-Procedure Diagnosis:  discitis    Plan:   1.  No acute neurosurgical intervention recommended time  2.  Medical management and pain management per hospitalist  3.  Patient with continued muscle spasms without relief with alternative therapy, Valium.  Will trial methocarbamol.  Likely the patient spasms are secondary to his underlying OM/DM.  Treatment will likely improve his spasms.  4.  Antibiotics per ID  5.  Plan for CT-guided biopsy.  6.  Recommend LSO brace.  Patient states he brought a brace from home, he is open to wearing this when out of bed.  He states he does not want to wear it in bed as it is uncomfortable.  7.  Will discuss with Dr. Christina     The patient was seen and examined.  The patient agreed to the plan, verbalized understanding and was very appreciative.  Discussed with nursing.    Clinical exam:  The patient is awake, alert and orientated.  Speech fluent.  Comprehension intact.  Answers questions appropriately.  Easily follows commands.  Breathing easy and even.  Skin warm and dry.  Laying in bed.  Moving bilateral upper extremities spontaneously to full resistance  5/5 strength of bilateral IP, dorsiflexion, plantarflexion.  No clonus.  Bilateral lower extremity sensation intact, proximally and distally           Subjective:   The patient endorses muscle spasms, no relief with Valium.  He has no new lower extremity radiating pain, numbness, tingling or weakness.  No change to bladder/bowel function.           MEDICATIONS ADMINISTERED IN LAST 1 DAY:  carvedilol (Coreg) tab 25 mg       Date Action Dose Route User    1/2/2024  1716 Given 25 mg Oral Ozzy Meraz RN    1/2/2024 0846 Given 25 mg Oral Ozzy Meraz RN          fentaNYL (Sublimaze) 50 mcg/mL injection 50 mcg       Date Action Dose Route User    1/2/2024 1623 Given 50 mcg Intravenous Cely King RN    1/2/2024 1617 Given 50 mcg Intravenous Cely King RN          fentaNYL (Sublimaze) 50 mcg/mL injection       Date Action Dose Route User    1/2/2024 1617 Given 50 mcg Intravenous Cely King RN          furosemide (Lasix) tab 40 mg       Date Action Dose Route User    1/2/2024 1716 Given 40 mg Oral Ozzy Meraz RN          gabapentin (Neurontin) cap 100 mg       Date Action Dose Route User    1/2/2024 1716 Given 100 mg Oral Ozzy Meraz RN          heparin (Porcine) 5000 UNIT/ML injection 5,000 Units       Date Action Dose Route User    1/3/2024 0546 Given 5,000 Units Subcutaneous (Left Upper Abdomen) Silvia Harrison RN    1/2/2024 2107 Given 5,000 Units Subcutaneous (Left Lower Abdomen) Silvia Harrison RN          hydrALAZINE (Apresoline) tab 50 mg       Date Action Dose Route User    1/2/2024 1716 Given 50 mg Oral Ozzy Meraz RN          HYDROcodone-acetaminophen (Norco) 5-325 MG per tab 1 tablet       Date Action Dose Route User    1/3/2024 0607 Given 1 tablet Oral Silvia Harrison RN    1/2/2024 1814 Given 1 tablet Oral Ozzy Meraz RN    1/2/2024 0846 Given 1 tablet Oral Ozzy Meraz RN          HYDROmorphone (Dilaudid) 1 MG/ML injection 0.4 mg       Date Action Dose Route User    1/2/2024 1301 Given 0.4 mg Intravenous Ozzy Meraz RN          insulin aspart (NovoLOG) 100 Units/mL FlexPen 1-10 Units       Date Action Dose Route User    1/2/2024 2129 Given 1 Units Subcutaneous (Left Upper Arm) Silvia Harrison RN          insulin detemir (Levemir) 100 UNIT/ML FlexPen/FlexTouch 10 Units       Date Action Dose Route User    1/2/2024 2129 Given 10 Units Subcutaneous (Left Upper Arm) Silvia Harrison, RN          levothyroxine  (Synthroid) tab 25 mcg       Date Action Dose Route User    1/3/2024 0546 Given 25 mcg Oral Silvia Harrison RN    1/2/2024 0847 Given 25 mcg Oral Ozzy Meraz RN          methocarbamol (Robaxin) partial tab 250 mg       Date Action Dose Route User    1/2/2024 1716 Given 250 mg Oral Ozzy Meraz RN    1/2/2024 0846 Given 250 mg Oral Ozzy Meraz RN          midazolam (Versed) 2 MG/2ML injection 1 mg       Date Action Dose Route User    1/2/2024 1621 Given 1 mg Intravenous Cely King RN    1/2/2024 1618 Given 1 mg Intravenous Cely King RN          midazolam (Versed) 2 MG/2ML injection       Date Action Dose Route User    1/2/2024 1618 Given 1 mg Intravenous Cely King RN          NIFEdipine ER (Procardia-XL) 24 hr tab 60 mg       Date Action Dose Route User    1/2/2024 0846 Given 60 mg Oral Ozzy Meraz RN          polyethylene glycol (PEG 3350) (Miralax) 17 g oral packet 17 g       Date Action Dose Route User    1/3/2024 0612 Given 17 g Oral Silvia Harrison RN          sodium chloride 0.9% infusion       Date Action Dose Route User    1/2/2024 1549 New Bag (none) Intravenous Cely King RN            Vitals (last day)       Date/Time Temp Pulse Resp BP SpO2 Weight O2 Device O2 Flow Rate (L/min) Who    01/02/24 2320 -- 64 -- 123/68 90 % -- -- -- LR    01/02/24 2105 -- 62 -- 110/67 96 % -- None (Room air) -- LR    01/02/24 1955 97.7 °F (36.5 °C) 62 -- 100/60 92 % -- -- -- CM    01/02/24 1714 97.9 °F (36.6 °C) 67 16 143/80 89 % -- None (Room air) -- DS    01/02/24 16:37:12 -- 73 13 178/93 95 % -- None (Room air) -- CH    01/02/24 1635 -- 73 13 175/90 97 % -- -- --     01/02/24 1630 -- 71 12 162/95 96 % -- -- --     01/02/24 1625 -- 70 17 156/84 92 % -- -- --     01/02/24 1620 -- 70 7 171/90 96 % -- -- --     01/02/24 1615 -- 71 15 163/94 98 % -- Nasal cannula 2 L/min     01/02/24 1610 -- 71 8 176/94 97 % -- -- --     01/02/24 1605 -- 72 11 171/92 98 % -- Nasal cannula 2  L/min CH    01/02/24 0838 97.9 °F (36.6 °C) 67 16 136/75 99 % -- Nasal cannula 2 L/min SM    01/02/24 0450 98.2 °F (36.8 °C) 64 -- 161/76 99 % -- -- -- CM

## 2024-01-04 LAB
GLUCOSE BLD-MCNC: 109 MG/DL (ref 70–99)
GLUCOSE BLD-MCNC: 130 MG/DL (ref 70–99)
GLUCOSE BLD-MCNC: 144 MG/DL (ref 70–99)
GLUCOSE BLD-MCNC: 156 MG/DL (ref 70–99)

## 2024-01-04 PROCEDURE — 99231 SBSQ HOSP IP/OBS SF/LOW 25: CPT | Performed by: PODIATRIST

## 2024-01-04 PROCEDURE — 99232 SBSQ HOSP IP/OBS MODERATE 35: CPT | Performed by: HOSPITALIST

## 2024-01-04 NOTE — PROGRESS NOTES
[facility]    PODIATRY PROGRESS NOTE    Troy Feliz Patient Status:  Inpatient    1960 MRN [id number]   Location [facility] 3SW-A Attending Christopher Gustafson MD   Hosp Day # 5 PCP MADELINE FENTON,      Date of Admission:  2023    History of Present Illness:  Patient seen this morning working with physical therapy on getting up in his chair.  Briefly spoke with patient.  His dressings were intact with surgical shoes onto bilateral feet.  Denies any overnight events.  Denies any pain and no other concerns at this time.    History:  Past Medical History:   Diagnosis Date    Diabetes (HCC)     Dyslipidemia 2011    High blood pressure     HTN (hypertension) 2011    Hyperlipidemia     Impaired fasting glucose 2011    Proteinuria 2011    Subclinical hypothyroidism 2011    Unspecified essential hypertension     Visual impairment      Past Surgical History:   Procedure Laterality Date    MASTOIDECTOMY,SIMPLE      done 30 years ago.  Revised in      Family History   Problem Relation Age of Onset    Diabetes Father     Heart Disorder Mother     Heart Disorder Brother       reports that he has never smoked. He has never used smokeless tobacco. He reports that he does not drink alcohol and does not use drugs.    Allergies:  No Known Allergies    Medications:    Current Facility-Administered Medications:     methocarbamol (Robaxin) partial tab 250 mg, 250 mg, Oral, QID    hydrALAZINE (Apresoline) tab 50 mg, 50 mg, Oral, Q8H OSEI    carvedilol (Coreg) tab 25 mg, 25 mg, Oral, BID with meals    furosemide (Lasix) tab 40 mg, 40 mg, Oral, BID (Diuretic)    gabapentin (Neurontin) cap 100 mg, 100 mg, Oral, TID    HYDROcodone-acetaminophen (Norco) 5-325 MG per tab 1 tablet, 1 tablet, Oral, Q4H PRN    insulin detemir (Levemir) 100 UNIT/ML FlexPen/FlexTouch 10 Units, 10 Units, Subcutaneous, Nightly    levothyroxine (Synthroid) tab 25 mcg, 25 mcg, Oral, Before breakfast     NIFEdipine ER (Procardia-XL) 24 hr tab 60 mg, 60 mg, Oral, Daily    glucose (Dex4) 15 GM/59ML oral liquid 15 g, 15 g, Oral, Q15 Min PRN **OR** glucose (Glutose) 40% oral gel 15 g, 15 g, Oral, Q15 Min PRN **OR** glucose-vitamin C (Dex-4) chewable tab 4 tablet, 4 tablet, Oral, Q15 Min PRN **OR** dextrose 50% injection 50 mL, 50 mL, Intravenous, Q15 Min PRN **OR** glucose (Dex4) 15 GM/59ML oral liquid 30 g, 30 g, Oral, Q15 Min PRN **OR** glucose (Glutose) 40% oral gel 30 g, 30 g, Oral, Q15 Min PRN **OR** glucose-vitamin C (Dex-4) chewable tab 8 tablet, 8 tablet, Oral, Q15 Min PRN    heparin (Porcine) 5000 UNIT/ML injection 5,000 Units, 5,000 Units, Subcutaneous, Q8H OSEI    acetaminophen (Tylenol Extra Strength) tab 500 mg, 500 mg, Oral, Q4H PRN    HYDROmorphone (Dilaudid) 1 MG/ML injection 0.2 mg, 0.2 mg, Intravenous, Q2H PRN **OR** HYDROmorphone (Dilaudid) 1 MG/ML injection 0.4 mg, 0.4 mg, Intravenous, Q2H PRN **OR** HYDROmorphone (Dilaudid) 1 MG/ML injection 0.8 mg, 0.8 mg, Intravenous, Q2H PRN    ondansetron (Zofran) 4 MG/2ML injection 4 mg, 4 mg, Intravenous, Q6H PRN    prochlorperazine (Compazine) 10 MG/2ML injection 5 mg, 5 mg, Intravenous, Q8H PRN    insulin aspart (NovoLOG) 100 Units/mL FlexPen 1-10 Units, 1-10 Units, Subcutaneous, TID AC and HS    lidocaine-menthol 4-1 % patch 1 patch, 1 patch, Transdermal, Daily PRN    melatonin tab 1 mg, 1 mg, Oral, Nightly PRN    polyethylene glycol (PEG 3350) (Miralax) 17 g oral packet 17 g, 17 g, Oral, Daily PRN    Review of Systems:  10 point ROS completed and was negative, except for pertinent positive and negatives stated in subjective.    Physical Exam:  Dressings left intact today.  No strikethrough noted.    Imaging:  XR FOOT, COMPLETE (MIN 3 VIEWS), BILAT (CPT=73630-50)    Result Date: 1/3/2024  CONCLUSION:  Right foot:  There are stable changes of hallux valgus deformity.  There is some slight increased narrowing of the lateral right 1st MTP joint space.  There  are numerous corticated ossifications adjacent to the distal right 1st diaphysis and 1st metatarsal head.  There are new erosive changes involving the medial aspect of the 1st metatarsal head and the medial lateral distal portions which are subarticular suggestive either erosive osteoarthritis or changes of gout.  There is adjacent soft tissue swelling. Marked vascular calcification. Multiple level mild degenerative joint disease involving the midfoot and hindfoot calcaneal enthesophytes are noted which are small.  Small erosive changes are noted involving the left calcaneus adjacent to the origin of the plantar fascia insertion of the left Achilles tendon slightly more prominent.  This also may represent changes of erosive osteoarthritis or gout. Left foot:  Marked vascular calcification noted.  There is increased subchondral cystic change adjacent to the 1st MTP joint with overlying soft tissue swelling may represent osteoarthritis or changes of gout.  Mild midfoot and hindfoot osteoarthritis again noted.  Calcaneal enthesophytes are noted without definite erosion or fracture or bone destruction.  Corticated ossification measuring 9 mm again noted adjacent to the talus.  Diffuse soft tissue swelling.  No acute fracture.   LOCATION:  [id number]   Dictated by (CST): Charan Ambrose MD on 1/03/2024 at 12:11 PM     Finalized by (CST): Charan Ambrose MD on 1/03/2024 at 12:16 PM       CT BIOPSY INTERVERTEBRAL DISC(UTY=92757/02191)    Result Date: 1/2/2024  CONCLUSION:  CT-guided aspiration and core biopsy of L3-4 intervertebral disc performed.   Dictated by (CST): Jaycob Rg MD on 1/02/2024 at 5:19 PM     Finalized by (CST): Jaycob Rg MD on 1/02/2024 at 5:23 PM         Laboratory Data:  Recent Labs   Lab 12/31/23  0514   RBC 4.26*   HGB 13.2   HCT 38.7*   MCV 90.8   MCH 31.0   MCHC 34.1   RDW 12.3   NEPRELIM 6.13   WBC 8.1   .0       Impression:  Patient Active Problem List   Diagnosis    Mixed  hyperlipidemia    Subclinical hypothyroidism    Proteinuria    Benign essential HTN    Type 2 diabetes mellitus with other specified complication ([facility])    Peripheral edema    Morbid obesity with BMI of 40.0-44.9, adult ([facility])    Non-traumatic rhabdomyolysis    Acute hypoxemic respiratory failure ([facility])    Right leg weakness    Generalized weakness    HHNC (hyperglycemic hyperosmolar nonketotic coma) ([facility])    MSSA bacteremia    Discitis of lumbar region    Epidural abscess    Intractable back pain    Osteomyelitis of low back ([facility])    Diarrhea    C. difficile diarrhea    Syncope and collapse    Nausea and vomiting    Leukocytosis    TARIK (acute kidney injury) ([facility])    Dehydration    Renal insufficiency    Hyperkalemia    Azotemia    Metabolic acidosis    Hyperglycemia    Weakness generalized    Dizziness    Hypertensive urgency    Extremity edema    Lumbar radiculopathy    Discitis, unspecified spinal region    Dyslipidemia    Moderate back pain    Pressure ulcer, heel, right, unstageable ([facility])    Pressure injury of left heel, stage 2 ([facility])    PVD (peripheral vascular disease) ([facility])         Plan:  -Patient doing well overall with no overnight events.  Dressings left intact today.    -Discussed case with Dr. Najjar, vascular surgeon, who is not planning to perform any interventions at this time.     -Wounds remain uninfected to bilateral heels.  X-rays negative for erosive changes concerning for osteomyelitis.  Discussed with patient that I would recommend outpatient conservative management for his bilateral heel wounds.    -Unfortunately my outpatient clinic does not take patient's medical insurance.  I will provide patient with a list of wound care centers that may in his discharge paperwork.    -Recommend patient continue ambulating in bilateral surgical shoes, avoiding direct heel pressure is much as possible.  Also recommend continue utilizing PRAFO boots to bilateral heels while inpatient.  Discussed the importance  of continued offloading of ulcerations to allow healing.    -Podiatry will sign off.  Please contact on-call physician for any concerns.      Christopher Gracia DPM   1/4/2024  3:38 PM

## 2024-01-04 NOTE — PHYSICAL THERAPY NOTE
PHYSICAL THERAPY EVALUATION - INPATIENT     Room Number: 354/354-A  Evaluation Date: 1/4/2024  Type of Evaluation: Initial  Physician Order: PT Eval and Treat    Presenting Problem: back pain, R hip and groin pain, muscle spasms  Co-Morbidities : DM, HTN, DL, discitis  Reason for Therapy: Mobility Dysfunction and Discharge Planning    History related to current admission: Patient is a 63 year old male admitted on 12/30/2023 from [facility] for back pain, R hip and groin pain, muscle spasms.      RECENT ADMISSIONS:  12/13-12/17/23: BLE edema --> from home, dc to [facility]  8/22-8/24/23: discitis, OM of back, dizziness --> from [facility], dc to [facility]  7/10-7/14/23 dizziness from [facility]>SNF  6/27-7/2/2023: Intractable back pain: --> From [facility], dc to [facility]  (Per neuro sx 6/28 no acute intervention indicated, LSO when OOB as needed for comfort)  3/21-3/29/23: MSSA bacteremia/discitis--> From [facility], dc to [facility]  2/27-3/7/2023: non-traumatic rhabdomyolysis --> dc to [facility] (3/7-3/21/2023)    IMAGING  MRI SPINE LUMBAR:   Findings are concerning for progression of discitis/osteomyelitis centered at the L3-4 disc level, with suggestion of interval extension into the bilateral posterior articular facets at this level concerning for the development of bilateral septic   arthropathy.  There is also increasing bilateral psoas muscle edema/inflammation.  No discrete evidence of epidural abscess or psoas abscess.  Diffuse thickening of the cauda equina suggesting chronic arachnoiditis, which can be retrospectively seen and appears similar to previous imaging.  No abnormal post-contrast enhancement.     Procedure 1/2/24: CT guided lumbar disc biopsy    ASSESSMENT   In this PT evaluation, the patient presents with the following impairments incr pain/discomfort, global decr in strength (especially BLE), decr static and dynamic sitting and standing balance, decr activity tolerance/endurance, decr functional mobility.  These impairments and comorbidities manifest  themselves as functional limitations in independent bed mobility, transfers, and gait.  The patient is below baseline and would benefit from skilled inpatient PT to address the above deficits to assist patient in returning to prior to level of function.   Functional outcome measures completed include [facility]. The AM-PAC '6-Clicks' Inpatient Basic Mobility Short Form was completed and this patient is demonstrating a Approx Degree of Impairment: 64.91%  degree of impairment in mobility. Research supports that patients with this level of impairment may benefit from [facility].  DISCHARGE RECOMMENDATIONS  PT Discharge Recommendations: Sub-acute rehabilitation    PLAN  PT Treatment Plan: Bed mobility;Body mechanics;Coordination;Endurance;Energy conservation;Patient education;Family education;Gait training;Neuromuscular re-educate;Range of motion;Strengthening;Stoop training;Stair training;Transfer training;Balance training  Rehab Potential : Fair  Frequency (Obs): 3-5x/week  Number of Visits to Meet Established Goals: 6      CURRENT GOALS    Goal #1 Patient is able to demonstrate supine - sit EOB @ level: supervision     Goal #2 Patient is able to demonstrate transfers Sit to/from Stand at assistance level: supervision     Goal #3 Patient is able to ambulate 20 feet with assist device: walker - rolling at assistance level: supervision     Goal #4    Goal #5    Goal #6    Goal Comments: Goals established on 1/4/2024    HOME SITUATION  Type of Home: House   Home Layout: Two level;Able to live on main level  Stairs to Enter : 0     Stairs to Bedroom: 12  Railing: Yes    Lives With: Alone;Caregiver part-time ([employer] 3 hrs/ week 2x/week)  Drives: Yes  Patient Owned Equipment: Wheelchair;Rolling walker       Prior Level of [place]: From last admission to hospital in December to now- pt hasn't walked much. He has mainly been ambulating via wheelchair. At beginning of [facility] stay in Dec was able to ambulate 500 feet, but most recently was  only able to ambulate about 20 feet (2 weeks ago). However, he did say he hadn't walked in two weeks- (some confusion on what he was actually doing at [facility]). He wasn't receiving therapy services there.     SUBJECTIVE  \"It was shit!\"      OBJECTIVE  Precautions: Spine;Bed/chair alarm (BL post op shoes due to heel ulcerations, bunny boots when in bed for offloading)  Fall Risk: High fall risk    WEIGHT BEARING RESTRICTION  Weight Bearing Restriction: None                PAIN ASSESSMENT  Ratin  Location: right hip  Management Techniques: Activity promotion;Body mechanics;Repositioning    COGNITION  Overall Cognitive Status:  WFL - within functional limits    RANGE OF MOTION AND STRENGTH ASSESSMENT  Upper extremity ROM and strength are within functional limits   Lower extremity ROM is within functional limits   Lower extremity strength is within functional limits       BALANCE  Static Sitting: Fair  Dynamic Sitting: Fair -  Static Standing: Poor +  Dynamic Standing: Poor    ADDITIONAL TESTS                                    ACTIVITY TOLERANCE                         O2 WALK       NEUROLOGICAL FINDINGS                        AM-PAC '6-Clicks' INPATIENT SHORT FORM - BASIC MOBILITY  How much difficulty does the patient currently have...  Patient Difficulty: Turning over in bed (including adjusting bedclothes, sheets and blankets)?: A Little   Patient Difficulty: Sitting down on and standing up from a chair with arms (e.g., wheelchair, bedside commode, etc.): A Lot   Patient Difficulty: Moving from lying on back to sitting on the side of the bed?: A Little   How much help from another person does the patient currently need...   Help from Another: Moving to and from a bed to a chair (including a wheelchair)?: A Lot   Help from Another: Need to walk in hospital room?: A Lot   Help from Another: Climbing 3-5 steps with a railing?: Total       AM-PAC Score:  Raw Score: 13   Approx Degree of Impairment: 64.91%   Standardized  Score (AM-PAC Scale): 36.74   CMS Modifier (G-Code): CL    FUNCTIONAL ABILITY STATUS  Gait Assessment   Functional Mobility/Gait Assessment  Gait Assistance: Not tested    Skilled Therapy Provided     Bed Mobility:  Rolling: NT  Supine to sit: w/ HOB elevated - Josue to reach static sitting EOB, required UE pull for trunk to come forward      Transfer Mobility:  Sit to stand from bed with height of bed elevated- modA x 2 to RW- v/c for hand placement- very forward posture requiring cues for erect posture    Sit to stand from bedside chair: modA x 2 to RW  Stand to sit: modA x 2 - very poor eccentric control   Gait = NT    Therapist's Comments: Patient presents sitting up in bed. Discussed role and goal of physical therapy in hospital setting. Pt expresses frustration that he hasn't received any answers from his medical team. Pt in agreement to session. Reports minor pain at rest, increased pain with movement and weight bearing. Dr. Gracia did come into room during session and requested post op shoes to bilateral feet when up and ambulating. Did give the ok to ambulate to/from bed <-> chair <-> bathroom.    Bed mobility Josue to reach static sitting EOB. Sit to stand from bed and bedside chair at modA x 2 to RW. Pt able to take a few lateral steps to bedside chair with RW at modA x 2, continues to have very forward posture. Pt stating incr in muscle spasms in right hip, poor safety awareness when reaching chair as he wasn't close enough when transferring. Pt up in bedside chair at end of session. Educated on importance of out of bed mobility.     Exercise/Education Provided:  Bed mobility  Body mechanics  Energy conservation  Functional activity tolerated  Posture  Transfer training    Patient End of Session: Up in chair;Needs met;Call light within reach;RN aware of session/findings;All patient questions and concerns addressed;Alarm set;Discussed recommendations with /    Patient Evaluation  Complexity Level:  History Moderate - 1 or 2 personal factors and/or co-morbidities   Examination of body systems Moderate - addressing a total of 3 or more elements   Clinical Presentation Moderate - Evolving   Clinical Decision Making Moderate - Evolving     PT Session Time: 30 minutes  Gait Training:  minutes  Therapeutic Activity: 15 minutes  Neuromuscular Re-education:  minutes  Therapeutic Exercise:  minutes

## 2024-01-04 NOTE — PLAN OF CARE
Patient alert and oriented x4. VSS, on RA, 2-3L O2 via nc when asleep. Mild pain reported to back, PO PRN norco and scheduled meds given with stated relief. Pt denies N/T. Bunny boots in place to BLE. Wounds present to BLE with dressings CDI, dressing changes per orders. Pt voiding via urinal. Tolerating PO intake, denies N/V.       Plan: PT/OT to see. Await culture results.

## 2024-01-04 NOTE — CONSULTS
Samer F. Najjar, MD.  Vascular Surgery  [facility]       VASCULAR SURGERY   INPATIENT CONSULT NOTE      Name: Troy Feliz   :   1960  [id number]     Date of Consultation: 1/3/2024       REFERRING PHYSICIAN: Christopher Gustafson MD  PRIMARY CARE PHYSICIAN:  MADELINE FENTON DO    HISTORY OF PRESENT ILLNESS:   Patient is a 63 year old male whom I have been asked to see regarding bilateral heel ulcerations.  He states that he has had these ulcers for 4 weeks and that they started as blisters when developed severe bilateral edema.  He underwent an arterial doppler study that revealed 50% stenosis at the bilateral distal posterior tibial arteries.  Patient is also currently being treated for discitis.       PAST MEDICAL HISTORY:    Past Medical History:   Diagnosis Date    Diabetes (HCC)     Dyslipidemia 2011    High blood pressure     HTN (hypertension) 2011    Hyperlipidemia     Impaired fasting glucose 2011    Proteinuria 2011    Subclinical hypothyroidism 2011    Unspecified essential hypertension     Visual impairment        PAST SURGICAL HISTORY:   Past Surgical History:   Procedure Laterality Date    MASTOIDECTOMY,SIMPLE      done 30 years ago.  Revised in        MEDICATIONS:     Current Facility-Administered Medications:     methocarbamol (Robaxin) partial tab 250 mg, 250 mg, Oral, QID    hydrALAZINE (Apresoline) tab 50 mg, 50 mg, Oral, Q8H OSEI    carvedilol (Coreg) tab 25 mg, 25 mg, Oral, BID with meals    furosemide (Lasix) tab 40 mg, 40 mg, Oral, BID (Diuretic)    gabapentin (Neurontin) cap 100 mg, 100 mg, Oral, TID    HYDROcodone-acetaminophen (Norco) 5-325 MG per tab 1 tablet, 1 tablet, Oral, Q4H PRN    insulin detemir (Levemir) 100 UNIT/ML FlexPen/FlexTouch 10 Units, 10 Units, Subcutaneous, Nightly    levothyroxine (Synthroid) tab 25 mcg, 25 mcg, Oral, Before breakfast    NIFEdipine ER (Procardia-XL) 24 hr tab 60 mg, 60 mg, Oral, Daily    glucose  (Dex4) 15 GM/59ML oral liquid 15 g, 15 g, Oral, Q15 Min PRN **OR** glucose (Glutose) 40% oral gel 15 g, 15 g, Oral, Q15 Min PRN **OR** glucose-vitamin C (Dex-4) chewable tab 4 tablet, 4 tablet, Oral, Q15 Min PRN **OR** dextrose 50% injection 50 mL, 50 mL, Intravenous, Q15 Min PRN **OR** glucose (Dex4) 15 GM/59ML oral liquid 30 g, 30 g, Oral, Q15 Min PRN **OR** glucose (Glutose) 40% oral gel 30 g, 30 g, Oral, Q15 Min PRN **OR** glucose-vitamin C (Dex-4) chewable tab 8 tablet, 8 tablet, Oral, Q15 Min PRN    heparin (Porcine) 5000 UNIT/ML injection 5,000 Units, 5,000 Units, Subcutaneous, Q8H OSEI    acetaminophen (Tylenol Extra Strength) tab 500 mg, 500 mg, Oral, Q4H PRN    HYDROmorphone (Dilaudid) 1 MG/ML injection 0.2 mg, 0.2 mg, Intravenous, Q2H PRN **OR** HYDROmorphone (Dilaudid) 1 MG/ML injection 0.4 mg, 0.4 mg, Intravenous, Q2H PRN **OR** HYDROmorphone (Dilaudid) 1 MG/ML injection 0.8 mg, 0.8 mg, Intravenous, Q2H PRN    ondansetron (Zofran) 4 MG/2ML injection 4 mg, 4 mg, Intravenous, Q6H PRN    prochlorperazine (Compazine) 10 MG/2ML injection 5 mg, 5 mg, Intravenous, Q8H PRN    insulin aspart (NovoLOG) 100 Units/mL FlexPen 1-10 Units, 1-10 Units, Subcutaneous, TID AC and HS    lidocaine-menthol 4-1 % patch 1 patch, 1 patch, Transdermal, Daily PRN    melatonin tab 1 mg, 1 mg, Oral, Nightly PRN    polyethylene glycol (PEG 3350) (Miralax) 17 g oral packet 17 g, 17 g, Oral, Daily PRN    ALLERGIES:    He has No Known Allergies.    SOCIAL HISTORY:    Patient  reports that he has never smoked. He has never used smokeless tobacco. He reports that he does not drink alcohol and does not use drugs.    FAMILY HISTORY:    Patient's family history includes Diabetes in his father; Heart Disorder in his brother and mother.    ROS:    Comprehensive ROS reviewed and negative except for what's stated above.  Including negative for chest pain, shortness of breath, syncope.     EXAM:  /70 (BP Location: Left arm)   Pulse 72    Temp 97.9 °F (36.6 °C) (Oral)   Resp 18   Wt 292 lb (132.5 kg)   SpO2 94%   BMI 38.95 kg/m²   GENERAL: alert and orientated X 3, well developed, well nourished, in no apparent distress  NEURO/PSYCH: normal mood and affect  NECK: supple   CAROTID: No bruits  RESPIRATORY: no rales, rhonchi, or wheezes B  CARDIO: RRR without murmur, no murmur, no gallop   ABDOMEN: soft, non-tender with no palpable aneurysm or masses  EXTREMITIES: no tenderness  VASCULAR:        Femoral Popliteal DP PT Peroneal Edema   Right 1+       biphasic signal absent signal       1+   Left 1+       biphasic signal absent signal       none      Right heel with a small central plantar soft eschar with early healing around it. Along the left heel is a superficial ulcer that appears to be secondary to a blister. Both feet are warm   no sensory or motor deficits      Diagnostic Data:      LABS:  Recent Labs   Lab 12/30/23  1403 12/31/23  0514 01/02/24  1049   WBC 8.9 8.1  --    HGB 14.0 13.2  --    MCV 91.5 90.8  --    .0 234.0  --    INR  --   --  1.17       Recent Labs   Lab 12/30/23  1403 12/31/23  0514 01/01/24  0531 01/02/24  0535    142  --   --    K 3.9 3.2* 3.2* 3.9    109  --   --    CO2 29.0 28.0  --   --    BUN 25* 19  --   --    CREATSERUM 0.98 0.81  0.81  --   --    * 114*  --   --    CA 9.4 9.0  --   --      Recent Labs   Lab 01/02/24  1049   PTP 14.9*   INR 1.17     Recent Labs   Lab 12/30/23  1403   ALT 23   AST 14*   ALB 3.2*     No results for input(s): \"TROP\" in the last 168 hours.  No results found for: \"ANAS\", \"JANET\", \"ANASCRN\"  No results for input(s): \"PCACT\", \"PSACT\", \"AT3ACT\", \"HIPAB\", \"PATHI\", \"STALA\", \"DRVVTRATIO\", \"DRVVT\", \"STACLOT\", \"CARIGG\", \"S7GF8TELLX\", \"L7GN6ESQEN\", \"RA\", \"HAVIGM\", \"HBCIGM\", \"HCVAB\", \"HBSAG\", \"HBCAB\", \"HBVDNAINTERP\", \"ANAS\", \"C3\", \"C4\" in the last 168 hours.    Lab Results   Component Value Date    HGB 13.2 12/31/2023    HGB 14.0 12/30/2023    HGB 12.1 (L) 12/13/2023    HGB  13.3 12/12/2023    HGB 13.0 11/24/2023    HGB 13.4 08/23/2023    CREATSERUM 0.81 12/31/2023    CREATSERUM 0.81 12/31/2023    CREATSERUM 0.98 12/30/2023    CREATSERUM 1.24 12/15/2023    CREATSERUM 0.97 12/13/2023    CREATSERUM 1.35 (H) 12/12/2023           Radiology: XR FOOT, COMPLETE (MIN 3 VIEWS), BILAT (CPT=73630-50)    Result Date: 1/3/2024  PROCEDURE:  XR FOOT, COMPLETE (MIN 3 VIEWS), BILAT (CPT=73630-50)  TECHNIQUE:  A total of 6 views were obtained. Three views of the right foot and three views of the left foot.  INDICATIONS:  Bilateral heel ulcerations  COMPARISON:  [facility] , XR, XR FOOT (2 VIEW), RIGHT (CPT=73620), 3/01/2023, 4:43 PM.  [facility] , XR, XR FOOT (2 VIEW), LEFT (CPT=73620), 3/01/2023, 4:40 PM.  PATIENT STATED HISTORY: (As transcribed by Technologist)  Patient offered no additional history at this time.               CONCLUSION:  Right foot:  There are stable changes of hallux valgus deformity.  There is some slight increased narrowing of the lateral right 1st MTP joint space.  There are numerous corticated ossifications adjacent to the distal right 1st diaphysis and 1st metatarsal head.  There are new erosive changes involving the medial aspect of the 1st metatarsal head and the medial lateral distal portions which are subarticular suggestive either erosive osteoarthritis or changes of gout.  There is adjacent soft tissue swelling. Marked vascular calcification. Multiple level mild degenerative joint disease involving the midfoot and hindfoot calcaneal enthesophytes are noted which are small.  Small erosive changes are noted involving the left calcaneus adjacent to the origin of the plantar fascia insertion of the left Achilles tendon slightly more prominent.  This also may represent changes of erosive osteoarthritis or gout. Left foot:  Marked vascular calcification noted.  There is increased subchondral cystic change adjacent to the 1st MTP joint with overlying soft tissue swelling may represent  osteoarthritis or changes of gout.  Mild midfoot and hindfoot osteoarthritis again noted.  Calcaneal enthesophytes are noted without definite erosion or fracture or bone destruction.  Corticated ossification measuring 9 mm again noted adjacent to the talus.  Diffuse soft tissue swelling.  No acute fracture.   LOCATION:  [id number]   Dictated by (CST): Charan Ambrose MD on 1/03/2024 at 12:11 PM     Finalized by (CST): Charan Ambrose MD on 1/03/2024 at 12:16 PM       CT BIOPSY INTERVERTEBRAL DISC(YTS=[id number])    Result Date: 1/2/2024  PROCEDURE:  CT BIOPSY INTERVERTEBRAL DISC(IOK=05401/78290)  INDICATIONS:  possible discitis  COMPARISON:  [facility] , MR, MRI SPINE LUMBAR (W+WO) (CPT=72158), 12/30/2023, 3:12 PM.  [facility] , CT, CT BIOPSY INTERVERTEBRAL DISC(IMR=[id number]), 6/29/2023, 1:16 PM.  TECHNIQUE:  The risks, benefits and alternatives of CT-guided disc biopsy were discussed with the patient, and witnessed verbal and written informed consent was obtained.  Patient was scanned in the prone position and the L3-4 disc was localized. Using standard aseptic technique, all elements of maximal standard barrier technique and 1% lidocaine as local anesthesia, a 17 gauge guide needle was advanced into the L3-4 intervertebral disc via the right posterolateral approach.  A total of approximately 2 cc of dark bloody fluid were aspirated during 2 separate passes.  A single coaxial core biopsy was obtained with an 18 gauge Dryden biopsy device.  All obtained samples were placed in a specimen cup for culture.   Conscious sedation was utilized for the procedure. After receiving the patient's consent, moderate sedation was achieved with Versed and fentanyl. Monitoring of the patient's vital signs was provided by trained nursing staff during the entire course of the procedure under the supervision of the radiologist at and recorded in the patient's medical record. The total intraservice time of the conscious sedation was 17  minutes. The patient tolerated the procedure well and left the department in stable, unchanged condition.              CONCLUSION:  CT-guided aspiration and core biopsy of L3-4 intervertebral disc performed.   Dictated by (CST): Jaycob Rg MD on 1/02/2024 at 5:19 PM     Finalized by (CST): Jaycob Rg MD on 1/02/2024 at 5:23 PM       MRI SPINE LUMBAR (W+WO) (CPT=72158)    Result Date: 12/30/2023  PROCEDURE:  MRI SPINE LUMBAR (W+WO) (CPT=72158)   COMPARISON:  [facility] , MR, MRI SPINE LUMBAR (W+WO) (CPT=72158), 6/27/2023, 10:07 PM.  [facility] , MR, MRI SPINE LUMBAR(CONTRAST ONLY) (CPT=72149), 8/22/2023, 9:58 PM.  [facility] , XR, XR LUMBAR SPINE (MIN 4 VIEWS) (CPT=72110), 12/14/2023, 2:48 PM.  INDICATIONS:  Severe low back pain.  History of discitis/osteomyelitis at the L3-4 level.  TECHNIQUE:  Multiplanar T1 and T2 weighted images including fat suppression sequences.  Images acquired in sagittal and axial planes. Intravenous gadolinium was administered followed by multiplanar post-infusion T1 weighted sequences.   PATIENT STATED HISTORY:(As transcribed by Technologist)  The patient stated he is being evaluated for chronic, severe lower back pain and muscle spasms. He is wheelchair bound due to pain.   CONTRAST USED:  20 mL of Dotarem  FINDINGS:  LUMBAR DISC LEVELS L1-L2:  No significant disc/facet abnormality, spinal stenosis, or foraminal narrowing.  L2-L3:  There is a moderate broad-based degenerative disc bulge.  There is posterior epidural fat hypertrophy.  This causes moderate central canal stenosis of 8 mm.  The facet joints are unremarkable. L3-L4:  There continues to be fluid signal within the disc space.  There is destructive changes of the endplates of L3 and L4 which is slightly more advanced than the study of 8/22/2023.  There is enhancement of the endplates which is slightly more vigorous than on the previous study.  Additionally, there is new intense mint along the posterior articular facets at both levels  suspicious for interval progression of septic arthropathy into the bilateral posterior articular facet joints.  There is also increasing adjacent psoas edema diffusely.  There is no abdi epidural fluid collection or abscess.  There is moderate central canal stenosis of 7.5 mm.  L4-L5:  There is mild degenerative disc bulge osteophyte complex.  AP diameter canal is normal 12 mm.  There is mild subarticular zone and neural foraminal narrowing.  The facet joints unremarkable. L5-S1:  Very mild posterior degenerative disc bulge.  There is mild bilateral facet joint degenerative change.  There is mild bilateral neural foraminal narrowing.  PARASPINAL AREA:  Increasing bilateral psoas muscle edema/inflammation.  No evidence of psoas muscle abscess. BONES:  No fracture, pars defect, or osseous lesion.  CORD/CAUDA EQUINA:  There is thickening of the cauda equina nerve roots diffusely suggestive of chronic arachnoiditis, which can be retrospectively seen on previous imaging studies and appears relatively stable.  No abnormal enhancement along the conus or nerve roots.            CONCLUSION:  Findings are concerning for progression of discitis/osteomyelitis centered at the L3-4 disc level, with suggestion of interval extension into the bilateral posterior articular facets at this level concerning for the development of bilateral septic arthropathy.  There is also increasing bilateral psoas muscle edema/inflammation.  No discrete evidence of epidural abscess or psoas abscess.  Diffuse thickening of the cauda equina suggesting chronic arachnoiditis, which can be retrospectively seen and appears similar to previous imaging.  No abnormal post-contrast enhancement.   LOCATION:  [id number]      Dictated by (CST): Tyree Rojo DO on 12/30/2023 at 4:19 PM     Finalized by (CST): Tyree Rojo DO on 12/30/2023 at 4:29 PM       US ARTERIAL DUPLEX LOWER EXTREMITY BILATERAL (CPT=93925)    Result Date: 12/15/2023  PROCEDURE:  US ARTERIAL  DUPLEX LOWER EXTREMITY BILATERAL (CPT=93925)  COMPARISON:  None.  INDICATIONS:  bilateral leg wounds, non healing  TECHNIQUE:  A comprehensive color duplex Doppler ultrasound examination of the bilateral lower extremities was performed.  Color imaging, Doppler wave form analysis, and peak systolic flow measurements of the common femoral, profunda femoral, superficial  femoral, and popliteal arteries were performed.   PATIENT STATED HISTORY: (As transcribed by Technologist)  Patient stated that he has nonhealing wounds within both legs.    FINDINGS:  RIGHT EXTREMITY:  There is a 50% stenosis at the distal right PTA.  Monophasic waveforms are noted at the right DON and right DPA.  Biphasic waveforms are seen at the right PTA. LEFT EXTREMITY:  There is a 50% stenosis at the distal left PTA.  Biphasic waveforms are seen at the left popliteal artery and proximal/mid left PTA.  Biphasic waveforms are seen at the proximal/mid left DON.  Monophasic waveforms are seen at the distal left PTA, distal left DON, and left DPA. OTHER:  There is a Baker's cyst seen at the right lower extremity measuring 4.6 x 2.2 x 1.3 cm.  There is a left-sided Baker's cyst measuring 4.4 x 1.3 x 3.8 cm.  SYSTOLIC VELOCITIES (CM/S): RIGHT COMMON FEMORAL:      102  RIGHT PROFUNDA FEMORAL:      86    RIGHT SUPERFICIAL FEMORAL PROX:    141 RIGHT SUPERFICIAL FEMORAL MID:    90 RIGHT SUPERFICIAL FEMORAL DISTAL:    89 RIGHT POPLITEAL PROX:      49 RIGHT POPLITEAL DISTAL:      51 TIBIOPER TRUNK:      Not visualized.  RIGHT PTA PROX:      65 RIGHT PTA MID:      89 RIGHT PTA DISTAL:       RIGHT DON PROX:      26 RIGHT DON MID:      45  RIGHT DON DISTAL:    18 RIGHT DORSALIS PEDIS:      57 RIGHT GREAT TOE PRESSURE:      79 mmHg  SYSTOLIC VELOCITIES (CM./S): LEFT COMMON FEMORAL:      101  LEFT PROFUNDA FEMORAL:      80    LEFT SUPERFICIAL FEMORAL PROX:    125 LEFT SUPERFICIAL FEMORAL MID:      140 LEFT SUPERFICIAL FEMORAL DISTAL:    108 LEFT POPLITEAL  PROX:      48 LEFT POPLITEAL DISTAL:      75 TIBIOPER TRUNK:      Not visualized.  LEFT PTA PROX:      105 LEFT PTA MID:      83 LEFT PTA DISTAL:       LEFT DON PROX:      89 LEFT DON MID:      59  LEFT DISTAL DON:    55 LEFT DORSALIS PEDIS::      16 LEFT GREAT TOE PRESSURE:      Unable to obtain secondary to patient involuntary movement.             CONCLUSION:  There is an approximate 50% stenosis at the bilateral distal posterior tibial arteries.  Please see above for further details.   LOCATION:  [facility]   Dictated by (CST): Stromberg, LeRoy, MD on 12/15/2023 at 10:48 AM     Finalized by (CST): Stromberg, LeRoy, MD on 12/15/2023 at 10:51 AM       US KIDNEY W DOPPLER (LQR=[id number])    Result Date: 12/15/2023  PROCEDURE:  US KIDNEY W DOPPLER (SSZ=[id number])  COMPARISON:  None.  INDICATIONS:  resistent HTN, rule out CHRISTINA  TECHNIQUE:  Ultrasound of the bilateral kidneys were performed with Doppler evaluation of the renal arteries and veins. The renal vessels were evaluated with B-mode ultrasound, Doppler color flow, and spectral analysis.  PATIENT STATED HISTORY: (As transcribed by Technologist)  Patient offered no additional history at this time. Resistant hypertension.    FINDINGS:  RIGHT KIDNEY:  Normal for age.  Right kidney measures 11.9 x 5.1 x 5.2 cm. LEFT KIDNEY:  There is a 14 mm cyst at the upper pole the left kidney.  Left kidney measures 12 x 6.5 x 6.8 cm.  Doppler velocity measurements and renal/aortic ratios are as follows: AORTA:     137 cm/s  RIGHT RENAL ARTERY ORIGIN:    134 cm/s,  ratio = 1 PROXIMAL:  101 cm/s,  ratio = 0.7 MID:       79 cm/s,  ratio = 0.6 DISTAL:    64 cm/s,  ratio = 0.5  MAXIMUM ACCELERATION TIME:  0.03 seconds  LEFT RENAL ARTERY ORIGIN:    75 cm/s,  ratio = 0.6 PROXIMAL:  78 cm/s,  ratio = 0.6 MID:       62 cm/s,  ratio = 0.5 DISTAL:    62 cm/s,  ratio = 0.5  MAXIMUM ACCELERATION TIME:  0.03 seconds RENAL VEINS:  There is normal Doppler wave form.            CONCLUSION:   1. No sonographic evidence of a significant renal artery stenosis. 2. There is a 14 mm cyst at the upper aspect of the left kidney.   LOCATION:  [facility]     Dictated by ([facility]): Stromberg, LeRoy, MD on 12/15/2023 at 10:46 AM     Finalized by (CST): Stromberg, LeRoy, MD on 12/15/2023 at 10:48 AM       XR HIP W OR WO PELVIS(MIN 5 VIEWS),BILAT(CPT=73523)    Result Date: 12/14/2023  PROCEDURE:  XR HIP W OR WO PELVIS (MIN 5 VIEWS), BILAT (CPT=73523)  TECHNIQUE:  Bilateral min 5 views of the hip and pelvis if performed.  COMPARISON:  None.  INDICATIONS:  hip pain inability to walk  PATIENT STATED HISTORY: (As transcribed by Technologist)  Patient offered no additional history at this time.    FINDINGS:  No fracture or dislocation.  Mild right acetabular spurring.  Extensive atherosclerotic calcifications.            CONCLUSION:  Mild bilateral hip osteoarthritic changes.  No acute abnormality.   LOCATION:  [id number]   Dictated by ([facility]): Jaycob Rg MD on 12/14/2023 at 3:24 PM     Finalized by ([facility]): Jaycob Rg MD on 12/14/2023 at 3:24 PM       XR LUMBAR SPINE (MIN 4 VIEWS) (CPT=72110)    Result Date: 12/14/2023  PROCEDURE:  XR LUMBAR SPINE (MIN 4 VIEWS) (CPT=72110)  TECHNIQUE:  AP, lateral, oblique, and coned down L5-S1 views were obtained.  COMPARISON:  [facility] , CT, CT BIOPSY INTERVERTEBRAL DISC(BLC=[id number]), 6/29/2023, 1:16 PM.  [facility] , MR, MRI SPINE LUMBAR(CONTRAST ONLY) (CPT=72149), 8/22/2023, 9:58 PM.  INDICATIONS:  radiculopathy, inability to walk  PATIENT STATED HISTORY: (As transcribed by Technologist)  Patient offered no additional history at this time.    FINDINGS:  L3-4 disc space narrowing with irregularity of the adjacent vertebral endplates and loss of height of the L4 vertebral body anteriorly.  Allowing for different modalities, similar findings were present on MRI of 8/22/2023.  Disc spaces are otherwise preserved.  No subluxation.  Marginal osteophytes in lower thoracic spine.  Intact facet joints.   Curvatures are within normal limits.  Calcification of abdominal aorta.            CONCLUSION:  Disc space narrowing and associated irregularity of adjacent vertebral endplates at L3-4 consistent with chronic discitis/osteomyelitis, with similar findings present on MRI of 8/22/2023.  No acute abnormality.   LOCATION:  [facility]   Dictated by (CST): Jaycob Rg MD on 12/14/2023 at 3:19 PM     Finalized by (CST): Jaycob Rg MD on 12/14/2023 at 3:23 PM       US VENOUS DOPPLER LEG BILAT - DIAG IMG (CPT=93970)    Result Date: 12/13/2023  PROCEDURE:  US VENOUS DOPPLER LEG BILAT - DIAG IMG (CPT=93970)  COMPARISON:  None.  INDICATIONS:  Bilateral lower extremity swelling  TECHNIQUE:  Real time, grey scale, and duplex ultrasound was used to evaluate the lower extremity venous system. B-mode two-dimensional images of the vascular structures, Doppler spectral analysis, and color flow.  Doppler imaging were performed.  The following veins were imaged bilaterally:  Common, deep, and superficial femoral, popliteal, sapheno-femoral junction, and posterior tibial veins.  PATIENT STATED HISTORY: (As transcribed by Technologist)  Patient is having bilateral leg swelling for two weeks.    FINDINGS:  SAPHENOFEMORAL JUNCTION:  No reflux. THROMBI:  None visible. COMPRESSION:  Normal compressibility, phasicity, and augmentation. OTHER:  Negative.            CONCLUSION:  No evidence of deep vein thrombosis in the bilateral lower extremities.   LOCATION:  [place]   Dictated by (CST): Nicolás Cohn MD on 12/13/2023 at 5:37 AM     Finalized by (CST): Nicolás Cohn MD on 12/13/2023 at 5:37 AM       XR CHEST AP PORTABLE  (CPT=71045)    Result Date: 12/12/2023  PROCEDURE:  XR CHEST AP PORTABLE  (CPT=71045)  TECHNIQUE:  AP chest radiograph was obtained.  COMPARISON:  07/10/2023  INDICATIONS:  swelling to bilateral legs x 2 weeks.  denies SOB  PATIENT STATED HISTORY: (As transcribed by Technologist)  Patient has been having bilateral leg  swelling for the last 2 weeks that is progressivley getting worse.               CONCLUSION:   Stable cardiac and mediastinal contours.  Stable moderate elevation of the right hemidiaphragm.  Findings of pulmonary venous hypertension without overt pulmonary edema or focal airspace consolidation.  No significant pleural effusion or appreciable pneumothorax.   LOCATION:  [place]      Dictated by (CST): Chely Oliva MD on 12/12/2023 at 10:41 PM     Finalized by (CST): Chely Oliva MD on 12/12/2023 at 10:41 PM              ASSESSMENT AND PLAN:     The patient is a 63 year old male who has bilateral heel ulcers that are minor and superficial. I think that his perfusion is adequate to heal these superficial ulcers. He will be following in the wound care center with Dr Gracia from podiatry. If the ulcers worsen, then he will need an angiogram, but I think this will be unlikely.   The patient indicated an understanding of these issues and agreed to the plan and all questions were answered.     Thank you for allowing to participate in the patient's care.       Samer F. Najjar, MD  Vascular Surgery  [facility]

## 2024-01-04 NOTE — OCCUPATIONAL THERAPY NOTE
OCCUPATIONAL THERAPY EVALUATION - INPATIENT     Room Number: 354/354-A  Evaluation Date: 1/4/2024  Type of Evaluation: Initial  Presenting Problem: back pain, hip pain, muscle spasms, B heel pressure ulcers    Physician Order: IP Consult to Occupational Therapy  Reason for Therapy: ADL/IADL Dysfunction and Discharge Planning    History: Patient is a 63 year old male admitted on 12/30/2023 from [facility] with Presenting Problem: back pain, hip pain, muscle spasms, B heel pressure ulcers, s/p CT-guided lumbar disc biopsy 12/2/24. Co-Morbidities : DM, HTN, DL, discitis    RECENT ADMISSIONS:  12/13-12/17/23: BLE edema --> from home, dc to [facility]  8/22-8/24/23: discitis, OM of back, dizziness --> from [facility], dc to [facility]  7/10-7/14/23 dizziness from [facility]>SNF  6/27-7/2/2023: Intractable back pain: --> From [facility], dc to [facility]  (Per neuro sx 6/28 no acute intervention indicated, LSO when OOB as needed for comfort)  3/21-3/29/23: MSSA bacteremia/discitis--> From [facility], dc to [facility]  2/27-3/7/2023: non-traumatic rhabdomyolysis --> dc to [facility] (3/7-3/21/2023)     IMAGING  MRI SPINE LUMBAR:   Findings are concerning for progression of discitis/osteomyelitis centered at the L3-4 disc level, with suggestion of interval extension into the bilateral posterior articular facets at this level concerning for the development of bilateral septic   arthropathy.  There is also increasing bilateral psoas muscle edema/inflammation.  No discrete evidence of epidural abscess or psoas abscess.  Diffuse thickening of the cauda equina suggesting chronic arachnoiditis, which can be retrospectively seen and appears similar to previous imaging.  No abnormal post-contrast enhancement.     ASSESSMENT   Patient presents with the following performance deficits: increased pain, decreased endurance, decreased balance, decreased knowledge of adaptive techniques. These deficits impact the patient’s ability to participate in ADL, transfers, instrumental activities of daily living, rest  and sleep, leisure and social participation.     The patient is functioning below his previous functional level and would benefit from skilled inpatient OT to address the above deficits, maximizing patient’s ability to return safely to his prior level of function.    The AM-PAC ' '6-Clicks' Inpatient Daily Activity Short Form was completed and this patient is demonstrating an Approx Degree of Impairment: 59.67% in activities of daily living. Research supports that patients with this level of impairment often benefit from [facility] at discharge.       OT Discharge Recommendations: Sub-acute rehabilitation  OT Device Recommendations: TBD    WEIGHT BEARING RESTRICTION  Weight Bearing Restriction: None                Recommendations for nursing staff:   Transfers: 2-person mod/max or sit-to-stand  Toileting location: commode    EVALUATION SESSION:  Patient Start of Session: supine  FUNCTIONAL TRANSFER ASSESSMENT  Sit to Stand: Edge of Bed; Chair  Edge of Bed: Dependent (mod x2)  Chair: Dependent (mod x2)    BED MOBILITY  Supine to Sit : Moderate Assist (HOB elevated)    BALANCE ASSESSMENT     FUNCTIONAL ADL ASSESSMENT  LB Dressing Seated: Dependent (total to don B surgical shoes)      ACTIVITY TOLERANCE:                          O2 SATURATIONS       COGNITION  Overall Cognitive Status:  WFL - within functional limits    Upper Extremity   ROM: within functional limits   Strength: within functional limits     EDUCATION PROVIDED  Patient : Role of Occupational Therapy; Plan of Care; Discharge Recommendations; Functional Transfer Techniques; Fall Prevention; Compensatory ADL Techniques  Patient's Response to Education: Verbalized Understanding; Requires Further Education    Equipment used: RW  Demonstrates functional use, Would benefit from additional trial      Therapist comments: Patient agreeable to therapy, required increased time and mod assist to sit EOB with HOB elevated; standing from bed height mod assist x2, chair  height mod assist x2, difficulty achieving full upright posture despite cueing; several shuffle-steps to bedside chair via RW and mod assist x2; total assist to control descent. Will continue to follow.    Patient End of Session: Up in chair;Needs met;Call light within reach;RN aware of session/findings;All patient questions and concerns addressed;Alarm set    OCCUPATIONAL PROFILE    HOME SITUATION  Type of Home: House  Home Layout: Two level;Able to live on main level  Lives With: Alone;Caregiver part-time ([employer] 3 hrs/ week 2x/week)    Toilet and Equipment: Comfort height toilet;Grab bar  Shower/Tub and Equipment:  (sponge bathes only, shower upstairs)             Drives: Yes  Patient Regularly Uses: Glasses    Prior Level of Function: Patient reports typically independent in all BADLs and functional mobility with RW up until either two weeks ago or one month ago, some confusion/conflicting information on timing of prior levels of function due to multiple admissions and rehab stays. States recently at [facility] he has been doing transfers only independently, minimal walking. He sponge bathes only, and reports had been sleeping in his wheelchair as bed mobility had become more difficult. Has part-time caregiver that assists with IADLs.     SUBJECTIVE   \"I'm sorry I couldn't do more\"    PAIN ASSESSMENT  Rating: Unable to rate  Location: back, hip  Management Techniques: Activity promotion;Body mechanics;Relaxation;Breathing techniques;Repositioning    OBJECTIVE  Precautions: Spine;Bed/chair alarm (BL post op shoes due to heel ulcerations, bunny boots when in bed for offloading); per podiatrist at bedside, ambulation as far as to/from bathroom is acceptable  Fall Risk: High fall risk      ASSESSMENTS    AM-PAC ‘6-Clicks’ Inpatient Daily Activity Short Form  -   Putting on and taking off regular lower body clothing?: Total  -   Bathing (including washing, rinsing, drying)?: A Lot  -   Toileting, which includes using toilet,  bedpan or urinal? : Total  -   Putting on and taking off regular upper body clothing?: A Little  -   Taking care of personal grooming such as brushing teeth?: A Little  -   Eating meals?: None    AM-PAC Score:  Score: 14  Approx Degree of Impairment: 59.67%  Standardized Score (AM-PAC Scale): 33.39    ADDITIONAL TESTS     NEUROLOGICAL FINDINGS      COGNITION ASSESSMENTS       PLAN  OT Treatment Plan: Balance activities;ADL training;Functional transfer training;Endurance training;Patient/Family education;Patient/Family training;Compensatory technique education  Rehab Potential : Good  Frequency: 3x/week  Number of Visits to Meet Established Goals: 5    ADL Goals   Patient will perform lower body dressing:  with mod assist  Patient will perform toileting: with mod assist    Functional Transfer Goals  Patient will transfer from sit to supine:  with supervision  Patient will transfer from supine to sit:  with supervision  Patient will transfer from sit to stand:  with mod assist  Patient will transfer to bedside commode:  with mod assist      Patient Evaluation Complexity Level:   Occupational Profile/Medical History LOW - Brief history including review of medical or therapy records    Specific performance deficits impacting engagement in ADL/IADL LOW  1 - 3 performance deficits    Client Assessment/Performance Deficits LOW - No comorbidities nor modifications of tasks    Clinical Decision Making LOW - Analysis of occupational profile, problem-focused assessments, limited treatment options    Overall Complexity LOW     OT Session Time: 30 minutes  Therapeutic Activity: 10 minutes

## 2024-01-04 NOTE — PROGRESS NOTES
[facility]     Hospitalist Progress Note     Troy Feliz Patient Status:  Inpatient    1960 MRN [id number]   Formerly [facility] 3SW-A Attending Christopher Gustafson MD   Hosp Day # 5 PCP MADELINE FNETON DO     Chief Complaint: Back pain    Subjective:     Patient still complaining of back spasms.  Denies fever, chills, n/v.  No other acute complaints.      Objective:    Review of Systems:   A comprehensive review of systems was completed; pertinent positive and negatives stated in subjective.    Vital signs:  Temp:  [97.7 °F (36.5 °C)-98.3 °F (36.8 °C)] 97.7 °F (36.5 °C)  Pulse:  [66-72] 66  Resp:  [17-20] 20  BP: (115-174)/(49-80) 140/72  SpO2:  [92 %-96 %] 96 %    Physical Exam:    General: No acute distress, pleasant   Respiratory: no wheezes, no rhonchi  Cardiovascular: S1, S2, regular rate and rhythm  Abdomen: Soft, Non-tender, non-distended, positive bowel sounds  Neuro: No new focal deficits.   Extremities: no edema, chronic stasis dermatitis, dressing in place    Diagnostic Data:    Labs:  Recent Labs   Lab 23  1403 23  0514 24  1049   WBC 8.9 8.1  --    HGB 14.0 13.2  --    MCV 91.5 90.8  --    .0 234.0  --    INR  --   --  1.17       Recent Labs   Lab 23  1403 23  0514 24  0531 24  0535   * 114*  --   --    BUN 25* 19  --   --    CREATSERUM 0.98 0.81  0.81  --   --    CA 9.4 9.0  --   --    ALB 3.2*  --   --   --     142  --   --    K 3.9 3.2* 3.2* 3.9    109  --   --    CO2 29.0 28.0  --   --    ALKPHO 103  --   --   --    AST 14*  --   --   --    ALT 23  --   --   --    BILT 0.3  --   --   --    TP 7.9  --   --   --        Estimated Creatinine Clearance: 105.7 mL/min (based on SCr of 0.81 mg/dL).    No results for input(s): \"TROP\", \"TROPHS\", \"CK\" in the last 168 hours.    Recent Labs   Lab 24  1049   PTP 14.9*   INR 1.17                  Microbiology    Hospital Encounter on 23   1. Blood Culture      Status: None (Preliminary result)    Collection Time: 12/30/23  5:14 PM    Specimen: Blood,peripheral   Result Value Ref Range    Blood Culture Result No Growth 4 Days N/A         Imaging: Reviewed in Epic.    Medications:    methocarbamol  250 mg Oral QID    hydrALAZINE  50 mg Oral Q8H OSEI    carvedilol  25 mg Oral BID with meals    furosemide  40 mg Oral BID (Diuretic)    gabapentin  100 mg Oral TID    insulin detemir  10 Units Subcutaneous Nightly    levothyroxine  25 mcg Oral Before breakfast    NIFEdipine ER  60 mg Oral Daily    heparin  5,000 Units Subcutaneous Q8H OSEI    insulin aspart  1-10 Units Subcutaneous TID AC and HS       Assessment & Plan:      #MSSA L3/L4 Discitis/OM  Patient with recurrent OM, multiple treatment courses in past year  MRI 12/30 with progression of discitis  S/p CT guided aspiration/core biopsy on 1/2  Now off abx, cultures NGTD  Awaiting bx report - still pending  ID following - off abx    #Back Pain  Neurosurgery eval appreciated  Robaxin  LSO brace recommended    #Bilateral plantar heel pressure ulcers  Podiatry eval -> wound care  Vascular surgery consult -> no plans for peripheral angio  Foot xrays  Awaiting final podiatry recs    #Essential HTN - coreg, lasix, hydralazin e  #DLD   #DM Type 2 - levemir 10U, iss          Christopher Gustafson MD    Supplementary Documentation:     Quality:  DVT Mechanical Prophylaxis:   SCDs,    DVT Pharmacologic Prophylaxis   Medication    heparin (Porcine) 5000 UNIT/ML injection 5,000 Units                Code Status: Full Code  Pruitt: No urinary catheter in place  Pruitt Duration (in days):   Central line:    KIKE:     Discharge is dependent on: biopsy results, then back to rehab  At this point Mr. Feliz is expected to be discharge to: Rehab?     The 21st Century Cures Act makes medical notes like these available to patients in the interest of transparency. Please be advised this is a medical document. Medical documents are intended to carry relevant  information, facts as evident, and the clinical opinion of the practitioner. The medical note is intended as peer to peer communication and may appear blunt or direct. It is written in medical language and may contain abbreviations or verbiage that are unfamiliar.

## 2024-01-04 NOTE — DISCHARGE INSTRUCTIONS
List of possible wound care centers for outpatient follow-up for heel wounds:    - [facility] wound care center: [place]: [phone]    - [facility] wound care: [place]: [phone]    - [facility]: [place]: [phone]    - [facility]: [place]: [phone]    - [facility] wound and hyperbaric services: [place]: [phone]    Wound Cleaning and Dressings:  1.Right plantar heel, left anterior ankle  Showering directions: May shower with protection  Wound cleansing:  Cleanse with saline  Wound product: Paint with betadine.Cover with bordered gauze.  Dressing change frequency:  Change dressing daily and/or PRN     2.Left plantar heel  Showering directions: May shower with protection  Wound cleansing:  Cleanse with saline  Wound product: Bordered Silver foam  Dressing change frequency:  Change dressing every other day  and/or PRN        Compression Therapy:   Elevate leg(s) as much as possible-        Miscellaneous/Additional Orders:  Offloading: Turn Q 2 hours and Heel off-loading boot(s)     Miscellaneous orders: Increase dietary protein intake.Dietitian may need to evaluate amount of protein supplements

## 2024-01-04 NOTE — PROGRESS NOTES
INFECTIOUS DISEASE PROGRESS NOTE    Troy Feliz Patient Status:  Inpatient    1960 MRN [id number]   formerly [facility] 3SW-A Attending Roverto Obregon MD   Hosp Day # 5 PCP MAEDLINE FENTON,      Abx: None    Subjective: Patient seen and examined today. Feels back pain is similar to previous day but better than admission. Continues to have some back spasms. Denies any diarrhea. Denies any foot pain. Afebrile. On room air.     Allergies:  No Known Allergies    Medications:    Current Facility-Administered Medications:     methocarbamol (Robaxin) partial tab 250 mg, 250 mg, Oral, QID    hydrALAZINE (Apresoline) tab 50 mg, 50 mg, Oral, Q8H OSEI    carvedilol (Coreg) tab 25 mg, 25 mg, Oral, BID with meals    furosemide (Lasix) tab 40 mg, 40 mg, Oral, BID (Diuretic)    gabapentin (Neurontin) cap 100 mg, 100 mg, Oral, TID    HYDROcodone-acetaminophen (Norco) 5-325 MG per tab 1 tablet, 1 tablet, Oral, Q4H PRN    insulin detemir (Levemir) 100 UNIT/ML FlexPen/FlexTouch 10 Units, 10 Units, Subcutaneous, Nightly    levothyroxine (Synthroid) tab 25 mcg, 25 mcg, Oral, Before breakfast    NIFEdipine ER (Procardia-XL) 24 hr tab 60 mg, 60 mg, Oral, Daily    glucose (Dex4) 15 GM/59ML oral liquid 15 g, 15 g, Oral, Q15 Min PRN **OR** glucose (Glutose) 40% oral gel 15 g, 15 g, Oral, Q15 Min PRN **OR** glucose-vitamin C (Dex-4) chewable tab 4 tablet, 4 tablet, Oral, Q15 Min PRN **OR** dextrose 50% injection 50 mL, 50 mL, Intravenous, Q15 Min PRN **OR** glucose (Dex4) 15 GM/59ML oral liquid 30 g, 30 g, Oral, Q15 Min PRN **OR** glucose (Glutose) 40% oral gel 30 g, 30 g, Oral, Q15 Min PRN **OR** glucose-vitamin C (Dex-4) chewable tab 8 tablet, 8 tablet, Oral, Q15 Min PRN    heparin (Porcine) 5000 UNIT/ML injection 5,000 Units, 5,000 Units, Subcutaneous, Q8H OSEI    acetaminophen (Tylenol Extra Strength) tab 500 mg, 500 mg, Oral, Q4H PRN    HYDROmorphone (Dilaudid) 1  yes MG/ML injection 0.2 mg, 0.2 mg, Intravenous, Q2H PRN **OR** HYDROmorphone (Dilaudid) 1 MG/ML injection 0.4 mg, 0.4 mg, Intravenous, Q2H PRN **OR** HYDROmorphone (Dilaudid) 1 MG/ML injection 0.8 mg, 0.8 mg, Intravenous, Q2H PRN    ondansetron (Zofran) 4 MG/2ML injection 4 mg, 4 mg, Intravenous, Q6H PRN    prochlorperazine (Compazine) 10 MG/2ML injection 5 mg, 5 mg, Intravenous, Q8H PRN    insulin aspart (NovoLOG) 100 Units/mL FlexPen 1-10 Units, 1-10 Units, Subcutaneous, TID AC and HS    lidocaine-menthol 4-1 % patch 1 patch, 1 patch, Transdermal, Daily PRN    melatonin tab 1 mg, 1 mg, Oral, Nightly PRN    polyethylene glycol (PEG 3350) (Miralax) 17 g oral packet 17 g, 17 g, Oral, Daily PRN  Current Facility-Administered Medications on File Prior to Encounter   Medication Dose Route Frequency Provider Last Rate Last Admin    [COMPLETED] potassium chloride (K-Dur) tab 40 mEq  40 mEq Oral Once Marcella Burton MD   40 mEq at 12/16/23 1101    [COMPLETED] potassium chloride (K-Dur) tab 40 mEq  40 mEq Oral Once Marcella Burton MD   40 mEq at 12/14/23 0824    [COMPLETED] furosemide (Lasix) 10 mg/mL injection 60 mg  60 mg Intravenous BID (Diuretic) Marcella Burton MD   60 mg at 12/15/23 1737    [COMPLETED] potassium chloride (K-Dur) tab 40 mEq  40 mEq Oral Q4H Marcella Burton MD   40 mEq at 12/13/23 1202    [COMPLETED] potassium chloride (K-Dur) tab 40 mEq  40 mEq Oral Once Marcella Burton MD   40 mEq at 12/13/23 1725    [COMPLETED] furosemide (Lasix) 10 mg/mL injection 60 mg  60 mg Intravenous Once Jazmine Martinez MD   60 mg at 12/12/23 2216     Current Outpatient Medications on File Prior to Encounter   Medication Sig Dispense Refill    furosemide 40 MG Oral Tab Take 1 tablet (40 mg total) by mouth 2 (two) times daily. 60 tablet 1    hydrALAZINE 25 MG Oral Tab Take 1 tablet (25 mg total) by mouth every 8 (eight) hours. 90 tablet 0    tiZANidine 4 MG Oral Tab Take 1 tablet (4 mg total) by mouth every 6 (six) hours as  needed. 20 tablet 0    HYDROcodone-acetaminophen 5-325 MG Oral Tab Take 1 tablet by mouth every 4 (four) hours as needed. 15 tablet 0    gabapentin 100 MG Oral Cap Take 1 capsule (100 mg total) by mouth 3 (three) times daily. 90 capsule 0    carvedilol 25 MG Oral Tab Take 1 tablet (25 mg total) by mouth 2 (two) times daily with meals. 60 tablet 0    levothyroxine 25 MCG Oral Tab Take 1 tablet (25 mcg total) by mouth before breakfast.      NIFEdipine ER 60 MG Oral Tablet 24 Hr Take 1 tablet (60 mg total) by mouth daily.      traMADol 50 MG Oral Tab Take 1 tablet (50 mg total) by mouth every 8 (eight) hours as needed for Pain. (Patient not taking: Reported on 12/30/2023) 15 tablet 0    acetaminophen 500 MG Oral Tab Take 1-2 tablet every 4 hours as needed for pain 20 tablet 0    insulin detemir (LEVEMIR FLEXTOUCH) 100 UNIT/ML Subcutaneous Solution Pen-injector Inject 10 Units into the skin nightly. 5 each 2    polyethylene glycol, PEG 3350, 17 g Oral Powd Pack Take 17 g by mouth daily.      melatonin 1 MG Oral Tab Nightly prn 30 tablet 0    insulin aspart (NOVOLOG FLEXPEN) 100 Units/mL Subcutaneous Solution Pen-injector Test blood glucose 3 times daily with meals  Inject 1 unit if blood glucose is between 141-180 mg/dL Inject 2 units if blood glucose is between 181-220 mg/dL Inject 3 units if blood glucose is between 221-260 mg/dL Inject 4 units if blood glucose is between 261-300 mg/dL Inject 5 units if blood glucose is between 301-350 mg/dL Call your physician if blood glucose is greater than 351 mg/dL, 5 each 2    Insulin Pen Needle 32G X 4 MM Does not apply Misc May substitute if not on insurance formulary 100 each 5    lidocaine-menthol 4-1 % External Patch Place 1 patch onto the skin daily. 30 patch 0       Review of Systems:    A comprehensive 10 point review of systems was completed.  Pertinent positives and negatives noted in the the HPI.      Physical Exam:    General: No acute distress. Alert and oriented x  3.  Vital signs: Temp:  [97.7 °F (36.5 °C)-98.2 °F (36.8 °C)] 98 °F (36.7 °C)  Pulse:  [64-72] 64  Resp:  [17-20] 17  BP: (115-174)/(49-80) 133/68  SpO2:  [92 %-96 %] 94 %  HEENT: Moist mucous membranes. Extraocular muscles are intact.  Neck: No swelling, no masses  Respiratory: Clear to auscultation bilaterally. No wheezing. No rhonchi.  Cardiovascular: RRR  Abdomen: Soft, nontender, nondistended.    Musculoskeletal: Movement of all extremities.  Joints: no effusions  Skin: No cellulitis noted. R ankle wound without signs of infection- appears superficial. L heel wound stable without any drainage or surrounding cellulitis.     Laboratory Data:  Laboratory data reviewed      Recent Labs   Lab 12/31/23  0514   RBC 4.26*   HGB 13.2   HCT 38.7*   MCV 90.8   MCH 31.0   MCHC 34.1   RDW 12.3   NEPRELIM 6.13   WBC 8.1   .0       Recent Labs   Lab 12/30/23  1403 12/31/23  0514 01/01/24  0531 01/02/24  0535   * 114*  --   --    BUN 25* 19  --   --    CREATSERUM 0.98 0.81  0.81  --   --    CA 9.4 9.0  --   --    ALB 3.2*  --   --   --     142  --   --    K 3.9 3.2* 3.2* 3.9    109  --   --    CO2 29.0 28.0  --   --    ALKPHO 103  --   --   --    AST 14*  --   --   --    ALT 23  --   --   --    BILT 0.3  --   --   --    TP 7.9  --   --   --          Lab Results   Component Value Date    PGLU 109 01/04/2024         Microbiologic Data:   Hospital Encounter on 12/30/23   1. Blood Culture     Status: None (Preliminary result)    Collection Time: 12/30/23  5:14 PM    Specimen: Blood,peripheral   Result Value Ref Range    Blood Culture Result No Growth 4 Days N/A         Imaging:  Narrative   PROCEDURE:  MRI SPINE LUMBAR (W+WO) (CPT=72158)      COMPARISON:  [facility] , MR, MRI SPINE LUMBAR (W+WO) (CPT=72158), 6/27/2023, 10:07 PM.  [facility] , MR, MRI SPINE LUMBAR(CONTRAST ONLY) (CPT=72149), 8/22/2023, 9:58 PM.  [facility] , XR, XR LUMBAR SPINE (MIN 4 VIEWS) (CPT=72110), 12/14/2023, 2:48 PM.     INDICATIONS:  Severe  low back pain.  History of discitis/osteomyelitis at the L3-4 level.     TECHNIQUE:  Multiplanar T1 and T2 weighted images including fat suppression sequences.  Images acquired in sagittal and axial planes. Intravenous gadolinium was administered followed by multiplanar post-infusion T1 weighted sequences.       PATIENT STATED HISTORY:(As transcribed by Technologist)  The patient stated he is being evaluated for chronic, severe lower back pain and muscle spasms. He is wheelchair bound due to pain.      CONTRAST USED:  20 mL of Dotarem     FINDINGS:    LUMBAR DISC LEVELS  L1-L2:  No significant disc/facet abnormality, spinal stenosis, or foraminal narrowing.    L2-L3:  There is a moderate broad-based degenerative disc bulge.  There is posterior epidural fat hypertrophy.  This causes moderate central canal stenosis of 8 mm.  The facet joints are unremarkable.  L3-L4:  There continues to be fluid signal within the disc space.  There is destructive changes of the endplates of L3 and L4 which is slightly more advanced than the study of 8/22/2023.  There is enhancement of the endplates which is slightly more  vigorous than on the previous study.  Additionally, there is new intense mint along the posterior articular facets at both levels suspicious for interval progression of septic arthropathy into the bilateral posterior articular facet joints.  There is  also increasing adjacent psoas edema diffusely.  There is no abdi epidural fluid collection or abscess.  There is moderate central canal stenosis of 7.5 mm.    L4-L5:  There is mild degenerative disc bulge osteophyte complex.  AP diameter canal is normal 12 mm.  There is mild subarticular zone and neural foraminal narrowing.  The facet joints unremarkable.  L5-S1:  Very mild posterior degenerative disc bulge.  There is mild bilateral facet joint degenerative change.  There is mild bilateral neural foraminal narrowing.     PARASPINAL AREA:  Increasing bilateral psoas  muscle edema/inflammation.  No evidence of psoas muscle abscess.  BONES:  No fracture, pars defect, or osseous lesion.    CORD/CAUDA EQUINA:  There is thickening of the cauda equina nerve roots diffusely suggestive of chronic arachnoiditis, which can be retrospectively seen on previous imaging studies and appears relatively stable.  No abnormal enhancement along the conus  or nerve roots.                   Impression   CONCLUSION:    Findings are concerning for progression of discitis/osteomyelitis centered at the L3-4 disc level, with suggestion of interval extension into the bilateral posterior articular facets at this level concerning for the development of bilateral septic  arthropathy.  There is also increasing bilateral psoas muscle edema/inflammation.  No discrete evidence of epidural abscess or psoas abscess.     Diffuse thickening of the cauda equina suggesting chronic arachnoiditis, which can be retrospectively seen and appears similar to previous imaging.  No abnormal post-contrast enhancement.        Established Problem list:  Patient Active Problem List   Diagnosis    Mixed hyperlipidemia    Subclinical hypothyroidism    Proteinuria    Benign essential HTN    Type 2 diabetes mellitus with other specified complication ([facility])    Peripheral edema    Morbid obesity with BMI of 40.0-44.9, adult ([facility])    Non-traumatic rhabdomyolysis    Acute hypoxemic respiratory failure ([facility])    Right leg weakness    Generalized weakness    HHNC (hyperglycemic hyperosmolar nonketotic coma) ([facility])    MSSA bacteremia    Discitis of lumbar region    Epidural abscess    Intractable back pain    Osteomyelitis of low back ([facility])    Diarrhea    C. difficile diarrhea    Syncope and collapse    Nausea and vomiting    Leukocytosis    TARIK (acute kidney injury) ([facility])    Dehydration    Renal insufficiency    Hyperkalemia    Azotemia    Metabolic acidosis    Hyperglycemia    Weakness generalized    Dizziness    Hypertensive urgency     Extremity edema    Lumbar radiculopathy    Discitis, unspecified spinal region    Dyslipidemia    Moderate back pain    Pressure ulcer, heel, right, unstageable ([facility])    Pressure injury of left heel, stage 2 ([facility])    PVD (peripheral vascular disease) ([facility])       ASSESSMENT/PLAN:  1. MSSA L3/4 Discitis/OM     - First diagnosed February 2023 at which time patient had MSSA bacteremia and L3/4 discitis s/p cefazolin  - 3/22/23 with ongoing symptoms. MRI 3/22/23 demonstrated new epidural phlegmon, discharged on nafcillin EOT 5/29 (13 wk course).  - MRI 6/27 shows discitis/osteomyelitis with improvement, decreased L3/4 disc fluid, no abscess. CRP was elevated and patient planned for IR biopsy (off abx) though there was no fluid to aspirate, biopsy was taken 6/29, discharged off abx. Cultures finalized negative. Path with some neutrophils stains negative.   - MRI 8/22/23 showed similar discitis/om changes at L3-4, unclear if worsened for evolving changes of infection. Blood cultures were negative and patient afebrile at that time  - Presents to ED 12/30 with low back, R hip/groin pains that aren't controlled with pan medication.   - MRI 12/30 with contrast c/f progression of discitis / om at L3/4 level with extension into the bilateral posterior articular facets and increasing bilateral psoas muscle edema/inflammation without definite abscess in the epidural or psoas space.   -S/p CT guided aspiration and core bx of L3-L4 1/2 by IR, bloody fluid and solid core.    No ABX given PTA. Given nafcillin + vancomycin in house; now off abx  Blood cxs 12/30- ngtd  Afebrile  WBC 8  CRP 1.47     2. H/o C diff   - monitor BMs  - no diarrhea now    3. Foot wounds  - did not appear acutely infected   - arterial dopplers 12/15 with 50% stenosis at the bilateral distal posterior tibial arteries. Vascular eval appreciated, no plans for intervention at this time, may need angiogram if worsening.  - XR noted  - podiatry following    PLAN:  -  continue to hold abx for now as patient is hemodynamically stable, afebrile with normal WBC. If any of these should change would re-start empiric abx therapy.   - follow cultures from IR drainage- ngtd; d/w pathology, checking to see if can do pathology/cytology  - follow blood cultures- ngtd  - NS following  - monitor clinically   - monitor feet clinically; wound care as per wound care team/podiatry    Discussed case with RN, pathology and Dr. Tan.     Kasia Harley PA-C

## 2024-01-04 NOTE — PLAN OF CARE
Patient A & O x4. VSS, on 2L PRN. C/o mod pain controlled with norco. Wounds to both feet covered with coverlets are clean, dry and intact. Bilateral bunny boots. Voiding via urinal. Safety measures in place. Instructed to use call light.

## 2024-01-05 ENCOUNTER — APPOINTMENT (OUTPATIENT)
Dept: GENERAL RADIOLOGY | Facility: HOSPITAL | Age: 64
End: 2024-01-05
Attending: INTERNAL MEDICINE
Payer: MEDICAID

## 2024-01-05 LAB
ERYTHROCYTE [SEDIMENTATION RATE] IN BLOOD: 33 MM/HR
GLUCOSE BLD-MCNC: 129 MG/DL (ref 70–99)
GLUCOSE BLD-MCNC: 129 MG/DL (ref 70–99)
GLUCOSE BLD-MCNC: 144 MG/DL (ref 70–99)
GLUCOSE BLD-MCNC: 174 MG/DL (ref 70–99)

## 2024-01-05 PROCEDURE — 99232 SBSQ HOSP IP/OBS MODERATE 35: CPT | Performed by: HOSPITALIST

## 2024-01-05 PROCEDURE — 73502 X-RAY EXAM HIP UNI 2-3 VIEWS: CPT | Performed by: INTERNAL MEDICINE

## 2024-01-05 RX ORDER — KETOROLAC TROMETHAMINE 30 MG/ML
30 INJECTION, SOLUTION INTRAMUSCULAR; INTRAVENOUS EVERY 8 HOURS
Status: COMPLETED | OUTPATIENT
Start: 2024-01-05 | End: 2024-01-07

## 2024-01-05 RX ORDER — CYCLOBENZAPRINE HCL 10 MG
10 TABLET ORAL 3 TIMES DAILY PRN
Status: DISCONTINUED | OUTPATIENT
Start: 2024-01-05 | End: 2024-01-09

## 2024-01-05 NOTE — DIETARY NOTE
[facility]    NUTRITION ASSESSMENT    Pt does not meet malnutrition criteria at this time.    NUTRITION INTERVENTION:    Meal and Snacks - Monitor and encourage adequate PO intake.   Medical Food Supplements -orange Wesly BID; at lunch and dinner.     PATIENT STATUS:     1/5/24- 64 y/o male admitted with L3-L4 discitis/OM. Pt seen due to length of stay. Has pressure ulcers on B/L heels. Wound Care is following. Pt sound asleep at time of visit. Did not waken. Per conversation with RN, pt is tolerating diet w/ good appetite. Recorded PO intakes:% with 100% most meals. Will add ONS to help maximize protein intakes to aid with wound healing. Noted pt received Wesly during previous admit. Will continue to monitor.      PMH:HTN, DL, DM, discitis, wheel chair bound d/t muscle spasms.       ANTHROPOMETRICS:  Ht:  6' 0.6\"  Wt: 132.5 kg (292 lb).   BMI: Body mass index is 38.95 kg/m².  IBW: 82.5 kg      WEIGHT HISTORY:     Wts highly vary per EMR hx. If wts are accurate, pt may have lost about 11 lb (3.6%) in about 2 weeks.    Wt Readings from Last 10 Encounters:   12/30/23 132.5 kg (292 lb)   12/17/23 (!) 137.5 kg (303 lb 2.1 oz)   08/21/23 124.7 kg (275 lb)   08/22/23 124.7 kg (274 lb 14.6 oz)   07/10/23 126.6 kg (279 lb 3.2 oz)   07/05/23 (!) 158 kg (348 lb 5.2 oz)   06/28/23 (!) 158.8 kg (350 lb 1.5 oz)   03/21/23 (!) 158.8 kg (350 lb)   03/06/23 (!) 159 kg (350 lb 8 oz)   03/01/23 (!) 156.9 kg (345 lb 14.4 oz)        NUTRITION:  Diet:       Procedures    Carbohydrate controlled diet 1800 kcal/60 grams; Is Patient on Accuchecks? Yes      Food Allergies: No  Cultural/Ethnic/Buddhism Preferences Addressed: Yes    Percent Meals Eaten (last 3 days)       Date/Time Percent Meals Eaten (%)    01/02/24 0838 0 %     Percent Meals Eaten (%): NPO at 01/02/24 0838    01/02/24 1145 0 %     Percent Meals Eaten (%): NPO at 01/02/24 1145    01/03/24 0830 100 %    01/03/24 1600 100 %    01/04/24 1354 100 %            GI  system review:  Last BM:1/3    Skin and wounds: pressure ulcers (stages not specified) on B/L heels    NUTRITION RELATED PHYSICAL FINDINGS:     1. Body Fat/Muscle Mass: no wasting noted     2. Fluid Accumulation: lower extremity edema     NUTRITION PRESCRIPTION: 82.5 kg (IBW)  Calories: 7642-5417 calories/day (25-30 kcal/kg)  Protein: 150-180 grams protein/day ( 1.8-2.2  grams protein per kg)  Fluid: ~1 ml/kcal or per MD discretion    NUTRITION DIAGNOSIS/PROBLEM:  Increased nutrient needs related to  wound healing  as evidenced by  wounds to B/L heel and discitis/OM of unspecified spinal region.       MONITOR AND EVALUATE/NUTRITION GOALS:  PO intake of 75% of meals TID - New  PO intake of 75% of oral nutrition supplement/s - New  Provide nutrition adequate for wound healing - New      MEDICATIONS:  Lasix, Insulin, Norco    LABS:  POC Glu:109-156    Pt is at Moderate nutrition risk    Naya Bradshaw MS, RD, LDN  Clinical Dietitian  Ext:[phone]

## 2024-01-05 NOTE — PROGRESS NOTES
INFECTIOUS DISEASE PROGRESS NOTE    Troy Feliz Patient Status:  Inpatient    1960 MRN [id number]   Formerly [facility] 3SW-A Attending Roverto Obregon MD   Hosp Day # 6 PCP MADELINE FENTON,      Abx: None    Subjective: Patient seen and examined today. States that his back feels about the same. Pain worse with movements, continues to have spasms. No new issues. No foot pain. Afebrile.     Allergies:  No Known Allergies    Medications:    Current Facility-Administered Medications:     methocarbamol (Robaxin) partial tab 250 mg, 250 mg, Oral, QID    hydrALAZINE (Apresoline) tab 50 mg, 50 mg, Oral, Q8H OSEI    carvedilol (Coreg) tab 25 mg, 25 mg, Oral, BID with meals    furosemide (Lasix) tab 40 mg, 40 mg, Oral, BID (Diuretic)    gabapentin (Neurontin) cap 100 mg, 100 mg, Oral, TID    HYDROcodone-acetaminophen (Norco) 5-325 MG per tab 1 tablet, 1 tablet, Oral, Q4H PRN    insulin detemir (Levemir) 100 UNIT/ML FlexPen/FlexTouch 10 Units, 10 Units, Subcutaneous, Nightly    levothyroxine (Synthroid) tab 25 mcg, 25 mcg, Oral, Before breakfast    NIFEdipine ER (Procardia-XL) 24 hr tab 60 mg, 60 mg, Oral, Daily    glucose (Dex4) 15 GM/59ML oral liquid 15 g, 15 g, Oral, Q15 Min PRN **OR** glucose (Glutose) 40% oral gel 15 g, 15 g, Oral, Q15 Min PRN **OR** glucose-vitamin C (Dex-4) chewable tab 4 tablet, 4 tablet, Oral, Q15 Min PRN **OR** dextrose 50% injection 50 mL, 50 mL, Intravenous, Q15 Min PRN **OR** glucose (Dex4) 15 GM/59ML oral liquid 30 g, 30 g, Oral, Q15 Min PRN **OR** glucose (Glutose) 40% oral gel 30 g, 30 g, Oral, Q15 Min PRN **OR** glucose-vitamin C (Dex-4) chewable tab 8 tablet, 8 tablet, Oral, Q15 Min PRN    heparin (Porcine) 5000 UNIT/ML injection 5,000 Units, 5,000 Units, Subcutaneous, Q8H OSEI    acetaminophen (Tylenol Extra Strength) tab 500 mg, 500 mg, Oral, Q4H PRN    HYDROmorphone (Dilaudid) 1 MG/ML injection 0.2 mg, 0.2 mg,  Intravenous, Q2H PRN **OR** HYDROmorphone (Dilaudid) 1 MG/ML injection 0.4 mg, 0.4 mg, Intravenous, Q2H PRN **OR** HYDROmorphone (Dilaudid) 1 MG/ML injection 0.8 mg, 0.8 mg, Intravenous, Q2H PRN    ondansetron (Zofran) 4 MG/2ML injection 4 mg, 4 mg, Intravenous, Q6H PRN    prochlorperazine (Compazine) 10 MG/2ML injection 5 mg, 5 mg, Intravenous, Q8H PRN    insulin aspart (NovoLOG) 100 Units/mL FlexPen 1-10 Units, 1-10 Units, Subcutaneous, TID AC and HS    lidocaine-menthol 4-1 % patch 1 patch, 1 patch, Transdermal, Daily PRN    melatonin tab 1 mg, 1 mg, Oral, Nightly PRN    polyethylene glycol (PEG 3350) (Miralax) 17 g oral packet 17 g, 17 g, Oral, Daily PRN  Current Facility-Administered Medications on File Prior to Encounter   Medication Dose Route Frequency Provider Last Rate Last Admin    [COMPLETED] potassium chloride (K-Dur) tab 40 mEq  40 mEq Oral Once Marcella Burton MD   40 mEq at 12/16/23 1101    [COMPLETED] potassium chloride (K-Dur) tab 40 mEq  40 mEq Oral Once Marcella Burton MD   40 mEq at 12/14/23 0824    [COMPLETED] furosemide (Lasix) 10 mg/mL injection 60 mg  60 mg Intravenous BID (Diuretic) Marcella Burton MD   60 mg at 12/15/23 1737    [COMPLETED] potassium chloride (K-Dur) tab 40 mEq  40 mEq Oral Q4H Marcella Burton MD   40 mEq at 12/13/23 1202    [COMPLETED] potassium chloride (K-Dur) tab 40 mEq  40 mEq Oral Once Marcella Burton MD   40 mEq at 12/13/23 1725    [COMPLETED] furosemide (Lasix) 10 mg/mL injection 60 mg  60 mg Intravenous Once Jazmine Martinez MD   60 mg at 12/12/23 2216     Current Outpatient Medications on File Prior to Encounter   Medication Sig Dispense Refill    furosemide 40 MG Oral Tab Take 1 tablet (40 mg total) by mouth 2 (two) times daily. 60 tablet 1    hydrALAZINE 25 MG Oral Tab Take 1 tablet (25 mg total) by mouth every 8 (eight) hours. 90 tablet 0    tiZANidine 4 MG Oral Tab Take 1 tablet (4 mg total) by mouth every 6 (six) hours as needed. 20 tablet 0     HYDROcodone-acetaminophen 5-325 MG Oral Tab Take 1 tablet by mouth every 4 (four) hours as needed. 15 tablet 0    gabapentin 100 MG Oral Cap Take 1 capsule (100 mg total) by mouth 3 (three) times daily. 90 capsule 0    carvedilol 25 MG Oral Tab Take 1 tablet (25 mg total) by mouth 2 (two) times daily with meals. 60 tablet 0    levothyroxine 25 MCG Oral Tab Take 1 tablet (25 mcg total) by mouth before breakfast.      NIFEdipine ER 60 MG Oral Tablet 24 Hr Take 1 tablet (60 mg total) by mouth daily.      traMADol 50 MG Oral Tab Take 1 tablet (50 mg total) by mouth every 8 (eight) hours as needed for Pain. (Patient not taking: Reported on 12/30/2023) 15 tablet 0    acetaminophen 500 MG Oral Tab Take 1-2 tablet every 4 hours as needed for pain 20 tablet 0    insulin detemir (LEVEMIR FLEXTOUCH) 100 UNIT/ML Subcutaneous Solution Pen-injector Inject 10 Units into the skin nightly. 5 each 2    polyethylene glycol, PEG 3350, 17 g Oral Powd Pack Take 17 g by mouth daily.      melatonin 1 MG Oral Tab Nightly prn 30 tablet 0    insulin aspart (NOVOLOG FLEXPEN) 100 Units/mL Subcutaneous Solution Pen-injector Test blood glucose 3 times daily with meals  Inject 1 unit if blood glucose is between 141-180 mg/dL Inject 2 units if blood glucose is between 181-220 mg/dL Inject 3 units if blood glucose is between 221-260 mg/dL Inject 4 units if blood glucose is between 261-300 mg/dL Inject 5 units if blood glucose is between 301-350 mg/dL Call your physician if blood glucose is greater than 351 mg/dL, 5 each 2    Insulin Pen Needle 32G X 4 MM Does not apply Misc May substitute if not on insurance formulary 100 each 5    lidocaine-menthol 4-1 % External Patch Place 1 patch onto the skin daily. 30 patch 0       Review of Systems:    A comprehensive 10 point review of systems was completed.  Pertinent positives and negatives noted in the the HPI.      Physical Exam:    General: No acute distress. Alert and oriented x 3.  Vital signs: Temp:   [97.4 °F (36.3 °C)-97.9 °F (36.6 °C)] 97.7 °F (36.5 °C)  Pulse:  [60-69] 69  Resp:  [16-18] 16  BP: (117-147)/(57-88) 124/66  SpO2:  [88 %-96 %] 96 %  HEENT: Moist mucous membranes. Extraocular muscles are intact.  Neck: No swelling, no masses  Respiratory: Clear to auscultation bilaterally. No wheezing. No rhonchi.  Cardiovascular: RRR  Abdomen: Soft, nontender, nondistended.    Musculoskeletal: Movement of all extremities.  Joints: no effusions  Skin: No cellulitis noted.     Laboratory Data:  Laboratory data reviewed      Recent Labs   Lab 12/31/23  0514   RBC 4.26*   HGB 13.2   HCT 38.7*   MCV 90.8   MCH 31.0   MCHC 34.1   RDW 12.3   NEPRELIM 6.13   WBC 8.1   .0       Recent Labs   Lab 12/30/23  1403 12/31/23  0514 01/01/24  0531 01/02/24  0535   * 114*  --   --    BUN 25* 19  --   --    CREATSERUM 0.98 0.81  0.81  --   --    CA 9.4 9.0  --   --    ALB 3.2*  --   --   --     142  --   --    K 3.9 3.2* 3.2* 3.9    109  --   --    CO2 29.0 28.0  --   --    ALKPHO 103  --   --   --    AST 14*  --   --   --    ALT 23  --   --   --    BILT 0.3  --   --   --    TP 7.9  --   --   --          Lab Results   Component Value Date    PGLU 129 01/05/2024         Microbiologic Data:   Hospital Encounter on 12/30/23   1. Blood Culture     Status: None    Collection Time: 12/30/23  5:14 PM    Specimen: Blood,peripheral   Result Value Ref Range    Blood Culture Result No Growth 5 Days N/A         Imaging:  Narrative   PROCEDURE:  MRI SPINE LUMBAR (W+WO) (CPT=72158)      COMPARISON:  MR JOSE RAMON MRI SPINE LUMBAR (W+WO) (CPT=72158), 6/27/2023, 10:07 PM.  EDPRAVIN , MR, MRI SPINE LUMBAR(CONTRAST ONLY) (CPT=72149), 8/22/2023, 9:58 PM.  EDPRAVIN , XR, XR LUMBAR SPINE (MIN 4 VIEWS) (CPT=72110), 12/14/2023, 2:48 PM.     INDICATIONS:  Severe low back pain.  History of discitis/osteomyelitis at the L3-4 level.     TECHNIQUE:  Multiplanar T1 and T2 weighted images including fat suppression sequences.  Images  acquired in sagittal and axial planes. Intravenous gadolinium was administered followed by multiplanar post-infusion T1 weighted sequences.       PATIENT STATED HISTORY:(As transcribed by Technologist)  The patient stated he is being evaluated for chronic, severe lower back pain and muscle spasms. He is wheelchair bound due to pain.      CONTRAST USED:  20 mL of Dotarem     FINDINGS:    LUMBAR DISC LEVELS  L1-L2:  No significant disc/facet abnormality, spinal stenosis, or foraminal narrowing.    L2-L3:  There is a moderate broad-based degenerative disc bulge.  There is posterior epidural fat hypertrophy.  This causes moderate central canal stenosis of 8 mm.  The facet joints are unremarkable.  L3-L4:  There continues to be fluid signal within the disc space.  There is destructive changes of the endplates of L3 and L4 which is slightly more advanced than the study of 8/22/2023.  There is enhancement of the endplates which is slightly more  vigorous than on the previous study.  Additionally, there is new intense mint along the posterior articular facets at both levels suspicious for interval progression of septic arthropathy into the bilateral posterior articular facet joints.  There is  also increasing adjacent psoas edema diffusely.  There is no abdi epidural fluid collection or abscess.  There is moderate central canal stenosis of 7.5 mm.    L4-L5:  There is mild degenerative disc bulge osteophyte complex.  AP diameter canal is normal 12 mm.  There is mild subarticular zone and neural foraminal narrowing.  The facet joints unremarkable.  L5-S1:  Very mild posterior degenerative disc bulge.  There is mild bilateral facet joint degenerative change.  There is mild bilateral neural foraminal narrowing.     PARASPINAL AREA:  Increasing bilateral psoas muscle edema/inflammation.  No evidence of psoas muscle abscess.  BONES:  No fracture, pars defect, or osseous lesion.    CORD/CAUDA EQUINA:  There is thickening of the  cauda equina nerve roots diffusely suggestive of chronic arachnoiditis, which can be retrospectively seen on previous imaging studies and appears relatively stable.  No abnormal enhancement along the conus  or nerve roots.                   Impression   CONCLUSION:    Findings are concerning for progression of discitis/osteomyelitis centered at the L3-4 disc level, with suggestion of interval extension into the bilateral posterior articular facets at this level concerning for the development of bilateral septic  arthropathy.  There is also increasing bilateral psoas muscle edema/inflammation.  No discrete evidence of epidural abscess or psoas abscess.     Diffuse thickening of the cauda equina suggesting chronic arachnoiditis, which can be retrospectively seen and appears similar to previous imaging.  No abnormal post-contrast enhancement.        Established Problem list:  Patient Active Problem List   Diagnosis    Mixed hyperlipidemia    Subclinical hypothyroidism    Proteinuria    Benign essential HTN    Type 2 diabetes mellitus with other specified complication ([facility])    Peripheral edema    Morbid obesity with BMI of 40.0-44.9, adult ([facility])    Non-traumatic rhabdomyolysis    Acute hypoxemic respiratory failure ([facility])    Right leg weakness    Generalized weakness    HHNC (hyperglycemic hyperosmolar nonketotic coma) ([facility])    MSSA bacteremia    Discitis of lumbar region    Epidural abscess    Intractable back pain    Osteomyelitis of low back ([facility])    Diarrhea    C. difficile diarrhea    Syncope and collapse    Nausea and vomiting    Leukocytosis    TARIK (acute kidney injury) ([facility])    Dehydration    Renal insufficiency    Hyperkalemia    Azotemia    Metabolic acidosis    Hyperglycemia    Weakness generalized    Dizziness    Hypertensive urgency    Extremity edema    Lumbar radiculopathy    Discitis, unspecified spinal region    Dyslipidemia    Moderate back pain    Pressure ulcer, heel, right, unstageable ([facility])     Pressure injury of left heel, stage 2 ([facility])    PVD (peripheral vascular disease) ([facility])       ASSESSMENT/PLAN:  1. MSSA L3/4 Discitis/OM     - First diagnosed February 2023 at which time patient had MSSA bacteremia and L3/4 discitis s/p cefazolin  - 3/22/23 with ongoing symptoms. MRI 3/22/23 demonstrated new epidural phlegmon, discharged on nafcillin EOT 5/29 (13 wk course).  - MRI 6/27 shows discitis/osteomyelitis with improvement, decreased L3/4 disc fluid, no abscess. CRP was elevated and patient planned for IR biopsy (off abx) though there was no fluid to aspirate, biopsy was taken 6/29, discharged off abx. Cultures finalized negative. Path with some neutrophils stains negative.   - MRI 8/22/23 showed similar discitis/om changes at L3-4, unclear if worsened for evolving changes of infection. Blood cultures were negative and patient afebrile at that time  - Presents to ED 12/30 with low back, R hip/groin pains that aren't controlled with pan medication.   - MRI 12/30 with contrast c/f progression of discitis / om at L3/4 level with extension into the bilateral posterior articular facets and increasing bilateral psoas muscle edema/inflammation without definite abscess in the epidural or psoas space.   -S/p CT guided aspiration and core bx of L3-L4 1/2 by IR, bloody fluid and solid core.    No ABX given PTA. Given nafcillin + vancomycin in house; now off abx  Blood cxs 12/30 negative  Afebrile  WBC 8  CRP 1.47 , ESR 30    2. H/o C diff   - monitor BMs  - no diarrhea now    3. Foot wounds  - did not appear acutely infected   - arterial dopplers 12/15 with 50% stenosis at the bilateral distal posterior tibial arteries. Vascular eval appreciated, no plans for intervention at this time, may need angiogram if worsening.  - XR noted  - podiatry eval appreciated    PLAN:  - continue to hold abx for now as patient is hemodynamically stable, afebrile with normal WBC. If any of these should change would re-start empiric abx  therapy.   - follow cultures from IR drainage- ngtd; d/w pathology yesterday, they are able do pathology on core bx--> await results  - NS following  - monitor clinically   - check ESR  - monitor feet clinically; wound care as per wound care team/podiatry    Discussed case with patient, RN and Dr. Matamoros.    Kasia Harley, BETZAIDA    ID ATTENDING ADDENDUM     Pt seen an examined independently. Chart reviewed. Agree with above. Note has been reviewed by me and modified as needed.  Exam and Impression/ Recs as noted above.  Reports that pain is different than when he was treated for discitis, now pain involves R hip. Able to move hip. Will check xrays of hip. Will also try NSAIDs and see if responds better than narcotics.  Will follow  D/w staff and with pt  More than 50% of clinical time and 100% of the clinical decision making performed by me.    Deborah Matamoros MD

## 2024-01-05 NOTE — PROGRESS NOTES
[facility]     Hospitalist Progress Note     Troy Feliz Patient Status:  Inpatient    1960 MRN [id number]   Formerly [facility] 3SW-A Attending Christopher Gustafson MD   Hosp Day # 6 PCP MADELINE FENTON DO     Chief Complaint: Back pain    Subjective:     Patient still complaining of back spasms.  Denies fever, chills, n/v.  No other acute complaints.      Objective:    Review of Systems:   A comprehensive review of systems was completed; pertinent positive and negatives stated in subjective.    Vital signs:  Temp:  [97.4 °F (36.3 °C)-98 °F (36.7 °C)] 97.7 °F (36.5 °C)  Pulse:  [60-67] 64  Resp:  [16-18] 18  BP: (117-147)/(57-88) 118/59  SpO2:  [88 %-95 %] 94 %    Physical Exam:    General: No acute distress, pleasant   Respiratory: no wheezes, no rhonchi  Cardiovascular: S1, S2, regular rate and rhythm  Abdomen: Soft, Non-tender, non-distended, positive bowel sounds  Neuro: No new focal deficits.   Extremities: no edema, chronic stasis dermatitis, dressing in place    Diagnostic Data:    Labs:  Recent Labs   Lab 23  1403 23  0514 24  1049   WBC 8.9 8.1  --    HGB 14.0 13.2  --    MCV 91.5 90.8  --    .0 234.0  --    INR  --   --  1.17       Recent Labs   Lab 23  1403 23  0514 24  0531 24  0535   * 114*  --   --    BUN 25* 19  --   --    CREATSERUM 0.98 0.81  0.81  --   --    CA 9.4 9.0  --   --    ALB 3.2*  --   --   --     142  --   --    K 3.9 3.2* 3.2* 3.9    109  --   --    CO2 29.0 28.0  --   --    ALKPHO 103  --   --   --    AST 14*  --   --   --    ALT 23  --   --   --    BILT 0.3  --   --   --    TP 7.9  --   --   --        Estimated Creatinine Clearance: 105.7 mL/min (based on SCr of 0.81 mg/dL).    No results for input(s): \"TROP\", \"TROPHS\", \"CK\" in the last 168 hours.    Recent Labs   Lab 24  1049   PTP 14.9*   INR 1.17                  Microbiology    Hospital Encounter on 23   1. Blood Culture     Status:  None    Collection Time: 12/30/23  5:14 PM    Specimen: Blood,peripheral   Result Value Ref Range    Blood Culture Result No Growth 5 Days N/A         Imaging: Reviewed in Epic.    Medications:    methocarbamol  250 mg Oral QID    hydrALAZINE  50 mg Oral Q8H OSEI    carvedilol  25 mg Oral BID with meals    furosemide  40 mg Oral BID (Diuretic)    gabapentin  100 mg Oral TID    insulin detemir  10 Units Subcutaneous Nightly    levothyroxine  25 mcg Oral Before breakfast    NIFEdipine ER  60 mg Oral Daily    heparin  5,000 Units Subcutaneous Q8H OSEI    insulin aspart  1-10 Units Subcutaneous TID AC and HS       Assessment & Plan:      #MSSA L3/L4 Discitis/OM  Patient with recurrent OM, multiple treatment courses in past year  MRI 12/30 with progression of discitis  S/p CT guided aspiration/core biopsy on 1/2 - pending results   Now off abx, cultures NGTD  ID following - off abx    #Back Pain  Neurosurgery eval appreciated  Robaxin intolerant, will try flexeril   LSO brace recommended    #Bilateral plantar heel pressure ulcers  Podiatry eval -> wound care  Vascular surgery consult -> no plans for peripheral angio  Foot xrays without OM  Will need close outpatient wound care    #Essential HTN - coreg, lasix, hydralazin e  #DLD   #DM Type 2 - levemir 10U, iss      Discharge planning, EMERITA Gustafson MD    Supplementary Documentation:     Quality:  DVT Mechanical Prophylaxis:   SCDs,    DVT Pharmacologic Prophylaxis   Medication    heparin (Porcine) 5000 UNIT/ML injection 5,000 Units                Code Status: Full Code  Pruitt: No urinary catheter in place  Pruitt Duration (in days):   Central line:    KIKE:     Discharge is dependent on: biopsy results, then back to rehab  At this point Mr. Feliz is expected to be discharge to: EMERITA    The 21st Century Cures Act makes medical notes like these available to patients in the interest of transparency. Please be advised this is a medical document. Medical documents are  intended to carry relevant information, facts as evident, and the clinical opinion of the practitioner. The medical note is intended as peer to peer communication and may appear blunt or direct. It is written in medical language and may contain abbreviations or verbiage that are unfamiliar.

## 2024-01-05 NOTE — PLAN OF CARE
Patient alert and oriented x4. VSS, on RA when awake 2L o2 via NC when sleeping. Moderate pain reported to lower back, PO PRN and scheduled medications given with some stated relief. LSO brace present. Pt refusing bunny boots to BLE while in bed this AM. Post-op shoes to be in place when OOB, limiting weight bearing. Dressings changed to left heel, left ankle, and right heel per orders. Pt voiding via urinal. Tolerating PO intake, denies N/V.       Plan: await final ID plan.

## 2024-01-06 LAB
GLUCOSE BLD-MCNC: 142 MG/DL (ref 70–99)
GLUCOSE BLD-MCNC: 156 MG/DL (ref 70–99)
GLUCOSE BLD-MCNC: 173 MG/DL (ref 70–99)
GLUCOSE BLD-MCNC: 96 MG/DL (ref 70–99)

## 2024-01-06 PROCEDURE — 99232 SBSQ HOSP IP/OBS MODERATE 35: CPT | Performed by: HOSPITALIST

## 2024-01-06 NOTE — PLAN OF CARE
A&Ox4. VSS. On room air. . Tolerating diet. Voiding freely via urinal. Pain managed with scheduled IV Toradol. Bunny boots to BLE. Dressings to bilateral feet C/D/I. Cultures pending. Patient updated and in agreement with plan of care. Safety precautions in place. Instructed patient to call for assistance, call light within reach.

## 2024-01-06 NOTE — PROGRESS NOTES
[facility]     Hospitalist Progress Note     Troy Feliz Patient Status:  Inpatient    1960 MRN [id number]   [facility] 3SW-A Attending Christopher Gustafson MD   Hosp Day # 7 PCP MADELINE FENTON DO     Chief Complaint: Back pain    Subjective:     Patient back spasms improved.  Denies fever, chills, n/v.  No other acute complaints.      Objective:    Review of Systems:   A comprehensive review of systems was completed; pertinent positive and negatives stated in subjective.    Vital signs:  Temp:  [97.6 °F (36.4 °C)-98.1 °F (36.7 °C)] 98.1 °F (36.7 °C)  Pulse:  [61-72] 66  Resp:  [16] 16  BP: (122-154)/(63-72) 154/71  SpO2:  [91 %-94 %] 94 %    Physical Exam:    General: No acute distress, pleasant   Respiratory: no wheezes, no rhonchi  Cardiovascular: S1, S2, regular rate and rhythm  Abdomen: Soft, Non-tender, non-distended, positive bowel sounds  Neuro: No new focal deficits.   Extremities: no edema, chronic stasis dermatitis, dressing in place    Diagnostic Data:    Labs:  Recent Labs   Lab 23  1403 23  0514 24  1049   WBC 8.9 8.1  --    HGB 14.0 13.2  --    MCV 91.5 90.8  --    .0 234.0  --    INR  --   --  1.17       Recent Labs   Lab 23  1403 23  0514 24  0531 24  0535   * 114*  --   --    BUN 25* 19  --   --    CREATSERUM 0.98 0.81  0.81  --   --    CA 9.4 9.0  --   --    ALB 3.2*  --   --   --     142  --   --    K 3.9 3.2* 3.2* 3.9    109  --   --    CO2 29.0 28.0  --   --    ALKPHO 103  --   --   --    AST 14*  --   --   --    ALT 23  --   --   --    BILT 0.3  --   --   --    TP 7.9  --   --   --        Estimated Creatinine Clearance: 105.7 mL/min (based on SCr of 0.81 mg/dL).    No results for input(s): \"TROP\", \"TROPHS\", \"CK\" in the last 168 hours.    Recent Labs   Lab 24  1049   PTP 14.9*   INR 1.17                  Microbiology    Hospital Encounter on 23   1. Blood Culture     Status: None     Collection Time: 12/30/23  5:14 PM    Specimen: Blood,peripheral   Result Value Ref Range    Blood Culture Result No Growth 5 Days N/A         Imaging: Reviewed in Epic.    Medications:    ketorolac  30 mg Intravenous Q8H    hydrALAZINE  50 mg Oral Q8H OSEI    carvedilol  25 mg Oral BID with meals    furosemide  40 mg Oral BID (Diuretic)    gabapentin  100 mg Oral TID    insulin detemir  10 Units Subcutaneous Nightly    levothyroxine  25 mcg Oral Before breakfast    NIFEdipine ER  60 mg Oral Daily    heparin  5,000 Units Subcutaneous Q8H OSEI    insulin aspart  1-10 Units Subcutaneous TID AC and HS       Assessment & Plan:      #MSSA L3/L4 Discitis/OM  Patient with recurrent OM, multiple treatment courses in past year  MRI 12/30 with progression of discitis  S/p CT guided aspiration/core biopsy on 1/2 - pending results   Now off abx, cultures NGTD  ID following - off abx    #Back Pain  Neurosurgery eval appreciated  Flexeril seems to be improving   LSO brace recommended    #Bilateral plantar heel pressure ulcers  Podiatry eval -> wound care  Vascular surgery consult -> no plans for peripheral angio  Foot xrays without OM  Will need close outpatient wound care    #Essential HTN - coreg, lasix, hydralazin e  #DLD   #DM Type 2 - levemir 10U, iss      Discharge planning, [facility], awaiting biopsy     Christopher Gustafson MD    Supplementary Documentation:     Quality:  DVT Mechanical Prophylaxis:   SCDs,    DVT Pharmacologic Prophylaxis   Medication    heparin (Porcine) 5000 UNIT/ML injection 5,000 Units                Code Status: Full Code  Pruitt: No urinary catheter in place  Pruitt Duration (in days):   Central line:    KIKE:     Discharge is dependent on: biopsy results, then back to rehab  At this point Mr. Feliz is expected to be discharge to: [facility]    The 21st Century Cures Act makes medical notes like these available to patients in the interest of transparency. Please be advised this is a medical document. Medical documents are  intended to carry relevant information, facts as evident, and the clinical opinion of the practitioner. The medical note is intended as peer to peer communication and may appear blunt or direct. It is written in medical language and may contain abbreviations or verbiage that are unfamiliar.

## 2024-01-06 NOTE — PLAN OF CARE
A&O x4. VSS. 2L O2 nc overnight. . Pain controlled with scheduled toradol q8, muscle spasms . Ambulating with max assist x2 with bilat post op shoes. Voids freely per urinal. Declines SCDs tonight. Coverlet dressing to bilat feet C/D/I. Bunny boots applied to BLE. Cxs pending. Reviewed POC, pain management, IS use, and fall precautions with pt. Bed alarm on w/bed in lowest position. Pt reminded to use call light. Verbalized understanding.

## 2024-01-07 LAB
ANION GAP SERPL CALC-SCNC: 5 MMOL/L (ref 0–18)
BUN BLD-MCNC: 57 MG/DL (ref 9–23)
CALCIUM BLD-MCNC: 9 MG/DL (ref 8.5–10.1)
CHLORIDE SERPL-SCNC: 109 MMOL/L (ref 98–112)
CO2 SERPL-SCNC: 27 MMOL/L (ref 21–32)
CREAT BLD-MCNC: 1 MG/DL
EGFRCR SERPLBLD CKD-EPI 2021: 85 ML/MIN/1.73M2 (ref 60–?)
ERYTHROCYTE [DISTWIDTH] IN BLOOD BY AUTOMATED COUNT: 13 %
GLUCOSE BLD-MCNC: 119 MG/DL (ref 70–99)
GLUCOSE BLD-MCNC: 122 MG/DL (ref 70–99)
GLUCOSE BLD-MCNC: 140 MG/DL (ref 70–99)
GLUCOSE BLD-MCNC: 167 MG/DL (ref 70–99)
GLUCOSE BLD-MCNC: 182 MG/DL (ref 70–99)
HCT VFR BLD AUTO: 40.7 %
HGB BLD-MCNC: 13.1 G/DL
MCH RBC QN AUTO: 30.4 PG (ref 26–34)
MCHC RBC AUTO-ENTMCNC: 32.2 G/DL (ref 31–37)
MCV RBC AUTO: 94.4 FL
OSMOLALITY SERPL CALC.SUM OF ELEC: 309 MOSM/KG (ref 275–295)
PLATELET # BLD AUTO: 173 10(3)UL (ref 150–450)
POTASSIUM SERPL-SCNC: 3.6 MMOL/L (ref 3.5–5.1)
RBC # BLD AUTO: 4.31 X10(6)UL
SODIUM SERPL-SCNC: 141 MMOL/L (ref 136–145)
WBC # BLD AUTO: 6.6 X10(3) UL (ref 4–11)

## 2024-01-07 PROCEDURE — 99232 SBSQ HOSP IP/OBS MODERATE 35: CPT | Performed by: HOSPITALIST

## 2024-01-07 NOTE — PROGRESS NOTES
[facility]     Hospitalist Progress Note     Troy Feliz Patient Status:  Inpatient    1960 MRN [id number]   Location [facility] 3SW-A Attending Christopher Gustafson MD   Hosp Day # 8 PCP MADELINE FENTON,      Chief Complaint: Back pain    Subjective:     Patients spasms improved. Denies fever, chills, n/v.  No other acute complaints.      Objective:    Review of Systems:   A comprehensive review of systems was completed; pertinent positive and negatives stated in subjective.    Vital signs:  Temp:  [97.5 °F (36.4 °C)-98.1 °F (36.7 °C)] 97.7 °F (36.5 °C)  Pulse:  [62-71] 62  Resp:  [16-18] 18  BP: (105-154)/(60-71) 132/60  SpO2:  [91 %-95 %] 95 %    Physical Exam:    General: No acute distress, pleasant   Respiratory: no wheezes, no rhonchi  Cardiovascular: S1, S2, regular rate and rhythm  Abdomen: Soft, Non-tender, non-distended, positive bowel sounds  Neuro: No new focal deficits.   Extremities: no edema, chronic stasis dermatitis, dressing in place    Diagnostic Data:    Labs:  Recent Labs   Lab 24  1049   INR 1.17       Recent Labs   Lab 24  0531 24  0535   K 3.2* 3.9       CrCl cannot be calculated (Patient's most recent lab result is older than the maximum 7 days allowed.).    No results for input(s): \"TROP\", \"TROPHS\", \"CK\" in the last 168 hours.    Recent Labs   Lab 24  1049   PTP 14.9*   INR 1.17                  Microbiology    Hospital Encounter on 23   1. Blood Culture     Status: None    Collection Time: 23  5:14 PM    Specimen: Blood,peripheral   Result Value Ref Range    Blood Culture Result No Growth 5 Days N/A         Imaging: Reviewed in Epic.    Medications:    ketorolac  30 mg Intravenous Q8H    hydrALAZINE  50 mg Oral Q8H OSEI    carvedilol  25 mg Oral BID with meals    furosemide  40 mg Oral BID (Diuretic)    gabapentin  100 mg Oral TID    insulin detemir  10 Units Subcutaneous Nightly    levothyroxine  25 mcg Oral Before breakfast     NIFEdipine ER  60 mg Oral Daily    heparin  5,000 Units Subcutaneous Q8H OSEI    insulin aspart  1-10 Units Subcutaneous TID AC and HS       Assessment & Plan:      #MSSA L3/L4 Discitis/OM  Patient with recurrent OM, multiple treatment courses in past year  MRI 12/30 with progression of discitis  S/p CT guided aspiration/core biopsy on 1/2 - pending results   Now off abx, cultures NGTD  ID following - off abx    #Back Pain  Neurosurgery eval appreciated  Flexeril seems to be improving   LSO brace recommended    #Bilateral plantar heel pressure ulcers  Podiatry eval -> wound care  Vascular surgery consult -> no plans for peripheral angio  Foot xrays without OM  Will need close outpatient wound care    #Essential HTN - coreg, lasix, hydralazin e  #DLD   #DM Type 2 - levemir 10U, iss    Dispo:  Once bx report back, can arrange discharge pending findings.  Plan for back to rehab for therapy     Christopher Gustafson MD    Supplementary Documentation:     Quality:  DVT Mechanical Prophylaxis:   SCDs,    DVT Pharmacologic Prophylaxis   Medication    heparin (Porcine) 5000 UNIT/ML injection 5,000 Units                Code Status: Full Code  Pruitt: No urinary catheter in place  Pruitt Duration (in days):   Central line:    KIKE:     Discharge is dependent on: biopsy results, then back to rehab  At this point Mr. Feliz is expected to be discharge to: [facility]    The 21st Century Cures Act makes medical notes like these available to patients in the interest of transparency. Please be advised this is a medical document. Medical documents are intended to carry relevant information, facts as evident, and the clinical opinion of the practitioner. The medical note is intended as peer to peer communication and may appear blunt or direct. It is written in medical language and may contain abbreviations or verbiage that are unfamiliar.

## 2024-01-07 NOTE — PLAN OF CARE
A&Ox4. VSS. On room air. . Tolerating diet. Last BM today. Voiding freely via urinal. Pain managed PO medication. Bunny boots to BLE. Dressings to bilateral feet C/D/I. Cultures pending. Patient updated and in agreement with plan of care. Safety precautions in place. Instructed patient to call for assistance, call light within reach.

## 2024-01-08 LAB
GLUCOSE BLD-MCNC: 124 MG/DL (ref 70–99)
GLUCOSE BLD-MCNC: 134 MG/DL (ref 70–99)
GLUCOSE BLD-MCNC: 139 MG/DL (ref 70–99)
GLUCOSE BLD-MCNC: 154 MG/DL (ref 70–99)

## 2024-01-08 PROCEDURE — 99232 SBSQ HOSP IP/OBS MODERATE 35: CPT | Performed by: HOSPITALIST

## 2024-01-08 RX ORDER — IBUPROFEN 400 MG/1
400 TABLET ORAL EVERY 6 HOURS PRN
Status: DISCONTINUED | OUTPATIENT
Start: 2024-01-08 | End: 2024-01-09

## 2024-01-08 NOTE — CM/SW NOTE
Met w/pt at the bedside to follow up on discharge planning needs.  Noted pt from [facility], but was possibly considering discharging home instead per CM note.    Pt reports he would like to return to [facility] at discharge to continue rehab. Sent available updates via [employer].    / to remain available for support and/or discharge planning.     Elly Holcomb MBA MSN, RN CTL/  [id number]

## 2024-01-08 NOTE — PAYOR COMM NOTE
--------------  CONTINUED STAY REVIEW----REQUESTING ADDITIONAL DAYS 1/6 1/7 1/8      Payor: DANDY  Subscriber #:  [id number]  Authorization Number: [id number]    Admit date: 12/30/23  Admit time:  6:26 PM    Admitting Physician: Roverto Obregon MD  Attending Physician:  Christopher Gustafson MD  Primary Care Physician: Jimenez Bailey DO    1/6   Chief Complaint: Back pain     Subjective:      Patient back spasms improved.  Denies fever, chills, n/v.  No other acute complaints.       Objective:    Review of Systems:   A comprehensive review of systems was completed; pertinent positive and negatives stated in subjective.     Vital signs:  Temp:  [97.6 °F (36.4 °C)-98.1 °F (36.7 °C)] 98.1 °F (36.7 °C)  Pulse:  [61-72] 66  Resp:  [16] 16  BP: (122-154)/(63-72) 154/71  SpO2:  [91 %-94 %] 94 %     Physical Exam:    General: No acute distress, pleasant   Respiratory: no wheezes, no rhonchi  Cardiovascular: S1, S2, regular rate and rhythm  Abdomen: Soft, Non-tender, non-distended, positive bowel sounds  Neuro: No new focal deficits.   Extremities: no edema, chronic stasis dermatitis, dressing in place     Diagnostic Data:    Labs:        Recent Labs   Lab 12/30/23  1403 12/31/23  0514 01/02/24  1049   WBC 8.9 8.1  --    HGB 14.0 13.2  --    MCV 91.5 90.8  --    .0 234.0  --    INR  --   --  1.17                Recent Labs   Lab 12/30/23  1403 12/31/23  0514 01/01/24  0531 01/02/24  0535   * 114*  --   --    BUN 25* 19  --   --    CREATSERUM 0.98 0.81  0.81  --   --    CA 9.4 9.0  --   --    ALB 3.2*  --   --   --     142  --   --    K 3.9 3.2* 3.2* 3.9    109  --   --    CO2 29.0 28.0  --   --    ALKPHO 103  --   --   --    AST 14*  --   --   --    ALT 23  --   --   --    BILT 0.3  --   --   --    TP 7.9  --   --   --          Estimated Creatinine Clearance: 105.7 mL/min (based on SCr of 0.81 mg/dL).     No results for input(s): \"TROP\", \"TROPHS\", \"CK\" in the last 168 hours.         Recent Labs    Lab 01/02/24  1049   PTP 14.9*   INR 1.17                     Microbiology           Hospital Encounter on 12/30/23   1. Blood Culture     Status: None     Collection Time: 12/30/23  5:14 PM     Specimen: Blood,peripheral   Result Value Ref Range     Blood Culture Result No Growth 5 Days N/A            Imaging: Reviewed in Epic.     Medications:    ketorolac  30 mg Intravenous Q8H    hydrALAZINE  50 mg Oral Q8H OSEI    carvedilol  25 mg Oral BID with meals    furosemide  40 mg Oral BID (Diuretic)    gabapentin  100 mg Oral TID    insulin detemir  10 Units Subcutaneous Nightly    levothyroxine  25 mcg Oral Before breakfast    NIFEdipine ER  60 mg Oral Daily    heparin  5,000 Units Subcutaneous Q8H OSEI    insulin aspart  1-10 Units Subcutaneous TID AC and HS         Assessment & Plan:       #MSSA L3/L4 Discitis/OM  Patient with recurrent OM, multiple treatment courses in past year  MRI 12/30 with progression of discitis  S/p CT guided aspiration/core biopsy on 1/2 - pending results   Now off abx, cultures NGTD  ID following - off abx     #Back Pain  Neurosurgery eval appreciated  Flexeril seems to be improving   LSO brace recommended     #Bilateral plantar heel pressure ulcers  Podiatry eval -> wound care  Vascular surgery consult -> no plans for peripheral angio  Foot xrays without OM  Will need close outpatient wound care     #Essential HTN - coreg, lasix, hydralazin e  #DLD   #DM Type 2 - levemir 10U, iss        Discharge planning, [facility], awaiting biopsy      Christopher Gustafson MD     1/7   Chief Complaint: Back pain     Subjective:      Patients spasms improved. Denies fever, chills, n/v.  No other acute complaints.       Objective:    Review of Systems:   A comprehensive review of systems was completed; pertinent positive and negatives stated in subjective.     Vital signs:  Temp:  [97.5 °F (36.4 °C)-98.1 °F (36.7 °C)] 97.7 °F (36.5 °C)  Pulse:  [62-71] 62  Resp:  [16-18] 18  BP: (105-154)/(60-71) 132/60  SpO2:  [91 %-95  %] 95 %     Physical Exam:    General: No acute distress, pleasant   Respiratory: no wheezes, no rhonchi  Cardiovascular: S1, S2, regular rate and rhythm  Abdomen: Soft, Non-tender, non-distended, positive bowel sounds  Neuro: No new focal deficits.   Extremities: no edema, chronic stasis dermatitis, dressing in place     Diagnostic Data:    Labs:      Recent Labs   Lab 01/02/24  1049   INR 1.17              Recent Labs   Lab 01/01/24  0531 01/02/24  0535   K 3.2* 3.9         CrCl cannot be calculated (Patient's most recent lab result is older than the maximum 7 days allowed.).     No results for input(s): \"TROP\", \"TROPHS\", \"CK\" in the last 168 hours.         Recent Labs   Lab 01/02/24  1049   PTP 14.9*   INR 1.17                     Microbiology           Hospital Encounter on 12/30/23   1. Blood Culture     Status: None     Collection Time: 12/30/23  5:14 PM     Specimen: Blood,peripheral   Result Value Ref Range     Blood Culture Result No Growth 5 Days N/A            Imaging: Reviewed in Epic.     Medications:    ketorolac  30 mg Intravenous Q8H    hydrALAZINE  50 mg Oral Q8H OESI    carvedilol  25 mg Oral BID with meals    furosemide  40 mg Oral BID (Diuretic)    gabapentin  100 mg Oral TID    insulin detemir  10 Units Subcutaneous Nightly    levothyroxine  25 mcg Oral Before breakfast    NIFEdipine ER  60 mg Oral Daily    heparin  5,000 Units Subcutaneous Q8H OSEI    insulin aspart  1-10 Units Subcutaneous TID AC and HS         Assessment & Plan:       #MSSA L3/L4 Discitis/OM  Patient with recurrent OM, multiple treatment courses in past year  MRI 12/30 with progression of discitis  S/p CT guided aspiration/core biopsy on 1/2 - pending results   Now off abx, cultures NGTD  ID following - off abx     #Back Pain  Neurosurgery eval appreciated  Flexeril seems to be improving   LSO brace recommended     #Bilateral plantar heel pressure ulcers  Podiatry eval -> wound care  Vascular surgery consult -> no plans for  peripheral angio  Foot xrays without OM  Will need close outpatient wound care     #Essential HTN - coreg, lasix, hydralazin e  #DLD   #DM Type 2 - levemir 10U, iss     Dispo:  Once bx report back, can arrange discharge pending findings.  Plan for back to rehab for therapy      Christopher Gustafson MD     1/8    Chief Complaint: Back pain     Subjective:      Doing well.  Still with back spasms.  No other acute complaints.       Objective:    Review of Systems:   A comprehensive review of systems was completed; pertinent positive and negatives stated in subjective.     Vital signs:  Temp:  [97.4 °F (36.3 °C)-98 °F (36.7 °C)] 97.5 °F (36.4 °C)  Pulse:  [63-74] 65  Resp:  [16-18] 16  BP: (114-142)/(60-76) 115/60  SpO2:  [91 %-99 %] 94 %     Physical Exam:    General: No acute distress, pleasant   Respiratory: no wheezes, no rhonchi  Cardiovascular: S1, S2, regular rate and rhythm  Abdomen: Soft, Non-tender, non-distended, positive bowel sounds  Neuro: No new focal deficits.   Extremities: no edema, chronic stasis dermatitis, dressing in place     Diagnostic Data:    Labs:       Recent Labs   Lab 01/02/24  1049 01/07/24  1010   WBC  --  6.6   HGB  --  13.1   MCV  --  94.4   PLT  --  173.0   INR 1.17  --               Recent Labs   Lab 01/02/24  0535 01/07/24  1010   GLU  --  119*   BUN  --  57*   CREATSERUM  --  1.00   CA  --  9.0   NA  --  141   K 3.9 3.6   CL  --  109   CO2  --  27.0         Estimated Creatinine Clearance: 85.6 mL/min (based on SCr of 1 mg/dL).     No results for input(s): \"TROP\", \"TROPHS\", \"CK\" in the last 168 hours.         Recent Labs   Lab 01/02/24  1049   PTP 14.9*   INR 1.17                     Microbiology           Hospital Encounter on 12/30/23   1. Blood Culture     Status: None     Collection Time: 12/30/23  5:14 PM     Specimen: Blood,peripheral   Result Value Ref Range     Blood Culture Result No Growth 5 Days N/A            Imaging: Reviewed in Epic.     Medications:    hydrALAZINE  50 mg Oral  Q8H OSEI    carvedilol  25 mg Oral BID with meals    furosemide  40 mg Oral BID (Diuretic)    gabapentin  100 mg Oral TID    insulin detemir  10 Units Subcutaneous Nightly    levothyroxine  25 mcg Oral Before breakfast    NIFEdipine ER  60 mg Oral Daily    heparin  5,000 Units Subcutaneous Q8H [facility]    insulin aspart  1-10 Units Subcutaneous TID AC and HS         Assessment & Plan:       #MSSA L3/L4 Discitis/OM  Patient with recurrent OM, multiple treatment courses in past year  MRI 12/30 with progression of discitis  S/p CT guided aspiration/core biopsy on 1/2 - pending results   Cultures NGTD  ID following - off abx     #Back Pain  Neurosurgery eval appreciated  Flexeril seems to be improving, NSAID  LSO brace recommended     #Bilateral plantar heel pressure ulcers  Podiatry eval -> wound care  Vascular surgery consult -> no plans for peripheral angio  Foot xrays without OM  Will need close outpatient wound care     #Essential HTN - coreg, lasix, hydralazin e  #DLD   #DM Type 2 - levemir 10U, iss     Dispo:  Once bx report back, can arrange discharge pending findings.  Plan for back to rehab for therapy      Christopher Gustafson MD       MEDICATIONS ADMINISTERED IN LAST 1 DAY:  carvedilol (Coreg) tab 25 mg       Date Action Dose Route User    1/8/2024 0826 Given 25 mg Oral Elly Srivastava RN    1/7/2024 1726 Given 25 mg Oral Pat Mckinley RN          cyclobenzaprine (Flexeril) tab 10 mg       Date Action Dose Route User    1/7/2024 2220 Given 10 mg Oral Beltran Hidalgo RN          furosemide (Lasix) tab 40 mg       Date Action Dose Route User    1/8/2024 0826 Given 40 mg Oral Elly Srivastava RN    1/7/2024 1726 Given 40 mg Oral Pat Mckinley RN          gabapentin (Neurontin) cap 100 mg       Date Action Dose Route User    1/8/2024 0826 Given 100 mg Oral Elly Srivastava RN    1/7/2024 2141 Given 100 mg Oral Silvia Harrison RN    1/7/2024 1725 Given 100 mg Oral Pat Mckinley, RN          heparin (Porcine)  5000 UNIT/ML injection 5,000 Units       Date Action Dose Route User    1/8/2024 1458 Given 5,000 Units Subcutaneous (Left Lower Abdomen) Elly Srivastava RN    1/8/2024 0630 Given 5,000 Units Subcutaneous (Left Lower Arm) RockyBeltran Stern RN    1/7/2024 2141 Given 5,000 Units Subcutaneous (Left Lower Abdomen) Silvia Harrison RN    1/7/2024 1726 Given 5,000 Units Subcutaneous (Left Lower Abdomen) Pat Mckinley RN          hydrALAZINE (Apresoline) tab 50 mg       Date Action Dose Route User    1/8/2024 0826 Given 50 mg Oral Elly Srivastava RN    1/8/2024 0056 Given 50 mg Oral RockyBeltran Stern RN    1/7/2024 1726 Given 50 mg Oral Pat Mckinley RN          HYDROcodone-acetaminophen (Norco) 5-325 MG per tab 1 tablet       Date Action Dose Route User    1/8/2024 1219 Given 1 tablet Oral Elly Srivastaav RN    1/8/2024 0630 Given 1 tablet Oral Beltran Hidalgo RN    1/7/2024 2213 Given 1 tablet Oral Beltran Hidalgo RN    1/7/2024 1731 Given 1 tablet Oral Pat Mckinley RN          ibuprofen (Motrin) tab 400 mg       Date Action Dose Route User    1/8/2024 1458 Given 400 mg Oral Elly Srivastava RN          insulin aspart (NovoLOG) 100 Units/mL FlexPen 1-10 Units       Date Action Dose Route User    1/7/2024 2213 Given 1 Units Subcutaneous (Left Lower Arm) Beltran Hidalgo RN          insulin detemir (Levemir) 100 UNIT/ML FlexPen/FlexTouch 10 Units       Date Action Dose Route User    1/7/2024 2213 Given 10 Units Subcutaneous (Left Lower Arm) Beltran Hidalgo RN          levothyroxine (Synthroid) tab 25 mcg       Date Action Dose Route User    1/8/2024 0629 Given 25 mcg Oral Beltran Hidalgo RN          NIFEdipine ER (Procardia-XL) 24 hr tab 60 mg       Date Action Dose Route User    1/8/2024 0826 Given 60 mg Oral Elly Srivastava, RN            Vitals (last day)       Date/Time Temp Pulse Resp BP SpO2 Weight O2 Device O2 Flow Rate (L/min) Who    01/08/24 0728 97.5 °F (36.4  °C) 65 16 115/60 94 % -- None (Room air) -- SG    01/08/24 0420 97.4 °F (36.3 °C) 67 18 136/74 93 % -- None (Room air) --     01/07/24 2330 -- -- -- 134/68 -- -- -- -- AD    01/07/24 2025 97.7 °F (36.5 °C) 74 16 142/76 98 % -- None (Room air) --     01/07/24 1557 98 °F (36.7 °C) 63 16 114/62 99 % -- None (Room air) -- [facility]    01/07/24 0823 97.9 °F (36.6 °C) 64 16 118/61 91 % -- None (Room air) -- [facility]    01/07/24 0400 97.7 °F (36.5 °C) 62 18 132/60 95 % -- None (Room air) -- [facility]          CIWA Scores (since admission)       None              Procedures:      Plan:

## 2024-01-08 NOTE — PLAN OF CARE
Aox4, pain controlled on ordered medication, denying numbness or tingling to BLE, on room air , on Heparin subcutaneous, scds refused, voiding via urinal, up mod assist with RW and LSO brace, post-op shoes when ambulating, dressings to feet to be changed in the morning, waiting for biopsy results, call light within reach, no further needs at this time.

## 2024-01-08 NOTE — PHYSICAL THERAPY NOTE
PHYSICAL THERAPY TREATMENT NOTE - INPATIENT    Room Number: 354/354-A     Session: 2     Number of Visits to Meet Established Goals: 6    Presenting Problem: back pain, R hip and groin pain, muscle spasms  Co-Morbidities : DM, HTN, DL, discitis    History related to current admission: Patient is a 63 year old male admitted on 12/30/2023 from [facility] for back pain, R hip and groin pain, muscle spasms.       IMAGING  MRI SPINE LUMBAR:   Findings are concerning for progression of discitis/osteomyelitis centered at the L3-4 disc level, with suggestion of interval extension into the bilateral posterior articular facets at this level concerning for the development of bilateral septic   arthropathy.  There is also increasing bilateral psoas muscle edema/inflammation.  No discrete evidence of epidural abscess or psoas abscess.  Diffuse thickening of the cauda equina suggesting chronic arachnoiditis, which can be retrospectively seen and appears similar to previous imaging.  No abnormal post-contrast enhancement.      Procedure 1/2/24: CT guided lumbar disc biops    ASSESSMENT     In this PT session, pt is still progressing towards goals. Pt able to ambulate an increased amount of distance than before. Pt presenting with impairments of pain, decreased endurance, balance, strength, postural control. Pt requires min assist of 2 for functional mobility. The above deficits are functional limitations for independent bed mobility, transfers, and gait. The pt would benefit from skilled subacute rehab to address above deficits for patient to return to [facility].    DISCHARGE RECOMMENDATIONS  PT Discharge Recommendations: Sub-acute rehabilitation     PLAN  PT Treatment Plan: Bed mobility;Body mechanics;Coordination;Endurance;Energy conservation;Patient education;Family education;Gait training;Neuromuscular re-educate;Range of motion;Strengthening;Stoop training;Stair training;Transfer training;Balance training  Rehab Potential :  Fair  Frequency (Obs): 3-5x/week    CURRENT GOALS     Goal #1 Patient is able to demonstrate supine - sit EOB @ level: supervision      Goal #2 Patient is able to demonstrate transfers Sit to/from Stand at assistance level: supervision      Goal #3 Patient is able to ambulate 20 feet with assist device: walker - rolling at assistance level: supervision      Goal #4     Goal #5     Goal #6     Goal Comments: Goals established on 2024 all goals ongoing      SUBJECTIVE  \"Pain is between 4-8, increases when I move\"    OBJECTIVE  Precautions: Lumbar brace;Spine;Bed/chair alarm (Bilateral post op shoes, bunny boots when in bed to offload)    WEIGHT BEARING RESTRICTION  Weight Bearing Restriction: None                PAIN ASSESSMENT   Ratin  Location: Back  Management Techniques: Activity promotion;Body mechanics;Relaxation;Repositioning    BALANCE                                                                                                                       Static Sitting: Good  Dynamic Sitting: Fair -           Static Standing: Poor  Dynamic Standing: Poor    ACTIVITY TOLERANCE                         O2 WALK         AM-PAC '6-Clicks' INPATIENT SHORT FORM - BASIC MOBILITY  How much difficulty does the patient currently have...  Patient Difficulty: Turning over in bed (including adjusting bedclothes, sheets and blankets)?: A Little   Patient Difficulty: Sitting down on and standing up from a chair with arms (e.g., wheelchair, bedside commode, etc.): A Lot   Patient Difficulty: Moving from lying on back to sitting on the side of the bed?: A Little   How much help from another person does the patient currently need...   Help from Another: Moving to and from a bed to a chair (including a wheelchair)?: A Lot   Help from Another: Need to walk in hospital room?: A Lot   Help from Another: Climbing 3-5 steps with a railing?: A Lot       AM-PAC Score:  Raw Score: 14   Approx Degree of Impairment: 61.29%    Standardized Score (AM-PAC Scale): 38.1   CMS Modifier (G-Code): CL    FUNCTIONAL ABILITY STATUS  Gait Assessment   Functional Mobility/Gait Assessment  Gait Assistance: Minimum assistance (min of 2 people)  Distance (ft): 20, 5  Assistive Device: Rolling walker  Pattern: Within Functional Limits (flexed posture)    Skilled Therapy Provided  Per RNcaridad for therapy. Pt agreeable to therapy session.     Bed Mobility:  Rolling: NT   Supine<>Sit: NT   Sit<>Supine: NT     Transfer Mobility:  Sit<>Stand: min A of 2   Stand<>Sit: min A of 2   Gait: min A of 2 with walker and chair follow. After second round of ambulation, pt sat quickly in chair without warning. Pt took seated rest break in between each distance for ambulation.     Therapist's Comments: Pt educated on role of therapy, goals of session, safety, and fall prevention.      Patient End of Session: Up in chair;Needs met;Call light within reach;RN aware of session/findings;All patient questions and concerns addressed;Alarm set    PT Session Time: 15 minutes  Gait Training: 15 minutes

## 2024-01-08 NOTE — OCCUPATIONAL THERAPY NOTE
OCCUPATIONAL THERAPY TREATMENT NOTE - INPATIENT     Room Number: 354/354-A  Session: 1   Number of Visits to Meet Established Goals: 5    Presenting Problem: back pain, hip pain, muscle spasms, B heel pressure ulcers    History: Patient is a 63 year old male admitted on 12/30/2023 from [facility] for back pain, R hip and groin pain, muscle spasms.       RECENT ADMISSIONS:  12/13-12/17/23: BLE edema --> from home, dc to [facility]  8/22-8/24/23: discitis, OM of back, dizziness --> from [facility], dc to [facility]  7/10-7/14/23 dizziness from [facility]>SNF  6/27-7/2/2023: Intractable back pain: --> From [facility], dc to [facility]  (Per neuro sx 6/28 no acute intervention indicated, LSO when OOB as needed for comfort)  3/21-3/29/23: MSSA bacteremia/discitis--> From [facility], dc to [facility]  2/27-3/7/2023: non-traumatic rhabdomyolysis --> dc to [facility] (3/7-3/21/2023)     IMAGING  MRI SPINE LUMBAR:   Findings are concerning for progression of discitis/osteomyelitis centered at the L3-4 disc level, with suggestion of interval extension into the bilateral posterior articular facets at this level concerning for the development of bilateral septic   arthropathy.  There is also increasing bilateral psoas muscle edema/inflammation.  No discrete evidence of epidural abscess or psoas abscess.  Diffuse thickening of the cauda equina suggesting chronic arachnoiditis, which can be retrospectively seen and appears similar to previous imaging.  No abnormal post-contrast enhancement.      Procedure 1/2/24: CT guided lumbar disc biopsy    ASSESSMENT   Patient presents with the following performance deficits: strength, pain management, activity tolerance, endurance, standing balance. These deficits impact the patient’s ability to participate in ADL, transfers, instrumental activities of daily living, rest and sleep, leisure and social participation.     Pt received seated in chair, pleasant and cooperative for OT session. Pt educated on role of OT. Pt agreeable to participate in what he can  tolerate based on pain. Pt taken out to hallway to engage in short distance functional mobility in preparation for ambulatory toilet transfer. Sit to stand transfers performed at min A x 2 with good demonstration of safety awareness and body mechanics. Functional mobility performed at min A x 2 and chair follow for increased safety. During 2nd bout of functional mobility, pt quickly sat in chair without warning. Educated pt on notifying staff about needing a seated rest break to ensure safety. Pt left in chair with all needs.    The patient is functioning below his previous functional level and would benefit from skilled inpatient OT to address the above deficits, maximizing patient’s ability to return safely to his prior level of function.    OT Discharge Recommendations: Sub-acute rehabilitation  OT Device Recommendations: TBD    WEIGHT BEARING RESTRICTION  Weight Bearing Restriction: None                Recommendations for nursing staff:   Transfers: 2 person  Toileting location: bedside commode or toilet; varying pain    TREATMENT SESSION:  Patient Start of Session: seated in chair  FUNCTIONAL TRANSFER ASSESSMENT  Sit to Stand: Edge of Bed  Edge of Bed: Not Tested  Chair: Moderate Assist (min A x 2)    BED MOBILITY  Supine to Sit : Not tested    BALANCE ASSESSMENT     FUNCTIONAL ADL ASSESSMENT  LB Dressing Seated: Not Tested      ACTIVITY TOLERANCE: WFL                         O2 SATURATIONS       EDUCATION PROVIDED  Patient : Role of Occupational Therapy; Plan of Care; Discharge Recommendations; Functional Transfer Techniques; Fall Prevention; Surgical Precautions; Posture/Positioning; Energy Conservation; Proper Body Mechanics  Patient's Response to Education: Verbalized Understanding; Returned Demonstration      Equipment used: RW  Demonstrates functional use, Would benefit from additional trial      Patient End of Session: Up in chair;Needs met;Call light within reach;RN aware of session/findings;All patient  questions and concerns addressed;Alarm set    SUBJECTIVE  \"Pain is between a 4 and 8 depending how I move.\"    PAIN ASSESSMENT  Ratin (4 to 8)  Location: back  Management Techniques: Activity promotion;Body mechanics;Relaxation;Breathing techniques;Repositioning     OBJECTIVE  Precautions: Lumbar brace;Spine;Bed/chair alarm (Bilateral post op shoes, bunny boots when in bed to offload)    AM-PAC ‘6-Clicks’ Inpatient Daily Activity Short Form  -   Putting on and taking off regular lower body clothing?: Total  -   Bathing (including washing, rinsing, drying)?: A Lot  -   Toileting, which includes using toilet, bedpan or urinal? : Total  -   Putting on and taking off regular upper body clothing?: A Little  -   Taking care of personal grooming such as brushing teeth?: A Little  -   Eating meals?: None    AM-PAC Score:  Score: 14  Approx Degree of Impairment: 59.67%  Standardized Score (AM-PAC Scale): 33.39    PLAN  OT Treatment Plan: Balance activities;ADL training;Functional transfer training;Endurance training;Patient/Family education;Patient/Family training;Compensatory technique education  Rehab Potential : Good  Frequency: 3x/week    OT Goals:     All goals ongoing     ADL Goals   Patient will perform lower body dressing:  with mod assist  Patient will perform toileting: with mod assist     Functional Transfer Goals  Patient will transfer from sit to supine:  with supervision  Patient will transfer from supine to sit:  with supervision  Patient will transfer from sit to stand:  with mod assist  Patient will transfer to bedside commode:  with mod assist    OT Session Time: 15 minutes  Therapeutic Activity: 15 minutes

## 2024-01-08 NOTE — PROGRESS NOTES
01/08/24 1426   Clinical Encounter Type   Visited With Patient   Routine Visit   (length of stay)   Taxonomy   Intended Effects Convey a calming presence   Methods Offer emotional support;Offer spiritual/Religion support   Interventions Acknowledge current situation;Active listening;Assist with identifying strengths;Explain  role;Share words of hope and inspiration   Trigger for Consult   Trigger for Spiritual Care Consult No     Introduced self to the patient. Listened actively to the patient's story, feelings, coping, simeon, supportive relationship and resources. Acknowledged his situation, respected his simeon (\"spiritual\") and supported. Established a rapport.     Also, provided Spiritual Care card w/ direct contact number/information.    Spiritual Care support can be requested via an Epic consult.    For urgent/immediate needs, please contact the On Call  at ext. [phone].  Rev. Silviano Cabrera M.Div., M.T.S., APBCC., [facility]  Spiritual Care Lead

## 2024-01-08 NOTE — PROGRESS NOTES
[facility]     Hospitalist Progress Note     Troy Feliz Patient Status:  Inpatient    1960 MRN [id number]   [facility] 3SW-A Attending Christopher Gustafson MD   Hosp Day # 9 PCP MADELINE FENTON,      Chief Complaint: Back pain    Subjective:     Doing well.  Still with back spasms.  No other acute complaints.      Objective:    Review of Systems:   A comprehensive review of systems was completed; pertinent positive and negatives stated in subjective.    Vital signs:  Temp:  [97.4 °F (36.3 °C)-98 °F (36.7 °C)] 97.5 °F (36.4 °C)  Pulse:  [63-74] 65  Resp:  [16-18] 16  BP: (114-142)/(60-76) 115/60  SpO2:  [91 %-99 %] 94 %    Physical Exam:    General: No acute distress, pleasant   Respiratory: no wheezes, no rhonchi  Cardiovascular: S1, S2, regular rate and rhythm  Abdomen: Soft, Non-tender, non-distended, positive bowel sounds  Neuro: No new focal deficits.   Extremities: no edema, chronic stasis dermatitis, dressing in place    Diagnostic Data:    Labs:  Recent Labs   Lab 24  1049 24  1010   WBC  --  6.6   HGB  --  13.1   MCV  --  94.4   PLT  --  173.0   INR 1.17  --        Recent Labs   Lab 24  0535 24  1010   GLU  --  119*   BUN  --  57*   CREATSERUM  --  1.00   CA  --  9.0   NA  --  141   K 3.9 3.6   CL  --  109   CO2  --  27.0       Estimated Creatinine Clearance: 85.6 mL/min (based on SCr of 1 mg/dL).    No results for input(s): \"TROP\", \"TROPHS\", \"CK\" in the last 168 hours.    Recent Labs   Lab 24  1049   PTP 14.9*   INR 1.17                  Microbiology    Hospital Encounter on 23   1. Blood Culture     Status: None    Collection Time: 23  5:14 PM    Specimen: Blood,peripheral   Result Value Ref Range    Blood Culture Result No Growth 5 Days N/A         Imaging: Reviewed in Epic.    Medications:    hydrALAZINE  50 mg Oral Q8H OSEI    carvedilol  25 mg Oral BID with meals    furosemide  40 mg Oral BID (Diuretic)    gabapentin  100 mg Oral TID     insulin detemir  10 Units Subcutaneous Nightly    levothyroxine  25 mcg Oral Before breakfast    NIFEdipine ER  60 mg Oral Daily    heparin  5,000 Units Subcutaneous Q8H OSEI    insulin aspart  1-10 Units Subcutaneous TID AC and HS       Assessment & Plan:      #MSSA L3/L4 Discitis/OM  Patient with recurrent OM, multiple treatment courses in past year  MRI 12/30 with progression of discitis  S/p CT guided aspiration/core biopsy on 1/2 - pending results   Cultures NGTD  ID following - off abx    #Back Pain  Neurosurgery eval appreciated  Flexeril seems to be improving, NSAID  LSO brace recommended    #Bilateral plantar heel pressure ulcers  Podiatry eval -> wound care  Vascular surgery consult -> no plans for peripheral angio  Foot xrays without OM  Will need close outpatient wound care    #Essential HTN - coreg, lasix, hydralazin e  #DLD   #DM Type 2 - levemir 10U, iss    Dispo:  Once bx report back, can arrange discharge pending findings.  Plan for back to rehab for therapy     Christopher Gustafson MD    Supplementary Documentation:     Quality:  DVT Mechanical Prophylaxis:   SCDs,    DVT Pharmacologic Prophylaxis   Medication    heparin (Porcine) 5000 UNIT/ML injection 5,000 Units                Code Status: Full Code  Pruitt: No urinary catheter in place  Pruitt Duration (in days):   Central line:    KIKE:     Discharge is dependent on: biopsy results, then back to rehab  At this point Mr. Feliz is expected to be discharge to: [facility]    The 21st Century Cures Act makes medical notes like these available to patients in the interest of transparency. Please be advised this is a medical document. Medical documents are intended to carry relevant information, facts as evident, and the clinical opinion of the practitioner. The medical note is intended as peer to peer communication and may appear blunt or direct. It is written in medical language and may contain abbreviations or verbiage that are unfamiliar.

## 2024-01-08 NOTE — PROGRESS NOTES
INFECTIOUS DISEASE PROGRESS NOTE    Troy Feliz Patient Status:  Inpatient    1960 MRN [id number]   [facility] 3SW-A Attending Roverto Obregon MD   Hosp Day # 9 PCP MADELINE FENTON,      Abx: None    Subjective: Patient seen and examined today. States his back is feeling better, less pain and spasms s/p toradol. Denies any pain to his feet. Afebrile.    Allergies:  No Known Allergies    Medications:    Current Facility-Administered Medications:     cyclobenzaprine (Flexeril) tab 10 mg, 10 mg, Oral, TID PRN    hydrALAZINE (Apresoline) tab 50 mg, 50 mg, Oral, Q8H OSEI    carvedilol (Coreg) tab 25 mg, 25 mg, Oral, BID with meals    furosemide (Lasix) tab 40 mg, 40 mg, Oral, BID (Diuretic)    gabapentin (Neurontin) cap 100 mg, 100 mg, Oral, TID    HYDROcodone-acetaminophen (Norco) 5-325 MG per tab 1 tablet, 1 tablet, Oral, Q4H PRN    insulin detemir (Levemir) 100 UNIT/ML FlexPen/FlexTouch 10 Units, 10 Units, Subcutaneous, Nightly    levothyroxine (Synthroid) tab 25 mcg, 25 mcg, Oral, Before breakfast    NIFEdipine ER (Procardia-XL) 24 hr tab 60 mg, 60 mg, Oral, Daily    glucose (Dex4) 15 GM/59ML oral liquid 15 g, 15 g, Oral, Q15 Min PRN **OR** glucose (Glutose) 40% oral gel 15 g, 15 g, Oral, Q15 Min PRN **OR** glucose-vitamin C (Dex-4) chewable tab 4 tablet, 4 tablet, Oral, Q15 Min PRN **OR** dextrose 50% injection 50 mL, 50 mL, Intravenous, Q15 Min PRN **OR** glucose (Dex4) 15 GM/59ML oral liquid 30 g, 30 g, Oral, Q15 Min PRN **OR** glucose (Glutose) 40% oral gel 30 g, 30 g, Oral, Q15 Min PRN **OR** glucose-vitamin C (Dex-4) chewable tab 8 tablet, 8 tablet, Oral, Q15 Min PRN    heparin (Porcine) 5000 UNIT/ML injection 5,000 Units, 5,000 Units, Subcutaneous, Q8H OSEI    acetaminophen (Tylenol Extra Strength) tab 500 mg, 500 mg, Oral, Q4H PRN    HYDROmorphone (Dilaudid) 1 MG/ML injection 0.2 mg, 0.2 mg, Intravenous, Q2H PRN **OR**  HYDROmorphone (Dilaudid) 1 MG/ML injection 0.4 mg, 0.4 mg, Intravenous, Q2H PRN **OR** HYDROmorphone (Dilaudid) 1 MG/ML injection 0.8 mg, 0.8 mg, Intravenous, Q2H PRN    ondansetron (Zofran) 4 MG/2ML injection 4 mg, 4 mg, Intravenous, Q6H PRN    prochlorperazine (Compazine) 10 MG/2ML injection 5 mg, 5 mg, Intravenous, Q8H PRN    insulin aspart (NovoLOG) 100 Units/mL FlexPen 1-10 Units, 1-10 Units, Subcutaneous, TID AC and HS    lidocaine-menthol 4-1 % patch 1 patch, 1 patch, Transdermal, Daily PRN    melatonin tab 1 mg, 1 mg, Oral, Nightly PRN    polyethylene glycol (PEG 3350) (Miralax) 17 g oral packet 17 g, 17 g, Oral, Daily PRN  Current Facility-Administered Medications on File Prior to Encounter   Medication Dose Route Frequency Provider Last Rate Last Admin    [COMPLETED] potassium chloride (K-Dur) tab 40 mEq  40 mEq Oral Once Marcella Burton MD   40 mEq at 12/16/23 1101    [COMPLETED] potassium chloride (K-Dur) tab 40 mEq  40 mEq Oral Once Marcella Burton MD   40 mEq at 12/14/23 0824    [COMPLETED] furosemide (Lasix) 10 mg/mL injection 60 mg  60 mg Intravenous BID (Diuretic) Marcella Burton MD   60 mg at 12/15/23 1737    [COMPLETED] potassium chloride (K-Dur) tab 40 mEq  40 mEq Oral Q4H Marcella Burton MD   40 mEq at 12/13/23 1202    [COMPLETED] potassium chloride (K-Dur) tab 40 mEq  40 mEq Oral Once Marcella Burton MD   40 mEq at 12/13/23 1725    [COMPLETED] furosemide (Lasix) 10 mg/mL injection 60 mg  60 mg Intravenous Once Jazmine Martinez MD   60 mg at 12/12/23 2216     Current Outpatient Medications on File Prior to Encounter   Medication Sig Dispense Refill    furosemide 40 MG Oral Tab Take 1 tablet (40 mg total) by mouth 2 (two) times daily. 60 tablet 1    hydrALAZINE 25 MG Oral Tab Take 1 tablet (25 mg total) by mouth every 8 (eight) hours. 90 tablet 0    tiZANidine 4 MG Oral Tab Take 1 tablet (4 mg total) by mouth every 6 (six) hours as needed. 20 tablet 0    HYDROcodone-acetaminophen 5-325 MG Oral  Tab Take 1 tablet by mouth every 4 (four) hours as needed. 15 tablet 0    gabapentin 100 MG Oral Cap Take 1 capsule (100 mg total) by mouth 3 (three) times daily. 90 capsule 0    carvedilol 25 MG Oral Tab Take 1 tablet (25 mg total) by mouth 2 (two) times daily with meals. 60 tablet 0    levothyroxine 25 MCG Oral Tab Take 1 tablet (25 mcg total) by mouth before breakfast.      NIFEdipine ER 60 MG Oral Tablet 24 Hr Take 1 tablet (60 mg total) by mouth daily.      traMADol 50 MG Oral Tab Take 1 tablet (50 mg total) by mouth every 8 (eight) hours as needed for Pain. (Patient not taking: Reported on 12/30/2023) 15 tablet 0    acetaminophen 500 MG Oral Tab Take 1-2 tablet every 4 hours as needed for pain 20 tablet 0    insulin detemir (LEVEMIR FLEXTOUCH) 100 UNIT/ML Subcutaneous Solution Pen-injector Inject 10 Units into the skin nightly. 5 each 2    polyethylene glycol, PEG 3350, 17 g Oral Powd Pack Take 17 g by mouth daily.      melatonin 1 MG Oral Tab Nightly prn 30 tablet 0    insulin aspart (NOVOLOG FLEXPEN) 100 Units/mL Subcutaneous Solution Pen-injector Test blood glucose 3 times daily with meals  Inject 1 unit if blood glucose is between 141-180 mg/dL Inject 2 units if blood glucose is between 181-220 mg/dL Inject 3 units if blood glucose is between 221-260 mg/dL Inject 4 units if blood glucose is between 261-300 mg/dL Inject 5 units if blood glucose is between 301-350 mg/dL Call your physician if blood glucose is greater than 351 mg/dL, 5 each 2    Insulin Pen Needle 32G X 4 MM Does not apply Misc May substitute if not on insurance formulary 100 each 5    lidocaine-menthol 4-1 % External Patch Place 1 patch onto the skin daily. 30 patch 0       Review of Systems:    A comprehensive 10 point review of systems was completed.  Pertinent positives and negatives noted in the the HPI.      Physical Exam:    General: No acute distress. Alert and oriented x 3.  Vital signs: Temp:  [97.4 °F (36.3 °C)-98 °F (36.7 °C)]  97.5 °F (36.4 °C)  Pulse:  [63-74] 65  Resp:  [16-18] 16  BP: (114-142)/(60-76) 115/60  SpO2:  [93 %-99 %] 94 %  HEENT: Moist mucous membranes. Extraocular muscles are intact.  Neck: No swelling, no masses  Respiratory: Clear to auscultation bilaterally. No wheezing. No rhonchi.  Cardiovascular: RRR  Abdomen: Soft, nontender, nondistended.    Musculoskeletal: Movement of all extremities.  Joints: no effusions  Skin: No cellulitis noted. R foot with wound to heel, no drainage or surrounding cellulitis. L ankle wound without signs of infection, seems superficial    Laboratory Data:  Laboratory data reviewed      Recent Labs   Lab 01/07/24  1010   RBC 4.31   HGB 13.1   HCT 40.7   MCV 94.4   MCH 30.4   MCHC 32.2   RDW 13.0   WBC 6.6   .0       Recent Labs   Lab 01/02/24  0535 01/07/24  1010   GLU  --  119*   BUN  --  57*   CREATSERUM  --  1.00   CA  --  9.0   NA  --  141   K 3.9 3.6   CL  --  109   CO2  --  27.0         Lab Results   Component Value Date    PGLU 124 01/08/2024         Microbiologic Data:   Hospital Encounter on 12/30/23   1. Blood Culture     Status: None    Collection Time: 12/30/23  5:14 PM    Specimen: Blood,peripheral   Result Value Ref Range    Blood Culture Result No Growth 5 Days N/A         Imaging:  Narrative   PROCEDURE:  MRI SPINE LUMBAR (W+WO) (CPT=72158)      COMPARISON:  JOSE RAMON , , MRI SPINE LUMBAR (W+WO) (CPT=72158), 6/27/2023, 10:07 PM.  JOSE RAMON , MR, MRI SPINE LUMBAR(CONTRAST ONLY) (CPT=72149), 8/22/2023, 9:58 PM.  JOSE RAMON , XR, XR LUMBAR SPINE (MIN 4 VIEWS) (CPT=72110), 12/14/2023, 2:48 PM.     INDICATIONS:  Severe low back pain.  History of discitis/osteomyelitis at the L3-4 level.     TECHNIQUE:  Multiplanar T1 and T2 weighted images including fat suppression sequences.  Images acquired in sagittal and axial planes. Intravenous gadolinium was administered followed by multiplanar post-infusion T1 weighted sequences.       PATIENT STATED HISTORY:(As transcribed by Technologist)   The patient stated he is being evaluated for chronic, severe lower back pain and muscle spasms. He is wheelchair bound due to pain.      CONTRAST USED:  20 mL of Dotarem     FINDINGS:    LUMBAR DISC LEVELS  L1-L2:  No significant disc/facet abnormality, spinal stenosis, or foraminal narrowing.    L2-L3:  There is a moderate broad-based degenerative disc bulge.  There is posterior epidural fat hypertrophy.  This causes moderate central canal stenosis of 8 mm.  The facet joints are unremarkable.  L3-L4:  There continues to be fluid signal within the disc space.  There is destructive changes of the endplates of L3 and L4 which is slightly more advanced than the study of 8/22/2023.  There is enhancement of the endplates which is slightly more  vigorous than on the previous study.  Additionally, there is new intense mint along the posterior articular facets at both levels suspicious for interval progression of septic arthropathy into the bilateral posterior articular facet joints.  There is  also increasing adjacent psoas edema diffusely.  There is no abdi epidural fluid collection or abscess.  There is moderate central canal stenosis of 7.5 mm.    L4-L5:  There is mild degenerative disc bulge osteophyte complex.  AP diameter canal is normal 12 mm.  There is mild subarticular zone and neural foraminal narrowing.  The facet joints unremarkable.  L5-S1:  Very mild posterior degenerative disc bulge.  There is mild bilateral facet joint degenerative change.  There is mild bilateral neural foraminal narrowing.     PARASPINAL AREA:  Increasing bilateral psoas muscle edema/inflammation.  No evidence of psoas muscle abscess.  BONES:  No fracture, pars defect, or osseous lesion.    CORD/CAUDA EQUINA:  There is thickening of the cauda equina nerve roots diffusely suggestive of chronic arachnoiditis, which can be retrospectively seen on previous imaging studies and appears relatively stable.  No abnormal enhancement along the  conus  or nerve roots.                   Impression   CONCLUSION:    Findings are concerning for progression of discitis/osteomyelitis centered at the L3-4 disc level, with suggestion of interval extension into the bilateral posterior articular facets at this level concerning for the development of bilateral septic  arthropathy.  There is also increasing bilateral psoas muscle edema/inflammation.  No discrete evidence of epidural abscess or psoas abscess.     Diffuse thickening of the cauda equina suggesting chronic arachnoiditis, which can be retrospectively seen and appears similar to previous imaging.  No abnormal post-contrast enhancement.        Established Problem list:  Patient Active Problem List   Diagnosis    Mixed hyperlipidemia    Subclinical hypothyroidism    Proteinuria    Benign essential HTN    Type 2 diabetes mellitus with other specified complication ([facility])    Peripheral edema    Morbid obesity with BMI of 40.0-44.9, adult ([facility])    Non-traumatic rhabdomyolysis    Acute hypoxemic respiratory failure ([facility])    Right leg weakness    Generalized weakness    HHNC (hyperglycemic hyperosmolar nonketotic coma) ([facility])    MSSA bacteremia    Discitis of lumbar region    Epidural abscess    Intractable back pain    Osteomyelitis of low back ([facility])    Diarrhea    C. difficile diarrhea    Syncope and collapse    Nausea and vomiting    Leukocytosis    TARIK (acute kidney injury) ([facility])    Dehydration    Renal insufficiency    Hyperkalemia    Azotemia    Metabolic acidosis    Hyperglycemia    Weakness generalized    Dizziness    Hypertensive urgency    Extremity edema    Lumbar radiculopathy    Discitis, unspecified spinal region    Dyslipidemia    Moderate back pain    Pressure ulcer, heel, right, unstageable ([facility])    Pressure injury of left heel, stage 2 ([facility])    PVD (peripheral vascular disease) ([facility])       ASSESSMENT/PLAN:  1. MSSA L3/4 Discitis/OM     - First diagnosed February 2023 at which time patient had MSSA  bacteremia and L3/4 discitis s/p cefazolin  - 3/22/23 with ongoing symptoms. MRI 3/22/23 demonstrated new epidural phlegmon, discharged on nafcillin EOT 5/29 (13 wk course).  - MRI 6/27 shows discitis/osteomyelitis with improvement, decreased L3/4 disc fluid, no abscess. CRP was elevated and patient planned for IR biopsy (off abx) though there was no fluid to aspirate, biopsy was taken 6/29, discharged off abx. Cultures finalized negative. Path with some neutrophils stains negative.   - MRI 8/22/23 showed similar discitis/om changes at L3-4, unclear if worsened for evolving changes of infection. Blood cultures were negative and patient afebrile at that time  - Presents to ED 12/30 with low back, R hip/groin pains that aren't controlled with pan medication.   - MRI 12/30 with contrast c/f progression of discitis / om at L3/4 level with extension into the bilateral posterior articular facets and increasing bilateral psoas muscle edema/inflammation without definite abscess in the epidural or psoas space.   -S/p CT guided aspiration and core bx of L3-L4 1/2 by IR, bloody fluid and solid core. Cxs negative.  - R hip XR- OA changes w/o fx  - pain improved with NSAIDs    No ABX given PTA. Given nafcillin + vancomycin in house; now off abx  Blood cxs 12/30 negative  Afebrile  WBC 8  CRP 1.47 , ESR 33    2. H/o C diff   - monitor BMs  - no diarrhea now    3. Foot wounds  - did not appear acutely infected   - arterial dopplers 12/15 with 50% stenosis at the bilateral distal posterior tibial arteries. Vascular eval appreciated, no plans for intervention at this time, may need angiogram if worsening.  - XR noted  - podiatry eval appreciated    PLAN:  - continue to hold abx for now as patient is hemodynamically stable, afebrile with normal WBC. If any of these should change would re-start empiric abx therapy.   - await pathology  - NS following  - monitor clinically   - monitor feet clinically; wound care as per wound care  team/podiatry    Discussed case with patient and RN.    Kasia Harley PA-C

## 2024-01-08 NOTE — PLAN OF CARE
A&O x 4. VSS. On RA. Norco/Flexeril for lower back pain and muscle spasms. Dressing to bilateral feet C/D/I, changed already this AM. Voiding large amounts per urinal. Minimal activity \"d/t pain.\" Refuses SCD's/ on Heparin. Reviewed POC, pain management, and fall precautions with pt. Plan pending bx results. Will continue to monitor.

## 2024-01-09 VITALS
WEIGHT: 292 LBS | DIASTOLIC BLOOD PRESSURE: 60 MMHG | TEMPERATURE: 98 F | RESPIRATION RATE: 16 BRPM | SYSTOLIC BLOOD PRESSURE: 128 MMHG | HEART RATE: 72 BPM | BODY MASS INDEX: 39 KG/M2 | OXYGEN SATURATION: 96 %

## 2024-01-09 LAB
GLUCOSE BLD-MCNC: 119 MG/DL (ref 70–99)
GLUCOSE BLD-MCNC: 143 MG/DL (ref 70–99)

## 2024-01-09 PROCEDURE — 99239 HOSP IP/OBS DSCHRG MGMT >30: CPT | Performed by: HOSPITALIST

## 2024-01-09 RX ORDER — CYCLOBENZAPRINE HCL 10 MG
10 TABLET ORAL 3 TIMES DAILY PRN
Qty: 21 TABLET | Refills: 0 | Status: SHIPPED | OUTPATIENT
Start: 2024-01-09

## 2024-01-09 RX ORDER — HYDROCODONE BITARTRATE AND ACETAMINOPHEN 5; 325 MG/1; MG/1
1 TABLET ORAL EVERY 8 HOURS PRN
Qty: 15 TABLET | Refills: 0 | Status: SHIPPED | OUTPATIENT
Start: 2024-01-09

## 2024-01-09 RX ORDER — HYDRALAZINE HYDROCHLORIDE 50 MG/1
50 TABLET, FILM COATED ORAL EVERY 8 HOURS SCHEDULED
Qty: 90 TABLET | Refills: 0 | Status: SHIPPED | OUTPATIENT
Start: 2024-01-09 | End: 2024-02-08

## 2024-01-09 NOTE — PLAN OF CARE
Aox4, pain controlled on ordered medication, denying numbness or tingling to BLE, on room air , on Heparin subcutaneous, scds refused, voiding via urinal, up mod assist with RW and LSO brace, post-op shoes when ambulating, dressings to feet to be changed in the morning, plan to dc back to [facility] rehab today, call light within reach, no further needs at this time.

## 2024-01-09 NOTE — PROGRESS NOTES
AVS reviewed, IV dc'd, script for Norco in chart, will dc to [facility] at 4pm via ambulance.

## 2024-01-09 NOTE — DISCHARGE SUMMARY
[facility]     Troy Feliz Patient Status:  Inpatient    1960 MRN [id number]   Location [facility] 3SW-A Attending Christopher Gustafson MD   Hosp Day # 10 PCP MADELINE FENTON DO     Date of Admission: 2023  Date of Discharge:   2024    Discharge Disposition: ED Dismiss - Never Arrived    Discharge Diagnosis:    #MSSA L3/L4 Discitis  #Back Pain  #Bilateral plantar heel pressure ulcers  #Essential HTN   #DLD   #DM Type 2     History of Present Illness: Troy Feliz is a 63 year old male with PMhx of DM, HTN, DL, discitis presents with back and hip pain and muscle spasms. Pt is wheelchair bound due to muscle spasms. He states symptoms all started back in March. He has been going to rehab for this. He states he took norco this morning without relief. He denies any F/C. No new focal weakness, numbness or tingling. No CP or SOB. No N/V/D/C or abd pain. He denies any loss of bowel or urine function.     Brief Synopsis:   Patient presented with hip pain/muscle spasms.  He was seen by neurosurgery who recommended IR guided biopsy with LSO brace for therapy support.  His biopsy report was negative for infection/inflammatory cells.  He completed abx for previous mssa discitis.  ID consult, patient was monitored off antibiotics.  He will discharge to [facility] for continued PT.  Patient also seen by podiatry and vascular surgery, continue with wound care and outpatient follow up for his LE wounds.    All questions addressed with patient prior to discharge, he was agreeable to plan of care as stated.  He was encouraged to follow up with PCP after discharge.     Lace+ Score: 79  59-90 High Risk  29-58 Medium Risk  0-28   Low Risk       TCM Follow-Up Recommendation:  LACE > 58: High Risk of readmission after discharge from the hospital.      Procedures during hospitalization:   CT guided lumbar biopsy - 24    Incidental or significant findings and recommendations (brief  descriptions):  None    Lab/Test results pending at Discharge:   N/a    Consultants:  ID, Neurosurgery, Podiatry, Vascular     Discharge Medication List:     Discharge Medications        START taking these medications        Instructions Prescription details   cyclobenzaprine 10 MG Tabs  Commonly known as: Flexeril      Take 1 tablet (10 mg total) by mouth 3 (three) times daily as needed for Muscle spasms.   Quantity: 21 tablet  Refills: 0            CHANGE how you take these medications        Instructions Prescription details   hydrALAZINE 50 MG Tabs  Commonly known as: Apresoline  What changed:   medication strength  how much to take      Take 1 tablet (50 mg total) by mouth every 8 (eight) hours.   Stop taking on: February 8, 2024  Quantity: 90 tablet  Refills: 0     HYDROcodone-acetaminophen 5-325 MG Tabs  Commonly known as: Norco  What changed: when to take this      Take 1 tablet by mouth every 8 (eight) hours as needed.   Quantity: 15 tablet  Refills: 0            CONTINUE taking these medications        Instructions Prescription details   acetaminophen 500 MG Tabs  Commonly known as: Tylenol Extra Strength      Take 1-2 tablet every 4 hours as needed for pain   Quantity: 20 tablet  Refills: 0     carvedilol 25 MG Tabs  Commonly known as: Coreg      Take 1 tablet (25 mg total) by mouth 2 (two) times daily with meals.   Quantity: 60 tablet  Refills: 0     furosemide 40 MG Tabs  Commonly known as: Lasix      Take 1 tablet (40 mg total) by mouth 2 (two) times daily.   Quantity: 60 tablet  Refills: 1     gabapentin 100 MG Caps  Commonly known as: Neurontin      Take 1 capsule (100 mg total) by mouth 3 (three) times daily.   Quantity: 90 capsule  Refills: 0     Insulin Pen Needle 32G X 4 MM Misc      May substitute if not on insurance formulary   Quantity: 100 each  Refills: 5     Levemir FlexTouch 100 UNIT/ML Sopn  Generic drug: insulin detemir      Inject 10 Units into the skin nightly.   Quantity: 5  each  Refills: 2     levothyroxine 25 MCG Tabs  Commonly known as: Synthroid      Take 1 tablet (25 mcg total) by mouth before breakfast.   Refills: 0     lidocaine-menthol 4-1 % Ptch      Place 1 patch onto the skin daily.   Quantity: 30 patch  Refills: 0     melatonin 1 MG Tabs      Nightly prn   Quantity: 30 tablet  Refills: 0     NIFEdipine ER 60 MG Tb24  Commonly known as: ADALAT CC      Take 1 tablet (60 mg total) by mouth daily.   Refills: 0     NovoLOG FlexPen 100 UNIT/ML Sopn  Generic drug: insulin aspart      Test blood glucose 3 times daily with meals  Inject 1 unit if blood glucose is between 141-180 mg/dL Inject 2 units if blood glucose is between 181-220 mg/dL Inject 3 units if blood glucose is between 221-260 mg/dL Inject 4 units if blood glucose is between 261-300 mg/dL Inject 5 units if blood glucose is between 301-350 mg/dL Call your physician if blood glucose is greater than 351 mg/dL,   Quantity: 5 each  Refills: 2     polyethylene glycol (PEG 3350) 17 g Pack  Commonly known as: Miralax      Take 17 g by mouth daily.   Refills: 0            STOP taking these medications      tiZANidine 4 MG Tabs  Commonly known as: Zanaflex        traMADol 50 MG Tabs  Commonly known as: Ultram                  Where to Get Your Medications        These medications were sent to [id number] - [place], [place] - [address] [facility] [address], [phone], [phone]  [address], [facility] [address]      Phone: [phone]   cyclobenzaprine 10 MG Tabs  hydrALAZINE 50 MG Tabs       Please  your prescriptions at the location directed by your doctor or nurse    Bring a paper prescription for each of these medications  HYDROcodone-acetaminophen 5-325 MG Tabs         [place] reviewed[place]    Follow-up appointment:   No follow-up provider specified.  Appointments for Next 30 Days 1/9/2024 - 2/8/2024      None            Vital signs:  Temp:  [97.7 °F (36.5 °C)-98.3 °F (36.8 °C)] 97.7  °F (36.5 °C)  Pulse:  [66-72] 72  Resp:  [16-18] 16  BP: (125-130)/(56-74) 128/60  SpO2:  [94 %-96 %] 96 %    Physical Exam:    General: No acute distress, pleasant    Lungs: clear to auscultation  Cardiovascular: S1, S2, RRR  Abdomen: Soft, NT, ND    -----------------------------------------------------------------------------------------------  PATIENT DISCHARGE INSTRUCTIONS: See electronic chart    Christopher Gustafson MD    Total time spent on discharge plannin minutes     The  Cures Act makes medical notes like these available to patients in the interest of transparency. Please be advised this is a medical document. Medical documents are intended to carry relevant information, facts as evident, and the clinical opinion of the practitioner. The medical note is intended as peer to peer communication and may appear blunt or direct. It is written in medical language and may contain abbreviations or verbiage that are unfamiliar.

## 2024-01-09 NOTE — PLAN OF CARE
Pt Aox4, RA , declined SCDs. Heparin subcutaneous. Norco and ibuprofen for pain. Flexeril given for spasms. Min assist with walker and PO shoes up to chair. Dressings to BLE CDI. IV SL. Plan to Dc to [facility] later today

## 2024-01-09 NOTE — PROGRESS NOTES
INFECTIOUS DISEASE PROGRESS NOTE    Troy Feliz Patient Status:  Inpatient    1960 MRN [id number]   [facility] 3SW-A Attending Roverto Obregon MD   Hosp Day # 10 PCP MADELINE FENTON,      Abx: None    Subjective: Patient seen and examined today. Still with some back discomfort and spasms. Afebrile. On room air.     Allergies:  No Known Allergies    Medications:    Current Facility-Administered Medications:     ibuprofen (Motrin) tab 400 mg, 400 mg, Oral, Q6H PRN    cyclobenzaprine (Flexeril) tab 10 mg, 10 mg, Oral, TID PRN    hydrALAZINE (Apresoline) tab 50 mg, 50 mg, Oral, Q8H OSEI    carvedilol (Coreg) tab 25 mg, 25 mg, Oral, BID with meals    furosemide (Lasix) tab 40 mg, 40 mg, Oral, BID (Diuretic)    gabapentin (Neurontin) cap 100 mg, 100 mg, Oral, TID    HYDROcodone-acetaminophen (Norco) 5-325 MG per tab 1 tablet, 1 tablet, Oral, Q4H PRN    insulin detemir (Levemir) 100 UNIT/ML FlexPen/FlexTouch 10 Units, 10 Units, Subcutaneous, Nightly    levothyroxine (Synthroid) tab 25 mcg, 25 mcg, Oral, Before breakfast    NIFEdipine ER (Procardia-XL) 24 hr tab 60 mg, 60 mg, Oral, Daily    glucose (Dex4) 15 GM/59ML oral liquid 15 g, 15 g, Oral, Q15 Min PRN **OR** glucose (Glutose) 40% oral gel 15 g, 15 g, Oral, Q15 Min PRN **OR** glucose-vitamin C (Dex-4) chewable tab 4 tablet, 4 tablet, Oral, Q15 Min PRN **OR** dextrose 50% injection 50 mL, 50 mL, Intravenous, Q15 Min PRN **OR** glucose (Dex4) 15 GM/59ML oral liquid 30 g, 30 g, Oral, Q15 Min PRN **OR** glucose (Glutose) 40% oral gel 30 g, 30 g, Oral, Q15 Min PRN **OR** glucose-vitamin C (Dex-4) chewable tab 8 tablet, 8 tablet, Oral, Q15 Min PRN    heparin (Porcine) 5000 UNIT/ML injection 5,000 Units, 5,000 Units, Subcutaneous, Q8H [facility]    acetaminophen (Tylenol Extra Strength) tab 500 mg, 500 mg, Oral, Q4H PRN    HYDROmorphone (Dilaudid) 1 MG/ML injection 0.2 mg, 0.2 mg, Intravenous, Q2H PRN  **OR** HYDROmorphone (Dilaudid) 1 MG/ML injection 0.4 mg, 0.4 mg, Intravenous, Q2H PRN **OR** HYDROmorphone (Dilaudid) 1 MG/ML injection 0.8 mg, 0.8 mg, Intravenous, Q2H PRN    ondansetron (Zofran) 4 MG/2ML injection 4 mg, 4 mg, Intravenous, Q6H PRN    prochlorperazine (Compazine) 10 MG/2ML injection 5 mg, 5 mg, Intravenous, Q8H PRN    insulin aspart (NovoLOG) 100 Units/mL FlexPen 1-10 Units, 1-10 Units, Subcutaneous, TID AC and HS    lidocaine-menthol 4-1 % patch 1 patch, 1 patch, Transdermal, Daily PRN    melatonin tab 1 mg, 1 mg, Oral, Nightly PRN    polyethylene glycol (PEG 3350) (Miralax) 17 g oral packet 17 g, 17 g, Oral, Daily PRN  Current Facility-Administered Medications on File Prior to Encounter   Medication Dose Route Frequency Provider Last Rate Last Admin    [COMPLETED] potassium chloride (K-Dur) tab 40 mEq  40 mEq Oral Once Marcella Burton MD   40 mEq at 12/16/23 1101    [COMPLETED] potassium chloride (K-Dur) tab 40 mEq  40 mEq Oral Once Marcella Burton MD   40 mEq at 12/14/23 0824    [COMPLETED] furosemide (Lasix) 10 mg/mL injection 60 mg  60 mg Intravenous BID (Diuretic) aMrcella Burton MD   60 mg at 12/15/23 1737    [COMPLETED] potassium chloride (K-Dur) tab 40 mEq  40 mEq Oral Q4H Marcella Burton MD   40 mEq at 12/13/23 1202    [COMPLETED] potassium chloride (K-Dur) tab 40 mEq  40 mEq Oral Once Marcella Burton MD   40 mEq at 12/13/23 1725    [COMPLETED] furosemide (Lasix) 10 mg/mL injection 60 mg  60 mg Intravenous Once Jazmine Martinez MD   60 mg at 12/12/23 2216     Current Outpatient Medications on File Prior to Encounter   Medication Sig Dispense Refill    furosemide 40 MG Oral Tab Take 1 tablet (40 mg total) by mouth 2 (two) times daily. 60 tablet 1    hydrALAZINE 25 MG Oral Tab Take 1 tablet (25 mg total) by mouth every 8 (eight) hours. 90 tablet 0    tiZANidine 4 MG Oral Tab Take 1 tablet (4 mg total) by mouth every 6 (six) hours as needed. 20 tablet 0    gabapentin 100 MG Oral Cap Take 1  capsule (100 mg total) by mouth 3 (three) times daily. 90 capsule 0    carvedilol 25 MG Oral Tab Take 1 tablet (25 mg total) by mouth 2 (two) times daily with meals. 60 tablet 0    levothyroxine 25 MCG Oral Tab Take 1 tablet (25 mcg total) by mouth before breakfast.      NIFEdipine ER 60 MG Oral Tablet 24 Hr Take 1 tablet (60 mg total) by mouth daily.      traMADol 50 MG Oral Tab Take 1 tablet (50 mg total) by mouth every 8 (eight) hours as needed for Pain. (Patient not taking: Reported on 12/30/2023) 15 tablet 0    acetaminophen 500 MG Oral Tab Take 1-2 tablet every 4 hours as needed for pain 20 tablet 0    insulin detemir (LEVEMIR FLEXTOUCH) 100 UNIT/ML Subcutaneous Solution Pen-injector Inject 10 Units into the skin nightly. 5 each 2    polyethylene glycol, PEG 3350, 17 g Oral Powd Pack Take 17 g by mouth daily.      melatonin 1 MG Oral Tab Nightly prn 30 tablet 0    insulin aspart (NOVOLOG FLEXPEN) 100 Units/mL Subcutaneous Solution Pen-injector Test blood glucose 3 times daily with meals  Inject 1 unit if blood glucose is between 141-180 mg/dL Inject 2 units if blood glucose is between 181-220 mg/dL Inject 3 units if blood glucose is between 221-260 mg/dL Inject 4 units if blood glucose is between 261-300 mg/dL Inject 5 units if blood glucose is between 301-350 mg/dL Call your physician if blood glucose is greater than 351 mg/dL, 5 each 2    Insulin Pen Needle 32G X 4 MM Does not apply Misc May substitute if not on insurance formulary 100 each 5    lidocaine-menthol 4-1 % External Patch Place 1 patch onto the skin daily. 30 patch 0       Review of Systems:    A comprehensive 10 point review of systems was completed.  Pertinent positives and negatives noted in the the HPI.      Physical Exam:    General: No acute distress. Alert and oriented x 3.  Vital signs: Temp:  [97.7 °F (36.5 °C)-98.3 °F (36.8 °C)] 97.7 °F (36.5 °C)  Pulse:  [66-72] 72  Resp:  [16-18] 16  BP: (125-130)/(56-74) 128/60  SpO2:  [94 %-96 %] 96  %  HEENT: Moist mucous membranes. Extraocular muscles are intact.  Neck: No swelling, no masses  Respiratory: Clear to auscultation bilaterally. No wheezing. No rhonchi.  Cardiovascular: RRR  Abdomen: Soft, nontender, nondistended.    Musculoskeletal: Movement of all extremities.  Joints: no effusions  Skin: No cellulitis noted.     Laboratory Data:  Laboratory data reviewed      Recent Labs   Lab 01/07/24  1010   RBC 4.31   HGB 13.1   HCT 40.7   MCV 94.4   MCH 30.4   MCHC 32.2   RDW 13.0   WBC 6.6   .0       Recent Labs   Lab 01/07/24  1010   *   BUN 57*   CREATSERUM 1.00   CA 9.0      K 3.6      CO2 27.0         Lab Results   Component Value Date    PGLU 119 01/09/2024         Microbiologic Data:   Hospital Encounter on 12/30/23   1. Blood Culture     Status: None    Collection Time: 12/30/23  5:14 PM    Specimen: Blood,peripheral   Result Value Ref Range    Blood Culture Result No Growth 5 Days N/A         Imaging:  Narrative   PROCEDURE:  MRI SPINE LUMBAR (W+WO) (CPT=72158)      COMPARISON:  [facility] , MR, MRI SPINE LUMBAR (W+WO) (CPT=72158), 6/27/2023, 10:07 PM.  [facility] , MR, MRI SPINE LUMBAR(CONTRAST ONLY) (CPT=72149), 8/22/2023, 9:58 PM.  [facility] , XR, XR LUMBAR SPINE (MIN 4 VIEWS) (CPT=72110), 12/14/2023, 2:48 PM.     INDICATIONS:  Severe low back pain.  History of discitis/osteomyelitis at the L3-4 level.     TECHNIQUE:  Multiplanar T1 and T2 weighted images including fat suppression sequences.  Images acquired in sagittal and axial planes. Intravenous gadolinium was administered followed by multiplanar post-infusion T1 weighted sequences.       PATIENT STATED HISTORY:(As transcribed by Technologist)  The patient stated he is being evaluated for chronic, severe lower back pain and muscle spasms. He is wheelchair bound due to pain.      CONTRAST USED:  20 mL of Dotarem     FINDINGS:    LUMBAR DISC LEVELS  L1-L2:  No significant disc/facet abnormality, spinal stenosis, or foraminal  narrowing.    L2-L3:  There is a moderate broad-based degenerative disc bulge.  There is posterior epidural fat hypertrophy.  This causes moderate central canal stenosis of 8 mm.  The facet joints are unremarkable.  L3-L4:  There continues to be fluid signal within the disc space.  There is destructive changes of the endplates of L3 and L4 which is slightly more advanced than the study of 8/22/2023.  There is enhancement of the endplates which is slightly more  vigorous than on the previous study.  Additionally, there is new intense mint along the posterior articular facets at both levels suspicious for interval progression of septic arthropathy into the bilateral posterior articular facet joints.  There is  also increasing adjacent psoas edema diffusely.  There is no abdi epidural fluid collection or abscess.  There is moderate central canal stenosis of 7.5 mm.    L4-L5:  There is mild degenerative disc bulge osteophyte complex.  AP diameter canal is normal 12 mm.  There is mild subarticular zone and neural foraminal narrowing.  The facet joints unremarkable.  L5-S1:  Very mild posterior degenerative disc bulge.  There is mild bilateral facet joint degenerative change.  There is mild bilateral neural foraminal narrowing.     PARASPINAL AREA:  Increasing bilateral psoas muscle edema/inflammation.  No evidence of psoas muscle abscess.  BONES:  No fracture, pars defect, or osseous lesion.    CORD/CAUDA EQUINA:  There is thickening of the cauda equina nerve roots diffusely suggestive of chronic arachnoiditis, which can be retrospectively seen on previous imaging studies and appears relatively stable.  No abnormal enhancement along the conus  or nerve roots.                   Impression   CONCLUSION:    Findings are concerning for progression of discitis/osteomyelitis centered at the L3-4 disc level, with suggestion of interval extension into the bilateral posterior articular facets at this level concerning for the  development of bilateral septic  arthropathy.  There is also increasing bilateral psoas muscle edema/inflammation.  No discrete evidence of epidural abscess or psoas abscess.     Diffuse thickening of the cauda equina suggesting chronic arachnoiditis, which can be retrospectively seen and appears similar to previous imaging.  No abnormal post-contrast enhancement.        Established Problem list:  Patient Active Problem List   Diagnosis    Mixed hyperlipidemia    Subclinical hypothyroidism    Proteinuria    Benign essential HTN    Type 2 diabetes mellitus with other specified complication ([facility])    Peripheral edema    Morbid obesity with BMI of 40.0-44.9, adult ([facility])    Non-traumatic rhabdomyolysis    Acute hypoxemic respiratory failure ([facility])    Right leg weakness    Generalized weakness    HHNC (hyperglycemic hyperosmolar nonketotic coma) ([facility])    MSSA bacteremia    Discitis of lumbar region    Epidural abscess    Intractable back pain    Osteomyelitis of low back ([facility])    Diarrhea    C. difficile diarrhea    Syncope and collapse    Nausea and vomiting    Leukocytosis    TARIK (acute kidney injury) ([facility])    Dehydration    Renal insufficiency    Hyperkalemia    Azotemia    Metabolic acidosis    Hyperglycemia    Weakness generalized    Dizziness    Hypertensive urgency    Extremity edema    Lumbar radiculopathy    Discitis, unspecified spinal region    Dyslipidemia    Moderate back pain    Pressure ulcer, heel, right, unstageable ([facility])    Pressure injury of left heel, stage 2 ([facility])    PVD (peripheral vascular disease) ([facility])       ASSESSMENT/PLAN:  1. MSSA L3/4 Discitis/OM     - First diagnosed February 2023 at which time patient had MSSA bacteremia and L3/4 discitis s/p cefazolin  - 3/22/23 with ongoing symptoms. MRI 3/22/23 demonstrated new epidural phlegmon, discharged on nafcillin EOT 5/29 (13 wk course).  - MRI 6/27 shows discitis/osteomyelitis with improvement, decreased L3/4 disc fluid, no abscess. CRP was  elevated and patient planned for IR biopsy (off abx) though there was no fluid to aspirate, biopsy was taken 6/29, discharged off abx. Cultures finalized negative. Path with some neutrophils stains negative.   - MRI 8/22/23 showed similar discitis/om changes at L3-4, unclear if worsened for evolving changes of infection. Blood cultures were negative and patient afebrile at that time  - Presents to ED 12/30 with low back, R hip/groin pains that aren't controlled with pan medication.   - MRI 12/30 with contrast c/f progression of discitis / om at L3/4 level with extension into the bilateral posterior articular facets and increasing bilateral psoas muscle edema/inflammation without definite abscess in the epidural or psoas space.   -S/p CT guided aspiration and core bx of L3-L4 1/2 by IR, bloody fluid and solid core. Cxs negative and pathology without acute inflammation  - R hip XR- OA changes w/o fx  - pain improved with NSAIDs    No ABX given PTA. Given nafcillin + vancomycin in house; now off abx  Blood cxs 12/30 negative  Afebrile  WBC 8  CRP 1.47 , ESR 33    2. H/o C diff   - monitor BMs  - no diarrhea now    3. Foot wounds  - did not appear acutely infected   - arterial dopplers 12/15 with 50% stenosis at the bilateral distal posterior tibial arteries. Vascular eval appreciated, no plans for intervention at this time, may need angiogram if worsening.  - XR noted  - podiatry eval appreciated    PLAN:  - monitor clinically   - monitor for temps  - monitor feet clinically; wound care as per wound care team/podiatry    Discussed case with patient,  and RN.    Kasia Harley PA-C

## 2024-01-09 NOTE — OCCUPATIONAL THERAPY NOTE
OCCUPATIONAL THERAPY TREATMENT NOTE - INPATIENT     Room Number: 354/354-A  Session: 2   Number of Visits to Meet Established Goals: 5    Presenting Problem: back pain, hip pain, muscle spasms, B heel pressure ulcers    History: Patient is a 63 year old male admitted on 12/30/2023 from [facility] for back pain, R hip and groin pain, muscle spasms.       RECENT ADMISSIONS:  12/13-12/17/23: BLE edema --> from home, dc to [facility]  8/22-8/24/23: discitis, OM of back, dizziness --> from [facility], dc to [facility]  7/10-7/14/23 dizziness from [facility]>SNF  6/27-7/2/2023: Intractable back pain: --> From [facility], dc to [facility]  (Per neuro sx 6/28 no acute intervention indicated, LSO when OOB as needed for comfort)  3/21-3/29/23: MSSA bacteremia/discitis--> [facility], dc to [facility]  2/27-3/7/2023: non-traumatic rhabdomyolysis --> dc to [facility] (3/7-3/21/2023)     IMAGING  MRI SPINE LUMBAR:   Findings are concerning for progression of discitis/osteomyelitis centered at the L3-4 disc level, with suggestion of interval extension into the bilateral posterior articular facets at this level concerning for the development of bilateral septic   arthropathy.  There is also increasing bilateral psoas muscle edema/inflammation.  No discrete evidence of epidural abscess or psoas abscess.  Diffuse thickening of the cauda equina suggesting chronic arachnoiditis, which can be retrospectively seen and appears similar to previous imaging.  No abnormal post-contrast enhancement.      Procedure 1/2/24: CT guided lumbar disc biopsy    ASSESSMENT   Patient presents with the following performance deficits: strength, safety, decreased knowledge of AE for LB dressing. These deficits impact the patient’s ability to participate in ADL, transfers, instrumental activities of daily living, rest and sleep, leisure and social participation.     Pt received seated in chair, pleasant and cooperative for OT session. Pt declines getting dressed or OOB mobility as pt reports wanting to conserve energy for  when we will discharge, however pt agreeable to explain step-by-step LB dressing with use of AE. Pt with good verbal explanation of LB dressing with simulated movements with use of AE. Pt with no concerns.     The patient is functioning below his previous functional level and would benefit from skilled inpatient OT to address the above deficits, maximizing patient’s ability to return safely to his prior level of function.    OT Discharge Recommendations: Sub-acute rehabilitation  OT Device Recommendations: TBD    WEIGHT BEARING RESTRICTION  Weight Bearing Restriction: None                Recommendations for nursing staff:   Transfers: 1 person; 2nd for safety  Toileting location: commode over toilet    TREATMENT SESSION:  Patient Start of Session: seated in chair  FUNCTIONAL TRANSFER ASSESSMENT  Sit to Stand: Edge of Bed  Edge of Bed: Not Tested  Chair: Not Tested    BED MOBILITY  Supine to Sit : Not tested    BALANCE ASSESSMENT     FUNCTIONAL ADL ASSESSMENT  LB Dressing Seated: Not Tested      ACTIVITY TOLERANCE: WFL                         O2 SATURATIONS       EDUCATION PROVIDED  Patient : Role of Occupational Therapy; Plan of Care; Discharge Recommendations; Adaptive Equipment Recommendations; Functional Transfer Techniques; Fall Prevention; Surgical Precautions; Posture/Positioning; Energy Conservation; Proper Body Mechanics  Patient's Response to Education: Verbalized Understanding; Returned Demonstration; Requires Further Education      Equipment used: reacher, sock aide  Demonstrates functional use, Would benefit from additional trial     Patient End of Session: Up in chair;Needs met;Call light within reach;RN aware of session/findings;All patient questions and concerns addressed;Alarm set    SUBJECTIVE  \"Do you know when I'll be sent to rehab today?\"    PAIN ASSESSMENT  Ratin  Location: hip  Management Techniques: Activity promotion;Body mechanics;Relaxation;Breathing techniques;Repositioning      OBJECTIVE  Precautions: Lumbar brace;Spine;Bed/chair alarm (Bilateral post op shoes, bunny boots when in bed to offload)    AM-PAC ‘6-Clicks’ Inpatient Daily Activity Short Form  -   Putting on and taking off regular lower body clothing?: Total  -   Bathing (including washing, rinsing, drying)?: A Lot  -   Toileting, which includes using toilet, bedpan or urinal? : Total  -   Putting on and taking off regular upper body clothing?: A Little  -   Taking care of personal grooming such as brushing teeth?: A Little  -   Eating meals?: None    AM-PAC Score:  Score: 14  Approx Degree of Impairment: 59.67%  Standardized Score (AM-PAC Scale): 33.39    PLAN  OT Treatment Plan: Balance activities;ADL training;Functional transfer training;Endurance training;Patient/Family education;Patient/Family training;Compensatory technique education  Rehab Potential : Good  Frequency: 3x/week    OT Goals:     All goals ongoing 01/09    ADL Goals   Patient will perform lower body dressing:  with mod assist  Patient will perform toileting: with mod assist     Functional Transfer Goals  Patient will transfer from sit to supine:  with supervision  Patient will transfer from supine to sit:  with supervision  Patient will transfer from sit to stand:  with mod assist  Patient will transfer to bedside commode:  with mod assist    OT Session Time: 10 minutes  Self-Care Home Management: 10 minutes

## 2024-01-09 NOTE — DIETARY NOTE
[facility]    NUTRITION ASSESSMENT    Pt does not meet malnutrition criteria at this time.    NUTRITION INTERVENTION:    Meal and Snacks - Monitor and encourage adequate PO intake.   Medical Food Supplements -orange Wesly BID; at lunch and dinner.     PATIENT STATUS:     1/9- Pt reported good appetite. Recorded PO intakes: 100% most meals. Has been consuming Wesly BID. Plans to discharge to [facility] today.     1/5/24- 64 y/o male admitted with L3-L4 discitis/OM. Pt seen due to length of stay. Has pressure ulcers on B/L heels. Wound Care is following. Pt sound asleep at time of visit. Did not waken. Per conversation with RN, pt is tolerating diet w/ good appetite. Recorded PO intakes:% with 100% most meals. Will add ONS to help maximize protein intakes to aid with wound healing. Noted pt received Wesly during previous admit. Will continue to monitor.      PMH:HTN, DL, DM, discitis, wheel chair bound d/t muscle spasms.       ANTHROPOMETRICS:  Ht:  6' 0.6\"  Wt: 132.5 kg (292 lb).   BMI: Body mass index is 38.95 kg/m².  IBW: 82.5 kg      WEIGHT HISTORY:     Wts highly vary per EMR hx. If wts are accurate, pt may have lost about 11 lb (3.6%) in about 2 weeks.    Wt Readings from Last 10 Encounters:   12/30/23 132.5 kg (292 lb)   12/17/23 (!) 137.5 kg (303 lb 2.1 oz)   08/21/23 124.7 kg (275 lb)   08/22/23 124.7 kg (274 lb 14.6 oz)   07/10/23 126.6 kg (279 lb 3.2 oz)   07/05/23 (!) 158 kg (348 lb 5.2 oz)   06/28/23 (!) 158.8 kg (350 lb 1.5 oz)   03/21/23 (!) 158.8 kg (350 lb)   03/06/23 (!) 159 kg (350 lb 8 oz)   03/01/23 (!) 156.9 kg (345 lb 14.4 oz)        NUTRITION:  Diet:       Procedures    Carbohydrate controlled diet 1800 kcal/60 grams; Is Patient on Accuchecks? Yes      Food Allergies: No  Cultural/Ethnic/Sabianist Preferences Addressed: Yes    Percent Meals Eaten (last 3 days)       Date/Time Percent Meals Eaten (%)    01/06/24 0800 --     Percent Meals Eaten (%): wrong time\ at 01/06/24 0800    01/06/24  0830 100 %    01/07/24 1430 100 %    01/07/24 1557 --     Percent Meals Eaten (%): wrong time at 01/07/24 1557    01/08/24 0939 100 %    01/09/24 0900 100 %            GI system review:  Last BM:1/9    Skin and wounds: pressure ulcers (stages not specified) on B/L heels    NUTRITION RELATED PHYSICAL FINDINGS:     1. Body Fat/Muscle Mass: no wasting noted     2. Fluid Accumulation: lower extremity edema     NUTRITION PRESCRIPTION: 82.5 kg (IBW)  Calories: 4591-2105 calories/day (25-30 kcal/kg)  Protein: 150-180 grams protein/day ( 1.8-2.2  grams protein per kg)  Fluid: ~1 ml/kcal or per MD discretion    NUTRITION DIAGNOSIS/PROBLEM:  Increased nutrient needs related to  wound healing  as evidenced by  wounds to B/L heel and discitis/OM of unspecified spinal region.       MONITOR AND EVALUATE/NUTRITION GOALS:  PO intake of 75% of meals TID - Met, continues  PO intake of 75% of oral nutrition supplement/s - Met, continues  Provide nutrition adequate for wound healing - Ongoing      MEDICATIONS:  Lasix, Insulin, Norco    LABS:  POC Glu:119-154    Pt is at Moderate nutrition risk    Naya Bradshaw MS, RD, LDN  Clinical Dietitian  Ext:[phone]

## 2024-01-09 NOTE — CM/SW NOTE
Spoke with pt's RN who stated pt can discharge to NH today.  Spoke with admissions at [facility] who confirmed pt can be accepted for readmission today.  They requested DC after 3pm.  [facility] transport scheduled for 4pm.  PCS form completed and available for RN to print.  Updated pt's RN and pt's sister, Ada.  / to remain available for support and/or discharge planning.     [facility] Phone [phone]     [facility]  [phone]    [facility]  Discharge Planner  [phone]

## 2024-01-10 NOTE — PAYOR COMM NOTE
--------------  DISCHARGE REVIEW    Payor: DANDY  Subscriber #:  [id number]  Authorization Number: [id number]    Admit date: 23  Admit time:   6:26 PM  Discharge Date: 2024  4:30 PM     Admitting Physician: Roverto Obregon MD  Attending Physician:  Coy Dorman MD  Primary Care Physician: Jimenez Bailey DO          Discharge Summary Notes        Discharge Summary signed by Christopher Gustafson MD at 2024 12:21 PM       Author: Christopher Gustafson MD Specialty: HOSPITALIST Author Type: Physician    Filed: 2024 12:21 PM Date of Service: 2024 12:15 PM Status: Addendum    : Christopher Gustafson MD (Physician)    Related Notes: Original Note by Christopher Gustafson MD (Physician) filed at 2024 12:20 PM           [facility]     Troy Feliz Patient Status:  Inpatient    1960 MRN [id number]   Formerly [facility] 3SW-A Attending Christopher Gustafson MD   Hosp Day # 10 PCP JIMENEZ BAILEY DO     Date of Admission: 2023  Date of Discharge:   2024    Discharge Disposition: ED Dismiss - Never Arrived    Discharge Diagnosis:    #MSSA L3/L4 Discitis  #Back Pain  #Bilateral plantar heel pressure ulcers  #Essential HTN   #DLD   #DM Type 2     History of Present Illness: Troy Feliz is a 63 year old male with PMhx of DM, HTN, DL, discitis presents with back and hip pain and muscle spasms. Pt is wheelchair bound due to muscle spasms. He states symptoms all started back in March. He has been going to rehab for this. He states he took norco this morning without relief. He denies any F/C. No new focal weakness, numbness or tingling. No CP or SOB. No N/V/D/C or abd pain. He denies any loss of bowel or urine function.     Brief Synopsis:   Patient presented with hip pain/muscle spasms.  He was seen by neurosurgery who recommended IR guided biopsy with LSO brace for therapy support.  His biopsy report was negative for infection/inflammatory cells.  He completed abx for  previous mssa discitis.  ID consult, patient was monitored off antibiotics.  He will discharge to [facility] for continued PT.  Patient also seen by podiatry and vascular surgery, continue with wound care and outpatient follow up for his LE wounds.    All questions addressed with patient prior to discharge, he was agreeable to plan of care as stated.  He was encouraged to follow up with PCP after discharge.     Lace+ Score: 79  59-90 High Risk  29-58 Medium Risk  0-28   Low Risk       TCM Follow-Up Recommendation:  LACE > 58: High Risk of readmission after discharge from the hospital.      Procedures during hospitalization:   CT guided lumbar biopsy - 1/2/24    Incidental or significant findings and recommendations (brief descriptions):  None    Lab/Test results pending at Discharge:   N/a    Consultants:  ID, Neurosurgery, Podiatry, Vascular     Discharge Medication List:     Discharge Medications        START taking these medications        Instructions Prescription details   cyclobenzaprine 10 MG Tabs  Commonly known as: Flexeril      Take 1 tablet (10 mg total) by mouth 3 (three) times daily as needed for Muscle spasms.   Quantity: 21 tablet  Refills: 0            CHANGE how you take these medications        Instructions Prescription details   hydrALAZINE 50 MG Tabs  Commonly known as: Apresoline  What changed:   medication strength  how much to take      Take 1 tablet (50 mg total) by mouth every 8 (eight) hours.   Stop taking on: February 8, 2024  Quantity: 90 tablet  Refills: 0     HYDROcodone-acetaminophen 5-325 MG Tabs  Commonly known as: Norco  What changed: when to take this      Take 1 tablet by mouth every 8 (eight) hours as needed.   Quantity: 15 tablet  Refills: 0            CONTINUE taking these medications        Instructions Prescription details   acetaminophen 500 MG Tabs  Commonly known as: Tylenol Extra Strength      Take 1-2 tablet every 4 hours as needed for pain   Quantity: 20 tablet  Refills: 0      carvedilol 25 MG Tabs  Commonly known as: Coreg      Take 1 tablet (25 mg total) by mouth 2 (two) times daily with meals.   Quantity: 60 tablet  Refills: 0     furosemide 40 MG Tabs  Commonly known as: Lasix      Take 1 tablet (40 mg total) by mouth 2 (two) times daily.   Quantity: 60 tablet  Refills: 1     gabapentin 100 MG Caps  Commonly known as: Neurontin      Take 1 capsule (100 mg total) by mouth 3 (three) times daily.   Quantity: 90 capsule  Refills: 0     Insulin Pen Needle 32G X 4 MM Misc      May substitute if not on insurance formulary   Quantity: 100 each  Refills: 5     Levemir FlexTouch 100 UNIT/ML Sopn  Generic drug: insulin detemir      Inject 10 Units into the skin nightly.   Quantity: 5 each  Refills: 2     levothyroxine 25 MCG Tabs  Commonly known as: Synthroid      Take 1 tablet (25 mcg total) by mouth before breakfast.   Refills: 0     lidocaine-menthol 4-1 % Ptch      Place 1 patch onto the skin daily.   Quantity: 30 patch  Refills: 0     melatonin 1 MG Tabs      Nightly prn   Quantity: 30 tablet  Refills: 0     NIFEdipine ER 60 MG Tb24  Commonly known as: ADALAT CC      Take 1 tablet (60 mg total) by mouth daily.   Refills: 0     NovoLOG FlexPen 100 UNIT/ML Sopn  Generic drug: insulin aspart      Test blood glucose 3 times daily with meals  Inject 1 unit if blood glucose is between 141-180 mg/dL Inject 2 units if blood glucose is between 181-220 mg/dL Inject 3 units if blood glucose is between 221-260 mg/dL Inject 4 units if blood glucose is between 261-300 mg/dL Inject 5 units if blood glucose is between 301-350 mg/dL Call your physician if blood glucose is greater than 351 mg/dL,   Quantity: 5 each  Refills: 2     polyethylene glycol (PEG 3350) 17 g Pack  Commonly known as: Miralax      Take 17 g by mouth daily.   Refills: 0            STOP taking these medications      tiZANidine 4 MG Tabs  Commonly known as: Zanaflex        traMADol 50 MG Tabs  Commonly known as: Ultram                   Where to Get Your Medications        These medications were sent to [id number] - [place], [place] - [address] AT [facility] [address], [phone], [phone]  [address], [place] [address]      Phone: [phone]   cyclobenzaprine 10 MG Tabs  hydrALAZINE 50 MG Tabs       Please  your prescriptions at the location directed by your doctor or nurse    Bring a paper prescription for each of these medications  HYDROcodone-acetaminophen 5-325 MG Tabs         [place] reviewed[place]    Follow-up appointment:   No follow-up provider specified.  Appointments for Next 30 Days 2024 - 2024      None            Vital signs:  Temp:  [97.7 °F (36.5 °C)-98.3 °F (36.8 °C)] 97.7 °F (36.5 °C)  Pulse:  [66-72] 72  Resp:  [16-18] 16  BP: (125-130)/(56-74) 128/60  SpO2:  [94 %-96 %] 96 %    Physical Exam:    General: No acute distress, pleasant    Lungs: clear to auscultation  Cardiovascular: S1, S2, RRR  Abdomen: Soft, NT, ND    -----------------------------------------------------------------------------------------------  PATIENT DISCHARGE INSTRUCTIONS: See electronic chart    Christopher Gustafson MD    Total time spent on discharge plannin minutes     The  Cures Act makes medical notes like these available to patients in the interest of transparency. Please be advised this is a medical document. Medical documents are intended to carry relevant information, facts as evident, and the clinical opinion of the practitioner. The medical note is intended as peer to peer communication and may appear blunt or direct. It is written in medical language and may contain abbreviations or verbiage that are unfamiliar.       Electronically signed by Christopher Gustafson MD on 2024 12:21 PM         REVIEWER COMMENTS

## 2024-10-07 ENCOUNTER — TELEPHONE (OUTPATIENT)
Dept: FAMILY MEDICINE CLINIC | Facility: CLINIC | Age: 64
End: 2024-10-07

## 2024-10-07 NOTE — TELEPHONE ENCOUNTER
Sergio from [facility] pharmacy requesting for furosemide refill.     LOV: 8/11/21    NOV: none scheduled    Spoke to the patient, states that he fell sick, now he's in a wheelchair and was getting care through Medicaid [employer] community. Patient wants to know if he can switch back to Dr. Bailey as his PCP and if we accept Medicaid.     Please advise. Thank you.

## 2024-10-07 NOTE — TELEPHONE ENCOUNTER
Called back patient and agreed to schedule appointment with Dr. Bailey. Appointment scheduled on 10/16 1:30pm.

## 2024-10-16 ENCOUNTER — OFFICE VISIT (OUTPATIENT)
Dept: FAMILY MEDICINE CLINIC | Facility: CLINIC | Age: 64
End: 2024-10-16
Payer: MEDICAID

## 2024-10-16 ENCOUNTER — TELEPHONE (OUTPATIENT)
Dept: FAMILY MEDICINE CLINIC | Facility: CLINIC | Age: 64
End: 2024-10-16

## 2024-10-16 ENCOUNTER — LAB ENCOUNTER (OUTPATIENT)
Dept: LAB | Age: 64
End: 2024-10-16
Attending: FAMILY MEDICINE
Payer: MEDICAID

## 2024-10-16 VITALS
RESPIRATION RATE: 16 BRPM | DIASTOLIC BLOOD PRESSURE: 66 MMHG | TEMPERATURE: 98 F | HEIGHT: 73 IN | HEART RATE: 66 BPM | BODY MASS INDEX: 39.1 KG/M2 | WEIGHT: 295 LBS | SYSTOLIC BLOOD PRESSURE: 118 MMHG

## 2024-10-16 DIAGNOSIS — Z12.11 SCREENING FOR MALIGNANT NEOPLASM OF COLON: ICD-10-CM

## 2024-10-16 DIAGNOSIS — R53.1 GENERALIZED WEAKNESS: ICD-10-CM

## 2024-10-16 DIAGNOSIS — Z00.00 ANNUAL PHYSICAL EXAM: ICD-10-CM

## 2024-10-16 DIAGNOSIS — Z00.00 ANNUAL PHYSICAL EXAM: Primary | ICD-10-CM

## 2024-10-16 DIAGNOSIS — E11.65 TYPE 2 DIABETES MELLITUS WITH HYPERGLYCEMIA, WITHOUT LONG-TERM CURRENT USE OF INSULIN (HCC): ICD-10-CM

## 2024-10-16 DIAGNOSIS — E78.2 MIXED HYPERLIPIDEMIA: ICD-10-CM

## 2024-10-16 DIAGNOSIS — M51.369 BULGE OF LUMBAR DISC WITHOUT MYELOPATHY: ICD-10-CM

## 2024-10-16 LAB
ALBUMIN SERPL-MCNC: 4.8 G/DL (ref 3.2–4.8)
ALBUMIN/GLOB SERPL: 1.3 {RATIO} (ref 1–2)
ALP LIVER SERPL-CCNC: 100 U/L
ALT SERPL-CCNC: 21 U/L
ANION GAP SERPL CALC-SCNC: 9 MMOL/L (ref 0–18)
AST SERPL-CCNC: 18 U/L (ref ?–34)
BASOPHILS # BLD AUTO: 0.06 X10(3) UL (ref 0–0.2)
BASOPHILS NFR BLD AUTO: 0.8 %
BILIRUB SERPL-MCNC: 0.3 MG/DL (ref 0.2–1.1)
BUN BLD-MCNC: 38 MG/DL (ref 9–23)
CALCIUM BLD-MCNC: 9.9 MG/DL (ref 8.7–10.4)
CHLORIDE SERPL-SCNC: 106 MMOL/L (ref 98–112)
CHOLEST SERPL-MCNC: 199 MG/DL (ref ?–200)
CO2 SERPL-SCNC: 27 MMOL/L (ref 21–32)
COMPLEXED PSA SERPL-MCNC: 2.25 NG/ML (ref ?–4)
CREAT BLD-MCNC: 1.17 MG/DL
EGFRCR SERPLBLD CKD-EPI 2021: 70 ML/MIN/1.73M2 (ref 60–?)
EOSINOPHIL # BLD AUTO: 0.2 X10(3) UL (ref 0–0.7)
EOSINOPHIL NFR BLD AUTO: 2.6 %
ERYTHROCYTE [DISTWIDTH] IN BLOOD BY AUTOMATED COUNT: 13 %
EST. AVERAGE GLUCOSE BLD GHB EST-MCNC: 126 MG/DL (ref 68–126)
FASTING PATIENT LIPID ANSWER: NO
FASTING STATUS PATIENT QL REPORTED: NO
GLOBULIN PLAS-MCNC: 3.7 G/DL (ref 2–3.5)
GLUCOSE BLD-MCNC: 119 MG/DL (ref 70–99)
HBA1C MFR BLD: 6 % (ref ?–5.7)
HCT VFR BLD AUTO: 43.8 %
HDLC SERPL-MCNC: 34 MG/DL (ref 40–59)
HGB BLD-MCNC: 14.6 G/DL
IMM GRANULOCYTES # BLD AUTO: 0.04 X10(3) UL (ref 0–1)
IMM GRANULOCYTES NFR BLD: 0.5 %
LDLC SERPL CALC-MCNC: 142 MG/DL (ref ?–100)
LYMPHOCYTES # BLD AUTO: 1.39 X10(3) UL (ref 1–4)
LYMPHOCYTES NFR BLD AUTO: 18.1 %
MCH RBC QN AUTO: 30.4 PG (ref 26–34)
MCHC RBC AUTO-ENTMCNC: 33.3 G/DL (ref 31–37)
MCV RBC AUTO: 91.3 FL
MONOCYTES # BLD AUTO: 0.82 X10(3) UL (ref 0.1–1)
MONOCYTES NFR BLD AUTO: 10.7 %
NEUTROPHILS # BLD AUTO: 5.17 X10 (3) UL (ref 1.5–7.7)
NEUTROPHILS # BLD AUTO: 5.17 X10(3) UL (ref 1.5–7.7)
NEUTROPHILS NFR BLD AUTO: 67.3 %
NONHDLC SERPL-MCNC: 165 MG/DL (ref ?–130)
OSMOLALITY SERPL CALC.SUM OF ELEC: 304 MOSM/KG (ref 275–295)
PLATELET # BLD AUTO: 186 10(3)UL (ref 150–450)
POTASSIUM SERPL-SCNC: 4.2 MMOL/L (ref 3.5–5.1)
PROT SERPL-MCNC: 8.5 G/DL (ref 5.7–8.2)
RBC # BLD AUTO: 4.8 X10(6)UL
SODIUM SERPL-SCNC: 142 MMOL/L (ref 136–145)
TRIGL SERPL-MCNC: 124 MG/DL (ref 30–149)
TSI SER-ACNC: 2.75 MIU/ML (ref 0.55–4.78)
VLDLC SERPL CALC-MCNC: 23 MG/DL (ref 0–30)
WBC # BLD AUTO: 7.7 X10(3) UL (ref 4–11)

## 2024-10-16 PROCEDURE — 80061 LIPID PANEL: CPT | Performed by: FAMILY MEDICINE

## 2024-10-16 PROCEDURE — 99396 PREV VISIT EST AGE 40-64: CPT | Performed by: FAMILY MEDICINE

## 2024-10-16 PROCEDURE — 99213 OFFICE O/P EST LOW 20 MIN: CPT | Performed by: FAMILY MEDICINE

## 2024-10-16 PROCEDURE — 84443 ASSAY THYROID STIM HORMONE: CPT | Performed by: FAMILY MEDICINE

## 2024-10-16 PROCEDURE — 85025 COMPLETE CBC W/AUTO DIFF WBC: CPT | Performed by: FAMILY MEDICINE

## 2024-10-16 PROCEDURE — 36415 COLL VENOUS BLD VENIPUNCTURE: CPT

## 2024-10-16 PROCEDURE — 80053 COMPREHEN METABOLIC PANEL: CPT | Performed by: FAMILY MEDICINE

## 2024-10-16 PROCEDURE — 83036 HEMOGLOBIN GLYCOSYLATED A1C: CPT | Performed by: FAMILY MEDICINE

## 2024-10-16 RX ORDER — HYDRALAZINE HYDROCHLORIDE 25 MG/1
25 TABLET, FILM COATED ORAL
COMMUNITY
Start: 2024-07-26

## 2024-10-16 RX ORDER — FUROSEMIDE 40 MG/1
40 TABLET ORAL 2 TIMES DAILY
Qty: 180 TABLET | Refills: 1 | Status: SHIPPED | OUTPATIENT
Start: 2024-10-16 | End: 2024-10-17

## 2024-10-16 RX ORDER — IBUPROFEN 600 MG/1
600 TABLET, FILM COATED ORAL 3 TIMES DAILY PRN
COMMUNITY
Start: 2024-07-24

## 2024-10-16 RX ORDER — INSULIN LISPRO 100 [IU]/ML
INJECTION, SOLUTION INTRAVENOUS; SUBCUTANEOUS
COMMUNITY
Start: 2023-12-18

## 2024-10-16 RX ORDER — BLOOD SUGAR DIAGNOSTIC
STRIP MISCELLANEOUS 2 TIMES DAILY
COMMUNITY
Start: 2024-07-24

## 2024-10-16 RX ORDER — POTASSIUM CHLORIDE 1500 MG/1
TABLET, EXTENDED RELEASE ORAL
COMMUNITY
Start: 2023-10-04

## 2024-10-16 RX ORDER — LEVOTHYROXINE SODIUM 75 UG/1
75 TABLET ORAL
COMMUNITY
Start: 2024-07-24

## 2024-10-16 NOTE — TELEPHONE ENCOUNTER
Patient was here today for physical.    Per Dr. Bailey, we are awaiting patients lab results before this medication is refilled.    Informed patient. He verbalized an understanding.

## 2024-10-16 NOTE — TELEPHONE ENCOUNTER
Patient was told by pharmacy to contact us for a refill on the following medication.       Medication Detail    Medication Quantity Refills Start End   furosemide 40 MG Oral Tab 60 tablet 1 12/16/2023 --   Sig:   Take 1 tablet (40 mg total) by mouth 2 (two) times daily.     Route:   Oral     Class:   Print Script     Order #:   [id number]         [facility] #[id number] [place] [address] AT [facility] OF [address] [facility], [phone], [phone]

## 2024-10-17 ENCOUNTER — TELEPHONE (OUTPATIENT)
Dept: FAMILY MEDICINE CLINIC | Facility: CLINIC | Age: 64
End: 2024-10-17

## 2024-10-17 RX ORDER — FUROSEMIDE 40 MG/1
40 TABLET ORAL 2 TIMES DAILY
Qty: 180 TABLET | Refills: 1 | Status: SHIPPED | OUTPATIENT
Start: 2024-10-17

## 2024-10-17 RX ORDER — ROSUVASTATIN CALCIUM 5 MG/1
5 TABLET, COATED ORAL NIGHTLY
Qty: 90 TABLET | Refills: 0 | Status: SHIPPED | OUTPATIENT
Start: 2024-10-17

## 2024-10-17 NOTE — TELEPHONE ENCOUNTER
Called patient to inform him that Dr. Bailey had prescribed Rosuvastatin due to elevated cholesterol levels.     Patient stated that he \"does not take any statin medications due to hearing bad things about them and would prefer not to take it\". He also stated that his cholesterol levels have been near 200 since he was 19.     I reminded patient that Dr. Bailey recommends taking the medication as well as a low fat diet and exercise to decrease cholesterol levels.     Patient was asking if there was any other medication options.   Patient also wants furosemide sent to [email or web address] instead of [facility], please approve if appropriate.

## 2024-10-17 NOTE — TELEPHONE ENCOUNTER
Patient called requesting to cancel wheeled walker ordered and processed today via [facility].     Patient is requesting for a Bariatric wheeled walker, minimum weight requirements- 300 lbs does not meet patient's current weight- 295 lbs. Patient informed about this and submitted order for a medium-size wheeled walker with seat.     Patient states he won't be able to use it because it will be unstable for him when used outdoors considering his size. States he will check with his  regarding request.    Order cancelled in [facility]. See test result notes tsh w reflex done 10/16

## 2024-10-17 NOTE — PROGRESS NOTES
Troy Feliz is a 64 year old male who presents for a complete physical exam.   HPI:   Pt complains of persistent low back pain.  Patient has a history of lumbar discitis with prolonged hospitalization as a result.  He was also found to have several lumbar disc bulges on previous MRI.  He is having persistent low back pain with occasional radiculopathy.  He is asking for referral to have it reevaluated by specialist.    He has no other recent hospitalizations.  He is on long-term disability for recent history of spinal infection.  He is having generalized weakness and primarily is using a wheelchair as a result.  He is undergoing therapy to become stronger and start walking and exercising again.  He has a history of type 2 diabetes and most recent A1c is well-controlled at 6.  He also has a history of high cholesterol but is currently not on statin therapy.      Wt Readings from Last 6 Encounters:   10/16/24 295 lb (133.8 kg)   12/30/23 292 lb (132.5 kg)   12/17/23 (!) 303 lb 2.1 oz (137.5 kg)   08/21/23 275 lb (124.7 kg)   08/22/23 274 lb 14.6 oz (124.7 kg)   07/10/23 279 lb 3.2 oz (126.6 kg)     Body mass index is 38.92 kg/m².     Results for orders placed or performed in visit on 10/16/24   CMP Now    Collection Time: 10/16/24  2:49 PM   Result Value Ref Range    Glucose 119 (H) 70 - 99 mg/dL    Sodium 142 136 - 145 mmol/L    Potassium 4.2 3.5 - 5.1 mmol/L    Chloride 106 98 - 112 mmol/L    CO2 27.0 21.0 - 32.0 mmol/L    Anion Gap 9 0 - 18 mmol/L    BUN 38 (H) 9 - 23 mg/dL    Creatinine 1.17 0.70 - 1.30 mg/dL    Calcium, Total 9.9 8.7 - 10.4 mg/dL    Calculated Osmolality 304 (H) 275 - 295 mOsm/kg    eGFR-Cr 70 >=60 mL/min/1.73m2    AST 18 <34 U/L    ALT 21 10 - 49 U/L    Alkaline Phosphatase 100 45 - 117 U/L    Bilirubin, Total 0.3 0.2 - 1.1 mg/dL    Total Protein 8.5 (H) 5.7 - 8.2 g/dL    Albumin 4.8 3.2 - 4.8 g/dL    Globulin  3.7 (H) 2.0 - 3.5 g/dL    A/G Ratio 1.3 1.0 - 2.0    Patient Fasting for CMP? No     Lipids Now    Collection Time: 10/16/24  2:49 PM   Result Value Ref Range    Cholesterol, Total 199 <200 mg/dL    HDL Cholesterol 34 (L) 40 - 59 mg/dL    Triglycerides 124 30 - 149 mg/dL    LDL Cholesterol 142 (H) <100 mg/dL    VLDL 23 0 - 30 mg/dL    Non HDL Chol 165 (H) <130 mg/dL    Patient Fasting for Lipid? No    Hemoglobin A1C Now    Collection Time: 10/16/24  2:49 PM   Result Value Ref Range    HgbA1C 6.0 (H) <5.7 %    Estimated Average Glucose 126 68 - 126 mg/dL   CBC With Differential With Platelet    Collection Time: 10/16/24  2:49 PM   Result Value Ref Range    WBC 7.7 4.0 - 11.0 x10(3) uL    RBC 4.80 4.30 - 5.70 x10(6)uL    HGB 14.6 13.0 - 17.5 g/dL    HCT 43.8 39.0 - 53.0 %    .0 150.0 - 450.0 10(3)uL    MCV 91.3 80.0 - 100.0 fL    MCH 30.4 26.0 - 34.0 pg    MCHC 33.3 31.0 - 37.0 g/dL    RDW 13.0 %    Neutrophil Absolute Prelim 5.17 1.50 - 7.70 x10 (3) uL    Neutrophil Absolute 5.17 1.50 - 7.70 x10(3) uL    Lymphocyte Absolute 1.39 1.00 - 4.00 x10(3) uL    Monocyte Absolute 0.82 0.10 - 1.00 x10(3) uL    Eosinophil Absolute 0.20 0.00 - 0.70 x10(3) uL    Basophil Absolute 0.06 0.00 - 0.20 x10(3) uL    Immature Granulocyte Absolute 0.04 0.00 - 1.00 x10(3) uL    Neutrophil % 67.3 %    Lymphocyte % 18.1 %    Monocyte % 10.7 %    Eosinophil % 2.6 %    Basophil % 0.8 %    Immature Granulocyte % 0.5 %   TSH W Reflex To Free T4    Collection Time: 10/16/24  2:49 PM   Result Value Ref Range    TSH 2.752 0.550 - 4.780 mIU/mL         Current Outpatient Medications   Medication Sig Dispense Refill    Insulin Lispro, 1 Unit Dial, (HUMALOG KWIKPEN) 100 UNIT/ML Subcutaneous Solution Pen-injector Inject into the skin.      ONETOUCH ULTRA In Vitro Strip 2 (two) times daily.      hydrALAZINE 25 MG Oral Tab Take 1 tablet (25 mg total) by mouth 3 (three) times daily with meals.      ibuprofen 600 MG Oral Tab Take 1 tablet (600 mg total) by mouth 3 (three) times daily as needed.      levothyroxine 75 MCG Oral Tab  Take 1 tablet (75 mcg total) by mouth before breakfast.      potassium chloride 20 MEQ Oral Tab CR       furosemide 40 MG Oral Tab Take 1 tablet (40 mg total) by mouth 2 (two) times daily. 180 tablet 1    acetaminophen 500 MG Oral Tab Take 1-2 tablet every 4 hours as needed for pain 20 tablet 0    insulin detemir (LEVEMIR FLEXTOUCH) 100 UNIT/ML Subcutaneous Solution Pen-injector Inject 10 Units into the skin nightly. 5 each 2    carvedilol 25 MG Oral Tab Take 1 tablet (25 mg total) by mouth 2 (two) times daily with meals. 60 tablet 0    NIFEdipine ER 60 MG Oral Tablet 24 Hr Take 1 tablet (60 mg total) by mouth daily.      insulin aspart (NOVOLOG FLEXPEN) 100 Units/mL Subcutaneous Solution Pen-injector Test blood glucose 3 times daily with meals  Inject 1 unit if blood glucose is between 141-180 mg/dL Inject 2 units if blood glucose is between 181-220 mg/dL Inject 3 units if blood glucose is between 221-260 mg/dL Inject 4 units if blood glucose is between 261-300 mg/dL Inject 5 units if blood glucose is between 301-350 mg/dL Call your physician if blood glucose is greater than 351 mg/dL, 5 each 2    Insulin Pen Needle 32G X 4 MM Does not apply Misc May substitute if not on insurance formulary 100 each 5    gabapentin 100 MG Oral Cap Take 1 capsule (100 mg total) by mouth 3 (three) times daily. (Patient not taking: Reported on 10/16/2024) 90 capsule 0    levothyroxine 25 MCG Oral Tab Take 1 tablet (25 mcg total) by mouth before breakfast. (Patient not taking: Reported on 10/16/2024)      polyethylene glycol, PEG 3350, 17 g Oral Powd Pack Take 17 g by mouth daily. (Patient not taking: Reported on 10/16/2024)      melatonin 1 MG Oral Tab Nightly prn 30 tablet 0      Allergies[1]   Past Medical History:    Diabetes (HCC)    Dyslipidemia    High blood pressure    HTN (hypertension)    Hyperlipidemia    Impaired fasting glucose    Proteinuria    Subclinical hypothyroidism    Unspecified essential hypertension    Visual  impairment      Past Surgical History:   Procedure Laterality Date    Mastoidectomy,simple      done 30 years ago.  Revised in 1995      Family History   Problem Relation Age of Onset    Diabetes Father     Heart Disorder Mother     Heart Disorder Brother       Social History:  Social History     Socioeconomic History    Marital status: Single   Tobacco Use    Smoking status: Never    Smokeless tobacco: Never   Vaping Use    Vaping status: Never Used   Substance and Sexual Activity    Alcohol use: No     Alcohol/week: 0.0 standard drinks of alcohol    Drug use: No    Sexual activity: Not Currently   Other Topics Concern    Caffeine Concern Yes     Comment: coffee one q day,or tea    Exercise Yes     Comment: walking 2 mi x5 q week     Social Drivers of Health     Food Insecurity: No Food Insecurity (12/30/2023)    Food Insecurity     Food Insecurity: Never true   Transportation Needs: No Transportation Needs (12/30/2023)    Transportation Needs     Lack of Transportation: No    Received from [facility]    Housing Stability      Occ: Disability. : single. Children: none.   Exercise: none.  Diet: watches fats closely and watches sugar closely     REVIEW OF SYSTEMS:   GENERAL: feels well otherwise, + generalized weakness  SKIN: denies any unusual skin lesions  EYES:denies blurred vision or double vision  HEENT: denies nasal congestion, sinus pain or ST  LUNGS: denies shortness of breath with exertion, denies cough  CARDIOVASCULAR: denies chest pain on exertion or at rest, denies palpitations  GI: denies abdominal pain,denies heartburn, denies n/v/d/c/blood in stool  : denies nocturia or changes in stream  MUSCULOSKELETAL: + low back pain  NEURO: denies headaches, denies LH/dizziness/syncope  PSYCHE: denies depression or anxiety  HEMATOLOGIC: denies hx of anemia  ENDOCRINE: denies thyroid history  ALL/ASTHMA: denies hx of allergy or asthma    EXAM:   /66   Pulse 66   Temp 97.5 °F  (36.4 °C) (Temporal)   Resp 16   Ht 6' 1\" (1.854 m)   Wt 295 lb (133.8 kg)   BMI 38.92 kg/m²   Body mass index is 38.92 kg/m².   GENERAL: well developed, well nourished,in no apparent distress  SKIN: no rashes,no suspicious lesions  HEENT: atraumatic, normocephalic,ears and throat are clear  EYES:PERRLA, EOMI, conjunctiva are clear  NECK: supple,no adenopathy, no thyromegaly  LUNGS: clear to auscultation, no r/r/w  CARDIO: RRR without murmur  GI: good BS's,no masses, HSM or tenderness  :  two descended testes,no masses,no hernia,no penile lesions  RECTAL: good rectal tone, prostate shows minimal enlargement but no masses  MUSCULOSKELETAL: back is not tender,FROM of the back  EXTREMITIES: no cyanosis, clubbing or edema  NEURO: Oriented times three,cranial nerves are intact,motor and sensory are grossly intact; 2 + knee reflexes bilaterally  VASCULAR: 2 + dorsalis pedal pulses bilaterally    ASSESSMENT AND PLAN:   Troy Feliz is a 64 year old male who presents for a complete physical exam.    Pt's weight is Body mass index is 38.92 kg/m²., recommended low fat diet and aerobic exercise 30 minutes three times weekly.   Health maintenance, will check:   Orders Placed This Encounter   Procedures    CMP Now    Lipids Now    Hemoglobin A1C Now    Microalb/Creat Ratio, Random Urine Now    CBC With Differential With Platelet    Urinalysis, Routine    TSH W Reflex To Free T4    PSA Total, Screen    Occult Blood, Fecal, Immunoassay (Blue cards) [E]         Pt referred for screening colonoscopy.     Advised to have ultrafast, and 5 minute heart aware.  Advised patient to check with insurance company for coverage prior to doing the test.     DM2 - A1c stable at 6, managed by endocrine, eye exam up to date, cpm, LDL > 100 and will start statin therapy    HL - LDL high - start rosuvastatin 5mg at bedtime and repeat lipid panel and CMP in 3 months    Generalized weakness - likely 2/2 deconditioning, start PT    Lumbar disc  bulge - repeat MRI due to persistent low back pain, will refer to physiatry or neurosurgery pending MRI results    The patient indicates understanding of these issues and agrees to the plan.    The patient is asked to return in 3-6 months.        [1] No Known Allergies

## 2024-10-24 RX ORDER — HYDRALAZINE HYDROCHLORIDE 25 MG/1
25 TABLET, FILM COATED ORAL
Qty: 270 TABLET | Refills: 1 | Status: SHIPPED | OUTPATIENT
Start: 2024-10-24

## 2024-10-24 NOTE — TELEPHONE ENCOUNTER
Patient states pharmacy has tried to reach out regarding refill and they have not received a response.     Rx refill   hydrALAZINE 25 MG Oral Tab     Send to   [facility] #[id number] [facility] [address] AT [facility] [address], [phone], [phone]

## 2024-10-24 NOTE — TELEPHONE ENCOUNTER
Requested Prescriptions     Pending Prescriptions Disp Refills    hydrALAZINE 25 MG Oral Tab 90 tablet 0     Sig: Take 1 tablet (25 mg total) by mouth 3 (three) times daily with meals.     Patient reported medication  LOV 10/16/824  No future appointments.  Return to office on or around 4/16/25

## 2024-11-07 PROCEDURE — 82274 ASSAY TEST FOR BLOOD FECAL: CPT | Performed by: FAMILY MEDICINE

## 2024-11-11 NOTE — TELEPHONE ENCOUNTER
Pharmacy called requesting a refill for Nifedipine 60 mg 1 daily. I do not see that you have ever prescribed this for the patient. Last office visit was 10/16/2024. Please authorize if appropriate.

## 2024-11-11 NOTE — TELEPHONE ENCOUNTER
Medication was sent to the wrong pharmacy.     Please send to     Pharmacy    [facility] #[id number] [place], [place] - [address] AT [facility] [address], [phone], [phone]

## 2024-11-11 NOTE — TELEPHONE ENCOUNTER
Spoke to patient.     Nifedipine ER 60 mg Rx #[id number] [phone] refill signed today and sent to [facility] pharmacy    Patient states that Nifedipine 60 mg was sent to the wrong pharmacy ([facility]) and patient wants Rx to be sent to [address]. Patient also requested to update preferred pharmacy on file. Updated to [address].    Updated prescription and changed pharmacy to [facility]. Patient verbalized understanding.   Called [facility] pharmacy and confirmed that they received the prescription.

## 2024-11-13 ENCOUNTER — ANCILLARY ORDERS (OUTPATIENT)
Dept: FAMILY MEDICINE CLINIC | Facility: CLINIC | Age: 64
End: 2024-11-13

## 2024-11-13 DIAGNOSIS — Z13.9 VISIT FOR SCREENING: Primary | ICD-10-CM

## 2024-11-14 LAB — HEMOCCULT STL QL: POSITIVE

## 2025-01-17 ENCOUNTER — TELEPHONE (OUTPATIENT)
Dept: FAMILY MEDICINE CLINIC | Facility: CLINIC | Age: 65
End: 2025-01-17

## 2025-01-17 DIAGNOSIS — Z12.11 SCREENING FOR MALIGNANT NEOPLASM OF COLON: Primary | ICD-10-CM

## 2025-01-17 NOTE — TELEPHONE ENCOUNTER
GI referral was placed 10/16/24 but already . Per patient, Dr. Matos is not covered by his insurance.     Patient requesting for gastroenterology external referral. He checked with his insurance and Dr. Gianni Sullivan is covered.

## 2025-01-17 NOTE — TELEPHONE ENCOUNTER
Pt requesting referral for the below specialist:    Specialty: Gastroenterologist   Provider/Specialist: Dr. Gianni Sullivan  Address: [address] [place] [address]# [phone]  Fax# [phone]    Reason/Symptoms: Screening for malignant neoplasm of colon (Z12.11)     Has pt seen PCP for this condition? (Y/N): Y    Insurance: [facility] [employer]ANS

## 2025-01-22 ENCOUNTER — TELEPHONE (OUTPATIENT)
Dept: FAMILY MEDICINE CLINIC | Facility: CLINIC | Age: 65
End: 2025-01-22

## 2025-01-22 NOTE — TELEPHONE ENCOUNTER
Patient last seen in office 10/16/24 - annual physical    Requesting for refill- levothyroxine 75 MCG. Last refill 7/24 by external provider  NOV: none scheduled    Routed to Dr. Bailey for review and approval.

## 2025-01-22 NOTE — TELEPHONE ENCOUNTER
Patient calling to see if  will send a prescription for the following. This will be the 1st time  fills this medication for the patient.     levothyroxine 75 MCG Oral Tab -- -- 7/24/2024 --   Sig:   Take 1 tablet (75 mcg total) by mouth before breakfast.     Route:   Oral     Class:   Historical     Order #:   [id number]           Pharmacy    [facility] #[id number] [place] [address] AT [facility] OF [facility], [phone], [phone]

## 2025-01-23 RX ORDER — LEVOTHYROXINE SODIUM 75 UG/1
75 TABLET ORAL
Qty: 90 TABLET | Refills: 0 | Status: SHIPPED | OUTPATIENT
Start: 2025-01-23

## 2025-01-31 ENCOUNTER — TELEPHONE (OUTPATIENT)
Dept: FAMILY MEDICINE CLINIC | Facility: CLINIC | Age: 65
End: 2025-01-31

## 2025-01-31 RX ORDER — CARVEDILOL 25 MG/1
25 TABLET ORAL 2 TIMES DAILY WITH MEALS
Qty: 60 TABLET | Refills: 1 | Status: SHIPPED | OUTPATIENT
Start: 2025-01-31

## 2025-01-31 NOTE — TELEPHONE ENCOUNTER
Patient states that [facility] has tried to reach us to get a refill sent in for the following medication. Did not see medication on his list but it is called     Carvedilol 25mg twice daily    Patient has only 3 days of medication left.       Pharmacy    [facility] #[id number] [facility] [address] AT [facility] [address], [phone], [phone]

## 2025-01-31 NOTE — TELEPHONE ENCOUNTER
LOV:10/16/ 2024      NOV:January - April Not scheduled     Medication: Carvedilol 25 mg 1 twice daily # 60   MCM sent to patient to call and make an appointment

## 2025-02-10 ENCOUNTER — TELEPHONE (OUTPATIENT)
Dept: FAMILY MEDICINE CLINIC | Facility: CLINIC | Age: 65
End: 2025-02-10

## 2025-02-10 DIAGNOSIS — E11.65 TYPE 2 DIABETES MELLITUS WITH HYPERGLYCEMIA, WITHOUT LONG-TERM CURRENT USE OF INSULIN (HCC): Primary | ICD-10-CM

## 2025-02-10 RX ORDER — INSULIN LISPRO 100 [IU]/ML
INJECTION, SOLUTION INTRAVENOUS; SUBCUTANEOUS
Qty: 3 EACH | Refills: 0 | Status: SHIPPED | OUTPATIENT
Start: 2025-02-10

## 2025-02-10 NOTE — TELEPHONE ENCOUNTER
I would recommend that he continue with insulin until we recheck an A1c.  Go ahead and order A1c as well.  If he is still in the low 6 range, we can discuss oral medications at that point.

## 2025-02-10 NOTE — TELEPHONE ENCOUNTER
Patient informed of Dr. Bailey's note below. Patient also informed of Rx sent and A1c order added.    Patient verbalized understanding and agreed with plan.

## 2025-02-10 NOTE — TELEPHONE ENCOUNTER
Patient requesting for Insulin lispro Kwikpen refill.   States that he was put on insulin while he was in the rehab for 6 to 8 months and he continued using it until now.     LOV: 10/16/24    NOV: None scheduled    Requesting refill:  Insulin Lispro, 1 Unit Dial, (HUMALOG KWIKPEN) 100 UNIT/ML     Last refill: 12/18/23       Sliding scale:   141 to 180 = 1 unit  181 to 220 = 2 units  221 to 260 = 3 units  261 to 300 = 4  units  301 to 350 = 5 units  >351 = call MD    Patient wants to know if he should continue on insulin or if he can switch to an alternative medication. Thank you.

## 2025-02-13 ENCOUNTER — TELEPHONE (OUTPATIENT)
Dept: FAMILY MEDICINE CLINIC | Facility: CLINIC | Age: 65
End: 2025-02-13

## 2025-02-13 NOTE — TELEPHONE ENCOUNTER
Patient clarified that he was able to get refill for Humalog Kwikpen. Patient is requesting refill for insulin detemir (LEVEMIR FLEXTOUCH) 100 UNIT/ML Subcutaneous Solution Pen-injector and Onetouch ultra In Vitro strip.    LOV: 10/16/24    NOV: None scheduled    Requesting refill: insulin detemir levemir flextouch 100unit/ml   Last refill: 7/14/23 - external provider    Requesting refill: Onetouch ultra in vitro strip  Last refill: 7/24/24     Please see pended Rx for your approval. Thank you.

## 2025-02-13 NOTE — TELEPHONE ENCOUNTER
Patient calling to report that the medication below is no longer being manufactured.       He is requesting something comparable be sent to the pharmacy below.               Insulin Lispro, 1 Unit Dial, (HUMALOG KWIKPEN) 100 UNIT/ML Subcutaneous Solution Pen-injector 3 each 0 2/10/2025 --    Sig: Use sliding scale    Sent to pharmacy as: Insulin Lispro (1 Unit Dial) 100 UNIT/ML Subcutaneous Solution Pen-injector (HumaLOG KwikPen)    E-Prescribing Status: Receipt confirmed by pharmacy (2/10/2025  3:12 PM CST)      Pharmacy    [facility] #[id number] [facility] [address] AT [facility] [address] [facility], [phone], [phone]

## 2025-02-14 RX ORDER — INSULIN DETEMIR 100 [IU]/ML
10 INJECTION, SOLUTION SUBCUTANEOUS NIGHTLY
Qty: 5 EACH | Refills: 2 | Status: SHIPPED | OUTPATIENT
Start: 2025-02-14

## 2025-02-14 RX ORDER — BLOOD SUGAR DIAGNOSTIC
1 STRIP MISCELLANEOUS 2 TIMES DAILY
Qty: 100 STRIP | Refills: 0 | Status: SHIPPED | OUTPATIENT
Start: 2025-02-14

## 2025-04-04 NOTE — TELEPHONE ENCOUNTER
carvedilol 25 MG Oral Tab   [facility] in [place] [address]phone:  [phone]  Patient is requesting this medication to be sent to [facility].  He is without insurance at this time & it is cheaper.

## 2025-04-07 RX ORDER — CARVEDILOL 25 MG/1
25 TABLET ORAL 2 TIMES DAILY WITH MEALS
Qty: 60 TABLET | Refills: 0 | Status: SHIPPED | OUTPATIENT
Start: 2025-04-07

## 2025-04-07 NOTE — TELEPHONE ENCOUNTER
Last Office Visit: 10/16/2024    Last Refill:   Medication Quantity Refills Start End   carvedilol 25 MG Oral Tab 60 tablet 1 1/31/2025 --     Return to Clinic: 04/16/2025    Protocol: n/a    Please call patient to schedule medication follow up then route to clinical basket.

## 2025-04-26 ENCOUNTER — TELEPHONE (OUTPATIENT)
Dept: FAMILY MEDICINE CLINIC | Facility: CLINIC | Age: 65
End: 2025-04-26

## 2025-04-29 RX ORDER — LEVOTHYROXINE SODIUM 75 UG/1
75 TABLET ORAL
Qty: 90 TABLET | Refills: 0 | OUTPATIENT
Start: 2025-04-29

## 2025-04-29 NOTE — TELEPHONE ENCOUNTER
Patient has an appointment on 5/1/25.     Called and spoke to patient. He confirmed that he has 2-3 tablets of levothyroxine left. He will come to the appointment on 5/1/25 for refills.

## 2025-04-30 RX ORDER — LEVOTHYROXINE SODIUM 75 UG/1
75 TABLET ORAL
Qty: 30 TABLET | Refills: 0 | Status: SHIPPED | OUTPATIENT
Start: 2025-04-30

## 2025-04-30 NOTE — TELEPHONE ENCOUNTER
Patient rescheduled appointment for next week and would like to know he can get enough medication to get him through next week.

## 2025-04-30 NOTE — TELEPHONE ENCOUNTER
LOV:  10/16/24 Annual physical. Asked to return in 3-6 months.     NOV:  Rescheduled from 5/1/25 to 5/8/25.     Last refill:  1/23/25  #90, 0 refills    Medication Detail  Medication Quantity Refills Start End   levothyroxine 75 MCG Oral Tab 90 tablet 0 1/23/2025 --   Sig:   Take 1 tablet (75 mcg total) by mouth before breakfast.     Route:   Oral

## 2025-05-08 ENCOUNTER — OFFICE VISIT (OUTPATIENT)
Dept: FAMILY MEDICINE CLINIC | Facility: CLINIC | Age: 65
End: 2025-05-08
Payer: MEDICARE

## 2025-05-08 ENCOUNTER — LAB ENCOUNTER (OUTPATIENT)
Dept: LAB | Age: 65
End: 2025-05-08
Attending: FAMILY MEDICINE
Payer: MEDICARE

## 2025-05-08 ENCOUNTER — TELEPHONE (OUTPATIENT)
Dept: FAMILY MEDICINE CLINIC | Facility: CLINIC | Age: 65
End: 2025-05-08

## 2025-05-08 VITALS
SYSTOLIC BLOOD PRESSURE: 112 MMHG | HEART RATE: 64 BPM | DIASTOLIC BLOOD PRESSURE: 48 MMHG | HEIGHT: 73 IN | WEIGHT: 315 LBS | TEMPERATURE: 98 F | BODY MASS INDEX: 41.75 KG/M2 | RESPIRATION RATE: 20 BRPM

## 2025-05-08 DIAGNOSIS — J30.9 ALLERGIC RHINITIS, UNSPECIFIED SEASONALITY, UNSPECIFIED TRIGGER: ICD-10-CM

## 2025-05-08 DIAGNOSIS — I10 BENIGN ESSENTIAL HTN: ICD-10-CM

## 2025-05-08 DIAGNOSIS — E11.65 TYPE 2 DIABETES MELLITUS WITH HYPERGLYCEMIA, WITHOUT LONG-TERM CURRENT USE OF INSULIN (HCC): Primary | ICD-10-CM

## 2025-05-08 DIAGNOSIS — E11.65 TYPE 2 DIABETES MELLITUS WITH HYPERGLYCEMIA, WITHOUT LONG-TERM CURRENT USE OF INSULIN (HCC): ICD-10-CM

## 2025-05-08 DIAGNOSIS — E78.2 MIXED HYPERLIPIDEMIA: ICD-10-CM

## 2025-05-08 LAB
ALBUMIN SERPL-MCNC: 4.6 G/DL (ref 3.2–4.8)
ALBUMIN/GLOB SERPL: 1.4 {RATIO} (ref 1–2)
ALP LIVER SERPL-CCNC: 88 U/L (ref 45–117)
ALT SERPL-CCNC: 21 U/L (ref 10–49)
ANION GAP SERPL CALC-SCNC: 11 MMOL/L (ref 0–18)
AST SERPL-CCNC: 19 U/L (ref ?–34)
BASOPHILS # BLD AUTO: 0.08 X10(3) UL (ref 0–0.2)
BASOPHILS NFR BLD AUTO: 1 %
BILIRUB SERPL-MCNC: 0.3 MG/DL (ref 0.2–1.1)
BUN BLD-MCNC: 32 MG/DL (ref 9–23)
CALCIUM BLD-MCNC: 9.5 MG/DL (ref 8.7–10.6)
CHLORIDE SERPL-SCNC: 106 MMOL/L (ref 98–112)
CHOLEST SERPL-MCNC: 182 MG/DL (ref ?–200)
CO2 SERPL-SCNC: 26 MMOL/L (ref 21–32)
CREAT BLD-MCNC: 1.3 MG/DL (ref 0.7–1.3)
EGFRCR SERPLBLD CKD-EPI 2021: 62 ML/MIN/1.73M2 (ref 60–?)
EOSINOPHIL # BLD AUTO: 0.23 X10(3) UL (ref 0–0.7)
EOSINOPHIL NFR BLD AUTO: 2.8 %
ERYTHROCYTE [DISTWIDTH] IN BLOOD BY AUTOMATED COUNT: 13.3 %
EST. AVERAGE GLUCOSE BLD GHB EST-MCNC: 134 MG/DL (ref 68–126)
FASTING PATIENT LIPID ANSWER: YES
FASTING STATUS PATIENT QL REPORTED: YES
GLOBULIN PLAS-MCNC: 3.4 G/DL (ref 2–3.5)
GLUCOSE BLD-MCNC: 119 MG/DL (ref 70–99)
HBA1C MFR BLD: 6.3 % (ref ?–5.7)
HCT VFR BLD AUTO: 48.6 % (ref 39–53)
HDLC SERPL-MCNC: 35 MG/DL (ref 40–59)
HGB BLD-MCNC: 15.7 G/DL (ref 13–17.5)
IMM GRANULOCYTES # BLD AUTO: 0.03 X10(3) UL (ref 0–1)
IMM GRANULOCYTES NFR BLD: 0.4 %
LDLC SERPL CALC-MCNC: 130 MG/DL (ref ?–100)
LYMPHOCYTES # BLD AUTO: 1.47 X10(3) UL (ref 1–4)
LYMPHOCYTES NFR BLD AUTO: 18.2 %
MCH RBC QN AUTO: 30.1 PG (ref 26–34)
MCHC RBC AUTO-ENTMCNC: 32.3 G/DL (ref 31–37)
MCV RBC AUTO: 93.3 FL (ref 80–100)
MONOCYTES # BLD AUTO: 0.72 X10(3) UL (ref 0.1–1)
MONOCYTES NFR BLD AUTO: 8.9 %
NEUTROPHILS # BLD AUTO: 5.56 X10 (3) UL (ref 1.5–7.7)
NEUTROPHILS # BLD AUTO: 5.56 X10(3) UL (ref 1.5–7.7)
NEUTROPHILS NFR BLD AUTO: 68.7 %
NONHDLC SERPL-MCNC: 147 MG/DL (ref ?–130)
OSMOLALITY SERPL CALC.SUM OF ELEC: 304 MOSM/KG (ref 275–295)
PLATELET # BLD AUTO: 172 10(3)UL (ref 150–450)
POTASSIUM SERPL-SCNC: 4.3 MMOL/L (ref 3.5–5.1)
PROT SERPL-MCNC: 8 G/DL (ref 5.7–8.2)
RBC # BLD AUTO: 5.21 X10(6)UL (ref 4.3–5.7)
SODIUM SERPL-SCNC: 143 MMOL/L (ref 136–145)
TRIGL SERPL-MCNC: 91 MG/DL (ref 30–149)
VLDLC SERPL CALC-MCNC: 16 MG/DL (ref 0–30)
WBC # BLD AUTO: 8.1 X10(3) UL (ref 4–11)

## 2025-05-08 PROCEDURE — G2211 COMPLEX E/M VISIT ADD ON: HCPCS

## 2025-05-08 PROCEDURE — 80053 COMPREHEN METABOLIC PANEL: CPT

## 2025-05-08 PROCEDURE — 36415 COLL VENOUS BLD VENIPUNCTURE: CPT

## 2025-05-08 PROCEDURE — 80061 LIPID PANEL: CPT

## 2025-05-08 PROCEDURE — 83036 HEMOGLOBIN GLYCOSYLATED A1C: CPT

## 2025-05-08 PROCEDURE — 85025 COMPLETE CBC W/AUTO DIFF WBC: CPT

## 2025-05-08 PROCEDURE — 99214 OFFICE O/P EST MOD 30 MIN: CPT

## 2025-05-08 RX ORDER — CARVEDILOL 25 MG/1
25 TABLET ORAL 2 TIMES DAILY WITH MEALS
Qty: 60 TABLET | Refills: 0 | Status: CANCELLED | OUTPATIENT
Start: 2025-05-08

## 2025-05-08 RX ORDER — FLUTICASONE PROPIONATE 50 MCG
2 SPRAY, SUSPENSION (ML) NASAL DAILY
Qty: 1 EACH | Refills: 0 | Status: SHIPPED | OUTPATIENT
Start: 2025-05-08

## 2025-05-08 RX ORDER — CARVEDILOL 12.5 MG/1
12.5 TABLET ORAL 2 TIMES DAILY WITH MEALS
Qty: 60 TABLET | Refills: 0 | Status: SHIPPED | OUTPATIENT
Start: 2025-05-08

## 2025-05-08 RX ORDER — FAMOTIDINE 20 MG/1
20 TABLET, FILM COATED ORAL NIGHTLY PRN
COMMUNITY
Start: 2025-02-10

## 2025-05-08 NOTE — TELEPHONE ENCOUNTER
Pt calling- needs refill on NIFEdipine ER 60 MG Oral Tablet 24 Hr     [facility] - [place], [place] -  [address] -[phone], [phone]

## 2025-05-08 NOTE — PROGRESS NOTES
[facility] Family Medicine Office Note  Chief Complaint:   Chief Complaint   Patient presents with    Medication Follow-Up       HPI:   This is a 64 year old male coming in for a medication check.  Patient needs refills on his carvedilol.  Reports intermittent lightheadedness over the last several months.  Denies recent changes to medications or weight loss.    Patient also complains of intermittent sinus pressure and occasional phlegmy cough.      Past Medical History[1]  Past Surgical History[2]  Social History:  Short Social Hx on File[3]  Family History:  Family History[4]  Allergies:  Allergies[5]  Current Meds:  Current Medications[6]   Counseling given: Not Answered       REVIEW OF SYSTEMS:   ROS:  CONSTITUTIONAL:  Denies any unusual weight gain/loss, fever, chills,  or fatigue.  Patient with chronic generalized muscle weakness/deconditioning.  HEENT:  Eyes:  Denies visual loss, blurred vision, double vision or yellow sclerae.   CARDIOVASCULAR:  Denies chest pain, chest pressure or chest discomfort. No palpitations or edema.  Denies any dyspnea on exertion or at rest  RESPIRATORY:  Denies shortness of breath, intermittent cough  GASTROINTESTINAL:  Denies any abdominal pain, nausea, vomiting, constipation, diarrhea, or blood in stool.  NEUROLOGICAL:  Denies headache, dizziness, syncope, numbness or tingling in the extremities.  Complains of intermittent lightheadedness      EXAM:   /48   Pulse 64   Temp 97.6 °F (36.4 °C) (Temporal)   Resp 20   Ht 6' 1\" (1.854 m)   Wt (!) 346 lb 3.2 oz (157 kg)   BMI 45.68 kg/m²  Estimated body mass index is 45.68 kg/m² as calculated from the following:    Height as of this encounter: 6' 1\" (1.854 m).    Weight as of this encounter: 346 lb 3.2 oz (157 kg).   Vital signs reviewed.Appears stated age, well groomed.  Physical Exam:  GEN:  Patient is alert and oriented x3, no apparent distress  HEAD:  Normocephalic, atraumatic  HEENT:  Eyes: EOMI, PERRLA, no  scleral icterus, conjunctivae clear bilaterally.    LUNGS: clear to auscultation bilaterally, no rales/rhonchi/wheezing  HEART:  Regular rate and rhythm, no murmurs, rubs or gallops  EXTREMITIES:  Strength intact with 5/5 bilaterally upper and lower extremities, no edema noted  NEURO:  CN 2 - 12 grossly intact     ASSESSMENT AND PLAN:   1. Type 2 diabetes mellitus with hyperglycemia, without long-term current use of insulin (HCC)  Patient due for repeat fasting labs.  States is fasting today and will have labs performed prior to leaving facility.  - CBC W Differential W Platelet [E]; Future  - Comp Metabolic Panel (14) [E]; Future    2. Mixed hyperlipidemia  See above  - Comp Metabolic Panel (14) [E]; Future  - Lipid Panel [E]; Future    3. Benign essential HTN  Patient with intermittent lightheadedness over the last several months.  Discussed with PCP and will try decreasing carvedilol to 12.5 mg twice a day.  Patient agrees to monitoring his blood pressure at home daily for the next few weeks.  If blood pressure increases will notify the office and we will schedule a visit to return to previous regimen.  - Comp Metabolic Panel (14) [E]; Future  - carvedilol 12.5 MG Oral Tab; Take 1 tablet (12.5 mg total) by mouth 2 (two) times daily with meals.  Dispense: 60 tablet; Refill: 0    4. Allergic rhinitis, unspecified seasonality, unspecified trigger  Patient complains of intermittent sinus pressure and cough.  States has been worse over the last month or so.  Will try Flonase daily.  - fluticasone propionate 50 MCG/ACT Nasal Suspension; 2 sprays by Each Nare route daily.  Dispense: 1 each; Refill: 0      Meds & Refills for this Visit:  Requested Prescriptions     Signed Prescriptions Disp Refills    fluticasone propionate 50 MCG/ACT Nasal Suspension 1 each 0     Si sprays by Each Nare route daily.    carvedilol 12.5 MG Oral Tab 60 tablet 0     Sig: Take 1 tablet (12.5 mg total) by mouth 2 (two) times daily with  meals.       Health Maintenance:  Health Maintenance Due   Topic Date Due    Diabetes Care Dilated Eye Exam  Never done    Pneumococcal Vaccine: 50+ Years (1 of 2 - PCV) Never done    Zoster Vaccines (1 of 2) Never done    Diabetes Care Foot Exam  02/08/2019    COVID-19 Vaccine (1 - 2024-25 season) Never done    Annual Depression Screening  01/01/2025    Diabetes Care: Microalb/Creat Ratio (Annual)  Never done    Diabetes Care A1C  04/16/2025       Patient/Caregiver Education: Patient/Caregiver Education: There are no barriers to learning. Medical education done.   Outcome: Patient verbalizes understanding. Patient is notified to call with any questions, complications, allergies, or worsening or changing symptoms.  Patient is to call with any side effects or complications from the treatments as a result of today.     Problem List:  Problem List[7]    JESSICA Storm    Please note that portions of this note may have been completed with a voice recognition program. Efforts were made to edit the dictations but occasionally words are mis-transcribed.           [1]   Past Medical History:   Diabetes (HCC)    Dyslipidemia    High blood pressure    HTN (hypertension)    Hyperlipidemia    Impaired fasting glucose    Proteinuria    Subclinical hypothyroidism    Unspecified essential hypertension    Visual impairment   [2]   Past Surgical History:  Procedure Laterality Date    Mastoidectomy,simple      done 30 years ago.  Revised in 1995   [3]   Social History  Socioeconomic History    Marital status: Single   Tobacco Use    Smoking status: Never    Smokeless tobacco: Never   Vaping Use    Vaping status: Never Used   Substance and Sexual Activity    Alcohol use: No     Alcohol/week: 0.0 standard drinks of alcohol    Drug use: No    Sexual activity: Not Currently   Other Topics Concern    Caffeine Concern Yes     Comment: coffee one q day,or tea    Exercise Yes     Comment: walking 2 mi x5 q week     Social Drivers of  Health     Food Insecurity: No Food Insecurity (12/30/2023)    Food Insecurity     Food Insecurity: Never true   Transportation Needs: No Transportation Needs (12/30/2023)    Transportation Needs     Lack of Transportation: No    Received from [facility]    Housing Stability   [4]   Family History  Problem Relation Age of Onset    Diabetes Father     Heart Disorder Mother     Heart Disorder Brother    [5] No Known Allergies  [6]   Current Outpatient Medications   Medication Sig Dispense Refill    fluticasone propionate 50 MCG/ACT Nasal Suspension 2 sprays by Each Nare route daily. 1 each 0    carvedilol 12.5 MG Oral Tab Take 1 tablet (12.5 mg total) by mouth 2 (two) times daily with meals. 60 tablet 0    levothyroxine 75 MCG Oral Tab Take 1 tablet (75 mcg total) by mouth before breakfast. 30 tablet 0    carvedilol 25 MG Oral Tab Take 1 tablet (25 mg total) by mouth 2 (two) times daily with meals. 60 tablet 0    insulin glargine (LANTUS SOLOSTAR) 100 UNIT/ML Subcutaneous Solution Pen-injector Inject 10 Units into the skin at bedtime. 5 each 2    ONETOUCH ULTRA In Vitro Strip 1 each by In Vitro route 2 (two) times daily. 100 strip 0    Insulin Lispro, 1 Unit Dial, (HUMALOG KWIKPEN) 100 UNIT/ML Subcutaneous Solution Pen-injector Use sliding scale 3 each 0    hydrALAZINE 25 MG Oral Tab Take 1 tablet (25 mg total) by mouth 3 (three) times daily with meals. 270 tablet 1    furosemide 40 MG Oral Tab Take 1 tablet (40 mg total) by mouth 2 (two) times daily. 180 tablet 1    ibuprofen 600 MG Oral Tab Take 1 tablet (600 mg total) by mouth 3 (three) times daily as needed.      potassium chloride 20 MEQ Oral Tab CR       acetaminophen 500 MG Oral Tab Take 1-2 tablet every 4 hours as needed for pain 20 tablet 0    polyethylene glycol, PEG 3350, 17 g Oral Powd Pack Take 17 g by mouth daily.      NIFEdipine ER 60 MG Oral Tablet 24 Hr Take 1 tablet (60 mg total) by mouth daily.      Insulin Pen Needle 32G X 4 MM  Does not apply Misc May substitute if not on insurance formulary 100 each 5    famotidine 20 MG Oral Tab Take 1 tablet (20 mg total) by mouth nightly as needed. AT BEDTIME (Patient not taking: Reported on 5/8/2025)      rosuvastatin 5 MG Oral Tab Take 1 tablet (5 mg total) by mouth nightly. (Patient not taking: Reported on 5/8/2025) 90 tablet 0    levothyroxine 25 MCG Oral Tab Take 1 tablet (25 mcg total) by mouth before breakfast. (Patient not taking: Reported on 5/8/2025)      insulin aspart (NOVOLOG FLEXPEN) 100 Units/mL Subcutaneous Solution Pen-injector Test blood glucose 3 times daily with meals  Inject 1 unit if blood glucose is between 141-180 mg/dL Inject 2 units if blood glucose is between 181-220 mg/dL Inject 3 units if blood glucose is between 221-260 mg/dL Inject 4 units if blood glucose is between 261-300 mg/dL Inject 5 units if blood glucose is between 301-350 mg/dL Call your physician if blood glucose is greater than 351 mg/dL, (Patient not taking: Reported on 5/8/2025) 5 each 2   [7]   Patient Active Problem List  Diagnosis    Mixed hyperlipidemia    Subclinical hypothyroidism    Proteinuria    Benign essential HTN    Type 2 diabetes mellitus with other specified complication (HCC)    Peripheral edema    Morbid obesity with BMI of 40.0-44.9, adult (HCC)    Non-traumatic rhabdomyolysis    Acute hypoxemic respiratory failure (HCC)    Right leg weakness    Generalized weakness    HHNC (hyperglycemic hyperosmolar nonketotic coma) ([facility])    MSSA bacteremia    Discitis of lumbar region    Epidural abscess (HCC)    Intractable back pain    Osteomyelitis of low back ([facility])    Diarrhea    C. difficile diarrhea    Syncope and collapse    Nausea and vomiting    Leukocytosis    TARIK (acute kidney injury)    Dehydration    Renal insufficiency    Hyperkalemia    Azotemia    Metabolic acidosis    Hyperglycemia    Weakness generalized    Dizziness    Hypertensive urgency    Extremity edema    Lumbar radiculopathy     Discitis, unspecified spinal region    Dyslipidemia    Moderate back pain    Pressure ulcer, heel, right, unstageable (HCC)    Pressure injury of left heel, stage 2 (HCC)    PVD (peripheral vascular disease)

## 2025-05-08 NOTE — TELEPHONE ENCOUNTER
Patient was seen today for med check. Requesting refill for nifedipine ER 60mg.  Last refill: 11/11/24

## 2025-05-09 DIAGNOSIS — E78.00 PURE HYPERCHOLESTEROLEMIA: ICD-10-CM

## 2025-05-09 DIAGNOSIS — E11.65 POORLY CONTROLLED DIABETES MELLITUS (HCC): Primary | ICD-10-CM

## 2025-05-09 RX ORDER — ROSUVASTATIN CALCIUM 10 MG/1
10 TABLET, COATED ORAL NIGHTLY
Qty: 90 TABLET | Refills: 0 | Status: SHIPPED | OUTPATIENT
Start: 2025-05-09

## 2025-05-19 ENCOUNTER — TELEPHONE (OUTPATIENT)
Dept: FAMILY MEDICINE CLINIC | Facility: CLINIC | Age: 65
End: 2025-05-19

## 2025-05-19 RX ORDER — BLOOD SUGAR DIAGNOSTIC
1 STRIP MISCELLANEOUS 2 TIMES DAILY
Qty: 100 STRIP | Refills: 2 | Status: SHIPPED | OUTPATIENT
Start: 2025-05-19

## 2025-05-19 RX ORDER — BLOOD SUGAR DIAGNOSTIC
STRIP MISCELLANEOUS 2 TIMES DAILY
Qty: 100 STRIP | Refills: 0 | OUTPATIENT
Start: 2025-05-19

## 2025-05-19 NOTE — TELEPHONE ENCOUNTER
Patient requests refill of   ONETOUCH ULTRA In Vitro Strip     Please send to   [facility] #[id number] [place], [place] - [address] AT [facility] [address], [phone], [phone]     Please advise

## 2025-05-19 NOTE — TELEPHONE ENCOUNTER
LOV: 5/8/25    NOV: None scheduled    Requesting refill: one touch ultra strips    Last refill: 2/14/25 #100     Patient was recently seen. Refill sent.

## 2025-06-02 RX ORDER — LEVOTHYROXINE SODIUM 75 UG/1
75 TABLET ORAL
Qty: 90 TABLET | Refills: 1 | Status: SHIPPED | OUTPATIENT
Start: 2025-06-02

## 2025-06-02 NOTE — TELEPHONE ENCOUNTER
Last Office Visit: 05/08/2025    Last Refill:   Medication Quantity Refills Start End   levothyroxine 75 MCG Oral Tab 30 tablet 0 4/30/2025 --     Return to Clinic: 11/08/2025    Protocol: passed

## 2025-06-09 ENCOUNTER — TELEPHONE (OUTPATIENT)
Dept: FAMILY MEDICINE CLINIC | Facility: CLINIC | Age: 65
End: 2025-06-09

## 2025-06-09 RX ORDER — CARVEDILOL 25 MG/1
25 TABLET ORAL 2 TIMES DAILY WITH MEALS
Qty: 180 TABLET | Refills: 0 | Status: SHIPPED | OUTPATIENT
Start: 2025-06-09

## 2025-06-09 RX ORDER — HYDRALAZINE HYDROCHLORIDE 25 MG/1
25 TABLET, FILM COATED ORAL
Qty: 270 TABLET | Refills: 1 | OUTPATIENT
Start: 2025-06-09

## 2025-06-09 RX ORDER — HYDRALAZINE HYDROCHLORIDE 25 MG/1
25 TABLET, FILM COATED ORAL
Qty: 270 TABLET | Refills: 0 | Status: SHIPPED | OUTPATIENT
Start: 2025-06-09

## 2025-06-09 NOTE — TELEPHONE ENCOUNTER
Patient informed of Dr. Bailey's note below. He verbalized understanding. Patient is also requesting for hydralazine refill. He has 6 pills left. LOV: 5/8/25 for med check. 3 months refill sent for carvedilol 25mg and hydralazine.

## 2025-06-09 NOTE — TELEPHONE ENCOUNTER
Patient states at the last office visit on 5/8/25, he was told to decrease Carvedilol from 25 mg BID to 12.5 mg BID due to intermittent lightheadedness over the past several months.     Patient is asking to return to Carvedilol 25mg BID due to elevated blood pressure readings. Yesterday his BP was 149/85. He ranges anywhere in the 140s-160s systolic, and in the 80s diastolic.  Patient feels well. No dizziness, No headaches. No lightheadedness.     If increased again, he wants a 90 days supply sent to [facility] on [address].

## 2025-06-09 NOTE — TELEPHONE ENCOUNTER
Patient would like to know if he can go back to carvedilol 25 MG Oral Tab. Patient states he is almost out of medication.     Send to  [facility] #[id number] [facility] [address] AT [facility] [address], [phone], [phone]     LOV:5/8/2025

## 2025-07-16 RX ORDER — FUROSEMIDE 40 MG/1
40 TABLET ORAL 2 TIMES DAILY
Qty: 180 TABLET | Refills: 0 | Status: SHIPPED | OUTPATIENT
Start: 2025-07-16 | End: 2025-07-16

## 2025-07-16 RX ORDER — FUROSEMIDE 40 MG/1
40 TABLET ORAL 2 TIMES DAILY
Qty: 180 TABLET | Refills: 0 | Status: SHIPPED | OUTPATIENT
Start: 2025-07-16

## 2025-07-16 NOTE — TELEPHONE ENCOUNTER
Rx request   furosemide 40 MG Oral Tab     Send to   [facility] - [place], [place] - [address] [phone], [phone]   [address] [facility] [address]   Phone: [phone] Fax: [phone]   Hours: Not open 24 hours   [id number] - [place], [place] - [address] AT [facility] [address], [phone], [phone]   [address] [facility] [address]   Phone: [phone] Fax: [phone]     Patient was told medication was denied for refill. Patient is frustrated with recent refill request. States it seems impossible to get a refill without calling the office.     LOV: 5/8/2025

## 2025-07-16 NOTE — TELEPHONE ENCOUNTER
LOV:  5/8/25    NOV:  None scheduled.   Asked to have labs completed in 3 months (August 2025)    Last refill:  10/17/24  #180, 1 refill     Medication Detail  Medication Quantity Refills Start End   furosemide 40 MG Oral Tab 180 tablet 1 10/17/2024 --   Sig:   Take 1 tablet (40 mg total) by mouth 2 (two) times daily.     Route:   Oral

## 2025-07-18 ENCOUNTER — MED REC SCAN ONLY (OUTPATIENT)
Dept: FAMILY MEDICINE CLINIC | Facility: CLINIC | Age: 65
End: 2025-07-18

## 2025-07-21 ENCOUNTER — TELEPHONE (OUTPATIENT)
Dept: FAMILY MEDICINE CLINIC | Facility: CLINIC | Age: 65
End: 2025-07-21

## 2025-07-21 NOTE — TELEPHONE ENCOUNTER
I Called and spoke to Kun at the [facility] pharmacy on [address]. They have to call medicare and have a form faxed to Dr. Bailey to complete confirming the patient has been seen in the office in the past 6 months. They said they would do this today. I did inform the patient of the delay and will follow up with the patient as soon as I get a response from [facility].

## 2025-07-22 NOTE — TELEPHONE ENCOUNTER
Form received from [facility] for diabetic supplies. It was filled out and put in Dr. Bailey's basket outside of his office. He needs to sign and give to his MA to fax back on Wednesday 07/23/25. Patient was notified.

## 2025-07-24 ENCOUNTER — TELEPHONE (OUTPATIENT)
Dept: FAMILY MEDICINE CLINIC | Facility: CLINIC | Age: 65
End: 2025-07-24

## 2025-07-24 RX ORDER — BLOOD SUGAR DIAGNOSTIC
STRIP MISCELLANEOUS
Qty: 100 STRIP | Refills: 2 | Status: SHIPPED | OUTPATIENT
Start: 2025-07-24

## 2025-07-24 NOTE — TELEPHONE ENCOUNTER
Per pharmacy, they wanted to know if patient tests 2 times or 3 times daily.  Recent form faxed states 3 times a day and the script states 2 times a day.    Spoke with patient and states 3 times a day.    Resent script with corrected instruction.

## 2025-08-14 ENCOUNTER — MED REC SCAN ONLY (OUTPATIENT)
Dept: FAMILY MEDICINE CLINIC | Facility: CLINIC | Age: 65
End: 2025-08-14

## 2025-08-29 ENCOUNTER — PATIENT OUTREACH (OUTPATIENT)
Dept: FAMILY MEDICINE CLINIC | Facility: CLINIC | Age: 65
End: 2025-08-29

## 2025-08-29 DIAGNOSIS — E11.65 TYPE 2 DIABETES MELLITUS WITH HYPERGLYCEMIA, WITHOUT LONG-TERM CURRENT USE OF INSULIN (HCC): Primary | ICD-10-CM

## (undated) NOTE — LETTER
Patient Name: Troy Feliz        : 1960       Medical Record #: [id number]    CONSENT FOR PROCEDURES/SEDATION    Date: 2024       Time: 3:17 PM        1. I authorize the performance upon Troy Feliz the following:    __Image guided Lumbar spine intervertebral disc biopsy__________________    2. I authorize Dr. Rg (and whomever is designated as the doctor’s assistant), to perform the above mentioned procedures.    3. If any unforeseen conditions arise during this procedure calling for additional procedures, operations, or medications (including anesthesia and blood transfusion), I  further request and authorize the doctor to do whatever he/she deems advisable in my interest.    4. I consent to the taking and reproduction of any photographs in the course of this procedure for professional purposes.    5. I consent to the administration of such sedation as may be considered necessary or advisable by the physician responsible for this service, with the exception of  NONE.    6. I have been informed by my doctor of the nature and purpose of this procedure/sedation, possible alternative methods of treatment, risk involved and possible complications.      Signature of Patient:  ___________________________    Signature of person authorized to consent for patient: Relationship to patient:  ___________________________    ___________________    Witness: ____________________     Date: ______________    Provider: ____________________     Date: ______________

## (undated) NOTE — LETTER
6/25/2020        [address] [id number]    Dear Mr. ChuAnnikamillie Aviles,     [facility] office has been trying to contact you to schedule a visit with your doctor to address important healthcare needs.   It is important that you contact our o

## (undated) NOTE — LETTER
6/12/2018    [address]  [address] [address]    Dear Sharron Kristopher Rodriguez,     [facility] office has been trying to address important healthcare needs. It is important that you contact our office at your earliest convenience.     Also,  Did you kn

## (undated) NOTE — MR AVS SNAPSHOT
[facility], [address] [id number] [phone]               Thank you for choosing us for your health care visit with [address] [place] [address], .   We are glad to serve you and happy to provide you with this s DIETITIAN EDUCATION INITIAL, DIET (INTERNAL) [[id number] CPT(R)]  Order #:  [id number]         **THIS IS NOT A REFERRAL**    Your physician has recommended you to a specialist. If your insurance requires you to obtain a managed care referral, please allow 3 Medication Follow-Up           Medical Issues Discussed Today     Uncontrolled type 2 diabetes mellitus with diabetic neuropathy, without long-term current use of insulin    -  Primary    Hypertension goal BP (blood pressure) < 130/80        Hyperlipidemi that’s high in these fats increases your bad cholesterol. It's not enough to just cut back on foods containing cholesterol. · Eat about 2 servings of fish per week. Most fish contain omega-3 fatty acids. These help lower blood cholesterol.   · Eat more who of it in your blood, it can build up in the walls of your arteries. Over time, this buildup can lead to coronary disease. Coronary disease can put you at risk for a heart attack or stroke.  It can also put you at risk for disease of the arteries in your leg When to call your healthcare provider  When taking your medicine, let your healthcare provider know if you have:  · Yellowing of the whites of eyes  · Blurred vision  · Muscle aches  · Trouble breathing   High-risk groups  Some people may need to take Debbie Norris substitute for professional medical care. Always follow your healthcare professional's instructions. Diet: Diabetes  Food is an important tool that you can use to control diabetes and stay healthy.  Eating well-balanced meals in the correct amounts w that are unsaturated, such as olive, canola, or peanut oil. · Talk with your dietitian about safe sugar substitutes. · Avoid added salt. It can contribute to high blood pressure, which can cause heart disease.  People with diabetes already have a risk of also found in corn, peas, potatoes, yam, acorn squash, and butternut squash. Starches also raise blood sugar.   · Fiber is found in foods such as vegetables, fruits, beans, and whole grains. Unlike other carbs, fiber isn’t digested or absorbed.  So it doesn Protein helps the body build and repair muscle and other tissue. Protein has little or no effect on blood sugar. However, many foods that contain protein also contain saturated fat.  By choosing low-fat protein sources, you can get the benefits of protein w such as your health, the medicines you take, and how active you are. Your healthcare team will help you figure out the right amount of carbohydrates for you.  You may start with around 45 to 60 grams of carbohydrates per meal, depending on your situation.  · Learn what a correct serving size looks like on your plate. This will help when you are away from home and can’t measure your servings. · Eat only the number of servings given on your meal plan for each food. Don’t take seconds.   · Learn to read food la · Try to eat your meals and snacks at about the same times each day. · Eat all your meals and snacks. Skipping a meal or snack can make your blood sugar drop too low. It can also cause you to eat too much at the next meal or snack.  Then your blood sugar c · Serious infections, possibly leading to loss of toes, feet, or limbs  How to avoid complications  The serious consequences of these complications may be avoidable for most people with diabetes by managing your blood glucose, blood pressure, and cholester heart and helps relieve stress. · Go with you to visit your healthcare team. This will help your loved ones learn what you need to do. Taking time to relax  Learning to relax and doing things you enjoy may reduce stress. Staying active also helps.   Ways BP Pulse Temp Height Weight BMI    160/100 mmHg 88 98.1 °F (36.7 °C) (Oral) 72.5\" 366 lb 48.93 kg/m2         Current Medications          This list is accurate as of: 3/31/17  8:56 AM.  Always use your most recent med list.                [facility] Support Staff. Remember, MyChart is NOT to be used for urgent needs. For medical emergencies, dial 911.            Visit [facility] online at  [email or web address]

## (undated) NOTE — LETTER
04/07/21        [place] [id number]      Dear Mary Koo,    [address] records indicate that you have outstanding lab work and or testing that was ordered for you and has not yet been completed:  Orders Placed This Encounter

## (undated) NOTE — IP AVS SNAPSHOT
Patient Demographics     [address] [place] [address] Phone  [phone] (Home)  [phone] (Mobile) *Preferred* E-mail Address  [email or web address]      Patient Contacts     Name Relation Home Work Mobile    Ada Rand Sister [phone]    dionne Marie Sister   [phone]    Regina Curry Sister   [phone]    Ashley Mcdonald Sister   [phone]      Allergies as of 1/9/2024  Review status set to Review Complete on 12/30/2023   No Known Allergies     Code Status Information     Code Status    Full Code        Patient Instructions       List of possible wound care centers for outpatient follow-up for heel wounds:    - [facility] wound care center: [place]: [phone]    - [facility] wound care: [place]: [phone]    - [facility] wound center: [place]: [phone]    - [facility] for wound care: [place]: [phone]    - [facility] wound and hyperbaric services: [place]: [phone]    Wound Cleaning and Dressings:  1.Right plantar heel, left anterior ankle  Showering directions: May shower with protection  Wound cleansing:  Cleanse with saline  Wound product: Paint with betadine.Cover with bordered gauze.  Dressing change frequency:  Change dressing daily and/or PRN     2.Left plantar heel  Showering directions: May shower with protection  Wound cleansing:  Cleanse with saline  Wound product: Bordered Silver foam  Dressing change frequency:  Change dressing every other day  and/or PRN        Compression Therapy:   Elevate leg(s) as much as possible-        Miscellaneous/Additional Orders:  Offloading: Turn Q 2 hours and Heel off-loading boot(s)     Miscellaneous orders: Increase dietary protein intake.Dietitian may need to evaluate amount of protein supplements        Follow-up Information     Jimenez Bailey DO. Call in 1 week(s).    Specialty: Family Medicine  Why: As needed  Contact  information:  [address] [place] [address]  [facility] [address]  [phone]             Deborah Matamoros MD. Call in 2 week(s).    Specialties: INFECTIOUS DISEASES, Internal Medicine  Contact information:  [address] [place] [address] 3  [facility] [address]  [phone]             Marcelo Young DPM Follow up in 1 week(s).    Specialties: Surgery, Foot & Ankle, PODIATRIST  Contact information:  [address] [place] [address]  [phone]             WOUND CARE NURSE Follow up in 1 week(s).    Contact information:  [address] [place] [address]  [phone]                        Your Home Meds List      TAKE these medications       Instructions Authorizing Provider Morning Afternoon Evening As Needed   acetaminophen 500 MG Tabs  Commonly known as: Tylenol Extra Strength      Take 1-2 tablet every 4 hours as needed for pain   Farhadfrandy Monzon         carvedilol 25 MG Tabs  Commonly known as: Coreg      Take 1 tablet (25 mg total) by mouth 2 (two) times daily with meals.   Farhad Monzon         cyclobenzaprine 10 MG Tabs  Commonly known as: Flexeril      Take 1 tablet (10 mg total) by mouth 3 (three) times daily as needed for Muscle spasms.   Christopher Gustafson         furosemide 40 MG Tabs  Commonly known as: Lasix      Take 1 tablet (40 mg total) by mouth 2 (two) times daily.   Marcella Burton         gabapentin 100 MG Caps  Commonly known as: Neurontin      Take 1 capsule (100 mg total) by mouth 3 (three) times daily.   Marcella Burton         hydrALAZINE 50 MG Tabs  Commonly known as: Apresoline      Take 1 tablet (50 mg total) by mouth every 8 (eight) hours.  Stop taking on: February 8, 2024   Christopher Gustafson         HYDROcodone-acetaminophen 5-325 MG Tabs  Commonly known as: Norco      Take 1 tablet by mouth every 8 (eight) hours as needed.   Christopher Gustafson         Insulin Pen Needle 32G X 4 MM Misc      May substitute if not on insurance formulary   Coy Dandy         Levemir FlexTouch 100 UNIT/ML Sopn  Generic drug:  insulin detemir      Inject 10 Units into the skin nightly.   Sylwia Vazquez         levothyroxine 25 MCG Tabs  Commonly known as: Synthroid      Take 1 tablet (25 mcg total) by mouth before breakfast.          lidocaine-menthol 4-1 % Ptch      Place 1 patch onto the skin daily.   Coy Dorman         melatonin 1 MG Tabs      Nightly prn   Eula Olella         NIFEdipine ER 60 MG Tb24  Commonly known as: ADALAT CC      Take 1 tablet (60 mg total) by mouth daily.          NovoLOG FlexPen 100 UNIT/ML Sopn  Generic drug: insulin aspart  Next dose due: Take as directed- follow sliding scale      Test blood glucose 3 times daily with meals  Inject 1 unit if blood glucose is between 141-180 mg/dL Inject 2 units if blood glucose is between 181-220 mg/dL Inject 3 units if blood glucose is between 221-260 mg/dL Inject 4 units if blood glucose is between 261-300 mg/dL Inject 5 units if blood glucose is between 301-350 mg/dL Call your physician if blood glucose is greater than 351 mg/dL,   Eula Miles         polyethylene glycol (PEG 3350) 17 g Pack  Commonly known as: Miralax      Take 17 g by mouth daily.                Where to Get Your Medications      These medications were sent to [id number] [place], [place]  [address] AT [facility] [address] [facility], [phone], [phone]  [address], [facility] [address]    Phone: [phone]   cyclobenzaprine 10 MG Tabs  hydrALAZINE 50 MG Tabs     Please  your prescriptions at the location directed by your doctor or nurse    Bring a paper prescription for each of these medications  HYDROcodone-acetaminophen 5-325 MG Tabs           [phone] - MAR ACTION REPORT  (last 48 hrs)    ** SITE UNKNOWN **     Order ID Medication Name Action Time Action Reason Comments    [id number] HYDROcodone-acetaminophen (Norco) 5-325 MG per tab 1 tablet 01/07/24 1731 Given      [id number] HYDROcodone-acetaminophen (Norco) 5-325 MG per tab 1 tablet 01/07/24 2213 Given       [id number] HYDROcodone-acetaminophen (Norco) 5-325 MG per tab 1 tablet 01/08/24 0630 Given      [id number] HYDROcodone-acetaminophen (Norco) 5-325 MG per tab 1 tablet 01/08/24 1219 Given      [id number] HYDROcodone-acetaminophen (Norco) 5-325 MG per tab 1 tablet 01/08/24 1704 Given      [id number] HYDROcodone-acetaminophen (Norco) 5-325 MG per tab 1 tablet 01/08/24 2128 Given      [id number] HYDROcodone-acetaminophen (Norco) 5-325 MG per tab 1 tablet 01/09/24 0618 Given      [id number] HYDROcodone-acetaminophen (Norco) 5-325 MG per tab 1 tablet 01/09/24 1307 Given      [id number] NIFEdipine ER (Procardia-XL) 24 hr tab 60 mg 01/08/24 0826 Given      [id number] NIFEdipine ER (Procardia-XL) 24 hr tab 60 mg 01/09/24 0845 Given      [id number] carvedilol (Coreg) tab 25 mg 01/07/24 1726 Given      [id number] carvedilol (Coreg) tab 25 mg 01/08/24 0826 Given      [id number] carvedilol (Coreg) tab 25 mg 01/08/24 1704 Given      [id number] carvedilol (Coreg) tab 25 mg 01/09/24 0845 Given      [id number] cyclobenzaprine (Flexeril) tab 10 mg 01/07/24 2220 Given      [id number] cyclobenzaprine (Flexeril) tab 10 mg 01/08/24 2257 Given      [id number] cyclobenzaprine (Flexeril) tab 10 mg 01/09/24 1307 Given      [id number] furosemide (Lasix) tab 40 mg 01/07/24 1726 Given      [id number] furosemide (Lasix) tab 40 mg 01/08/24 0826 Given      [id number] furosemide (Lasix) tab 40 mg 01/08/24 1704 Given      [id number] furosemide (Lasix) tab 40 mg 01/09/24 0845 Given      [id number] gabapentin (Neurontin) cap 100 mg 01/07/24 1725 Given      [id number] gabapentin (Neurontin) cap 100 mg 01/07/24 2141 Given      [id number] gabapentin (Neurontin) cap 100 mg 01/08/24 0826 Given      [id number] gabapentin (Neurontin) cap 100 mg 01/08/24 1704 Given      [id number] gabapentin (Neurontin) cap 100 mg 01/08/24 2129 Given      [id number] gabapentin (Neurontin) cap 100 mg 01/09/24 0845 Given      [id number] gabapentin (Neurontin) cap 100 mg 01/09/24 1525 Given       [id number] hydrALAZINE (Apresoline) tab 50 mg 01/07/24 1726 Given      [id number] hydrALAZINE (Apresoline) tab 50 mg 01/08/24 0056 Given      [id number] hydrALAZINE (Apresoline) tab 50 mg 01/08/24 0826 Given      [id number] hydrALAZINE (Apresoline) tab 50 mg 01/08/24 1704 Given      [id number] hydrALAZINE (Apresoline) tab 50 mg 01/09/24 0041 Given      [id number] hydrALAZINE (Apresoline) tab 50 mg 01/09/24 0845 Given      [id number] hydrALAZINE (Apresoline) tab 50 mg 01/09/24 1525 Given      [id number] ibuprofen (Motrin) tab 400 mg 01/08/24 1458 Given      [id number] levothyroxine (Synthroid) tab 25 mcg 01/08/24 0629 Given      [id number] levothyroxine (Synthroid) tab 25 mcg 01/09/24 0618 Given            LEFT LOWER ABDOMEN     Order ID Medication Name Action Time Action Reason Comments    [id number] heparin (Porcine) 5000 UNIT/ML injection 5,000 Units 01/07/24 1726 Given      [id number] heparin (Porcine) 5000 UNIT/ML injection 5,000 Units 01/07/24 2141 Given      [id number] heparin (Porcine) 5000 UNIT/ML injection 5,000 Units 01/08/24 1458 Given      [id number] heparin (Porcine) 5000 UNIT/ML injection 5,000 Units 01/08/24 2129 Given      [id number] heparin (Porcine) 5000 UNIT/ML injection 5,000 Units 01/09/24 1307 Given            LEFT LOWER ARM     Order ID Medication Name Action Time Action Reason Comments    [id number] heparin (Porcine) 5000 UNIT/ML injection 5,000 Units 01/08/24 0630 Given      [id number] insulin aspart (NovoLOG) 100 Units/mL FlexPen 1-10 Units 01/07/24 2213 Given      [id number] insulin detemir (Levemir) 100 UNIT/ML FlexPen/FlexTouch 10 Units 01/07/24 2213 Given      [id number] insulin detemir (Levemir) 100 UNIT/ML FlexPen/FlexTouch 10 Units 01/08/24 2129 Given            RIGHT LOWER ABDOMEN     Order ID Medication Name Action Time Action Reason Comments    [id number] heparin (Porcine) 5000 UNIT/ML injection 5,000 Units 01/09/24 0618 Given              Recent Vital Signs    Flowsheet Row Most Recent Value   BP  128/60 Filed at 2024   Pulse 72 Filed at 2024   Resp 16 Filed at 2024   Temp 97.7 °F (36.5 °C) Filed at 2024   SpO2 96 % Filed at 2024      Patient's Most Recent Weight    Flowsheet Row Most Recent Value   Patient Weight 132.5 kg (292 lb)      Lab Results Last 24 Hours    No matching results found     Microbiology Results (All)     Procedure Component Value Units Date/Time    Blood Culture [[id number]] Collected: 23 170    Order Status: Completed Lab Status: Final result Updated: 24    Specimen: Blood,peripheral      Blood Culture Result No Growth 5 Days    Blood Culture [[id number]] Collected: 23    Order Status: Completed Lab Status: Final result Updated: 24    Specimen: Blood,peripheral      Blood Culture Result No Growth 5 Days         H&P - H&P Note      H&P signed by Coy Dorman MD at 2023  5:54 PM  Version 2 of 2    Author: Coy Dorman MD Service: — Author Type: Physician    Filed: 2023  5:54 PM Date of Service: 2023  5:08 PM Status: Addendum    : Coy Dorman MD (Physician)    Related Notes: Original Note by Coy Dorman MD (Physician) filed at 2023  5:53 PM         [facility]     Troy Feliz Patient Status:  Emergency    1960 MRN [id number]   Location [facility] EMERGENCY DEPARTMENT Attending Donavon Lipscomb MD   Hosp Day # 0 PCP MADELINE FENTON DO     Chief Complaint: back pain/hip pain    Subjective:    History of Present Illness:     Troy Feliz is a 63 year old male with PMhx of DM, HTN, DL, discitis presents with back and hip pain and muscle spasms. Pt is wheelchair bound due to muscle spasms. He states symptoms all started back in March. He has been going to rehab for this. He states he took norco this morning without relief. He denies any F/C. No new focal weakness, numbness or tingling. No CP or SOB. No  N/V/D/C or abd pain. He denies any loss of bowel or urine function.     History/Other:    Past Medical History:  Past Medical History:   Diagnosis Date    Diabetes (HCC)     Dyslipidemia 09/19/2011    High blood pressure     HTN (hypertension) 09/19/2011    Hyperlipidemia     Impaired fasting glucose 09/19/2011    Proteinuria 09/19/2011    Subclinical hypothyroidism 09/19/2011    Unspecified essential hypertension     Visual impairment      Past Surgical History:   Past Surgical History:   Procedure Laterality Date    MASTOIDECTOMY,SIMPLE      done 30 years ago.  Revised in 1995      Family History:   Family History   Problem Relation Age of Onset    Diabetes Father     Heart Disorder Mother     Heart Disorder Brother      Social History:    reports that he has never smoked. He has never used smokeless tobacco. He reports that he does not drink alcohol and does not use drugs.     Allergies: No Known Allergies    Medications:    Current Facility-Administered Medications on File Prior to Encounter   Medication Dose Route Frequency Provider Last Rate Last Admin    [COMPLETED] potassium chloride (K-Dur) tab 40 mEq  40 mEq Oral Once Marcella Burton MD   40 mEq at 12/16/23 1101    [COMPLETED] potassium chloride (K-Dur) tab 40 mEq  40 mEq Oral Once Marcella Burton MD   40 mEq at 12/14/23 0824    [COMPLETED] furosemide (Lasix) 10 mg/mL injection 60 mg  60 mg Intravenous BID (Diuretic) Marcella Burton MD   60 mg at 12/15/23 1737    [COMPLETED] potassium chloride (K-Dur) tab 40 mEq  40 mEq Oral Q4H Marcella Burton MD   40 mEq at 12/13/23 1202    [COMPLETED] potassium chloride (K-Dur) tab 40 mEq  40 mEq Oral Once Marcella Burton MD   40 mEq at 12/13/23 1725    [COMPLETED] furosemide (Lasix) 10 mg/mL injection 60 mg  60 mg Intravenous Once Jazmine Martinez MD   60 mg at 12/12/23 2216     Current Outpatient Medications on File Prior to Encounter   Medication Sig Dispense Refill    furosemide 40 MG Oral Tab Take 1 tablet (40 mg  total) by mouth 2 (two) times daily. 60 tablet 1    hydrALAZINE 25 MG Oral Tab Take 1 tablet (25 mg total) by mouth every 8 (eight) hours. 90 tablet 0    traMADol 50 MG Oral Tab Take 1 tablet (50 mg total) by mouth every 8 (eight) hours as needed for Pain. 15 tablet 0    tiZANidine 4 MG Oral Tab Take 1 tablet (4 mg total) by mouth every 6 (six) hours as needed. 20 tablet 0    HYDROcodone-acetaminophen 5-325 MG Oral Tab Take 1 tablet by mouth every 4 (four) hours as needed. 15 tablet 0    gabapentin 100 MG Oral Cap Take 1 capsule (100 mg total) by mouth 3 (three) times daily. 90 capsule 0    acetaminophen 500 MG Oral Tab Take 1-2 tablet every 4 hours as needed for pain 20 tablet 0    insulin detemir (LEVEMIR FLEXTOUCH) 100 UNIT/ML Subcutaneous Solution Pen-injector Inject 10 Units into the skin nightly. 5 each 2    carvedilol 25 MG Oral Tab Take 1 tablet (25 mg total) by mouth 2 (two) times daily with meals. 60 tablet 0    levothyroxine 25 MCG Oral Tab Take 1 tablet (25 mcg total) by mouth before breakfast.      polyethylene glycol, PEG 3350, 17 g Oral Powd Pack Take 17 g by mouth daily.      NIFEdipine ER 60 MG Oral Tablet 24 Hr Take 1 tablet (60 mg total) by mouth daily.      melatonin 1 MG Oral Tab Nightly prn 30 tablet 0    insulin aspart (NOVOLOG FLEXPEN) 100 Units/mL Subcutaneous Solution Pen-injector Test blood glucose 3 times daily with meals  Inject 1 unit if blood glucose is between 141-180 mg/dL Inject 2 units if blood glucose is between 181-220 mg/dL Inject 3 units if blood glucose is between 221-260 mg/dL Inject 4 units if blood glucose is between 261-300 mg/dL Inject 5 units if blood glucose is between 301-350 mg/dL Call your physician if blood glucose is greater than 351 mg/dL, 5 each 2    Insulin Pen Needle 32G X 4 MM Does not apply Misc May substitute if not on insurance formulary 100 each 5    lidocaine-menthol 4-1 % External Patch Place 1 patch onto the skin daily. 30 patch 0       Review of  Systems:   A comprehensive review of systems was completed.    Pertinent positives and negatives noted in the HPI.    Objective:   Physical Exam:    BP (!) 174/86   Pulse 72   Temp 98.4 °F (36.9 °C) (Temporal)   Resp 21   Wt 292 lb (132.5 kg)   SpO2 97%   BMI 38.95 kg/m²   General: No acute distress, Alert  Respiratory: No rhonchi, no wheezes  Cardiovascular: S1, S2. Regular rate and rhythm  Abdomen: Soft, Non-tender, non-distended, positive bowel sounds  Neuro: No new focal deficits  Extremities: No edema      Results:    Labs:      Labs Last 24 Hours:    Recent Labs   Lab 12/30/23  1403   RBC 4.59   HGB 14.0   HCT 42.0   MCV 91.5   MCH 30.5   MCHC 33.3   RDW 12.5   NEPRELIM 6.78   WBC 8.9   .0       Recent Labs   Lab 12/30/23  1403   *   BUN 25*   CREATSERUM 0.98   EGFRCR 87   CA 9.4   ALB 3.2*      K 3.9      CO2 29.0   ALKPHO 103   AST 14*   ALT 23   BILT 0.3   TP 7.9       Lab Results   Component Value Date    INR 1.14 06/29/2023    INR 1.25 (H) 03/23/2023       No results for input(s): \"TROP\", \"TROPHS\", \"CK\" in the last 168 hours.    No results for input(s): \"TROP\", \"PBNP\" in the last 168 hours.    No results for input(s): \"PCT\" in the last 168 hours.    Imaging: Imaging data reviewed in Epic.    Assessment & Plan:      #Back Pain  #Discitis/OM  -continue IV abx  -follow cultures  -ID and NeuroSx to eval  -check ESR/CRP  -LSO brace    #Ess HTN  -resume home meds    #Dyslipidemia  -statin    #DM2  -SSI        Plan of care discussed with patient, ED Physician     Coy Dorman MD    Supplementary Documentation:     The 21st Century Cures Act makes medical notes like these available to patients in the interest of transparency. Please be advised this is a medical document. Medical documents are intended to carry relevant information, facts as evident, and the clinical opinion of the practitioner. The medical note is intended as peer to peer communication and may appear blunt or  direct. It is written in medical language and may contain abbreviations or verbiage that are unfamiliar.                                   Electronically signed by Coy Dorman MD on 2023  5:54 PM     H&P signed by Coy Dorman MD at 2023  5:53 PM  Version 1 of 2    Author: Coy Dorman MD Service: — Author Type: Physician    Filed: 2023  5:53 PM Date of Service: 2023  5:08 PM Status: Signed    : Coy Dorman MD (Physician)    Related Notes: Addendum by Coy Dorman MD (Physician) filed at 2023  5:54 PM         [facility]     Troy Feliz Patient Status:  Emergency    1960 MRN [id number]   [facility] EM[facility] Attending Donavon Lipscomb MD   Hosp Day # 0 PCP MADELINE FENTON,      Chief Complaint: back pain/hip pain    Subjective:    History of Present Illness:     Troy Feliz is a 63 year old male with PMhx of DM, HTN, DL, discitis presents with back and hip pain and muscle spasms. Pt is wheelchair bound due to muscle spasms. He states symptoms all started back in March. He has been going to rehab for this. He states he took norco this morning without relief. He denies any F/C. No new focal weakness, numbness or tingling. No CP or SOB. No N/V/D/C or abd pain. He denies any loss of bowel or urine function.     History/Other:    Past Medical History:  Past Medical History:   Diagnosis Date    Diabetes (HCC)     Dyslipidemia 2011    High blood pressure     HTN (hypertension) 2011    Hyperlipidemia     Impaired fasting glucose 2011    Proteinuria 2011    Subclinical hypothyroidism 2011    Unspecified essential hypertension     Visual impairment      Past Surgical History:   Past Surgical History:   Procedure Laterality Date    MASTOIDECTOMY,SIMPLE      done 30 years ago.  Revised in       Family History:   Family History   Problem Relation Age of Onset     Diabetes Father     Heart Disorder Mother     Heart Disorder Brother      Social History:    reports that he has never smoked. He has never used smokeless tobacco. He reports that he does not drink alcohol and does not use drugs.     Allergies: No Known Allergies    Medications:    Current Facility-Administered Medications on File Prior to Encounter   Medication Dose Route Frequency Provider Last Rate Last Admin    [COMPLETED] potassium chloride (K-Dur) tab 40 mEq  40 mEq Oral Once Marcella Burton MD   40 mEq at 12/16/23 1101    [COMPLETED] potassium chloride (K-Dur) tab 40 mEq  40 mEq Oral Once Marcella Burton MD   40 mEq at 12/14/23 0824    [COMPLETED] furosemide (Lasix) 10 mg/mL injection 60 mg  60 mg Intravenous BID (Diuretic) Marcella Burton MD   60 mg at 12/15/23 1737    [COMPLETED] potassium chloride (K-Dur) tab 40 mEq  40 mEq Oral Q4H Marcella Burton MD   40 mEq at 12/13/23 1202    [COMPLETED] potassium chloride (K-Dur) tab 40 mEq  40 mEq Oral Once Marcella Burton MD   40 mEq at 12/13/23 1725    [COMPLETED] furosemide (Lasix) 10 mg/mL injection 60 mg  60 mg Intravenous Once Jazmine Martinez MD   60 mg at 12/12/23 2216     Current Outpatient Medications on File Prior to Encounter   Medication Sig Dispense Refill    furosemide 40 MG Oral Tab Take 1 tablet (40 mg total) by mouth 2 (two) times daily. 60 tablet 1    hydrALAZINE 25 MG Oral Tab Take 1 tablet (25 mg total) by mouth every 8 (eight) hours. 90 tablet 0    traMADol 50 MG Oral Tab Take 1 tablet (50 mg total) by mouth every 8 (eight) hours as needed for Pain. 15 tablet 0    tiZANidine 4 MG Oral Tab Take 1 tablet (4 mg total) by mouth every 6 (six) hours as needed. 20 tablet 0    HYDROcodone-acetaminophen 5-325 MG Oral Tab Take 1 tablet by mouth every 4 (four) hours as needed. 15 tablet 0    gabapentin 100 MG Oral Cap Take 1 capsule (100 mg total) by mouth 3 (three) times daily. 90 capsule 0    acetaminophen 500 MG Oral Tab Take 1-2 tablet every 4  hours as needed for pain 20 tablet 0    insulin detemir (LEVEMIR FLEXTOUCH) 100 UNIT/ML Subcutaneous Solution Pen-injector Inject 10 Units into the skin nightly. 5 each 2    carvedilol 25 MG Oral Tab Take 1 tablet (25 mg total) by mouth 2 (two) times daily with meals. 60 tablet 0    levothyroxine 25 MCG Oral Tab Take 1 tablet (25 mcg total) by mouth before breakfast.      polyethylene glycol, PEG 3350, 17 g Oral Powd Pack Take 17 g by mouth daily.      NIFEdipine ER 60 MG Oral Tablet 24 Hr Take 1 tablet (60 mg total) by mouth daily.      melatonin 1 MG Oral Tab Nightly prn 30 tablet 0    insulin aspart (NOVOLOG FLEXPEN) 100 Units/mL Subcutaneous Solution Pen-injector Test blood glucose 3 times daily with meals  Inject 1 unit if blood glucose is between 141-180 mg/dL Inject 2 units if blood glucose is between 181-220 mg/dL Inject 3 units if blood glucose is between 221-260 mg/dL Inject 4 units if blood glucose is between 261-300 mg/dL Inject 5 units if blood glucose is between 301-350 mg/dL Call your physician if blood glucose is greater than 351 mg/dL, 5 each 2    Insulin Pen Needle 32G X 4 MM Does not apply Misc May substitute if not on insurance formulary 100 each 5    lidocaine-menthol 4-1 % External Patch Place 1 patch onto the skin daily. 30 patch 0       Review of Systems:   A comprehensive review of systems was completed.    Pertinent positives and negatives noted in the HPI.    Objective:   Physical Exam:    BP (!) 175/64   Pulse 73   Temp 98.2 °F (36.8 °C) (Oral)   Resp 16   Wt 292 lb (132.5 kg)   SpO2 100%   BMI 38.95 kg/m²   General: No acute distress, Alert  Respiratory: No rhonchi, no wheezes  Cardiovascular: S1, S2. Regular rate and rhythm  Abdomen: Soft, Non-tender, non-distended, positive bowel sounds  Neuro: No new focal deficits  Extremities: No edema      Results:    Labs:      Labs Last 24 Hours:    Recent Labs   Lab 12/30/23  1403   RBC 4.59   HGB 14.0   HCT 42.0   MCV 91.5   MCH 30.5    MCHC 33.3   RDW 12.5   NEPRELIM 6.78   WBC 8.9   .0       Recent Labs   Lab 12/30/23  1403   *   BUN 25*   CREATSERUM 0.98   EGFRCR 87   CA 9.4   ALB 3.2*      K 3.9      CO2 29.0   ALKPHO 103   AST 14*   ALT 23   BILT 0.3   TP 7.9       Lab Results   Component Value Date    INR 1.14 06/29/2023    INR 1.25 (H) 03/23/2023       No results for input(s): \"TROP\", \"TROPHS\", \"CK\" in the last 168 hours.    No results for input(s): \"TROP\", \"PBNP\" in the last 168 hours.    No results for input(s): \"PCT\" in the last 168 hours.    Imaging: Imaging data reviewed in Epic.    Assessment & Plan:      #Back Pain  #Discitis/OM  -continue IV abx  -follow cultures  -ID and NeuroSx to eval  -check ESR/CRP  -LSO brace    #Ess HTN  -resume home meds    #Dyslipidemia  -statin    #DM2  -SSI        Plan of care discussed with patient, ED Physician     Coy Dorman MD    Supplementary Documentation:     The 21st Century Cures Act makes medical notes like these available to patients in the interest of transparency. Please be advised this is a medical document. Medical documents are intended to carry relevant information, facts as evident, and the clinical opinion of the practitioner. The medical note is intended as peer to peer communication and may appear blunt or direct. It is written in medical language and may contain abbreviations or verbiage that are unfamiliar.                                   Electronically signed by Coy Dorman MD on 12/30/2023  5:53 PM              Consults - MD Consult Notes      Consults signed by Najjar, Samer F, MD at 1/3/2024 10:20 PM     Author: Najjar, Samer F, MD Service: Vascular Surgery Author Type: Physician    Filed: 1/3/2024 10:20 PM Date of Service: 1/3/2024  9:46 PM Status: Signed    : Najjar, Samer F, MD (Physician)       Samer F. Najjar, MD.  Vascular Surgery  [facility]       VASCULAR SURGERY   INPATIENT CONSULT NOTE      Name: Troy GRIFFIN  Tray   :   1960  [id number]     Date of Consultation: 1/3/2024       REFERRING PHYSICIAN: Christopher Gustafson MD  PRIMARY CARE PHYSICIAN:  MADELINE FENTON DO    HISTORY OF PRESENT ILLNESS:   Patient is a 63 year old male whom I have been asked to see regarding bilateral heel ulcerations.  He states that he has had these ulcers for 4 weeks and that they started as blisters when developed severe bilateral edema.  He underwent an arterial doppler study that revealed 50% stenosis at the bilateral distal posterior tibial arteries.  Patient is also currently being treated for discitis.       PAST MEDICAL HISTORY:    Past Medical History:   Diagnosis Date    Diabetes (HCC)     Dyslipidemia 2011    High blood pressure     HTN (hypertension) 2011    Hyperlipidemia     Impaired fasting glucose 2011    Proteinuria 2011    Subclinical hypothyroidism 2011    Unspecified essential hypertension     Visual impairment        PAST SURGICAL HISTORY:   Past Surgical History:   Procedure Laterality Date    MASTOIDECTOMY,SIMPLE      done 30 years ago.  Revised in        MEDICATIONS:     Current Facility-Administered Medications:     methocarbamol (Robaxin) partial tab 250 mg, 250 mg, Oral, QID    hydrALAZINE (Apresoline) tab 50 mg, 50 mg, Oral, Q8H OSEI    carvedilol (Coreg) tab 25 mg, 25 mg, Oral, BID with meals    furosemide (Lasix) tab 40 mg, 40 mg, Oral, BID (Diuretic)    gabapentin (Neurontin) cap 100 mg, 100 mg, Oral, TID    HYDROcodone-acetaminophen (Norco) 5-325 MG per tab 1 tablet, 1 tablet, Oral, Q4H PRN    insulin detemir (Levemir) 100 UNIT/ML FlexPen/FlexTouch 10 Units, 10 Units, Subcutaneous, Nightly    levothyroxine (Synthroid) tab 25 mcg, 25 mcg, Oral, Before breakfast    NIFEdipine ER (Procardia-XL) 24 hr tab 60 mg, 60 mg, Oral, Daily    glucose (Dex4) 15 GM/59ML oral liquid 15 g, 15 g, Oral, Q15 Min PRN **OR** glucose (Glutose) 40% oral gel 15 g, 15 g, Oral, Q15 Min PRN **OR**  glucose-vitamin C (Dex-4) chewable tab 4 tablet, 4 tablet, Oral, Q15 Min PRN **OR** dextrose 50% injection 50 mL, 50 mL, Intravenous, Q15 Min PRN **OR** glucose (Dex4) 15 GM/59ML oral liquid 30 g, 30 g, Oral, Q15 Min PRN **OR** glucose (Glutose) 40% oral gel 30 g, 30 g, Oral, Q15 Min PRN **OR** glucose-vitamin C (Dex-4) chewable tab 8 tablet, 8 tablet, Oral, Q15 Min PRN    heparin (Porcine) 5000 UNIT/ML injection 5,000 Units, 5,000 Units, Subcutaneous, Q8H OSEI    acetaminophen (Tylenol Extra Strength) tab 500 mg, 500 mg, Oral, Q4H PRN    HYDROmorphone (Dilaudid) 1 MG/ML injection 0.2 mg, 0.2 mg, Intravenous, Q2H PRN **OR** HYDROmorphone (Dilaudid) 1 MG/ML injection 0.4 mg, 0.4 mg, Intravenous, Q2H PRN **OR** HYDROmorphone (Dilaudid) 1 MG/ML injection 0.8 mg, 0.8 mg, Intravenous, Q2H PRN    ondansetron (Zofran) 4 MG/2ML injection 4 mg, 4 mg, Intravenous, Q6H PRN    prochlorperazine (Compazine) 10 MG/2ML injection 5 mg, 5 mg, Intravenous, Q8H PRN    insulin aspart (NovoLOG) 100 Units/mL FlexPen 1-10 Units, 1-10 Units, Subcutaneous, TID AC and HS    lidocaine-menthol 4-1 % patch 1 patch, 1 patch, Transdermal, Daily PRN    melatonin tab 1 mg, 1 mg, Oral, Nightly PRN    polyethylene glycol (PEG 3350) (Miralax) 17 g oral packet 17 g, 17 g, Oral, Daily PRN    ALLERGIES:    He has No Known Allergies.    SOCIAL HISTORY:    Patient  reports that he has never smoked. He has never used smokeless tobacco. He reports that he does not drink alcohol and does not use drugs.    FAMILY HISTORY:    Patient's family history includes Diabetes in his father; Heart Disorder in his brother and mother.    ROS:    Comprehensive ROS reviewed and negative except for what's stated above.  Including negative for chest pain, shortness of breath, syncope.     EXAM:  /70 (BP Location: Left arm)   Pulse 72   Temp 97.9 °F (36.6 °C) (Oral)   Resp 18   Wt 292 lb (132.5 kg)   SpO2 94%   BMI 38.95 kg/m²   GENERAL: alert and orientated X 3,  well developed, well nourished, in no apparent distress  NEURO/PSYCH: normal mood and affect  NECK: supple   CAROTID: No bruits  RESPIRATORY: no rales, rhonchi, or wheezes B  CARDIO: RRR without murmur, no murmur, no gallop   ABDOMEN: soft, non-tender with no palpable aneurysm or masses  EXTREMITIES: no tenderness  VASCULAR:        Femoral Popliteal DP PT Peroneal Edema   Right 1+       biphasic signal absent signal       1+   Left 1+       biphasic signal absent signal       none      Right heel with a small central plantar soft eschar with early healing around it. Along the left heel is a superficial ulcer that appears to be secondary to a blister. Both feet are warm   no sensory or motor deficits      Diagnostic Data:      LABS:  Recent Labs   Lab 12/30/23  1403 12/31/23  0514 01/02/24  1049   WBC 8.9 8.1  --    HGB 14.0 13.2  --    MCV 91.5 90.8  --    .0 234.0  --    INR  --   --  1.17       Recent Labs   Lab 12/30/23  1403 12/31/23  0514 01/01/24  0531 01/02/24  0535    142  --   --    K 3.9 3.2* 3.2* 3.9    109  --   --    CO2 29.0 28.0  --   --    BUN 25* 19  --   --    CREATSERUM 0.98 0.81  0.81  --   --    * 114*  --   --    CA 9.4 9.0  --   --      Recent Labs   Lab 01/02/24  1049   PTP 14.9*   INR 1.17     Recent Labs   Lab 12/30/23  1403   ALT 23   AST 14*   ALB 3.2*     No results for input(s): \"TROP\" in the last 168 hours.  No results found for: \"ANAS\", \"JANET\", \"ANASCRN\"  No results for input(s): \"PCACT\", \"PSACT\", \"AT3ACT\", \"HIPAB\", \"PATHI\", \"STALA\", \"DRVVTRATIO\", \"DRVVT\", \"STACLOT\", \"CARIGG\", \"R1GI9FVTDI\", \"Z9AO2GKSKX\", \"RA\", \"HAVIGM\", \"HBCIGM\", \"HCVAB\", \"HBSAG\", \"HBCAB\", \"HBVDNAINTERP\", \"ANAS\", \"C3\", \"C4\" in the last 168 hours.    Lab Results   Component Value Date    HGB 13.2 12/31/2023    HGB 14.0 12/30/2023    HGB 12.1 (L) 12/13/2023    HGB 13.3 12/12/2023    HGB 13.0 11/24/2023    HGB 13.4 08/23/2023    CREATSERUM 0.81 12/31/2023    CREATSERUM 0.81 12/31/2023     CREATSERUM 0.98 12/30/2023    CREATSERUM 1.24 12/15/2023    CREATSERUM 0.97 12/13/2023    CREATSERUM 1.35 (H) 12/12/2023           Radiology: XR FOOT, COMPLETE (MIN 3 VIEWS), BILAT (CPT=73630-50)    Result Date: 1/3/2024  PROCEDURE:  XR FOOT, COMPLETE (MIN 3 VIEWS), BILAT (CPT=73630-50)  TECHNIQUE:  A total of 6 views were obtained. Three views of the right foot and three views of the left foot.  INDICATIONS:  Bilateral heel ulcerations  COMPARISON:  [facility] , XR, XR FOOT (2 VIEW), RIGHT (CPT=73620), 3/01/2023, 4:43 PM.  [facility] , XR, XR FOOT (2 VIEW), LEFT (CPT=73620), 3/01/2023, 4:40 PM.  PATIENT STATED HISTORY: (As transcribed by Technologist)  Patient offered no additional history at this time.               CONCLUSION:  Right foot:  There are stable changes of hallux valgus deformity.  There is some slight increased narrowing of the lateral right 1st MTP joint space.  There are numerous corticated ossifications adjacent to the distal right 1st diaphysis and 1st metatarsal head.  There are new erosive changes involving the medial aspect of the 1st metatarsal head and the medial lateral distal portions which are subarticular suggestive either erosive osteoarthritis or changes of gout.  There is adjacent soft tissue swelling. Marked vascular calcification. Multiple level mild degenerative joint disease involving the midfoot and hindfoot calcaneal enthesophytes are noted which are small.  Small erosive changes are noted involving the left calcaneus adjacent to the origin of the plantar fascia insertion of the left Achilles tendon slightly more prominent.  This also may represent changes of erosive osteoarthritis or gout. Left foot:  Marked vascular calcification noted.  There is increased subchondral cystic change adjacent to the 1st MTP joint with overlying soft tissue swelling may represent osteoarthritis or changes of gout.  Mild midfoot and hindfoot osteoarthritis again noted.  Calcaneal enthesophytes are noted  without definite erosion or fracture or bone destruction.  Corticated ossification measuring 9 mm again noted adjacent to the talus.  Diffuse soft tissue swelling.  No acute fracture.   LOCATION:  [id number]   Dictated by (CST): Charan Ambrose MD on 1/03/2024 at 12:11 PM     Finalized by (CST): Charan Ambrose MD on 1/03/2024 at 12:16 PM       CT BIOPSY INTERVERTEBRAL DISC(RHJ=74091/20278)    Result Date: 1/2/2024  PROCEDURE:  CT BIOPSY INTERVERTEBRAL DISC(TLR=27842/89149)  INDICATIONS:  possible discitis  COMPARISON:  [facility] , MR, MRI SPINE LUMBAR (W+WO) (CPT=72158), 12/30/2023, 3:12 PM.  [facility] , CT, CT BIOPSY INTERVERTEBRAL DISC(VUW=50179/35791), 6/29/2023, 1:16 PM.  TECHNIQUE:  The risks, benefits and alternatives of CT-guided disc biopsy were discussed with the patient, and witnessed verbal and written informed consent was obtained.  Patient was scanned in the prone position and the L3-4 disc was localized. Using standard aseptic technique, all elements of maximal standard barrier technique and 1% lidocaine as local anesthesia, a 17 gauge guide needle was advanced into the L3-4 intervertebral disc via the right posterolateral approach.  A total of approximately 2 cc of dark bloody fluid were aspirated during 2 separate passes.  A single coaxial core biopsy was obtained with an 18 gauge [place] biopsy device.  All obtained samples were placed in a specimen cup for culture.   Conscious sedation was utilized for the procedure. After receiving the patient's consent, moderate sedation was achieved with Versed and fentanyl. Monitoring of the patient's vital signs was provided by trained nursing staff during the entire course of the procedure under the supervision of the radiologist at and recorded in the patient's medical record. The total intraservice time of the conscious sedation was 17 minutes. The patient tolerated the procedure well and left the department in stable, unchanged condition.               CONCLUSION:  CT-guided aspiration and core biopsy of L3-4 intervertebral disc performed.   Dictated by (CST): Jaycob Rg MD on 1/02/2024 at 5:19 PM     Finalized by (CST): Jaycob Rg MD on 1/02/2024 at 5:23 PM       MRI SPINE LUMBAR (W+WO) (CPT=72158)    Result Date: 12/30/2023  PROCEDURE:  MRI SPINE LUMBAR (W+WO) (CPT=72158)   COMPARISON:  [facility] , MR, MRI SPINE LUMBAR (W+WO) (CPT=72158), 6/27/2023, 10:07 PM.  [facility] , MR, MRI SPINE LUMBAR(CONTRAST ONLY) (CPT=72149), 8/22/2023, 9:58 PM.  [facility] , XR, XR LUMBAR SPINE (MIN 4 VIEWS) (CPT=72110), 12/14/2023, 2:48 PM.  INDICATIONS:  Severe low back pain.  History of discitis/osteomyelitis at the L3-4 level.  TECHNIQUE:  Multiplanar T1 and T2 weighted images including fat suppression sequences.  Images acquired in sagittal and axial planes. Intravenous gadolinium was administered followed by multiplanar post-infusion T1 weighted sequences.   PATIENT STATED HISTORY:(As transcribed by Technologist)  The patient stated he is being evaluated for chronic, severe lower back pain and muscle spasms. He is wheelchair bound due to pain.   CONTRAST USED:  20 mL of Dotarem  FINDINGS:  LUMBAR DISC LEVELS L1-L2:  No significant disc/facet abnormality, spinal stenosis, or foraminal narrowing.  L2-L3:  There is a moderate broad-based degenerative disc bulge.  There is posterior epidural fat hypertrophy.  This causes moderate central canal stenosis of 8 mm.  The facet joints are unremarkable. L3-L4:  There continues to be fluid signal within the disc space.  There is destructive changes of the endplates of L3 and L4 which is slightly more advanced than the study of 8/22/2023.  There is enhancement of the endplates which is slightly more vigorous than on the previous study.  Additionally, there is new intense mint along the posterior articular facets at both levels suspicious for interval progression of septic arthropathy into the bilateral posterior articular facet joints.   There is also increasing adjacent psoas edema diffusely.  There is no abdi epidural fluid collection or abscess.  There is moderate central canal stenosis of 7.5 mm.  L4-L5:  There is mild degenerative disc bulge osteophyte complex.  AP diameter canal is normal 12 mm.  There is mild subarticular zone and neural foraminal narrowing.  The facet joints unremarkable. L5-S1:  Very mild posterior degenerative disc bulge.  There is mild bilateral facet joint degenerative change.  There is mild bilateral neural foraminal narrowing.  PARASPINAL AREA:  Increasing bilateral psoas muscle edema/inflammation.  No evidence of psoas muscle abscess. BONES:  No fracture, pars defect, or osseous lesion.  CORD/CAUDA EQUINA:  There is thickening of the cauda equina nerve roots diffusely suggestive of chronic arachnoiditis, which can be retrospectively seen on previous imaging studies and appears relatively stable.  No abnormal enhancement along the conus or nerve roots.            CONCLUSION:  Findings are concerning for progression of discitis/osteomyelitis centered at the L3-4 disc level, with suggestion of interval extension into the bilateral posterior articular facets at this level concerning for the development of bilateral septic arthropathy.  There is also increasing bilateral psoas muscle edema/inflammation.  No discrete evidence of epidural abscess or psoas abscess.  Diffuse thickening of the cauda equina suggesting chronic arachnoiditis, which can be retrospectively seen and appears similar to previous imaging.  No abnormal post-contrast enhancement.   LOCATION:  [facility]      Dictated by (CST): Tyree Rojo DO on 12/30/2023 at 4:19 PM     Finalized by (CST): Tyree Rojo DO on 12/30/2023 at 4:29 PM       US ARTERIAL DUPLEX LOWER EXTREMITY BILATERAL (CPT=93925)    Result Date: 12/15/2023  PROCEDURE:  US ARTERIAL DUPLEX LOWER EXTREMITY BILATERAL (CPT=93925)  COMPARISON:  None.  INDICATIONS:  bilateral leg wounds, non healing   TECHNIQUE:  A comprehensive color duplex Doppler ultrasound examination of the bilateral lower extremities was performed.  Color imaging, Doppler wave form analysis, and peak systolic flow measurements of the common femoral, profunda femoral, superficial  femoral, and popliteal arteries were performed.   PATIENT STATED HISTORY: (As transcribed by Technologist)  Patient stated that he has nonhealing wounds within both legs.    FINDINGS:  RIGHT EXTREMITY:  There is a 50% stenosis at the distal right PTA.  Monophasic waveforms are noted at the right DON and right DPA.  Biphasic waveforms are seen at the right PTA. LEFT EXTREMITY:  There is a 50% stenosis at the distal left PTA.  Biphasic waveforms are seen at the left popliteal artery and proximal/mid left PTA.  Biphasic waveforms are seen at the proximal/mid left DON.  Monophasic waveforms are seen at the distal left PTA, distal left DON, and left DPA. OTHER:  There is a Baker's cyst seen at the right lower extremity measuring 4.6 x 2.2 x 1.3 cm.  There is a left-sided Baker's cyst measuring 4.4 x 1.3 x 3.8 cm.  SYSTOLIC VELOCITIES (CM/S): RIGHT COMMON FEMORAL:      102  RIGHT PROFUNDA FEMORAL:      86    RIGHT SUPERFICIAL FEMORAL PROX:    141 RIGHT SUPERFICIAL FEMORAL MID:    90 RIGHT SUPERFICIAL FEMORAL DISTAL:    89 RIGHT POPLITEAL PROX:      49 RIGHT POPLITEAL DISTAL:      51 TIBIOPER TRUNK:      Not visualized.  RIGHT PTA PROX:      65 RIGHT PTA MID:      89 RIGHT PTA DISTAL:       RIGHT DON PROX:      26 RIGHT DON MID:      45  RIGHT DON DISTAL:    18 RIGHT DORSALIS PEDIS:      57 RIGHT GREAT TOE PRESSURE:      79 mmHg  SYSTOLIC VELOCITIES (CM./S): LEFT COMMON FEMORAL:      101  LEFT PROFUNDA FEMORAL:      80    LEFT SUPERFICIAL FEMORAL PROX:    125 LEFT SUPERFICIAL FEMORAL MID:      140 LEFT SUPERFICIAL FEMORAL DISTAL:    108 LEFT POPLITEAL PROX:      48 LEFT POPLITEAL DISTAL:      75 TIBIOPER TRUNK:      Not visualized.  LEFT PTA PROX:      105 LEFT PTA  MID:      83 LEFT PTA DISTAL:       LEFT DON PROX:      89 LEFT DON MID:      59  LEFT DISTAL DON:    55 LEFT DORSALIS PEDIS::      16 LEFT GREAT TOE PRESSURE:      Unable to obtain secondary to patient involuntary movement.             CONCLUSION:  There is an approximate 50% stenosis at the bilateral distal posterior tibial arteries.  Please see above for further details.   LOCATION:  [facility]   Dictated by (CST): Stromberg, LeRoy, MD on 12/15/2023 at 10:48 AM     Finalized by (CST): Stromberg, LeRoy, MD on 12/15/2023 at 10:51 AM       US KIDNEY W DOPPLER (QTE=60052/66560)    Result Date: 12/15/2023  PROCEDURE:  US KIDNEY W DOPPLER (HLJ=[id number]/86276)  COMPARISON:  None.  INDICATIONS:  resistent HTN, rule out CHRISTINA  TECHNIQUE:  Ultrasound of the bilateral kidneys were performed with Doppler evaluation of the renal arteries and veins. The renal vessels were evaluated with B-mode ultrasound, Doppler color flow, and spectral analysis.  PATIENT STATED HISTORY: (As transcribed by Technologist)  Patient offered no additional history at this time. Resistant hypertension.    FINDINGS:  RIGHT KIDNEY:  Normal for age.  Right kidney measures 11.9 x 5.1 x 5.2 cm. LEFT KIDNEY:  There is a 14 mm cyst at the upper pole the left kidney.  Left kidney measures 12 x 6.5 x 6.8 cm.  Doppler velocity measurements and renal/aortic ratios are as follows: AORTA:     137 cm/s  RIGHT RENAL ARTERY ORIGIN:    134 cm/s,  ratio = 1 PROXIMAL:  101 cm/s,  ratio = 0.7 MID:       79 cm/s,  ratio = 0.6 DISTAL:    64 cm/s,  ratio = 0.5  MAXIMUM ACCELERATION TIME:  0.03 seconds  LEFT RENAL ARTERY ORIGIN:    75 cm/s,  ratio = 0.6 PROXIMAL:  78 cm/s,  ratio = 0.6 MID:       62 cm/s,  ratio = 0.5 DISTAL:    62 cm/s,  ratio = 0.5  MAXIMUM ACCELERATION TIME:  0.03 seconds RENAL VEINS:  There is normal Doppler wave form.            CONCLUSION:  1. No sonographic evidence of a significant renal artery stenosis. 2. There is a 14 mm cyst at the upper aspect of  the left kidney.   LOCATION:  [facility]     Dictated by ([facility]): Stromberg, LeRoy, MD on 12/15/2023 at 10:46 AM     Finalized by (CST): Stromberg, LeRoy, MD on 12/15/2023 at 10:48 AM       XR HIP W OR WO PELVIS(MIN 5 VIEWS),BILAT(CPT=73523)    Result Date: 12/14/2023  PROCEDURE:  XR HIP W OR WO PELVIS (MIN 5 VIEWS), BILAT (CPT=73523)  TECHNIQUE:  Bilateral min 5 views of the hip and pelvis if performed.  COMPARISON:  None.  INDICATIONS:  hip pain inability to walk  PATIENT STATED HISTORY: (As transcribed by Technologist)  Patient offered no additional history at this time.    FINDINGS:  No fracture or dislocation.  Mild right acetabular spurring.  Extensive atherosclerotic calcifications.            CONCLUSION:  Mild bilateral hip osteoarthritic changes.  No acute abnormality.   LOCATION:  [facility]   Dictated by ([facility]): Jaycob Rg MD on 12/14/2023 at 3:24 PM     Finalized by (CST): Jaycob Rg MD on 12/14/2023 at 3:24 PM       XR LUMBAR SPINE (MIN 4 VIEWS) (CPT=72110)    Result Date: 12/14/2023  PROCEDURE:  XR LUMBAR SPINE (MIN 4 VIEWS) (CPT=72110)  TECHNIQUE:  AP, lateral, oblique, and coned down L5-S1 views were obtained.  COMPARISON:  [facility] , CT, CT BIOPSY INTERVERTEBRAL DISC(NZL=[id number]), 6/29/2023, 1:16 PM.  [facility] , MR, MRI SPINE LUMBAR(CONTRAST ONLY) (CPT=72149), 8/22/2023, 9:58 PM.  INDICATIONS:  radiculopathy, inability to walk  PATIENT STATED HISTORY: (As transcribed by Technologist)  Patient offered no additional history at this time.    FINDINGS:  L3-4 disc space narrowing with irregularity of the adjacent vertebral endplates and loss of height of the L4 vertebral body anteriorly.  Allowing for different modalities, similar findings were present on MRI of 8/22/2023.  Disc spaces are otherwise preserved.  No subluxation.  Marginal osteophytes in lower thoracic spine.  Intact facet joints.  Curvatures are within normal limits.  Calcification of abdominal aorta.            CONCLUSION:  Disc space  narrowing and associated irregularity of adjacent vertebral endplates at L3-4 consistent with chronic discitis/osteomyelitis, with similar findings present on MRI of 8/22/2023.  No acute abnormality.   LOCATION:  [facility]   Dictated by (CST): Jaycob Rg MD on 12/14/2023 at 3:19 PM     Finalized by (CST): Jaycob Rg MD on 12/14/2023 at 3:23 PM       US VENOUS DOPPLER LEG BILAT - DIAG IMG (CPT=93970)    Result Date: 12/13/2023  PROCEDURE:  US VENOUS DOPPLER LEG BILAT - DIAG IMG (CPT=93970)  COMPARISON:  None.  INDICATIONS:  Bilateral lower extremity swelling  TECHNIQUE:  Real time, grey scale, and duplex ultrasound was used to evaluate the lower extremity venous system. B-mode two-dimensional images of the vascular structures, Doppler spectral analysis, and color flow.  Doppler imaging were performed.  The following veins were imaged bilaterally:  Common, deep, and superficial femoral, popliteal, sapheno-femoral junction, and posterior tibial veins.  PATIENT STATED HISTORY: (As transcribed by Technologist)  Patient is having bilateral leg swelling for two weeks.    FINDINGS:  SAPHENOFEMORAL JUNCTION:  No reflux. THROMBI:  None visible. COMPRESSION:  Normal compressibility, phasicity, and augmentation. OTHER:  Negative.            CONCLUSION:  No evidence of deep vein thrombosis in the bilateral lower extremities.   [place]  [facility]   Dictated by (CST): Nicolás Cohn MD on 12/13/2023 at 5:37 AM     Finalized by (CST): Nicolás Cohn MD on 12/13/2023 at 5:37 AM       XR CHEST AP PORTABLE  (CPT=71045)    Result Date: 12/12/2023  PROCEDURE:  XR CHEST AP PORTABLE  (CPT=71045)  TECHNIQUE:  AP chest radiograph was obtained.  COMPARISON:  07/10/2023  INDICATIONS:  swelling to bilateral legs x 2 weeks.  denies SOB  PATIENT STATED HISTORY: (As transcribed by Technologist)  Patient has been having bilateral leg swelling for the last 2 weeks that is progressivley getting worse.               CONCLUSION:   Stable cardiac and  mediastinal contours.  Stable moderate elevation of the right hemidiaphragm.  Findings of pulmonary venous hypertension without overt pulmonary edema or focal airspace consolidation.  No significant pleural effusion or appreciable pneumothorax.   LOCATION:  [place]      Dictated by (CST): Chely Oliva MD on 2023 at 10:41 PM     Finalized by (CST): Chely Oliva MD on 2023 at 10:41 PM              ASSESSMENT AND PLAN:     The patient is a 63 year old male who has bilateral heel ulcers that are minor and superficial. I think that his perfusion is adequate to heal these superficial ulcers. He will be following in the wound care center with Dr Gracia from podiatry. If the ulcers worsen, then he will need an angiogram, but I think this will be unlikely.   The patient indicated an understanding of these issues and agreed to the plan and all questions were answered.     Thank you for allowing to participate in the patient's care.       Samer F. Najjar, MD  Vascular Surgery  [facility]                  Electronically signed by Najjar, Samer F, MD on 1/3/2024 10:20 PM              Discharge Summary - D/C Summary      Discharge Summary signed by Christopher Gustafson MD at 2024 12:21 PM  Version 2 of 2    Author: Christopher Gustafson MD Service: Hospitalist Author Type: Physician    Filed: 2024 12:21 PM Date of Service: 2024 12:15 PM Status: Addendum    : Christopher Gustafson MD (Physician)    Related Notes: Original Note by Christopher Gustafson MD (Physician) filed at 2024 12:20 PM       [place] HOSPITALIST  DISCHARGE SUMMARY     Troy Feliz Patient Status:  Inpatient    1960 MRN [id number]   Location [facility] 3SW-A Attending Christopher Gustafson MD   Hosp Day # 10 PCP MADELINE FENTON DO     Date of Admission: 2023  Date of Discharge:   2024    Discharge Disposition: ED Dismiss - Never Arrived    Discharge Diagnosis:    #MSSA L3/L4 Discitis  #Back Pain  #Bilateral plantar heel  pressure ulcers  #Essential HTN   #DLD   #DM Type 2     History of Present Illness: Troy Feliz is a 63 year old male with PMhx of DM, HTN, DL, discitis presents with back and hip pain and muscle spasms. Pt is wheelchair bound due to muscle spasms. He states symptoms all started back in March. He has been going to rehab for this. He states he took norco this morning without relief. He denies any F/C. No new focal weakness, numbness or tingling. No CP or SOB. No N/V/D/C or abd pain. He denies any loss of bowel or urine function.     Brief Synopsis:   Patient presented with hip pain/muscle spasms.  He was seen by neurosurgery who recommended IR guided biopsy with LSO brace for therapy support.  His biopsy report was negative for infection/inflammatory cells.  He completed abx for previous mssa discitis.  ID consult, patient was monitored off antibiotics.  He will discharge to [facility] for continued PT.  Patient also seen by podiatry and vascular surgery, continue with wound care and outpatient follow up for his LE wounds.    All questions addressed with patient prior to discharge, he was agreeable to plan of care as stated.  He was encouraged to follow up with PCP after discharge.     Lace+ Score: 79  59-90 High Risk  29-58 Medium Risk  0-28   Low Risk       TCM Follow-Up Recommendation:  LACE > 58: High Risk of readmission after discharge from the hospital.      Procedures during hospitalization:   CT guided lumbar biopsy - 1/2/24    Incidental or significant findings and recommendations (brief descriptions):  None    Lab/Test results pending at Discharge:   N/a    Consultants:  ID, Neurosurgery, Podiatry, Vascular     Discharge Medication List:     Discharge Medications        START taking these medications        Instructions Prescription details   cyclobenzaprine 10 MG Tabs  Commonly known as: Flexeril      Take 1 tablet (10 mg total) by mouth 3 (three) times daily as needed for Muscle spasms.   Quantity: 21  tablet  Refills: 0            CHANGE how you take these medications        Instructions Prescription details   hydrALAZINE 50 MG Tabs  Commonly known as: Apresoline  What changed:   medication strength  how much to take      Take 1 tablet (50 mg total) by mouth every 8 (eight) hours.   Stop taking on: February 8, 2024  Quantity: 90 tablet  Refills: 0     HYDROcodone-acetaminophen 5-325 MG Tabs  Commonly known as: Norco  What changed: when to take this      Take 1 tablet by mouth every 8 (eight) hours as needed.   Quantity: 15 tablet  Refills: 0            CONTINUE taking these medications        Instructions Prescription details   acetaminophen 500 MG Tabs  Commonly known as: Tylenol Extra Strength      Take 1-2 tablet every 4 hours as needed for pain   Quantity: 20 tablet  Refills: 0     carvedilol 25 MG Tabs  Commonly known as: Coreg      Take 1 tablet (25 mg total) by mouth 2 (two) times daily with meals.   Quantity: 60 tablet  Refills: 0     furosemide 40 MG Tabs  Commonly known as: Lasix      Take 1 tablet (40 mg total) by mouth 2 (two) times daily.   Quantity: 60 tablet  Refills: 1     gabapentin 100 MG Caps  Commonly known as: Neurontin      Take 1 capsule (100 mg total) by mouth 3 (three) times daily.   Quantity: 90 capsule  Refills: 0     Insulin Pen Needle 32G X 4 MM Misc      May substitute if not on insurance formulary   Quantity: 100 each  Refills: 5     Levemir FlexTouch 100 UNIT/ML Sopn  Generic drug: insulin detemir      Inject 10 Units into the skin nightly.   Quantity: 5 each  Refills: 2     levothyroxine 25 MCG Tabs  Commonly known as: Synthroid      Take 1 tablet (25 mcg total) by mouth before breakfast.   Refills: 0     lidocaine-menthol 4-1 % Ptch      Place 1 patch onto the skin daily.   Quantity: 30 patch  Refills: 0     melatonin 1 MG Tabs      Nightly prn   Quantity: 30 tablet  Refills: 0     NIFEdipine ER 60 MG Tb24  Commonly known as: ADALAT CC      Take 1 tablet (60 mg total) by mouth  daily.   Refills: 0     NovoLOG FlexPen 100 UNIT/ML Sopn  Generic drug: insulin aspart      Test blood glucose 3 times daily with meals  Inject 1 unit if blood glucose is between 141-180 mg/dL Inject 2 units if blood glucose is between 181-220 mg/dL Inject 3 units if blood glucose is between 221-260 mg/dL Inject 4 units if blood glucose is between 261-300 mg/dL Inject 5 units if blood glucose is between 301-350 mg/dL Call your physician if blood glucose is greater than 351 mg/dL,   Quantity: 5 each  Refills: 2     polyethylene glycol (PEG 3350) 17 g Pack  Commonly known as: Miralax      Take 17 g by mouth daily.   Refills: 0            STOP taking these medications      tiZANidine 4 MG Tabs  Commonly known as: Zanaflex        traMADol 50 MG Tabs  Commonly known as: Ultram                  Where to Get Your Medications        These medications were sent to [id number] - [place], [place] - [address] AT [address], [phone], [phone]  [address], [facility] [address]      Phone: [phone]   cyclobenzaprine 10 MG Tabs  hydrALAZINE 50 MG Tabs       Please  your prescriptions at the location directed by your doctor or nurse    Bring a paper prescription for each of these medications  HYDROcodone-acetaminophen 5-325 MG Tabs         [place]    Follow-up appointment:   No follow-up provider specified.  Appointments for Next 30 Days 1/9/2024 - 2/8/2024      None            Vital signs:  Temp:  [97.7 °F (36.5 °C)-98.3 °F (36.8 °C)] 97.7 °F (36.5 °C)  Pulse:  [66-72] 72  Resp:  [16-18] 16  BP: (125-130)/(56-74) 128/60  SpO2:  [94 %-96 %] 96 %    Physical Exam:    General: No acute distress, pleasant    Lungs: clear to auscultation  Cardiovascular: S1, S2, RRR  Abdomen: Soft, NT, ND    -----------------------------------------------------------------------------------------------  PATIENT DISCHARGE INSTRUCTIONS: See electronic chart    Christopher Gustafson MD    Total time  spent on discharge plannin minutes     The  [place] Cures Act makes medical notes like these available to patients in the interest of transparency. Please be advised this is a medical document. Medical documents are intended to carry relevant information, facts as evident, and the clinical opinion of the practitioner. The medical note is intended as peer to peer communication and may appear blunt or direct. It is written in medical language and may contain abbreviations or verbiage that are unfamiliar.       Electronically signed by Christopher Gustafson MD on 2024 12:21 PM     Discharge Summary signed by Christopher Gustafson MD at 2024 12:20 PM  Version 1 of 2    Author: Christopher Gustafson MD Service: Hospitalist Author Type: Physician    Filed: 2024 12:20 PM Date of Service: 2024 12:15 PM Status: Signed    : Christopher Gustafson MD (Physician)    Related Notes: Addendum by Christopher Gustafson MD (Physician) filed at 2024 12:21 PM       [facility]     Troy Feliz Patient Status:  Inpatient    1960 MRN [id number]   [facility] 3SW-A Attending Christopher Gustafson MD   Hosp Day # 10 PCP MADELINE FENTON DO     Date of Admission: 2023  Date of Discharge:   2024    Discharge Disposition: ED Dismiss - Never Arrived    Discharge Diagnosis:    #MSSA L3/L4 Discitis  #Back Pain  #Bilateral plantar heel pressure ulcers  #Essential HTN -  #DLD   #DM Type 2     History of Present Illness: Troy Feliz is a 63 year old male with PMhx of DM, HTN, DL, discitis presents with back and hip pain and muscle spasms. Pt is wheelchair bound due to muscle spasms. He states symptoms all started back in March. He has been going to rehab for this. He states he took norco this morning without relief. He denies any F/C. No new focal weakness, numbness or tingling. No CP or SOB. No N/V/D/C or abd pain. He denies any loss of bowel or urine function.     Brief Synopsis:   Patient  presented with hip pain/muscle spasms.  He was seen by neurosurgery who recommended IR guided biopsy with LSO brace for therapy support.  His biopsy report was negative for infection/inflammatory cells.  ID consult, patient was monitored off antibiotics.  He will discharge to [facility] for continued PT.  Patient also seen by podiatry and vascular surgery, continue with wound care and outpatient follow up for his LE wounds.    All questions addressed with patient prior to discharge, he was agreeable to plan of care as stated.  He was encouraged to follow up with PCP after discharge.     Lace+ Score: 79  59-90 High Risk  29-58 Medium Risk  0-28   Low Risk       TCM Follow-Up Recommendation:  LACE > 58: High Risk of readmission after discharge from the hospital.      Procedures during hospitalization:   CT guided lumbar biopsy - 1/2/24    Incidental or significant findings and recommendations (brief descriptions):  None    Lab/Test results pending at Discharge:   N/a    Consultants:  ID, Neurosurgery, Podiatry, Vascular     Discharge Medication List:     Discharge Medications        START taking these medications        Instructions Prescription details   cyclobenzaprine 10 MG Tabs  Commonly known as: Flexeril      Take 1 tablet (10 mg total) by mouth 3 (three) times daily as needed for Muscle spasms.   Quantity: 21 tablet  Refills: 0            CHANGE how you take these medications        Instructions Prescription details   hydrALAZINE 50 MG Tabs  Commonly known as: Apresoline  What changed:   medication strength  how much to take      Take 1 tablet (50 mg total) by mouth every 8 (eight) hours.   Stop taking on: February 8, 2024  Quantity: 90 tablet  Refills: 0     HYDROcodone-acetaminophen 5-325 MG Tabs  Commonly known as: Norco  What changed: when to take this      Take 1 tablet by mouth every 8 (eight) hours as needed.   Quantity: 15 tablet  Refills: 0            CONTINUE taking these medications        Instructions  Prescription details   acetaminophen 500 MG Tabs  Commonly known as: Tylenol Extra Strength      Take 1-2 tablet every 4 hours as needed for pain   Quantity: 20 tablet  Refills: 0     carvedilol 25 MG Tabs  Commonly known as: Coreg      Take 1 tablet (25 mg total) by mouth 2 (two) times daily with meals.   Quantity: 60 tablet  Refills: 0     furosemide 40 MG Tabs  Commonly known as: Lasix      Take 1 tablet (40 mg total) by mouth 2 (two) times daily.   Quantity: 60 tablet  Refills: 1     gabapentin 100 MG Caps  Commonly known as: Neurontin      Take 1 capsule (100 mg total) by mouth 3 (three) times daily.   Quantity: 90 capsule  Refills: 0     Insulin Pen Needle 32G X 4 MM Misc      May substitute if not on insurance formulary   Quantity: 100 each  Refills: 5     Levemir FlexTouch 100 UNIT/ML Sopn  Generic drug: insulin detemir      Inject 10 Units into the skin nightly.   Quantity: 5 each  Refills: 2     levothyroxine 25 MCG Tabs  Commonly known as: Synthroid      Take 1 tablet (25 mcg total) by mouth before breakfast.   Refills: 0     lidocaine-menthol 4-1 % Ptch      Place 1 patch onto the skin daily.   Quantity: 30 patch  Refills: 0     melatonin 1 MG Tabs      Nightly prn   Quantity: 30 tablet  Refills: 0     NIFEdipine ER 60 MG Tb24  Commonly known as: ADALAT CC      Take 1 tablet (60 mg total) by mouth daily.   Refills: 0     NovoLOG FlexPen 100 UNIT/ML Sopn  Generic drug: insulin aspart      Test blood glucose 3 times daily with meals  Inject 1 unit if blood glucose is between 141-180 mg/dL Inject 2 units if blood glucose is between 181-220 mg/dL Inject 3 units if blood glucose is between 221-260 mg/dL Inject 4 units if blood glucose is between 261-300 mg/dL Inject 5 units if blood glucose is between 301-350 mg/dL Call your physician if blood glucose is greater than 351 mg/dL,   Quantity: 5 each  Refills: 2     polyethylene glycol (PEG 3350) 17 g Pack  Commonly known as: Miralax      Take 17 g by mouth  daily.   Refills: 0            STOP taking these medications      tiZANidine 4 MG Tabs  Commonly known as: Zanaflex        traMADol 50 MG Tabs  Commonly known as: Ultram                  Where to Get Your Medications        These medications were sent to [facility] #[id number] - [place], [place] - [address] AT [facility] [address], [phone], [phone]  [address], [facility] [address]      Phone: [phone]   cyclobenzaprine 10 MG Tabs  hydrALAZINE 50 MG Tabs       Please  your prescriptions at the location directed by your doctor or nurse    Bring a paper prescription for each of these medications  HYDROcodone-acetaminophen 5-325 MG Tabs         [place]    Follow-up appointment:   No follow-up provider specified.  Appointments for Next 30 Days 2024 - 2024      None            Vital signs:  Temp:  [97.7 °F (36.5 °C)-98.3 °F (36.8 °C)] 97.7 °F (36.5 °C)  Pulse:  [66-72] 72  Resp:  [16-18] 16  BP: (125-130)/(56-74) 128/60  SpO2:  [94 %-96 %] 96 %    Physical Exam:    General: No acute distress, pleasant    Lungs: clear to auscultation  Cardiovascular: S1, S2, RRR  Abdomen: Soft, NT, ND    -----------------------------------------------------------------------------------------------  PATIENT DISCHARGE INSTRUCTIONS: See electronic chart    Christopher Gustafson MD    Total time spent on discharge plannin minutes     The  Cures Act makes medical notes like these available to patients in the interest of transparency. Please be advised this is a medical document. Medical documents are intended to carry relevant information, facts as evident, and the clinical opinion of the practitioner. The medical note is intended as peer to peer communication and may appear blunt or direct. It is written in medical language and may contain abbreviations or verbiage that are unfamiliar.       Electronically signed by Christopher Gustafson MD on 2024 12:20 PM              Physical  Therapy Notes (last 72 hours)      Physical Therapy Note signed by Genny Stevenson PT at 1/8/2024  3:49 PM  Version 1 of 1    Author: Genny Stevenson PT Service: Rehab Author Type: Physical Therapist    Filed: 1/8/2024  3:49 PM Date of Service: 1/8/2024  3:33 PM Status: Signed    : Genny Stevenson PT (Physical Therapist)          PHYSICAL THERAPY TREATMENT NOTE - INPATIENT    Room Number: 354/354-A     Session: 2     Number of Visits to Meet Established Goals: 6    Presenting Problem: back pain, R hip and groin pain, muscle spasms  Co-Morbidities : DM, HTN, DL, discitis    History related to current admission: Patient is a 63 year old male admitted on 12/30/2023 from [facility] for back pain, R hip and groin pain, muscle spasms.       IMAGING  MRI SPINE LUMBAR:   Findings are concerning for progression of discitis/osteomyelitis centered at the L3-4 disc level, with suggestion of interval extension into the bilateral posterior articular facets at this level concerning for the development of bilateral septic   arthropathy.  There is also increasing bilateral psoas muscle edema/inflammation.  No discrete evidence of epidural abscess or psoas abscess.  Diffuse thickening of the cauda equina suggesting chronic arachnoiditis, which can be retrospectively seen and appears similar to previous imaging.  No abnormal post-contrast enhancement.      Procedure 1/2/24: CT guided lumbar disc biops    ASSESSMENT     In this PT session, pt is still progressing towards goals. Pt able to ambulate an increased amount of distance than before. Pt presenting with impairments of pain, decreased endurance, balance, strength, postural control. Pt requires min assist of 2 for functional mobility. The above deficits are functional limitations for independent bed mobility, transfers, and gait. The pt would benefit from skilled subacute rehab to address above deficits for patient to return to [facility].    DISCHARGE RECOMMENDATIONS  PT Discharge  Recommendations: Sub-acute rehabilitation     PLAN  PT Treatment Plan: Bed mobility;Body mechanics;Coordination;Endurance;Energy conservation;Patient education;Family education;Gait training;Neuromuscular re-educate;Range of motion;Strengthening;Stoop training;Stair training;Transfer training;Balance training  Rehab Potential : Fair  Frequency (Obs): 3-5x/week    CURRENT GOALS     Goal #1 Patient is able to demonstrate supine - sit EOB @ level: supervision      Goal #2 Patient is able to demonstrate transfers Sit to/from Stand at assistance level: supervision      Goal #3 Patient is able to ambulate 20 feet with assist device: walker - rolling at assistance level: supervision      Goal #4     Goal #5     Goal #6     Goal Comments: Goals established on 2024 all goals ongoing      SUBJECTIVE  \"Pain is between 4-8, increases when I move\"    OBJECTIVE  Precautions: Lumbar brace;Spine;Bed/chair alarm (Bilateral post op shoes, bunny boots when in bed to offload)    WEIGHT BEARING RESTRICTION  Weight Bearing Restriction: None                PAIN ASSESSMENT   Ratin  Location: Back  Management Techniques: Activity promotion;Body mechanics;Relaxation;Repositioning    BALANCE                                                                                                                       Static Sitting: Good  Dynamic Sitting: Fair -           Static Standing: Poor  Dynamic Standing: Poor    ACTIVITY TOLERANCE                         O2 WALK         AM-PAC '6-Clicks' INPATIENT SHORT FORM - BASIC MOBILITY  How much difficulty does the patient currently have...  Patient Difficulty: Turning over in bed (including adjusting bedclothes, sheets and blankets)?: A Little   Patient Difficulty: Sitting down on and standing up from a chair with arms (e.g., wheelchair, bedside commode, etc.): A Lot   Patient Difficulty: Moving from lying on back to sitting on the side of the bed?: A Little   How much help from  another person does the patient currently need...   Help from Another: Moving to and from a bed to a chair (including a wheelchair)?: A Lot   Help from Another: Need to walk in hospital room?: A Lot   Help from Another: Climbing 3-5 steps with a railing?: A Lot       AM-PAC Score:  Raw Score: 14   Approx Degree of Impairment: 61.29%   Standardized Score (AM-PAC Scale): 38.1   CMS Modifier (G-Code): CL    FUNCTIONAL ABILITY STATUS  Gait Assessment   Functional Mobility/Gait Assessment  Gait Assistance: Minimum assistance (min of 2 people)  Distance (ft): 20, 5  Assistive Device: Rolling walker  Pattern: Within Functional Limits (flexed posture)    Skilled Therapy Provided  Per RNcaridad for therapy. Pt agreeable to therapy session.     Bed Mobility:  Rolling: NT   Supine<>Sit: NT   Sit<>Supine: NT     Transfer Mobility:  Sit<>Stand: min A of 2   Stand<>Sit: min A of 2   Gait: min A of 2 with walker and chair follow. After second round of ambulation, pt sat quickly in chair without warning. Pt took seated rest break in between each distance for ambulation.     Therapist's Comments: Pt educated on role of therapy, goals of session, safety, and fall prevention.      Patient End of Session: Up in chair;Needs met;Call light within reach;RN aware of session/findings;All patient questions and concerns addressed;Alarm set    PT Session Time: 15 minutes  Gait Training: 15 minutes                         Occupational Therapy Notes (last 72 hours)      Occupational Therapy Note signed by Ronnie Patten OT at 1/9/2024  2:16 PM  Version 1 of 1    Author: Ronnie Patten OT Service: Rehab Author Type: Occupational Therapist    Filed: 1/9/2024  2:16 PM Date of Service: 1/9/2024 10:55 AM Status: Signed    : Ronnie Patten OT (Occupational Therapist)       OCCUPATIONAL THERAPY TREATMENT NOTE - INPATIENT     Room Number: 354/354-A  Session: 2   Number of Visits to Meet Established Goals: 5    Presenting Problem: back pain, hip  pain, muscle spasms, B heel pressure ulcers    History: Patient is a 63 year old male admitted on 12/30/2023 from [facility] for back pain, R hip and groin pain, muscle spasms.       RECENT ADMISSIONS:  12/13-12/17/23: BLE edema --> from home, dc to [facility]  8/22-8/24/23: discitis, OM of back, dizziness --> from [facility], dc to [facility]  7/10-7/14/23 dizziness from [facility]>SNF  6/27-7/2/2023: Intractable back pain: --> From [facility], dc to [facility]  (Per neuro sx 6/28 no acute intervention indicated, LSO when OOB as needed for comfort)  3/21-3/29/23: MSSA bacteremia/discitis--> From [facility], dc to [facility]  2/27-3/7/2023: non-traumatic rhabdomyolysis --> dc to [facility] (3/7-3/21/2023)     IMAGING  MRI SPINE LUMBAR:   Findings are concerning for progression of discitis/osteomyelitis centered at the L3-4 disc level, with suggestion of interval extension into the bilateral posterior articular facets at this level concerning for the development of bilateral septic   arthropathy.  There is also increasing bilateral psoas muscle edema/inflammation.  No discrete evidence of epidural abscess or psoas abscess.  Diffuse thickening of the cauda equina suggesting chronic arachnoiditis, which can be retrospectively seen and appears similar to previous imaging.  No abnormal post-contrast enhancement.      Procedure 1/2/24: CT guided lumbar disc biopsy    ASSESSMENT   Patient presents with the following performance deficits: strength, safety, decreased knowledge of AE for LB dressing. These deficits impact the patient’s ability to participate in ADL, transfers, instrumental activities of daily living, rest and sleep, leisure and social participation.     Pt received seated in chair, pleasant and cooperative for OT session. Pt declines getting dressed or OOB mobility as pt reports wanting to conserve energy for when we will discharge, however pt agreeable to explain step-by-step LB dressing with use of AE. Pt with good verbal explanation of LB dressing with simulated movements with  use of AE. Pt with no concerns.     The patient is functioning below his previous functional level and would benefit from skilled inpatient OT to address the above deficits, maximizing patient’s ability to return safely to his prior level of function.    OT Discharge Recommendations: Sub-acute rehabilitation  OT Device Recommendations: TBD    WEIGHT BEARING RESTRICTION  Weight Bearing Restriction: None                Recommendations for nursing staff:   Transfers: 1 person; 2nd for safety  Toileting location: commode over toilet    TREATMENT SESSION:  Patient Start of Session: seated in chair  FUNCTIONAL TRANSFER ASSESSMENT  Sit to Stand: Edge of Bed  Edge of Bed: Not Tested  Chair: Not Tested    BED MOBILITY  Supine to Sit : Not tested    BALANCE ASSESSMENT     FUNCTIONAL ADL ASSESSMENT  LB Dressing Seated: Not Tested      ACTIVITY TOLERANCE: WFL                         O2 SATURATIONS       EDUCATION PROVIDED  Patient : Role of Occupational Therapy; Plan of Care; Discharge Recommendations; Adaptive Equipment Recommendations; Functional Transfer Techniques; Fall Prevention; Surgical Precautions; Posture/Positioning; Energy Conservation; Proper Body Mechanics  Patient's Response to Education: Verbalized Understanding; Returned Demonstration; Requires Further Education      Equipment used: reacher, sock aide  Demonstrates functional use, Would benefit from additional trial     Patient End of Session: Up in chair;Needs met;Call light within reach;RN aware of session/findings;All patient questions and concerns addressed;Alarm set    SUBJECTIVE  \"Do you know when I'll be sent to rehab today?\"    PAIN ASSESSMENT  Ratin  Location: hip  Management Techniques: Activity promotion;Body mechanics;Relaxation;Breathing techniques;Repositioning     OBJECTIVE  Precautions: Lumbar brace;Spine;Bed/chair alarm (Bilateral post op shoes, bunny boots when in bed to offload)    AM-PAC ‘6-Clicks’ Inpatient Daily Activity Short Form  -    Putting on and taking off regular lower body clothing?: Total  -   Bathing (including washing, rinsing, drying)?: A Lot  -   Toileting, which includes using toilet, bedpan or urinal? : Total  -   Putting on and taking off regular upper body clothing?: A Little  -   Taking care of personal grooming such as brushing teeth?: A Little  -   Eating meals?: None    AM-PAC Score:  Score: 14  Approx Degree of Impairment: 59.67%  Standardized Score (AM-PAC Scale): 33.39    PLAN  OT Treatment Plan: Balance activities;ADL training;Functional transfer training;Endurance training;Patient/Family education;Patient/Family training;Compensatory technique education  Rehab Potential : Good  Frequency: 3x/week    OT Goals:     All goals ongoing 01/09    ADL Goals   Patient will perform lower body dressing:  with mod assist  Patient will perform toileting: with mod assist     Functional Transfer Goals  Patient will transfer from sit to supine:  with supervision  Patient will transfer from supine to sit:  with supervision  Patient will transfer from sit to stand:  with mod assist  Patient will transfer to bedside commode:  with mod assist    OT Session Time: 10 minutes  Self-Care Home Management: 10 minutes           Occupational Therapy Note signed by Ronnie Patten OT at 1/8/2024  4:01 PM  Version 1 of 1    Author: Ronnie Patten OT Service: Rehab Author Type: Occupational Therapist    Filed: 1/8/2024  4:01 PM Date of Service: 1/8/2024  3:55 PM Status: Signed    : Ronnie Patten OT (Occupational Therapist)       OCCUPATIONAL THERAPY TREATMENT NOTE - INPATIENT     Room Number: 354/354-A  Session: 1   Number of Visits to Meet Established Goals: 5    Presenting Problem: back pain, hip pain, muscle spasms, B heel pressure ulcers    History: Patient is a 63 year old male admitted on 12/30/2023 from [facility] for back pain, R hip and groin pain, muscle spasms.       RECENT ADMISSIONS:  12/13-12/17/23: BLE edema --> from home, dc to  [facility]  8/22-8/24/23: discitis, OM of back, dizziness --> from [facility], dc to [facility]  7/10-7/14/23 dizziness from [facility]>SNF  6/27-7/2/2023: Intractable back pain: --> From [facility], dc to [facility]  (Per neuro sx 6/28 no acute intervention indicated, LSO when OOB as needed for comfort)  3/21-3/29/23: MSSA bacteremia/discitis--> From [facility], dc to [facility]  2/27-3/7/2023: non-traumatic rhabdomyolysis --> dc to [facility] (3/7-3/21/2023)     IMAGING  MRI SPINE LUMBAR:   Findings are concerning for progression of discitis/osteomyelitis centered at the L3-4 disc level, with suggestion of interval extension into the bilateral posterior articular facets at this level concerning for the development of bilateral septic   arthropathy.  There is also increasing bilateral psoas muscle edema/inflammation.  No discrete evidence of epidural abscess or psoas abscess.  Diffuse thickening of the cauda equina suggesting chronic arachnoiditis, which can be retrospectively seen and appears similar to previous imaging.  No abnormal post-contrast enhancement.      Procedure 1/2/24: CT guided lumbar disc biopsy    ASSESSMENT   Patient presents with the following performance deficits: strength, pain management, activity tolerance, endurance, standing balance. These deficits impact the patient’s ability to participate in ADL, transfers, instrumental activities of daily living, rest and sleep, leisure and social participation.     Pt received seated in chair, pleasant and cooperative for OT session. Pt educated on role of OT. Pt agreeable to participate in what he can tolerate based on pain. Pt taken out to hallway to engage in short distance functional mobility in preparation for ambulatory toilet transfer. Sit to stand transfers performed at min A x 2 with good demonstration of safety awareness and body mechanics. Functional mobility performed at min A x 2 and chair follow for increased safety. During 2nd bout of functional mobility, pt quickly sat in chair without warning.  Educated pt on notifying staff about needing a seated rest break to ensure safety. Pt left in chair with all needs.    The patient is functioning below his previous functional level and would benefit from skilled inpatient OT to address the above deficits, maximizing patient’s ability to return safely to his prior level of function.    OT Discharge Recommendations: Sub-acute rehabilitation  OT Device Recommendations: TBD    WEIGHT BEARING RESTRICTION  Weight Bearing Restriction: None                Recommendations for nursing staff:   Transfers: 2 person  Toileting location: bedside commode or toilet; varying pain    TREATMENT SESSION:  Patient Start of Session: seated in chair  FUNCTIONAL TRANSFER ASSESSMENT  Sit to Stand: Edge of Bed  Edge of Bed: Not Tested  Chair: Moderate Assist (min A x 2)    BED MOBILITY  Supine to Sit : Not tested    BALANCE ASSESSMENT     FUNCTIONAL ADL ASSESSMENT  LB Dressing Seated: Not Tested      ACTIVITY TOLERANCE: WFL                         O2 SATURATIONS       EDUCATION PROVIDED  Patient : Role of Occupational Therapy; Plan of Care; Discharge Recommendations; Functional Transfer Techniques; Fall Prevention; Surgical Precautions; Posture/Positioning; Energy Conservation; Proper Body Mechanics  Patient's Response to Education: Verbalized Understanding; Returned Demonstration      Equipment used: RW  Demonstrates functional use, Would benefit from additional trial      Patient End of Session: Up in chair;Needs met;Call light within reach;RN aware of session/findings;All patient questions and concerns addressed;Alarm set    SUBJECTIVE  \"Pain is between a 4 and 8 depending how I move.\"    PAIN ASSESSMENT  Ratin (4 to 8)  Location: back  Management Techniques: Activity promotion;Body mechanics;Relaxation;Breathing techniques;Repositioning     OBJECTIVE  Precautions: Lumbar brace;Spine;Bed/chair alarm (Bilateral post op shoes, bunny boots when in bed to offload)    AM-PAC ‘6-Clicks’  Inpatient Daily Activity Short Form  -   Putting on and taking off regular lower body clothing?: Total  -   Bathing (including washing, rinsing, drying)?: A Lot  -   Toileting, which includes using toilet, bedpan or urinal? : Total  -   Putting on and taking off regular upper body clothing?: A Little  -   Taking care of personal grooming such as brushing teeth?: A Little  -   Eating meals?: None    AM-PAC Score:  Score: 14  Approx Degree of Impairment: 59.67%  Standardized Score (AM-PAC Scale): 33.39    PLAN  OT Treatment Plan: Balance activities;ADL training;Functional transfer training;Endurance training;Patient/Family education;Patient/Family training;Compensatory technique education  Rehab Potential : Good  Frequency: 3x/week    OT Goals:     All goals ongoing 01/08    ADL Goals   Patient will perform lower body dressing:  with mod assist  Patient will perform toileting: with mod assist     Functional Transfer Goals  Patient will transfer from sit to supine:  with supervision  Patient will transfer from supine to sit:  with supervision  Patient will transfer from sit to stand:  with mod assist  Patient will transfer to bedside commode:  with mod assist    OT Session Time: 15 minutes  Therapeutic Activity: 15 minutes                   Video Swallow Study Notes    No notes of this type exist for this encounter.     SLP Notes    No notes of this type exist for this encounter.     Immunizations     Name Date      TDAP 09/17/15       Multidisciplinary Problems     Active Goals     Not on file          Resolved Goals        Problem: Patient/Family Goals    Goal Priority Disciplines Outcome Interventions   Patient/Family Long Term Goal   (Resolved)     Interdisciplinary Adequate for Discharge    Description: Patient's Long Term Goal: to return to rehab and improve ambulating    Interventions:  - follow MD king  - PT/OT  - pain control  - See additional Care Plan goals for specific interventions   Patient/Family Short  Term Goal   (Resolved)     Interdisciplinary Adequate for Discharge    Description: Patient's Short Term Goal: pain control    Interventions:   - PRN pain meds  - nonpharm intervention as needed  - activity as tolerated  - See additional Care Plan goals for specific interventions

## (undated) NOTE — MR AVS SNAPSHOT
[facility], [address] [id number] [id number]               Thank you for choosing us for your health care visit with [address] [place] [address], .   We are glad to serve you and happy to provide you with this s Referral Orders      Normal Orders This Visit    CHIROPRACTIC  - INTERNAL [[id number] CUSTOM]  Order #:  [id number]         **REFERRAL REQUEST**    Your physician has referred you to a specialist.  Your physician or the clinic staff will provide you with the · Respiratory.  Pneumonia, pneumothorax, or pneumonitis (inflammation of the lining of the chest and lungs)  · Gastrointestinal. Esophageal reflux, heartburn, or gallbladder disease  · Anxiety and panic disorders  · Nerve compression and neuritis  · Miscell take more time to appear. Many problems not related to your heart can cause chest pain. These include:  · Musculoskeletal. Costochondritis, an inflammation of the tissues around the ribs that can occur from trauma or overuse injuries  · Respiratory.  Pneumon Your blood pressure was unusually high today. This can occur if you’ve missed doses of your blood pressure medicine. Or it can happen if you are taking other medicines.  These include some asthma inhalers, decongestants, diet pills, and street drugs like co amphetamine or cocaine could be lethal for someone with hypertension. Never take these. · Limit how much caffeine you drink. Or switch to noncaffeinated beverages. · Stop smoking. If you are a long-time smoker, this can be hard.  Enroll in a stop-smoking Current Medications          This list is accurate as of: 4/21/17  8:31 AM.  Always use your most recent med list.                Losartan Potassium-HCTZ 100-25 MG Tabs   Take 1 tablet by mouth once daily.    Commonly known as:  HYZAAR           [employer] Healthy Diet and Regular Exercise  [employer] of [address] [place], [place] [address] [facility] for making healthy food choices  -   Enjoy your food, but eat less. Fully enjoy your food when eating. Don’t eat while distracted and slow down. Avoid over sized portions.    Katelyn Lozano